# Patient Record
Sex: FEMALE | Race: AMERICAN INDIAN OR ALASKA NATIVE | HISPANIC OR LATINO | Employment: PART TIME | URBAN - METROPOLITAN AREA
[De-identification: names, ages, dates, MRNs, and addresses within clinical notes are randomized per-mention and may not be internally consistent; named-entity substitution may affect disease eponyms.]

---

## 2017-11-28 ENCOUNTER — HOSPITAL ENCOUNTER (EMERGENCY)
Facility: HOSPITAL | Age: 22
Discharge: HOME | End: 2017-11-28
Attending: EMERGENCY MEDICINE
Payer: COMMERCIAL

## 2017-11-28 VITALS
HEIGHT: 65 IN | SYSTOLIC BLOOD PRESSURE: 140 MMHG | DIASTOLIC BLOOD PRESSURE: 80 MMHG | TEMPERATURE: 98.1 F | WEIGHT: 237 LBS | OXYGEN SATURATION: 99 % | HEART RATE: 89 BPM | RESPIRATION RATE: 18 BRPM | BODY MASS INDEX: 39.49 KG/M2

## 2017-11-28 DIAGNOSIS — B37.3 VAGINAL CANDIDIASIS: Primary | ICD-10-CM

## 2017-11-28 LAB
CLARITY, POC: NORMAL
COLOR, POC: YELLOW
EXT BILIRUBIN, UA: NEGATIVE
EXT BLOOD URINE: NEGATIVE
EXT GLUCOSE, UA: NEGATIVE
EXT KETONES: NORMAL
EXT NITRITE, UA: NEGATIVE
EXT PH, UA: 5
EXT PREG TEST URINE: NEGATIVE
EXT PROTEIN, UA: NORMAL
EXT SPECIFIC GRAVITY, UA: 1.03
EXT UROBILINOGEN: NEGATIVE
WBC # BLD EST: NEGATIVE 10*3/UL

## 2017-11-28 PROCEDURE — 87591 N.GONORRHOEAE DNA AMP PROB: CPT | Performed by: EMERGENCY MEDICINE

## 2017-11-28 PROCEDURE — 81025 URINE PREGNANCY TEST: CPT | Performed by: EMERGENCY MEDICINE

## 2017-11-28 PROCEDURE — 87491 CHLMYD TRACH DNA AMP PROBE: CPT | Performed by: EMERGENCY MEDICINE

## 2017-11-28 PROCEDURE — 99283 EMERGENCY DEPT VISIT LOW MDM: CPT

## 2017-11-28 PROCEDURE — 81002 URINALYSIS NONAUTO W/O SCOPE: CPT | Performed by: EMERGENCY MEDICINE

## 2017-11-28 RX ORDER — FLUCONAZOLE 100 MG/1
200 TABLET ORAL ONCE
Status: COMPLETED | OUTPATIENT
Start: 2017-11-28 | End: 2017-11-28

## 2017-11-28 RX ADMIN — FLUCONAZOLE 200 MG: 100 TABLET ORAL at 19:37

## 2017-11-28 NOTE — ED PROVIDER NOTES
History  Chief Complaint   Patient presents with    Vaginal Itching     Patient says the symptoms have been going on for about 1 week  Has itching, odorous urine and discharge  History provided by:  Patient   used: No    Vaginal Itching   Location:  Vagina  Quality:  Itching, white discharge  Severity:  Moderate  Onset quality:  Gradual  Duration:  1 week  Timing:  Constant  Progression:  Worsening  Chronicity:  New  Context:  No antibiotics  Relieved by:  Nothing  Worsened by:  Nothing  Ineffective treatments:  None tried  Associated symptoms: no abdominal pain, no chest pain, no congestion, no cough, no diarrhea, no fatigue, no fever, no headaches, no nausea, no rash, no shortness of breath, no sore throat and no vomiting        None       History reviewed  No pertinent past medical history  History reviewed  No pertinent surgical history  History reviewed  No pertinent family history  I have reviewed and agree with the history as documented  Social History   Substance Use Topics    Smoking status: Current Every Day Smoker     Packs/day: 0 25    Smokeless tobacco: Never Used    Alcohol use Yes      Comment: socially        Review of Systems   Constitutional: Negative for activity change, appetite change, fatigue, fever and unexpected weight change  HENT: Negative for congestion, sore throat and voice change  Eyes: Negative for pain and visual disturbance  Respiratory: Negative for cough, chest tightness and shortness of breath  Cardiovascular: Negative for chest pain  Gastrointestinal: Negative for abdominal pain, diarrhea, nausea and vomiting  Endocrine: Negative for polyphagia and polyuria  Genitourinary: Negative for difficulty urinating, dysuria, flank pain, frequency and urgency  Musculoskeletal: Negative for back pain, gait problem, neck pain and neck stiffness  Skin: Negative for color change and rash     Allergic/Immunologic: Negative for immunocompromised state  Neurological: Negative for dizziness, syncope, speech difficulty, light-headedness, numbness and headaches  Hematological: Does not bruise/bleed easily  Psychiatric/Behavioral: Negative for confusion and decreased concentration  Physical Exam  ED Triage Vitals [11/28/17 1642]   Temperature Pulse Respirations Blood Pressure SpO2   98 1 °F (36 7 °C) 90 18 141/66 98 %      Temp Source Heart Rate Source Patient Position - Orthostatic VS BP Location FiO2 (%)   Oral Monitor Sitting Left arm --      Pain Score       No Pain           Orthostatic Vital Signs  Vitals:    11/28/17 1642   BP: 141/66   Pulse: 90   Patient Position - Orthostatic VS: Sitting       Physical Exam   Constitutional: She is oriented to person, place, and time  She appears well-developed and well-nourished  HENT:   Head: Normocephalic and atraumatic  Eyes: Conjunctivae and EOM are normal  Pupils are equal, round, and reactive to light  No scleral icterus  Neck: Normal range of motion  Neck supple  No tracheal deviation present  Cardiovascular: Normal rate and regular rhythm  Pulmonary/Chest: Effort normal and breath sounds normal  No respiratory distress  Abdominal: Soft  She exhibits no distension  There is no tenderness  There is no rebound and no guarding  Genitourinary:   Genitourinary Comments: Normal external female genitalia  There is scant amount of thick white discharge in the vaginal vault  Musculoskeletal: Normal range of motion  She exhibits no tenderness or deformity  Lymphadenopathy:     She has no cervical adenopathy  Neurological: She is alert and oriented to person, place, and time  No gross focal sensory or motor deficits   Skin: Skin is warm and dry  No rash noted  She is not diaphoretic  Psychiatric: She has a normal mood and affect  Vitals reviewed        ED Medications  Medications   fluconazole (DIFLUCAN) tablet 200 mg (200 mg Oral Given 11/28/17 1937) Diagnostic Studies  Results Reviewed     Procedure Component Value Units Date/Time    POCT pregnancy, urine [[id number]]  (Normal) Resulted:  11/28/17 1818    Lab Status:  Final result Updated:  11/28/17 1818     EXT PREG TEST UR (Ref: Negative) NEGATIVE    POCT urinalysis dipstick [[id number]]  (Normal) Resulted:  11/28/17 1816    Lab Status:  Final result Specimen:  Urine Updated:  11/28/17 1818     Color, UA YELLOW     Clarity, UA CLARITY     EXT Glucose, UA NEGATIVE     EXT Bilirubin, UA (Ref: Negative) NEGATIVE     EXT Ketones, UA (Ref: Negative) TRACE     EXT Spec Grav, UA 1 030     EXT Blood, UA (Ref: Negative) NEGATIVE     EXT pH, UA 5 0     EXT Protein, UA (Ref: Negative) TRACE     EXT Urobilinogen, UA (Ref: 0 2- 1 0) NEGATIVE     EXT Leukocytes, UA (Ref: Negative) NEGATIVE     EXT Nitrite, UA (Ref: Negative) NEGATIVE    Chlamydia/GC amplified DNA by PCR [[id number]] Collected:  11/28/17 1804    Lab Status: In process Specimen:  Urine, Other Updated:  11/28/17 1814                 No orders to display              Procedures  Procedures       Phone Contacts  ED Phone Contact    ED Course  ED Course                                MDM  Number of Diagnoses or Management Options  Vaginal candidiasis: new and requires workup  Diagnosis management comments: 53-year-old female complains of thick white discharge per vagina for the past four days  States that symptoms are similar to previous episodes of yeast infection  Urine HCG today is negative for pregnancy  No sign of infection on urine dip  Pelvic exam is suggestive of vaginal candidiasis  Will treat with Diflucan  Discharged to follow up with OBGYN as necessary    Discussed return precautions       Amount and/or Complexity of Data Reviewed  Clinical lab tests: reviewed and ordered  Review and summarize past medical records: yes      CritCare Time    Disposition  Final diagnoses:   Vaginal candidiasis     Time reflects when diagnosis was documented in both MDM as applicable and the Disposition within this note     Time User Action Codes Description Comment    11/28/2017  7:43 PM Irasema Leiva Add [B37 3] Vaginal candidiasis       ED Disposition     ED Disposition Condition Comment    Discharge  Henry Hernandez discharge to home/self care  Condition at discharge: Good        Follow-up Information     Follow up With Specialties Details Why Contact Info    OB/GYN   Please arrange for follow-up with your OBGYN  If you have new or worsening symptoms please call your doctor or return to the emergency department         Patient's Medications    No medications on file     No discharge procedures on file      ED Provider  Electronically Signed by           Courtney Goodrich MD  11/28/17 2555

## 2017-11-29 LAB
CHLAMYDIA DNA CVX QL NAA+PROBE: NORMAL
N GONORRHOEA DNA GENITAL QL NAA+PROBE: NORMAL

## 2017-11-29 NOTE — DISCHARGE INSTRUCTIONS
Vulvovaginal Candidiasis   WHAT YOU NEED TO KNOW:   Vulvovaginal candidiasis, or yeast infection, is a common vaginal infection  Vulvovaginal candidiasis is caused by a fungus, or yeast-like germ  Fungi are normally found in your vagina  When there are too many fungi, it can cause an infection  DISCHARGE INSTRUCTIONS:   Return to the emergency department if:   · You have fever and chills  · You are bleeding from your vagina and it is not your monthly period  · You develop abdominal or pelvic pain  Contact your healthcare provider if:   · Your signs and symptoms get worse, even after treatment  · You have questions or concerns about your condition or care  Medicines:   · Medicines  help treat the fungal infection and decrease inflammation  The medicine may be a pill, topical cream, or vaginal suppository  · Take your medicine as directed  Contact your healthcare provider if you think your medicine is not helping or if you have side effects  Tell him of her if you are allergic to any medicine  Keep a list of the medicines, vitamins, and herbs you take  Include the amounts, and when and why you take them  Bring the list or the pill bottles to follow-up visits  Carry your medicine list with you in case of an emergency  Manage your symptoms:   · Wear cotton underwear  · Keep the vaginal area clean and dry  · Do not have sex until your symptoms are gone  · Do not douche  · Do not use feminine hygiene sprays, powders, or bubble bath  Prevent another infection:   · Take showers instead of baths  · Eat yogurt  · Limit the amount of alcohol you drink'    · Control your blood sugar if you are diabetic  · Limit your time in hot tubs  Follow up with your healthcare provider as directed:  Write down your questions so you remember to ask them during your visits     © 2017 [employer] Information is for End User's use only and may not be sold, redistributed or otherwise used for commercial purposes  All illustrations and images included in CareNotes® are the copyrighted property of A D [employer] M [employer]  or [employer]  The above information is an  only  It is not intended as medical advice for individual conditions or treatments  Talk to your doctor, nurse or pharmacist before following any medical regimen to see if it is safe and effective for you

## 2018-03-13 ENCOUNTER — APPOINTMENT (OUTPATIENT)
Dept: LAB | Facility: HOSPITAL | Age: 23
End: 2018-03-13

## 2018-03-13 ENCOUNTER — TRANSCRIBE ORDERS (OUTPATIENT)
Dept: OCCUPATIONAL MEDICINE | Facility: CLINIC | Age: 23
End: 2018-03-13

## 2018-03-13 DIAGNOSIS — Z02.1 PRE-EMPLOYMENT HEALTH SCREENING EXAMINATION: ICD-10-CM

## 2018-03-13 DIAGNOSIS — Z02.1 PRE-EMPLOYMENT HEALTH SCREENING EXAMINATION: Primary | ICD-10-CM

## 2018-03-13 LAB — RUBV IGG SERPL IA-ACNC: >175 IU/ML

## 2018-03-13 PROCEDURE — 86762 RUBELLA ANTIBODY: CPT

## 2018-03-13 PROCEDURE — 86787 VARICELLA-ZOSTER ANTIBODY: CPT

## 2018-03-13 PROCEDURE — 86480 TB TEST CELL IMMUN MEASURE: CPT

## 2018-03-13 PROCEDURE — 86765 RUBEOLA ANTIBODY: CPT

## 2018-03-13 PROCEDURE — 36415 COLL VENOUS BLD VENIPUNCTURE: CPT

## 2018-03-13 PROCEDURE — 86735 MUMPS ANTIBODY: CPT

## 2018-03-15 LAB
ANNOTATION COMMENT IMP: NORMAL
GAMMA INTERFERON BACKGROUND BLD IA-ACNC: 0.04 IU/ML
M TB IFN-G BLD-IMP: NEGATIVE
M TB IFN-G CD4+ BCKGRND COR BLD-ACNC: 0.01 IU/ML
M TB IFN-G CD4+ T-CELLS BLD-ACNC: 0.05 IU/ML
MEV IGG SER QL: NORMAL
MEV IGM SER-ACNC: <0.8 AU (ref 0–0.79)
MITOGEN IGNF BLD-ACNC: 6.52 IU/ML
MUV IGG SER QL: NORMAL
QUANTIFERON-TB GOLD IN TUBE: NORMAL
SERVICE CMNT-IMP: NORMAL
VZV IGG SER IA-ACNC: NORMAL

## 2018-04-12 ENCOUNTER — HOSPITAL ENCOUNTER (EMERGENCY)
Facility: HOSPITAL | Age: 23
Discharge: LEFT BEFORE DISCHARGE | End: 2018-04-12
Attending: EMERGENCY MEDICINE
Payer: COMMERCIAL

## 2018-04-12 VITALS
HEART RATE: 98 BPM | HEIGHT: 66 IN | OXYGEN SATURATION: 98 % | SYSTOLIC BLOOD PRESSURE: 118 MMHG | WEIGHT: 260.2 LBS | RESPIRATION RATE: 17 BRPM | DIASTOLIC BLOOD PRESSURE: 80 MMHG | BODY MASS INDEX: 41.82 KG/M2 | TEMPERATURE: 99 F

## 2018-04-12 LAB
BILIRUB UR QL STRIP: NEGATIVE
CLARITY UR: CLEAR
COLOR UR: YELLOW
EXT PREG TEST URINE: POSITIVE
GLUCOSE UR STRIP-MCNC: NEGATIVE MG/DL
HGB UR QL STRIP.AUTO: NEGATIVE
KETONES UR STRIP-MCNC: NEGATIVE MG/DL
LEUKOCYTE ESTERASE UR QL STRIP: NEGATIVE
NITRITE UR QL STRIP: NEGATIVE
PH UR STRIP.AUTO: 6 [PH] (ref 4.5–8)
PROT UR STRIP-MCNC: NEGATIVE MG/DL
SP GR UR STRIP.AUTO: 1.02 (ref 1–1.03)
UROBILINOGEN UR QL STRIP.AUTO: 0.2 E.U./DL

## 2018-04-12 PROCEDURE — 81003 URINALYSIS AUTO W/O SCOPE: CPT | Performed by: EMERGENCY MEDICINE

## 2018-04-12 PROCEDURE — 99284 EMERGENCY DEPT VISIT MOD MDM: CPT

## 2018-04-12 PROCEDURE — 81025 URINE PREGNANCY TEST: CPT | Performed by: EMERGENCY MEDICINE

## 2018-04-13 NOTE — ED NOTES
Pt states that she is still in pain but her ride is here so she wants to leave        Reggie Flores RN  04/12/18 1277

## 2019-05-10 ENCOUNTER — OFFICE VISIT (OUTPATIENT)
Dept: INTERNAL MEDICINE CLINIC | Facility: CLINIC | Age: 24
End: 2019-05-10
Payer: COMMERCIAL

## 2019-05-10 VITALS
RESPIRATION RATE: 18 BRPM | OXYGEN SATURATION: 98 % | DIASTOLIC BLOOD PRESSURE: 72 MMHG | HEIGHT: 65 IN | HEART RATE: 78 BPM | BODY MASS INDEX: 44.65 KG/M2 | WEIGHT: 268 LBS | SYSTOLIC BLOOD PRESSURE: 132 MMHG

## 2019-05-10 DIAGNOSIS — M54.2 NECK PAIN: ICD-10-CM

## 2019-05-10 DIAGNOSIS — M54.9 UPPER BACK PAIN: ICD-10-CM

## 2019-05-10 DIAGNOSIS — L70.9 ACNE, UNSPECIFIED ACNE TYPE: ICD-10-CM

## 2019-05-10 DIAGNOSIS — E66.01 CLASS 3 SEVERE OBESITY DUE TO EXCESS CALORIES WITHOUT SERIOUS COMORBIDITY WITH BODY MASS INDEX (BMI) OF 40.0 TO 44.9 IN ADULT (HCC): Primary | ICD-10-CM

## 2019-05-10 DIAGNOSIS — N62 LARGE BREASTS: ICD-10-CM

## 2019-05-10 PROBLEM — E66.813 CLASS 3 SEVERE OBESITY DUE TO EXCESS CALORIES WITHOUT SERIOUS COMORBIDITY WITH BODY MASS INDEX (BMI) OF 40.0 TO 44.9 IN ADULT (HCC): Status: ACTIVE | Noted: 2019-05-10

## 2019-05-10 PROCEDURE — 3008F BODY MASS INDEX DOCD: CPT | Performed by: PHYSICIAN ASSISTANT

## 2019-05-10 PROCEDURE — 99204 OFFICE O/P NEW MOD 45 MIN: CPT | Performed by: PHYSICIAN ASSISTANT

## 2019-05-10 RX ORDER — METHOCARBAMOL 500 MG/1
TABLET, FILM COATED ORAL
COMMUNITY
Start: 2019-02-09 | End: 2019-07-10

## 2019-05-10 RX ORDER — MELOXICAM 15 MG/1
TABLET ORAL
COMMUNITY
Start: 2019-02-09 | End: 2019-07-10

## 2019-05-13 RX ORDER — TRETINOIN 0.5 MG/G
CREAM TOPICAL
COMMUNITY
Start: 2019-02-22 | End: 2019-07-12

## 2019-05-13 RX ORDER — CLINDAMYCIN HYDROCHLORIDE 300 MG/1
CAPSULE ORAL
COMMUNITY
Start: 2019-02-26 | End: 2019-07-10

## 2019-05-31 ENCOUNTER — EVALUATION (OUTPATIENT)
Dept: PHYSICAL THERAPY | Facility: CLINIC | Age: 24
End: 2019-05-31
Payer: COMMERCIAL

## 2019-05-31 DIAGNOSIS — M54.2 NECK PAIN: ICD-10-CM

## 2019-05-31 DIAGNOSIS — M54.9 UPPER BACK PAIN: ICD-10-CM

## 2019-05-31 PROCEDURE — 97112 NEUROMUSCULAR REEDUCATION: CPT | Performed by: PHYSICAL THERAPIST

## 2019-05-31 PROCEDURE — 97140 MANUAL THERAPY 1/> REGIONS: CPT | Performed by: PHYSICAL THERAPIST

## 2019-05-31 PROCEDURE — 97110 THERAPEUTIC EXERCISES: CPT | Performed by: PHYSICAL THERAPIST

## 2019-05-31 PROCEDURE — 97161 PT EVAL LOW COMPLEX 20 MIN: CPT | Performed by: PHYSICAL THERAPIST

## 2019-06-03 ENCOUNTER — OFFICE VISIT (OUTPATIENT)
Dept: PHYSICAL THERAPY | Facility: CLINIC | Age: 24
End: 2019-06-03
Payer: COMMERCIAL

## 2019-06-03 DIAGNOSIS — M54.2 NECK PAIN: Primary | ICD-10-CM

## 2019-06-03 DIAGNOSIS — M54.9 UPPER BACK PAIN: ICD-10-CM

## 2019-06-03 PROCEDURE — 97110 THERAPEUTIC EXERCISES: CPT | Performed by: PHYSICAL THERAPIST

## 2019-06-03 PROCEDURE — 97140 MANUAL THERAPY 1/> REGIONS: CPT | Performed by: PHYSICAL THERAPIST

## 2019-06-03 PROCEDURE — 97112 NEUROMUSCULAR REEDUCATION: CPT | Performed by: PHYSICAL THERAPIST

## 2019-06-06 ENCOUNTER — APPOINTMENT (OUTPATIENT)
Dept: PHYSICAL THERAPY | Facility: CLINIC | Age: 24
End: 2019-06-06
Payer: COMMERCIAL

## 2019-06-14 ENCOUNTER — APPOINTMENT (OUTPATIENT)
Dept: PHYSICAL THERAPY | Facility: CLINIC | Age: 24
End: 2019-06-14
Payer: COMMERCIAL

## 2019-06-21 ENCOUNTER — TELEPHONE (OUTPATIENT)
Dept: PHYSICAL THERAPY | Facility: CLINIC | Age: 24
End: 2019-06-21

## 2019-07-10 ENCOUNTER — HOSPITAL ENCOUNTER (EMERGENCY)
Facility: HOSPITAL | Age: 24
Discharge: HOME | End: 2019-07-10
Attending: EMERGENCY MEDICINE | Admitting: EMERGENCY MEDICINE
Payer: COMMERCIAL

## 2019-07-10 ENCOUNTER — TELEPHONE (OUTPATIENT)
Dept: INTERNAL MEDICINE CLINIC | Facility: CLINIC | Age: 24
End: 2019-07-10

## 2019-07-10 VITALS
BODY MASS INDEX: 44.71 KG/M2 | OXYGEN SATURATION: 98 % | DIASTOLIC BLOOD PRESSURE: 78 MMHG | WEIGHT: 278.22 LBS | SYSTOLIC BLOOD PRESSURE: 141 MMHG | HEIGHT: 66 IN | TEMPERATURE: 98.1 F | RESPIRATION RATE: 18 BRPM | HEART RATE: 72 BPM

## 2019-07-10 DIAGNOSIS — N89.8 VAGINAL DISCHARGE: Primary | ICD-10-CM

## 2019-07-10 LAB
BILIRUB UR QL STRIP: NEGATIVE
CLARITY UR: CLEAR
COLOR UR: YELLOW
EXT PREG TEST URINE: NEGATIVE
EXT. CONTROL ED NAV: NORMAL
GLUCOSE UR STRIP-MCNC: NEGATIVE MG/DL
HGB UR QL STRIP.AUTO: NEGATIVE
KETONES UR STRIP-MCNC: NEGATIVE MG/DL
LEUKOCYTE ESTERASE UR QL STRIP: NEGATIVE
NITRITE UR QL STRIP: NEGATIVE
PH UR STRIP.AUTO: 5.5 [PH]
PROT UR STRIP-MCNC: NEGATIVE MG/DL
SP GR UR STRIP.AUTO: 1.02 (ref 1–1.03)
UROBILINOGEN UR QL STRIP.AUTO: 0.2 E.U./DL

## 2019-07-10 PROCEDURE — 81003 URINALYSIS AUTO W/O SCOPE: CPT | Performed by: EMERGENCY MEDICINE

## 2019-07-10 PROCEDURE — 99283 EMERGENCY DEPT VISIT LOW MDM: CPT | Performed by: EMERGENCY MEDICINE

## 2019-07-10 PROCEDURE — 81025 URINE PREGNANCY TEST: CPT | Performed by: EMERGENCY MEDICINE

## 2019-07-10 PROCEDURE — 99283 EMERGENCY DEPT VISIT LOW MDM: CPT

## 2019-07-10 PROCEDURE — 87591 N.GONORRHOEAE DNA AMP PROB: CPT | Performed by: EMERGENCY MEDICINE

## 2019-07-10 PROCEDURE — 87491 CHLMYD TRACH DNA AMP PROBE: CPT | Performed by: EMERGENCY MEDICINE

## 2019-07-10 RX ORDER — FLUCONAZOLE 150 MG/1
150 TABLET ORAL ONCE
Qty: 1 TABLET | Refills: 0 | Status: SHIPPED | OUTPATIENT
Start: 2019-07-10 | End: 2019-07-10

## 2019-07-10 RX ORDER — FLUCONAZOLE 150 MG/1
150 TABLET ORAL ONCE
Status: COMPLETED | OUTPATIENT
Start: 2019-07-10 | End: 2019-07-10

## 2019-07-10 RX ADMIN — FLUCONAZOLE 150 MG: 150 TABLET ORAL at 16:38

## 2019-07-10 NOTE — TELEPHONE ENCOUNTER
Will make sure her PT is done before her next appt 08/16  She also would like to know if you would like any routine blood work done before this appt? Call back #[phone]   leave message

## 2019-07-10 NOTE — ED PROVIDER NOTES
History  Chief Complaint   Patient presents with    Vaginal Discharge     Patient presents with vaginal discharge and itchiness for the last few days  States she would also like to be tested for STIs       History provided by:  Patient  Vaginal Discharge   Quality:  White  Severity:  Moderate  Onset quality:  Gradual  Duration:  1 week  Timing:  Constant  Progression:  Worsening  Chronicity:  New  Context: spontaneously    Context: not recent antibiotic use    Relieved by:  Nothing  Worsened by:  Nothing  Ineffective treatments:  None tried  Associated symptoms: vaginal itching    Associated symptoms: no abdominal pain, no dyspareunia, no dysuria, no fever and no vomiting    Risk factors: no new sexual partner, no STI (but wants to be checked) and no STI exposure        Prior to Admission Medications   Prescriptions Last Dose Informant Patient Reported? Taking? clindamycin (CLEOCIN) 300 MG capsule   Yes No   meloxicam (MOBIC) 15 mg tablet   Yes No   methocarbamol (ROBAXIN) 500 mg tablet   Yes No   mupirocin (BACTROBAN) 2 % ointment   Yes No   tretinoin (REFISSA) 0 05 % cream   Yes No   tretinoin (RETIN-A) 0 05 % cream   Yes No   Sig:   Facility-Administered Medications: None       Past Medical History:   Diagnosis Date    High cholesterol     Morbid obesity with BMI of 45 0-49 9, adult ([facility] [address] )     Pre-operative laboratory examination        History reviewed  No pertinent surgical history  Family History   Problem Relation Age of Onset    Coronary artery disease Mother     Diabetes Sister     Coronary artery disease Maternal Grandmother     Hyperlipidemia Maternal Grandmother     Hyperlipidemia Maternal Grandfather     Diabetes Maternal Grandfather     Alcohol abuse Neg Hx     Substance Abuse Neg Hx     Mental illness Neg Hx      I have reviewed and agree with the history as documented      Social History     Tobacco Use    Smoking status: Former Smoker     Packs/day: 0 25     Years: 6 00 Pack years: 1 50    Smokeless tobacco: Never Used    Tobacco comment: stopped   Substance Use Topics    Alcohol use: Yes     Comment: socially-holidays    Drug use: No        Review of Systems   Constitutional: Negative for fever  Gastrointestinal: Negative for abdominal pain and vomiting  Genitourinary: Positive for vaginal discharge  Negative for dyspareunia and dysuria  All other systems reviewed and are negative  Physical Exam  Physical Exam   Constitutional: She appears well-developed and well-nourished  HENT:   Head: Normocephalic and atraumatic  Eyes: Pupils are equal, round, and reactive to light  EOM are normal    Cardiovascular: Normal rate and regular rhythm  Pulmonary/Chest: Effort normal and breath sounds normal    Abdominal: Soft  Genitourinary: There is no rash, tenderness or lesion on the right labia  There is no rash, tenderness or lesion on the left labia  Cervix exhibits discharge (mild yellow white discharge)  Cervix exhibits no motion tenderness  Musculoskeletal: She exhibits no edema  Neurological: She is alert  No cranial nerve deficit  She exhibits normal muscle tone  Skin: Skin is warm  Psychiatric: She has a normal mood and affect  Vitals reviewed        Vital Signs  ED Triage Vitals [07/10/19 1615]   Temp Pulse Respirations Blood Pressure SpO2   -- 72 18 141/78 98 %      Temp src Heart Rate Source Patient Position - Orthostatic VS BP Location FiO2 (%)   -- Monitor Sitting Right arm --      Pain Score       --           Vitals:    07/10/19 1615   BP: 141/78   Pulse: 72   Patient Position - Orthostatic VS: Sitting         Visual Acuity      ED Medications  Medications - No data to display    Diagnostic Studies  Results Reviewed     None                 No orders to display              Procedures  Procedures       ED Course                               MDM  Number of Diagnoses or Management Options  Vaginal discharge: new and requires workup  Diagnosis management comments: Pt declining current STD treatment and will await phone call if positive  Has appointment with OB/GYN in next few weeks  Amount and/or Complexity of Data Reviewed  Clinical lab tests: ordered and reviewed        Disposition  Final diagnoses:   None     ED Disposition     None      Follow-up Information    None         Patient's Medications   Discharge Prescriptions    No medications on file     No discharge procedures on file      ED Provider  Electronically Signed by           Mahesh Valencia MD  07/10/19 9716

## 2019-07-11 LAB
C TRACH DNA SPEC QL NAA+PROBE: NEGATIVE
N GONORRHOEA DNA SPEC QL NAA+PROBE: NEGATIVE

## 2019-07-12 ENCOUNTER — OFFICE VISIT (OUTPATIENT)
Dept: DERMATOLOGY | Facility: CLINIC | Age: 24
End: 2019-07-12
Payer: COMMERCIAL

## 2019-07-12 DIAGNOSIS — L70.9 ACNE, UNSPECIFIED ACNE TYPE: Primary | ICD-10-CM

## 2019-07-12 DIAGNOSIS — L91.0 KELOID OF SKIN: ICD-10-CM

## 2019-07-12 PROCEDURE — 99203 OFFICE O/P NEW LOW 30 MIN: CPT | Performed by: DERMATOLOGY

## 2019-07-12 PROCEDURE — 11900 INJECT SKIN LESIONS </W 7: CPT | Performed by: DERMATOLOGY

## 2019-07-12 RX ORDER — TRIAMCINOLONE ACETONIDE 40 MG/ML
40 INJECTION, SUSPENSION INTRA-ARTICULAR; INTRAMUSCULAR ONCE
Status: COMPLETED | OUTPATIENT
Start: 2019-07-12 | End: 2019-07-12

## 2019-07-12 RX ADMIN — TRIAMCINOLONE ACETONIDE 40 MG: 40 INJECTION, SUSPENSION INTRA-ARTICULAR; INTRAMUSCULAR at 13:06

## 2019-07-12 NOTE — PROGRESS NOTES
[facility] DERMATOLOGY  [place] [address] [place] [id number]  [phone]  [phone]     MRN: [id number] : 1995  Encounter: [id number]  Patient Information: Delilah Waller  Chief complaint: keloids    History of present illness:  26year old female with history of acne and history of keloids presents for overall check and concerned regarding keloids patient reports that she has had this treated previously with some response  Past Medical History:   Diagnosis Date    High cholesterol     Morbid obesity with BMI of 45 0-49 9, adult ([facility] [address] )     Pre-operative laboratory examination      History reviewed  No pertinent surgical history  Social History   Social History     Substance and Sexual Activity   Alcohol Use Yes    Comment: socially-holidays     Social History     Substance and Sexual Activity   Drug Use No     Social History     Tobacco Use   Smoking Status Former Smoker    Packs/day: 0 25    Years: 6 00    Pack years: 1 50   Smokeless Tobacco Never Used   Tobacco Comment    stopped     Family History   Problem Relation Age of Onset    Coronary artery disease Mother     Diabetes Sister     Coronary artery disease Maternal Grandmother     Hyperlipidemia Maternal Grandmother     Hyperlipidemia Maternal Grandfather     Diabetes Maternal Grandfather     Alcohol abuse Neg Hx     Substance Abuse Neg Hx     Mental illness Neg Hx      Meds/Allergies   No Known Allergies    Meds:  Prior to Admission medications    Medication Sig Start Date End Date Taking?  Authorizing Provider   mupirocin (BACTROBAN) 2 % ointment  19   Historical Provider, MD   tretinoin (REFISSA) 0 05 % cream  19   Historical Provider, MD   tretinoin (RETIN-A) 0 05 % cream   19   Historical Provider, MD       Subjective:     Review of Systems:    General: negative for - chills, fatigue, fever,  weight gain or weight loss  Psychological: negative for - anxiety, behavioral disorder, concentration difficulties, decreased libido, depression, irritability, memory difficulties, mood swings, sleep disturbances or suicidal ideation  ENT: negative for - hearing difficulties , nasal congestion, nasal discharge, oral lesions, sinus pain, sneezing, sore throat  Allergy and Immunology: negative for - hives, insect bite sensitivity,  Hematological and Lymphatic: negative for - bleeding problems, blood clots,bruising, swollen lymph nodes  Endocrine: negative for - hair pattern changes, hot flashes, malaise/lethargy, mood swings, palpitations, polydipsia/polyuria, skin changes, temperature intolerance or unexpected weight change  Respiratory: negative for - cough, hemoptysis, orthopnea, shortness of breath, or wheezing  Cardiovascular: negative for - chest pain, dyspnea on exertion, edema,  Gastrointestinal: negative for - abdominal pain, nausea/vomiting  Genito-Urinary: negative for - dysuria, incontinence, irregular/heavy menses or urinary frequency/urgency  Musculoskeletal: negative for - gait disturbance, joint pain, joint stiffness, joint swelling, muscle pain, muscular weakness  Dermatological:  As in HPI  Neurological: negative for confusion, dizziness, headaches, impaired coordination/balance, memory loss, numbness/tingling, seizures, speech problems, tremors or weakness       Objective: There were no vitals taken for this visit      Physical Exam:    General Appearance:    Alert, cooperative, no distress   Head:    Normocephalic, without obvious abnormality, atraumatic           Skin:   A full skin exam was performed including scalp, head scalp, eyes, ears, nose, lips, neck, chest, axilla, abdomen, back, buttocks, bilateral upper extremities, bilateral lower extremities, hands, feet, fingers, toes, fingernails, and toenails some comedones papules noted on the face no acne noted on the torso numerous hypertrophic to keloid scars noted between the on the posterior shoulders 1 on the chest nothing else of concern noted  Intralesional Injection Procedure Note  Diagnosis:  KeloidLocation:  Left upper backInformed consent:  Discussed risks (infection, pain, bleeding, bruising, thinning of the skin, loss of skin pigment, lack of resolution, and recurrence of lesion) and benefits of the procedure, as well as the alternatives   Informed consent was obtained  Preparation: The area was prepared a standard fashion  Anesthesia: not required  Procedure Details: An intralesional injection was performed with                                         Kenalog 40                         1 cc in total were injected  Total number of lesions injected:  12       Assessment:     1  Acne, unspecified acne type  tretinoin (RETIN-A) 0 025 % cream   2  Keloid of skin           Plan:   Acne we discussed the pathophysiology of acne the role of hormones cleansers at present encourage patient to control her acne with the lower dose of tretinoin see if this will prevent further activity as this is going to lead to further keloids  Keloid we discussed this process will go ahead and see if we get this under control with intralesional steroid injection  Estiven Rodriguez MD  7/12/2019,11:17 AM    Portions of the record may have been created with voice recognition software   Occasional wrong word or "sound a like" substitutions may have occurred due to the inherent limitations of voice recognition software   Read the chart carefully and recognize, using context, where substitutions have occurred

## 2019-07-12 NOTE — PATIENT INSTRUCTIONS
Acne we discussed the pathophysiology of acne the role of hormones cleansers at present encourage patient to control her acne with the lower dose of tretinoin see if this will prevent further activity as this is going to lead to further keloids    Keloid we discussed this process will go ahead and see if we get this under control with intralesional steroid injection

## 2019-07-22 ENCOUNTER — EVALUATION (OUTPATIENT)
Dept: PHYSICAL THERAPY | Facility: CLINIC | Age: 24
End: 2019-07-22
Payer: COMMERCIAL

## 2019-07-22 DIAGNOSIS — M54.2 NECK PAIN: Primary | ICD-10-CM

## 2019-07-22 DIAGNOSIS — M54.9 UPPER BACK PAIN: ICD-10-CM

## 2019-07-22 PROCEDURE — 97140 MANUAL THERAPY 1/> REGIONS: CPT | Performed by: PHYSICAL THERAPIST

## 2019-07-22 PROCEDURE — 97110 THERAPEUTIC EXERCISES: CPT | Performed by: PHYSICAL THERAPIST

## 2019-07-22 PROCEDURE — 97164 PT RE-EVAL EST PLAN CARE: CPT | Performed by: PHYSICAL THERAPIST

## 2019-07-22 RX ORDER — IBUPROFEN 200 MG
TABLET ORAL EVERY 6 HOURS PRN
COMMUNITY
End: 2021-10-04 | Stop reason: ALTCHOICE

## 2019-07-22 NOTE — PROGRESS NOTES
PT Re-Evaluation     Today's date: 2019  Patient name: Judith Rojas  : 1995  MRN: [id number]  Referring provider: Albertina France PA-C  Dx:   Encounter Diagnosis     ICD-10-CM    1  Neck pain M54 2    2  Upper back pain M54 9                   Assessment  Assessment details: Patient is a 27 y/o female with chief complaints of cervical/thoracic pain that began 4-5 years ago and worsened after the birth of her son 9 months ago  She reports large breasts contribute to her slouched/forward shoulder posture  Patient continues with decreased functional mobility due to increased pain, decreased cervical/postural strength, decreased cervical and thoracic ROM associated with postural dysfunction  She is not independent in exercises, self stretches, and has not made postural/ergonomic changes at her work desk  Patient will benefit from skilled physical therapy to address impairment and improve functional mobility  PT needed to allow for return to maximal function and improve quality of life  Impairments: abnormal or restricted ROM, activity intolerance, impaired physical strength, lacks appropriate home exercise program, pain with function, poor posture  and poor body mechanics  Understanding of Dx/Px/POC: good   Prognosis: good    Goals  STG within 4 weeks:   1  Patient to be independent in HEP  2  Reduce pain by 50% to improve quality of life  3  Improve cervical ROM by 10 degrees in all planes to improve ADLs  (partially met)  4  Improve cervical strength to 4+ in all planes to improve ADLs  LTG within 8 weeks:   1  Patient to be independent in ADLs/IADLs without difficulty  2  Patient to be able to sit at work desk for 20-30 minutes without pain  3  Patient to be able to look over her shoulder without pain       Plan  Patient would benefit from: skilled physical therapy and PT eval  Planned modality interventions: cryotherapy, hydrotherapy, unattended electrical stimulation, H-Wave, traction and TENS  Planned therapy interventions: therapeutic training, therapeutic exercise, therapeutic activities, stretching, strengthening, postural training, patient education, neuromuscular re-education, manual therapy, joint mobilization, IADL retraining, activity modification, ADL retraining, ADL training, body mechanics training, flexibility, functional ROM exercises, gait training, graded activity, graded exercise, graded motor, home exercise program, Morales taping and abdominal trunk stabilization  Frequency: 2x week  Duration in weeks: 8  Plan of Care beginning date: 2019  Plan of Care expiration date: 2019  Treatment plan discussed with: patient        Subjective Evaluation    History of Present Illness  Onset date: ongoing for the past 4-5 years; script from 5/10/19  Mechanism of injury: Patient is a 25 y/o female with chief complaints of cervical and thoracic pain that began 4-5 years ago with insidious onset  She states she has large breasts and is more comfortable slouching, which has increased her pain over the years  She reports increased pain following the birth of her son 9 months ago  She was seen by PCP and referred for physical therapy  Upon re-evaluation on 19, patient has only attended two therapy sessions due to her work schedule and states she is only performing exercises on occasion  She wants to return to therapy prior to her upcoming dr Dupont Many     Pain  Current pain ratin  At best pain ratin  At worst pain ratin  Quality: cramping and burning  Relieving factors: heat and medications  Aggravating factors: lifting  Progression: worsening      Diagnostic Tests  X-ray: normal (patient reports cerviothoracic xray was normal; unable to see formal imaging report from outside facility )  Treatments  Previous treatment: medication  Patient Goals  Patient goals for therapy: decreased pain  Patient goal: improve her posture         Objective     Concurrent Complaints  Negative for dizziness, headaches, nausea/motion sickness and visual change    Additional Special Questions  No numbness/tingling into UE     Postural Observations  Seated posture: fair  Standing posture: fair  Correction of posture: makes symptoms better    Additional Postural Observation Details  Forward head/forward shoulder     Palpation     Additional Palpation Details  Tender to palpation of cervical and thoracic SPs; thoracic paraspinal tenderness     Neurological Testing     Sensation   Cervical/Thoracic   Left   Intact: light touch    Right   Intact: light touch    Active Range of Motion   Cervical/Thoracic Spine       Cervical    Flexion: 30 degrees  with pain  Extension: 40 degrees      Left lateral flexion: 30 degrees     with pain  Right lateral flexion: 25 degrees     with pain  Left rotation: 60 degrees with pain  Right rotation: 72 degrees    with pain    Thoracic    Left lateral flexion:  with pain Restriction level: moderate  Right lateral flexion:  with pain Restriction level: moderate    Additional Active Range of Motion Details  Pain with thoracic rotation overpressure    Joint Play     Comments: Cervical segmental mobility: WNL   Thoracic segmental mobility: hypomobile     Strength/Myotome Testing   Cervical Spine   Neck extension: 3  Neck flexion: 3    Left   Interossei strength (t1): 5  Neck lateral flexion (C3): 3    Right   Interossei strength (t1): 5  Neck lateral flexion (C3): 3    Left Shoulder     Planes of Motion   Flexion: 5   Abduction: 5   External rotation at 0°: 5   Internal rotation at 0°: 5     Right Shoulder     Planes of Motion   Flexion: 5   Abduction: 5   External rotation at 0°: 5   Internal rotation at 0°: 5     Left Elbow   Flexion: 5  Extension: 5    Right Elbow   Flexion: 5  Extension: 5    Left Wrist/Hand   Wrist extension: 5  Wrist flexion: 5  Thumb extension: 5    Right Wrist/Hand   Wrist extension: 5  Wrist flexion: 5  Thumb extension: 5    Additional Strength Details  Cervical strength: 3/5 formally assessed (able to hold 4- through available range)    Lumbar screen: 90 flexion, 40 extension, 30 side bending bilaterally     LE MMT: 5/5     General Comments:    Upper quarter screen   Shoulder: unremarkable  Elbow: unremarkable  Hand/wrist: unremarkable  Neuro Exam:     Headaches   Patient reports headaches: No        Flowsheet Rows      Most Recent Value   PT/OT G-Codes   Current Score  41   Projected Score  56          Precautions: none     Daily Treatment Diary     Manual  5/31 6/3 7/22          Light TSP /UVPs 8' 8'           STR Paraspinals CT Junction  6'           IASTM Thoracic paraspinals   8'                                      Increased time spent on patient education with diagnosis, prognosis, goals of therapy, progression of therapy, and plan of care  All questions answered  Patient instructed to call clinic with questions or concerns  Patient education on work desk set up to reduce cervicothoracic stresses  Exercise Diary  5/31 6/3 7/22          RetroUBE  X 3'           NMR: Postural Edu KS   KS           Cervical Retraction 3"x15            Scapular Retraction 3"x20            Doorway Stretch 5x20" 5x20"           Standing open books x15 ea SL x15 ea           Seated 1/2 foam roll TSP extension 3"x20 3"x30           RetroUBE NV 3'           TB Row NV Red 3 x 10            TB Ext  NV Red 3 x 10            Prone Scapular Retraction- 4 steps   2 x 10           PPT  3"x20 3" x20          Adductor set w TA  3" x20 3" x20          TA engagement in hooklying    3" x20          Kneeling hip flexor/quad stretch   5 x 20" ea          LTR   2x10                                                                                            Modalities    7/22          Moist hot pack TSP   10'                                      Assessment: Less pain from start to finish of session   She is educated in today's exercises for HEP and instruction in seated posture and desk set up for work

## 2019-07-24 ENCOUNTER — OFFICE VISIT (OUTPATIENT)
Dept: PHYSICAL THERAPY | Facility: CLINIC | Age: 24
End: 2019-07-24
Payer: COMMERCIAL

## 2019-07-24 DIAGNOSIS — M54.2 NECK PAIN: Primary | ICD-10-CM

## 2019-07-24 DIAGNOSIS — M54.9 UPPER BACK PAIN: ICD-10-CM

## 2019-07-24 PROCEDURE — 97112 NEUROMUSCULAR REEDUCATION: CPT | Performed by: PHYSICAL THERAPIST

## 2019-07-24 PROCEDURE — 97110 THERAPEUTIC EXERCISES: CPT | Performed by: PHYSICAL THERAPIST

## 2019-07-24 PROCEDURE — 97140 MANUAL THERAPY 1/> REGIONS: CPT | Performed by: PHYSICAL THERAPIST

## 2019-07-24 NOTE — PROGRESS NOTES
Daily Note     Today's date: 2019  Patient name: Caitlyn العلي  : 1995  MRN: [id number]  Referring provider: Jazzy Timmons PA-C  Dx:   Encounter Diagnosis     ICD-10-CM    1  Neck pain M54 2    2  Upper back pain M54 9                   Subjective: Patient states the kneeling hip flexor stretch has been beneficial in reducing pain  She is trying to pay attention to posture at her desk  Objective: See treatment diary below      Manual  5/31 6/3 7/22 7/24         Light TSP /UVPs 8' 8'           STR Paraspinals CT Junction  6'           IASTM Thoracic paraspinals   8' 8'         CTJ Mob    2'                          Exercise Diary  5/31 6/3 7/22 7/24         RetroUBE  X 3'           NMR: Postural Edu KS   KS           Cervical Retraction 3"x15            Scapular Retraction 3"x20            Doorway Stretch 5x20" 5x20"           Standing open books x15 ea SL x15 ea           Seated 1/2 foam roll TSP extension 3"x20 3"x30           RetroUBE NV 3'           TB Row NV Red 3 x 10            TB Ext  NV Red 3 x 10            Prone Scapular Retraction- 4 steps   2 x 10           PPT  3"x20 3" x20 3" x20         Adductor set w TA  3" x20 3" x20 3" x20         TA engagement in hooklying    3" x20 3" x20         Kneeling hip flexor/quad stretch   5 x 20" ea 5 x 20" ea         LTR   2x10 2x10         Forward flexion ball roll center, L, R      x5 ea                                                                              Modalities             Moist hot pack TSP   10' 10'                                         Assessment: Tolerated treatment well  Patient notes significant reduction in lumbar pain from start to finish of session  CTJ pain persists; partially relieved with CTJ mob  Patient education in not overemphasizing scapular retraction posture while at work, so as to not further stress cervicothoracic region  Patient would benefit from continued PT      Plan: Continue per plan of care

## 2019-07-29 ENCOUNTER — OFFICE VISIT (OUTPATIENT)
Dept: PHYSICAL THERAPY | Facility: CLINIC | Age: 24
End: 2019-07-29
Payer: COMMERCIAL

## 2019-07-29 DIAGNOSIS — M54.9 UPPER BACK PAIN: ICD-10-CM

## 2019-07-29 DIAGNOSIS — M54.2 NECK PAIN: Primary | ICD-10-CM

## 2019-07-29 PROCEDURE — 97110 THERAPEUTIC EXERCISES: CPT | Performed by: PHYSICAL THERAPIST

## 2019-07-29 PROCEDURE — 97112 NEUROMUSCULAR REEDUCATION: CPT | Performed by: PHYSICAL THERAPIST

## 2019-07-29 NOTE — PROGRESS NOTES
Daily Note     Today's date: 2019  Patient name: Elizabeth Winters  : 1995  MRN: [id number]  Referring provider: Nancy Luna PA-C  Dx:   Encounter Diagnosis     ICD-10-CM    1  Neck pain M54 2    2  Upper back pain M54 9                   Subjective: Patient states , "I've been having pain for so long  It's not going to go away " She does state she's been doing her kneeling hip flexor stretch, which has been helping a bit  Objective: See treatment diary below      Manual  5/31 6/3 7/22 7/24 7/29        Light TSP /UVPs 8' 8'           STR Paraspinals CT Junction  6'           IASTM Thoracic paraspinals   8' 8'         CTJ Mob    2'                          Exercise Diary  5/31 6/3 7/22 7/24 7/29        RetroUBE  X 3'   Bike x 8 min        NMR: Postural Edu KS   KS           Cervical Retraction 3"x15            Scapular Retraction 3"x20            Doorway Stretch 5x20" 5x20"           Standing open books x15 ea SL x15 ea           Seated 1/ foam roll TSP extension 3"x20 3"x30           RetroUBE NV 3'           TB Row NV Red 3 x 10    Red 3 x 10        TB Ext  NV Red 3 x 10    Red 3 x 10        Prone Scapular Retraction- 4 steps   2 x 10           PPT  3"x20 3" x20 3" x20 3"x20        Adductor set w TA  3" x20 3" x20 3" x20 W/ knee lift 3"x 20        TA engagement in hooklying    3" x20 3" x20 3"x20        Kneeling hip flexor/quad stretch   5 x 20" ea 5 x 20" ea 5 x 20"ea        LTR   2x10 2x10 x20 Hold        Forward flexion ball roll center, L, R      x5 ea Hold         Mary pose      4 x 15"        Manual BLE- HS and piri stretching     10'                                                   Modalities             Moist hot pack TSP   10' 10'                                         Assessment: Tolerated treatment fair  Slight reduction in pain from start to finish of session  Patient admits not performing all her home exercises; updated written HEP provided to patient   Encouraged to continue with daily exercise to promote optimal mobility    Patient would benefit from continued PT      Plan: Continue per plan of care

## 2019-07-31 ENCOUNTER — OFFICE VISIT (OUTPATIENT)
Dept: PHYSICAL THERAPY | Facility: CLINIC | Age: 24
End: 2019-07-31
Payer: COMMERCIAL

## 2019-07-31 DIAGNOSIS — M54.9 UPPER BACK PAIN: Primary | ICD-10-CM

## 2019-07-31 DIAGNOSIS — M54.2 NECK PAIN: ICD-10-CM

## 2019-07-31 PROCEDURE — 97112 NEUROMUSCULAR REEDUCATION: CPT | Performed by: PHYSICAL THERAPIST

## 2019-07-31 PROCEDURE — 97110 THERAPEUTIC EXERCISES: CPT | Performed by: PHYSICAL THERAPIST

## 2019-07-31 NOTE — PROGRESS NOTES
Daily Note     Today's date: 2019  Patient name: Destinee Townsend  : 1995  MRN: [id number]  Referring provider: Key Patino PA-C  Dx:   Encounter Diagnosis     ICD-10-CM    1  Upper back pain M54 9    2  Neck pain M54 2                   Subjective: Patient states , "I didn't have much pain today  I feel good "     Objective: See treatment diary below      Manual  5/31 6/3 7/22 7/24 7/29 7/31       Light TSP /UVPs 8' 8'           STR Paraspinals CT Junction  6'           IASTM Thoracic paraspinals   8' 8'         CTJ Mob    2'                          Exercise Diary  5/31 6/3 7/22 7/24 7/29 7/31       RetroUBE  X 3'   Bike x 8 min Bike x 8 min       NMR: Postural Edu KS   KS           Cervical Retraction 3"x15            Scapular Retraction 3"x20            Doorway Stretch 5x20" 5x20"           Standing open books x15 ea SL x15 ea           Seated  foam roll TSP extension 3"x20 3"x30           RetroUBE NV 3'           TB Row NV Red 3 x 10    Red 3 x 10 Red 3 x 10       TB Ext  NV Red 3 x 10    Red 3 x 10 Red 3 x 10       Prone Scapular Retraction- 4 steps   2 x 10           PPT  3"x20 3" x20 3" x20 3"x20 3"x20       Adductor set w TA  3" x20 3" x20 3" x20 W/ knee lift 3"x 20 Up up down x 20       TA engagement in hooklying    3" x20 3" x20 3"x20        Kneeling hip flexor/quad stretch   5 x 20" ea 5 x 20" ea 5 x 20"ea 5 x 20"ea       LTR   2x10 2x10 x20 Hold        Forward flexion ball roll center, L, R      x5 ea Hold         Mary pose      4 x 15" 5 x 20"       Manual BLE- HS and piri stretching     10' 10'       DLS in HL w PB      2"Q80       SLR      2x10 ea                        Modalities             Moist hot pack TSP   10' 10'                                         Assessment: Tolerated treatment well  Able to progress exercise program to include DLS in hooklying with PB and SLR  No increased pain with session     Patient would benefit from continued PT      Plan: Continue per plan of care

## 2019-08-05 ENCOUNTER — OFFICE VISIT (OUTPATIENT)
Dept: PHYSICAL THERAPY | Facility: CLINIC | Age: 24
End: 2019-08-05
Payer: COMMERCIAL

## 2019-08-05 DIAGNOSIS — M54.9 UPPER BACK PAIN: Primary | ICD-10-CM

## 2019-08-05 DIAGNOSIS — M54.2 NECK PAIN: ICD-10-CM

## 2019-08-05 PROCEDURE — 97110 THERAPEUTIC EXERCISES: CPT | Performed by: PHYSICAL THERAPIST

## 2019-08-05 PROCEDURE — 97112 NEUROMUSCULAR REEDUCATION: CPT | Performed by: PHYSICAL THERAPIST

## 2019-08-05 NOTE — PROGRESS NOTES
Daily Note     Today's date: 2019  Patient name: Delilah Waller  : 1995  MRN: [id number]  Referring provider: Ilan Sutherland PA-C  Dx:   Encounter Diagnosis     ICD-10-CM    1  Upper back pain M54 9    2  Neck pain M54 2                   Subjective: Patient states she again has no pain today  "Some days it's good and some days I have pain "     Objective: See treatment diary below      Manual  5/31 6/3 7/22 7/24 7/29 7/31 8/5      Light TSP /UVPs 8' 8'           STR Paraspinals CT Junction  6'           IASTM Thoracic paraspinals   8' 8'         CTJ Mob    2'                          Exercise Diary  5/31 6/3 7/22 7/24 7/29 7/31 8/5      RetroUBE  X 3'   Bike x 8 min Bike x 8 min Bike x 8 min      NMR: Postural Edu KS   KS           Cervical Retraction 3"x15            Scapular Retraction 3"x20            Doorway Stretch 5x20" 5x20"           Standing open books x15 ea SL x15 ea           Seated 1/ foam roll TSP extension 3"x20 3"x30           RetroUBE NV 3'           TB Row NV Red 3 x 10    Red 3 x 10 Red 3 x 10 Green 3 x 10      TB Ext  NV Red 3 x 10    Red 3 x 10 Red 3 x 10 Green 3 x 10      Prone Scapular Retraction- 4 steps   2 x 10           PPT  3"x20 3" x20 3" x20 3"x20 3"x20       Adductor set w TA  3" x20 3" x20 3" x20 W/ knee lift 3"x 20 Up up down x 20 Up up down x 20      TA engagement in hooklying    3" x20 3" x20 3"x20        Kneeling hip flexor/quad stretch   5 x 20" ea 5 x 20" ea 5 x 20"ea 5 x 20"ea 5 x 20" ea      LTR   2x10 2x10 x20 Hold        Forward flexion ball roll center, L, R      x5 ea Hold         Mary pose      4 x 15" 5 x 20"       Manual BLE- HS and piri stretching     10' 10' 10'      DLS in HL w PB      2"B41 5"x20      SLR      2x10 ea 3x10ea                       Modalities             Moist hot pack TSP   10' 10'                                         Assessment: Patient completes exercise program without any increase in pain    Patient education in importance of exercises at home several times a week in addition to PT sessions  Patient would benefit from continued PT      Plan: Continue per plan of care

## 2019-08-09 ENCOUNTER — APPOINTMENT (OUTPATIENT)
Dept: PHYSICAL THERAPY | Facility: CLINIC | Age: 24
End: 2019-08-09
Payer: COMMERCIAL

## 2019-08-16 ENCOUNTER — OFFICE VISIT (OUTPATIENT)
Dept: INTERNAL MEDICINE CLINIC | Facility: CLINIC | Age: 24
End: 2019-08-16
Payer: COMMERCIAL

## 2019-08-16 ENCOUNTER — TRANSCRIBE ORDERS (OUTPATIENT)
Dept: LAB | Facility: CLINIC | Age: 24
End: 2019-08-16

## 2019-08-16 ENCOUNTER — OFFICE VISIT (OUTPATIENT)
Dept: DERMATOLOGY | Facility: CLINIC | Age: 24
End: 2019-08-16
Payer: COMMERCIAL

## 2019-08-16 VITALS
RESPIRATION RATE: 18 BRPM | OXYGEN SATURATION: 97 % | SYSTOLIC BLOOD PRESSURE: 138 MMHG | HEART RATE: 86 BPM | TEMPERATURE: 97.8 F | BODY MASS INDEX: 43.87 KG/M2 | WEIGHT: 273 LBS | HEIGHT: 66 IN | DIASTOLIC BLOOD PRESSURE: 78 MMHG

## 2019-08-16 DIAGNOSIS — L91.0 KELOID OF SKIN: Primary | ICD-10-CM

## 2019-08-16 DIAGNOSIS — N62 LARGE BREASTS: ICD-10-CM

## 2019-08-16 DIAGNOSIS — M54.2 NECK PAIN: Primary | ICD-10-CM

## 2019-08-16 DIAGNOSIS — F32.A DEPRESSION, UNSPECIFIED DEPRESSION TYPE: ICD-10-CM

## 2019-08-16 DIAGNOSIS — E66.01 CLASS 3 SEVERE OBESITY DUE TO EXCESS CALORIES WITHOUT SERIOUS COMORBIDITY WITH BODY MASS INDEX (BMI) OF 40.0 TO 44.9 IN ADULT (HCC): ICD-10-CM

## 2019-08-16 DIAGNOSIS — M54.9 UPPER BACK PAIN: ICD-10-CM

## 2019-08-16 DIAGNOSIS — E78.5 HYPERLIPIDEMIA, UNSPECIFIED HYPERLIPIDEMIA TYPE: ICD-10-CM

## 2019-08-16 PROCEDURE — 99213 OFFICE O/P EST LOW 20 MIN: CPT | Performed by: PHYSICIAN ASSISTANT

## 2019-08-16 PROCEDURE — 3008F BODY MASS INDEX DOCD: CPT | Performed by: PHYSICIAN ASSISTANT

## 2019-08-16 PROCEDURE — 11900 INJECT SKIN LESIONS </W 7: CPT | Performed by: DERMATOLOGY

## 2019-08-16 PROCEDURE — 99212 OFFICE O/P EST SF 10 MIN: CPT | Performed by: DERMATOLOGY

## 2019-08-16 PROCEDURE — 1036F TOBACCO NON-USER: CPT | Performed by: PHYSICIAN ASSISTANT

## 2019-08-16 RX ORDER — TRIAMCINOLONE ACETONIDE 40 MG/ML
40 INJECTION, SUSPENSION INTRA-ARTICULAR; INTRAMUSCULAR ONCE
Status: COMPLETED | OUTPATIENT
Start: 2019-08-16 | End: 2019-08-16

## 2019-08-16 RX ADMIN — TRIAMCINOLONE ACETONIDE 40 MG: 40 INJECTION, SUSPENSION INTRA-ARTICULAR; INTRAMUSCULAR at 09:32

## 2019-08-16 NOTE — PROGRESS NOTES
[facility] DERMATOLOGY  [address] [place] [id number]  [phone]  [phone]     MRN: [id number] : 1995  Encounter: [id number]  Patient Information: Tess Hernadez    Subjective:     28-year-old female presents for follow-up for her keloids on her chest and shoulders  Patient notes improvement after last injection     Objective: There were no vitals taken for this visit  Physical Exam:    General Appearance:    Alert, cooperative, no distress   Skin:  Numerous hypertrophic scars noted on the shoulder and mid chest marked improvement from previous  Intralesional Injection Procedure Note  Diagnosis:  Keloid  Location:  Chest shoulders bilaterally  Informed consent:  Discussed risks (infection, pain, bleeding, bruising, thinning of the skin, loss of skin pigment, lack of resolution, and recurrence of lesion) and benefits of the procedure, as well as the alternatives   Informed consent was obtained  Preparation: The area was prepared a standard fashion  Anesthesia: not required  Procedure Details: An intralesional injection was performed with                                                                  Kenalog 40 1 cc in total were injected  Total number of lesions injected:  9       Assessment:     1  Keloid of skin           Plan:   Patient advised to continue treatment we will re-evaluate in a month marked improvement noted      Prior to Admission medications    Medication Sig Start Date End Date Taking?  Authorizing Provider   ibuprofen (MOTRIN) 200 mg tablet Take by mouth every 6 (six) hours as needed for mild pain   Yes Historical Provider, MD   mupirocin (BACTROBAN) 2 % ointment  19  Yes Historical Provider, MD   tretinoin (RETIN-A) 0 025 % cream Apply topically daily at bedtime 19  Yes Wm White MD     No Known Allergies    Wm White MD  2019,9:29 AM    Portions of the record may have been created with voice recognition software   Occasional wrong word or "sound a like" substitutions may have occurred due to the inherent limitations of voice recognition software   Read the chart carefully and recognize, using context, where substitutions have occurred

## 2019-08-16 NOTE — PATIENT INSTRUCTIONS
Asked patient to check her insurance company to see if they will [facility] for [facility]  She will get back to me let me know if they will do so  Otherwise as she realizes she needs to disapline herself to initiate lifestyle modifications with dietary modifications and exercise  Schedule follow-up in 4 months, to repeat cholesterol at that time  Patient also research possible chiropractors in her insurance plan, and get me names so that I can review with her  Will refer to Psychology

## 2019-08-16 NOTE — PROGRESS NOTES
Assessment/Plan:      Patient Instructions   Asked patient to check her insurance company to see if they will cover [address] [facility] for weight loss  She will get back to me let me know if they will do so  Otherwise as she realizes she needs to disapline herself to initiate lifestyle modifications with dietary modifications and exercise  Schedule follow-up in 4 months, to repeat cholesterol at that time  Patient also research possible chiropractors in her insurance plan, and get me names so that I can review with her  Will refer to Psychology  BMI Counseling: Body mass index is 44 06 kg/m²  Discussed the patient's BMI with her  The BMI is above average  BMI counseling and education was provided to the patient  Nutrition recommendations include reducing portion sizes and decreasing overall calorie intake  Exercise recommendations include exercising 3-5 times per week  Return in about 6 months (around 2/16/2020) for Next scheduled follow up  Diagnoses and all orders for this visit:    Neck pain    Upper back pain    Large breasts    Class 3 severe obesity due to excess calories without serious comorbidity with body mass index (BMI) of 40 0 to 44 9 in [facility])    Hyperlipidemia, unspecified hyperlipidemia type  -     Comprehensive metabolic panel; Future  -     Lipid panel; Future    Depression, unspecified depression type  -     Ambulatory referral to [facility]; Future          Subjective:      Patient ID: Vic Piedra is a 26 y o  female  Follow-up    Despite physical therapy patient continues with upper neck pain upper shoulder pain and upper back pain  Much of this is felt to be positional and due to exceptionally large breasts  Patient has tried muscle relaxants in the past which have not been helpful  She works in a sitting position often hunched over  NSAIDs due to provide temporary relief  She would like to try chiropractic treatment  No radicular symptoms    She has seen bariatric surgery in the past and does not want to consider this care  She has also seen a nutritionist in the past   She reiterates that she knows what to do, she needs to get motivated to do so  Depression:  Patient has found a counselor that will take her insurance, therefore we will give her referral     Hyperlipidemia: We will recheck lipids in 4 months  Hopefully she will continue to developed lifestyle modifications with better diet and weight loss, and we will see an improvement in her lipids  ALLERGIES:  No Known Allergies    CURRENT MEDICATIONS:    Current Outpatient Medications:     ibuprofen (MOTRIN) 200 mg tablet, Take by mouth every 6 (six) hours as needed for mild pain, Disp: , Rfl:     mupirocin (BACTROBAN) 2 % ointment, , Disp: , Rfl:     tretinoin (RETIN-A) 0 025 % cream, Apply topically daily at bedtime, Disp: 45 g, Rfl: 3  No current facility-administered medications for this visit  ACTIVE PROBLEM LIST:  Patient Active Problem List   Diagnosis    Neck pain    Upper back pain    Class 3 severe obesity due to excess calories without serious comorbidity with body mass index (BMI) of 40 0 to 44 9 in adult ([facility])    Acne    Large breasts    Hyperlipidemia    Depression       PAST MEDICAL HISTORY:  Past Medical History:   Diagnosis Date    High cholesterol     Morbid obesity with BMI of 45 0-49 9, adult ([facility] )     Pre-operative laboratory examination        PAST SURGICAL HISTORY:  History reviewed  No pertinent surgical history      FAMILY HISTORY:  Family History   Problem Relation Age of Onset    Coronary artery disease Mother     Diabetes Sister     Coronary artery disease Maternal Grandmother     Hyperlipidemia Maternal Grandmother     Hyperlipidemia Maternal Grandfather     Diabetes Maternal Grandfather     Alcohol abuse Neg Hx     Substance Abuse Neg Hx     Mental illness Neg Hx        SOCIAL HISTORY:  Social History     Socioeconomic History    Marital status: Single     Spouse name: Not on file    Number of children: Not on file    Years of education: Not on file    Highest education level: Not on file   Occupational History    Not on file   Social Needs    Financial resource strain: Not on file    Food insecurity:     Worry: Not on file     Inability: Not on file    Transportation needs:     Medical: Not on file     Non-medical: Not on file   Tobacco Use    Smoking status: Former Smoker     Packs/day: 0 25     Years: 6 00     Pack years: 1 50    Smokeless tobacco: Never Used    Tobacco comment: stopped   Substance and Sexual Activity    Alcohol use: Yes     Comment: socially-holidays    Drug use: No    Sexual activity: Yes     Partners: Male   Lifestyle    Physical activity:     Days per week: Not on file     Minutes per session: Not on file    Stress: Not on file   Relationships    Social connections:     Talks on phone: Not on file     Gets together: Not on file     Attends [employer] service: Not on file     Active member of club or organization: Not on file     Attends meetings of clubs or organizations: Not on file     Relationship status: Not on file    Intimate partner violence:     Fear of current or ex partner: Not on file     Emotionally abused: Not on file     Physically abused: Not on file     Forced sexual activity: Not on file   Other Topics Concern    Not on file   Social History Narrative    Significant other    Works for [employer] office    1 child    Hobbies-    Exercise-occ       Review of Systems   Constitutional: Negative for activity change, chills, fatigue and fever  HENT: Negative for congestion  Eyes: Negative for discharge  Respiratory: Negative for cough, chest tightness and shortness of breath  Cardiovascular: Negative for chest pain, palpitations and leg swelling  Gastrointestinal: Negative for abdominal pain  Genitourinary: Negative for difficulty urinating     Musculoskeletal: Positive for back pain, neck pain and neck stiffness  Negative for arthralgias and myalgias  Skin: Negative for rash  Allergic/Immunologic: Negative for immunocompromised state  Neurological: Negative for dizziness, syncope, weakness, light-headedness and headaches  Hematological: Negative for adenopathy  Does not bruise/bleed easily  Psychiatric/Behavioral: Negative for dysphoric mood, sleep disturbance and suicidal ideas  The patient is not nervous/anxious  Objective:  Vitals:    08/16/19 1102   BP: 138/78   BP Location: Left arm   Patient Position: Sitting   Pulse: 86   Resp: 18   Temp: 97 8 °F (36 6 °C)   SpO2: 97%   Weight: 124 kg (273 lb)   Height: 5' 6" (1 676 m)     Body mass index is 44 06 kg/m²  Physical Exam   Constitutional: She is oriented to person, place, and time  She appears well-developed and well-nourished  No distress  Obese   Neck: No JVD present  No thyromegaly present  Cardiovascular: Normal rate, regular rhythm and normal heart sounds  Pulmonary/Chest: Effort normal and breath sounds normal    Large breasted female   Musculoskeletal: She exhibits no edema  Palpable tenderness with spasm of the paracervicals, trapezii, and parathoracic muscles   Lymphadenopathy:     She has no cervical adenopathy  Neurological: She is alert and oriented to person, place, and time  Skin: Skin is warm and dry  No rash noted  Psychiatric: She has a normal mood and affect  Her behavior is normal    Nursing note and vitals reviewed  RESULTS:        This note was created with voice recognition software  Phonic, grammatical and spelling errors may be present within the note as a result

## 2019-09-17 DIAGNOSIS — M54.2 NECK PAIN: ICD-10-CM

## 2019-09-17 DIAGNOSIS — M54.9 UPPER BACK PAIN: Primary | ICD-10-CM

## 2019-09-20 ENCOUNTER — OFFICE VISIT (OUTPATIENT)
Dept: DERMATOLOGY | Facility: CLINIC | Age: 24
End: 2019-09-20
Payer: COMMERCIAL

## 2019-09-20 DIAGNOSIS — L91.0 KELOID OF SKIN: Primary | ICD-10-CM

## 2019-09-20 PROCEDURE — 99212 OFFICE O/P EST SF 10 MIN: CPT | Performed by: DERMATOLOGY

## 2019-09-20 PROCEDURE — 11900 INJECT SKIN LESIONS </W 7: CPT | Performed by: DERMATOLOGY

## 2019-09-20 RX ORDER — TRIAMCINOLONE ACETONIDE 40 MG/ML
40 INJECTION, SUSPENSION INTRA-ARTICULAR; INTRAMUSCULAR ONCE
Status: COMPLETED | OUTPATIENT
Start: 2019-09-20 | End: 2019-09-20

## 2019-09-20 RX ADMIN — TRIAMCINOLONE ACETONIDE 40 MG: 40 INJECTION, SUSPENSION INTRA-ARTICULAR; INTRAMUSCULAR at 09:21

## 2019-09-20 NOTE — PATIENT INSTRUCTIONS
At present I think this should be enough other treatment at this point would stop any more intralesional injections afraid to cause any cutaneous atrophy continue with tretinoin to prevent further acne lesions and will plan follow-up again in a year earlier if necessary

## 2019-09-20 NOTE — PROGRESS NOTES
[facility] DERMATOLOGY  [address]  [place] [facility] [address]  [phone]  [phone]     MRN: [id number] : 1995  Encounter: [id number]  Patient Information: Allison Srinivasan    Subjective:     30-year-old female presents for follow-up for her keloids on her chest and shoulders  Patient notes improvement after last injection     Objective: There were no vitals taken for this visit  Physical Exam:    General Appearance:    Alert, cooperative, no distress   Skin:  Yevgeniy softening of numerous hypertrophic scar still lesions present on the shoulders and mid chest     Intralesional Injection Procedure Note  Diagnosis:  Keloid  Location:  Chest shoulders bilaterally  Informed consent:  Discussed risks (infection, pain, bleeding, bruising, thinning of the skin, loss of skin pigment, lack of resolution, and recurrence of lesion) and benefits of the procedure, as well as the alternatives   Informed consent was obtained  Preparation: The area was prepared a standard fashion  Anesthesia: not required  Procedure Details: An intralesional injection was performed with                                                                  Kenalog 40 1 cc in total were injected  Total number of lesions injected:  10  Assessment:     1  Keloid of skin           Plan: At present I think this should be enough other treatment at this point would stop any more intralesional injections afraid to cause any cutaneous atrophy continue with tretinoin to prevent further acne lesions and will plan follow-up again in a year earlier if necessary      Prior to Admission medications    Medication Sig Start Date End Date Taking?  Authorizing Provider   ibuprofen (MOTRIN) 200 mg tablet Take by mouth every 6 (six) hours as needed for mild pain   Yes Historical Provider, MD   mupirocin (BACTROBAN) 2 % ointment  19  Yes Historical Provider, MD   tretinoin (RETIN-A) 0 025 % cream Apply topically daily at bedtime 7/12/19  Yes Herve Kaur MD     No Known Allergies    Herve Kaur MD  9/20/2019,9:10 AM    Portions of the record may have been created with voice recognition software   Occasional wrong word or "sound a like" substitutions may have occurred due to the inherent limitations of voice recognition software   Read the chart carefully and recognize, using context, where substitutions have occurred

## 2019-09-24 ENCOUNTER — OFFICE VISIT (OUTPATIENT)
Dept: INTERNAL MEDICINE CLINIC | Facility: CLINIC | Age: 24
End: 2019-09-24
Payer: COMMERCIAL

## 2019-09-24 VITALS
BODY MASS INDEX: 44.68 KG/M2 | HEIGHT: 66 IN | SYSTOLIC BLOOD PRESSURE: 130 MMHG | WEIGHT: 278 LBS | HEART RATE: 84 BPM | DIASTOLIC BLOOD PRESSURE: 82 MMHG | OXYGEN SATURATION: 97 % | RESPIRATION RATE: 18 BRPM

## 2019-09-24 DIAGNOSIS — R19.7 DIARRHEA, UNSPECIFIED TYPE: Primary | ICD-10-CM

## 2019-09-24 DIAGNOSIS — S83.411A SPRAIN OF MEDIAL COLLATERAL LIGAMENT OF RIGHT KNEE, INITIAL ENCOUNTER: ICD-10-CM

## 2019-09-24 PROCEDURE — 3008F BODY MASS INDEX DOCD: CPT | Performed by: PHYSICIAN ASSISTANT

## 2019-09-24 PROCEDURE — 99213 OFFICE O/P EST LOW 20 MIN: CPT | Performed by: PHYSICIAN ASSISTANT

## 2019-09-24 NOTE — LETTER
September 24, 2019     Patient: Ricky Elmore   YOB: 1995   Date of Visit: 9/24/2019       To Whom it May Concern:    Ricky Elmore is under my professional care  She was seen in my office on 9/24/2019  She may return to work on 9/25/19  If you have any questions or concerns, please don't hesitate to call           Sincerely,          Freedom Quiñones PA-C        CC: No Recipients

## 2019-09-24 NOTE — PATIENT INSTRUCTIONS
For right knee sprain, can use topical heat for comfort  If pain persists greater than 3 weeks, please contact me  For diarrhea today put herself on clear liquids through the rest of the day, tomorrow start brat diet  If persisting please let me know

## 2019-09-24 NOTE — PROGRESS NOTES
Assessment/Plan:   Patient Instructions   For right knee sprain, can use topical heat for comfort  If pain persists greater than 3 weeks, please contact me  For diarrhea today put herself on clear liquids through the rest of the day, tomorrow start brat diet  If persisting please let me know  BMI Counseling: Body mass index is 44 87 kg/m²  Discussed the patient's BMI with her  The BMI is above normal  Nutrition recommendations include reducing portion sizes, decreasing overall calorie intake and moderation in carbohydrate intake  Exercise recommendations include exercising 3-5 times per week  Return for Next scheduled follow up  Diagnoses and all orders for this visit:    Diarrhea, unspecified type    Sprain of medial collateral ligament of right knee, initial encounter          Subjective:      Patient ID: Joselyn Johnson is a 26 y o  female  Acute visit 2 problems    Diarrhea:  Patient states that she started with loose stools yesterday and has continued to times today  No complaint of crampy abdominal pain  No fever, chills, urinary symptoms, nausea or vomiting  No blood in stools noted  Right knee pain:  3 weeks ago slipped and twisted her right knee  She has been having some pain in the medial aspect of the knee  Had not noted any swelling or bruising  No instability  Initially she had some trouble walking but the pain and the symptoms have been steadily improving        ALLERGIES:  No Known Allergies    CURRENT MEDICATIONS:    Current Outpatient Medications:     ibuprofen (MOTRIN) 200 mg tablet, Take by mouth every 6 (six) hours as needed for mild pain, Disp: , Rfl:     mupirocin (BACTROBAN) 2 % ointment, , Disp: , Rfl:     tretinoin (RETIN-A) 0 025 % cream, Apply topically daily at bedtime, Disp: 45 g, Rfl: 3    ACTIVE PROBLEM LIST:  Patient Active Problem List   Diagnosis    Neck pain    Upper back pain    Class 3 severe obesity due to excess calories without serious comorbidity with body mass index (BMI) of 40 0 to 44 9 in adult ([facility])    Acne    Large breasts    Hyperlipidemia    Depression       PAST MEDICAL HISTORY:  Past Medical History:   Diagnosis Date    High cholesterol     Morbid obesity with BMI of 45 0-49 9, adult ([facility] )     Pre-operative laboratory examination        PAST SURGICAL HISTORY:  History reviewed  No pertinent surgical history      FAMILY HISTORY:  Family History   Problem Relation Age of Onset    Coronary artery disease Mother     Diabetes Sister     Coronary artery disease Maternal Grandmother     Hyperlipidemia Maternal Grandmother     Hyperlipidemia Maternal Grandfather     Diabetes Maternal Grandfather     Alcohol abuse Neg Hx     Substance Abuse Neg Hx     Mental illness Neg Hx        SOCIAL HISTORY:  Social History     Socioeconomic History    Marital status: Single     Spouse name: Not on file    Number of children: Not on file    Years of education: Not on file    Highest education level: Not on file   Occupational History    Not on file   Social Needs    Financial resource strain: Not on file    Food insecurity:     Worry: Not on file     Inability: Not on file    Transportation needs:     Medical: Not on file     Non-medical: Not on file   Tobacco Use    Smoking status: Former Smoker     Packs/day: 0 25     Years: 6 00     Pack years: 1 50    Smokeless tobacco: Never Used    Tobacco comment: stopped   Substance and Sexual Activity    Alcohol use: Yes     Comment: socially-holidays    Drug use: No    Sexual activity: Yes     Partners: Male   Lifestyle    Physical activity:     Days per week: Not on file     Minutes per session: Not on file    Stress: Not on file   Relationships    Social connections:     Talks on phone: Not on file     Gets together: Not on file     Attends Hoahaoism service: Not on file     Active member of club or organization: Not on file     Attends meetings of clubs or organizations: Not on file     Relationship status: Not on file    Intimate partner violence:     Fear of current or ex partner: Not on file     Emotionally abused: Not on file     Physically abused: Not on file     Forced sexual activity: Not on file   Other Topics Concern    Not on file   Social History Narrative    Significant other    Works for [employer] office    1 child    Hobbies-    Exercise-occ       Review of Systems   Constitutional: Negative for activity change, chills, fatigue and fever  HENT: Negative for congestion  Eyes: Negative for discharge  Respiratory: Negative for cough, chest tightness and shortness of breath  Cardiovascular: Negative for chest pain, palpitations and leg swelling  Gastrointestinal: Positive for diarrhea  Negative for abdominal pain, nausea and vomiting  Genitourinary: Negative for difficulty urinating  Musculoskeletal: Positive for arthralgias  Negative for myalgias  Skin: Negative for rash  Allergic/Immunologic: Negative for immunocompromised state  Neurological: Negative for dizziness, syncope, weakness, light-headedness and headaches  Hematological: Negative for adenopathy  Does not bruise/bleed easily  Psychiatric/Behavioral: Negative for dysphoric mood  The patient is not nervous/anxious  Objective:  Vitals:    09/24/19 1251   BP: 130/82   BP Location: Left arm   Patient Position: Sitting   Cuff Size: Large   Pulse: 84   Resp: 18   SpO2: 97%   Weight: 126 kg (278 lb)   Height: 5' 6" (1 676 m)     Body mass index is 44 87 kg/m²  Physical Exam   Constitutional: She is oriented to person, place, and time  She appears well-developed and well-nourished  She does not appear ill  Obese   Cardiovascular: Normal rate, regular rhythm and normal heart sounds  Pulmonary/Chest: Effort normal and breath sounds normal    Abdominal: Soft  Bowel sounds are normal  There is no hepatosplenomegaly  There is no tenderness     Musculoskeletal: She exhibits no edema  Right knee: She exhibits no swelling, no effusion, no ecchymosis, no deformity, normal meniscus and no MCL laxity  Tenderness found  Medial joint line and MCL tenderness noted  Neurological: She is alert and oriented to person, place, and time  Skin: Skin is warm and dry  Psychiatric: She has a normal mood and affect  Her behavior is normal    Nursing note and vitals reviewed  RESULTS:    No results found for this or any previous visit (from the past 1008 hour(s))  This note was created with voice recognition software  Phonic, grammatical and spelling errors may be present within the note as a result

## 2019-09-27 ENCOUNTER — SOCIAL WORK (OUTPATIENT)
Dept: BEHAVIORAL/MENTAL HEALTH CLINIC | Facility: CLINIC | Age: 24
End: 2019-09-27
Payer: COMMERCIAL

## 2019-09-27 DIAGNOSIS — F32.A DEPRESSION, UNSPECIFIED DEPRESSION TYPE: ICD-10-CM

## 2019-09-27 PROCEDURE — 90834 PSYTX W PT 45 MINUTES: CPT | Performed by: SOCIAL WORKER
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
## 2019-09-30 NOTE — PROGRESS NOTES
PT Discharge:     Patient has stopped attending physical therapy appointments and has failed to reschedule  During last phone call with patient, asked patient to call clinic with any questions or concerns  At this time, patient to be discharged from current treatment cycle

## 2019-10-29 ENCOUNTER — OFFICE VISIT (OUTPATIENT)
Dept: DERMATOLOGY | Facility: CLINIC | Age: 24
End: 2019-10-29
Payer: COMMERCIAL

## 2019-10-29 DIAGNOSIS — L70.0 ACNE VULGARIS: Primary | ICD-10-CM

## 2019-10-29 PROCEDURE — 99213 OFFICE O/P EST LOW 20 MIN: CPT | Performed by: DERMATOLOGY

## 2019-10-29 RX ORDER — ERYTHROMYCIN AND BENZOYL PEROXIDE 30; 50 MG/G; MG/G
GEL TOPICAL
Qty: 46 G | Refills: 3 | Status: SHIPPED | OUTPATIENT
Start: 2019-10-29 | End: 2020-09-30 | Stop reason: SDUPTHER

## 2019-10-29 NOTE — PATIENT INSTRUCTIONS
Acne flaring advised patient that this again is hormonal and really is something that she needs to keep using the medications to control this and we really do not have a pimple cream to use just when it flares up advised the importance of trying to prevent this pore then the idea of using something just when it is flaring up

## 2019-11-01 ENCOUNTER — SOCIAL WORK (OUTPATIENT)
Dept: BEHAVIORAL/MENTAL HEALTH CLINIC | Facility: CLINIC | Age: 24
End: 2019-11-01
Payer: COMMERCIAL

## 2019-11-01 DIAGNOSIS — F32.A DEPRESSION, UNSPECIFIED DEPRESSION TYPE: Primary | ICD-10-CM

## 2019-11-01 PROCEDURE — 90834 PSYTX W PT 45 MINUTES: CPT | Performed by: SOCIAL WORKER
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
## 2020-02-01 ENCOUNTER — OFFICE VISIT (OUTPATIENT)
Dept: FAMILY MEDICINE CLINIC | Facility: CLINIC | Age: 25
End: 2020-02-01
Payer: COMMERCIAL

## 2020-02-01 VITALS
BODY MASS INDEX: 46.61 KG/M2 | RESPIRATION RATE: 14 BRPM | TEMPERATURE: 98 F | WEIGHT: 290 LBS | HEIGHT: 66 IN | SYSTOLIC BLOOD PRESSURE: 135 MMHG | HEART RATE: 100 BPM | DIASTOLIC BLOOD PRESSURE: 85 MMHG

## 2020-02-01 DIAGNOSIS — G89.29 CHRONIC LOW BACK PAIN WITH SCIATICA, SCIATICA LATERALITY UNSPECIFIED, UNSPECIFIED BACK PAIN LATERALITY: ICD-10-CM

## 2020-02-01 DIAGNOSIS — E55.9 HYPOVITAMINOSIS D: ICD-10-CM

## 2020-02-01 DIAGNOSIS — M25.512 CHRONIC PAIN OF BOTH SHOULDERS: ICD-10-CM

## 2020-02-01 DIAGNOSIS — R63.5 WEIGHT GAIN: ICD-10-CM

## 2020-02-01 DIAGNOSIS — Z11.4 SCREENING FOR HIV WITHOUT PRESENCE OF RISK FACTORS: ICD-10-CM

## 2020-02-01 DIAGNOSIS — M54.40 CHRONIC LOW BACK PAIN WITH SCIATICA, SCIATICA LATERALITY UNSPECIFIED, UNSPECIFIED BACK PAIN LATERALITY: ICD-10-CM

## 2020-02-01 DIAGNOSIS — G89.29 CHRONIC PAIN OF BOTH SHOULDERS: ICD-10-CM

## 2020-02-01 DIAGNOSIS — M25.511 CHRONIC PAIN OF BOTH SHOULDERS: ICD-10-CM

## 2020-02-01 DIAGNOSIS — E78.01 FAMILIAL HYPERCHOLESTEROLEMIA: ICD-10-CM

## 2020-02-01 DIAGNOSIS — M25.50 ARTHRALGIA, UNSPECIFIED JOINT: ICD-10-CM

## 2020-02-01 DIAGNOSIS — R53.82 CHRONIC FATIGUE: ICD-10-CM

## 2020-02-01 DIAGNOSIS — N62 LARGE BREASTS: Primary | ICD-10-CM

## 2020-02-01 PROCEDURE — 99204 OFFICE O/P NEW MOD 45 MIN: CPT | Performed by: FAMILY MEDICINE

## 2020-02-01 PROCEDURE — 1036F TOBACCO NON-USER: CPT | Performed by: FAMILY MEDICINE

## 2020-02-01 PROCEDURE — 3008F BODY MASS INDEX DOCD: CPT | Performed by: FAMILY MEDICINE

## 2020-02-01 NOTE — PROGRESS NOTES
BMI Counseling: Body mass index is 46 81 kg/m²  The BMI is above normal  Nutrition recommendations include encouraging healthy choices of fruits and vegetables, consuming healthier snacks, moderation in carbohydrate intake, increasing intake of lean protein, reducing intake of saturated and trans fat and reducing intake of cholesterol  Exercise recommendations include vigorous physical activity 75 minutes/week  No pharmacotherapy was ordered  Standard Visit   Assessment/Plan:     Visit Diagnosis:  Diagnoses and all orders for this visit:    Large breasts  -     Ambulatory referral to Plastic Surgery; Future    Chronic pain of both shoulders    Chronic fatigue  -     TSH, 3rd generation with Free T4 reflex; Future  -     Comprehensive metabolic panel; Future  -     CBC and differential; Future    Familial hypercholesterolemia  -     Lipid panel; Future    Chronic low back pain with sciatica, sciatica laterality unspecified, unspecified back pain laterality  -     HLA-B27 antigen; Future    Screening for HIV without presence of risk factors  -     HIV 1/2 AG-AB combo; Future    Hypovitaminosis D  -     Vitamin D 25 hydroxy; Future    Arthralgia, unspecified joint  -     Lyme Antibody Profile with reflex to WB; Future    Weight gain  -     Phentermine HCl 30 MG TBDP; TAKE 1 PILL DAILY BEFORE NOON          Subjective:    Patient ID: Jaiden Thornton is a 27 y o  female  Patient is here with the following concerns:     To establish with our practice  Previously seen by Dr Amada Nelson  She takes meloxicam on an occasional basis for her chronic back pain     Denies any surgical history   Periods are regular     lives with her son Mark Oro     father alive age  40  Hyperlipidemia,    Mother alive age 37  Alive well   denies family history of breast cancer   denies family history of colon cancer   2 siblings   2 sisters  Both alive well     Portia Zamora works in our office  She has prior of [employer]        The following portions of the patient's history were reviewed and updated as appropriate: allergies, current medications, past family history, past medical history, past social history, past surgical history and problem list     Review of Systems   Constitutional: Positive for unexpected weight change  Eyes: Negative  Respiratory: Negative  Cardiovascular: Negative  Gastrointestinal: Positive for constipation  Musculoskeletal: Positive for back pain  Complains of back pain for several years  Back pain started when she developed breasts  Age 16   bra size   42 H   difficult to sleep on her back because of shortness of breath  Chest feels very tight when she sleeps on her back  Breasts are heavy  Neck pain  Shoulder pain  Back pain   deep  Grooves in the shoulders  From her bra   also suffers from skin rashes and irritation underneath the breast   Neurological: Positive for seizures          ONE SEIZURE 11TH GRADE AND NOTHING SINCE         /85 (BP Location: Left arm, Patient Position: Sitting, Cuff Size: Standard)   Pulse 100   Temp 98 °F (36 7 °C)   Resp 14   Ht 5' 6" (1 676 m)   Wt 132 kg (290 lb)   LMP 01/01/2020   BMI 46 81 kg/m²   Social History     Socioeconomic History    Marital status: Single     Spouse name: Not on file    Number of children: Not on file    Years of education: Not on file    Highest education level: Not on file   Occupational History    Not on file   Social Needs    Financial resource strain: Not on file    Food insecurity:     Worry: Not on file     Inability: Not on file    Transportation needs:     Medical: Not on file     Non-medical: Not on file   Tobacco Use    Smoking status: Former Smoker     Packs/day: 0 25     Years: 6 00     Pack years: 1 50    Smokeless tobacco: Never Used    Tobacco comment: stopped   Substance and Sexual Activity    Alcohol use: Yes     Comment: socially-holidays    Drug use: No    Sexual activity: Yes Partners: Male   Lifestyle    Physical activity:     Days per week: Not on file     Minutes per session: Not on file    Stress: Not on file   Relationships    Social connections:     Talks on phone: Not on file     Gets together: Not on file     Attends [employer] service: Not on file     Active member of club or organization: Not on file     Attends meetings of clubs or organizations: Not on file     Relationship status: Not on file    Intimate partner violence:     Fear of current or ex partner: Not on file     Emotionally abused: Not on file     Physically abused: Not on file     Forced sexual activity: Not on file   Other Topics Concern    Not on file   Social History Narrative    Significant other    Works for [employer] office    1 child    Hobbies-    Exercise-occ     Past Medical History:   Diagnosis Date    High cholesterol     Morbid [facility] with BMI of 45 0-49 9, [facility]     Pre-operative laboratory examination      Family History   Problem Relation Age of Onset    Coronary artery disease Mother     Diabetes Sister     Coronary artery disease Maternal Grandmother     Hyperlipidemia Maternal Grandmother     Hyperlipidemia Maternal Grandfather     Diabetes Maternal Grandfather     Alcohol abuse Neg Hx     Substance Abuse Neg Hx     Mental illness Neg Hx      History reviewed  No pertinent surgical history  Current Outpatient Medications:     benzoyl peroxide-erythromycin (BENZAMYCIN) gel, Apply topically daily at bedtime, Disp: 46 g, Rfl: 3    ibuprofen (MOTRIN) 200 mg tablet, Take by mouth every 6 (six) hours as needed for mild pain, Disp: , Rfl:     mupirocin (BACTROBAN) 2 % ointment, , Disp: , Rfl:     Phentermine HCl 30 MG TBDP, TAKE 1 PILL DAILY BEFORE NOON, Disp: 30 tablet, Rfl: 3    tretinoin (RETIN-A) 0 025 % cream, Apply topically daily at bedtime, Disp: 45 g, Rfl: 3    Lab Review   No visits with results within 6 Month(s) from this visit     Latest known visit with results is:   Admission on 07/10/2019, Discharged on 07/10/2019   Component Date Value    Color, UA 07/10/2019 Yellow     Clarity, UA 07/10/2019 Clear     Specific Gravity, UA 07/10/2019 1 025     pH, UA 07/10/2019 5 5     Leukocytes, UA 07/10/2019 Negative     Nitrite, UA 07/10/2019 Negative     Protein, UA 07/10/2019 Negative     Glucose, UA 07/10/2019 Negative     Ketones, UA 07/10/2019 Negative     Urobilinogen, UA 07/10/2019 0 2     Bilirubin, UA 07/10/2019 Negative     Blood, UA 07/10/2019 Negative     EXT PREG TEST UR (Ref: N* 07/10/2019 negative     Control 07/10/2019 v     N gonorrhoeae, DNA Probe 07/10/2019 Negative     Chlamydia trachomatis, D* 07/10/2019 Negative         Objective:     Physical Exam   Constitutional: She is oriented to person, place, and time  She appears well-developed and well-nourished  HENT:   Head: Normocephalic and atraumatic  Eyes: Pupils are equal, round, and reactive to light  Neck: Normal range of motion  Neck supple  Cardiovascular: Normal rate  Pulmonary/Chest: Effort normal and breath sounds normal    Abdominal: Soft  Musculoskeletal: Normal range of motion  Neurological: She is alert and oriented to person, place, and time  Skin: Skin is warm and dry  DEEP GROOVES EITHER SHOULDER FROM HER BRA     Psychiatric: She has a normal mood and affect  Nursing note and vitals reviewed  There are no Patient Instructions on file for this visit          MIREYA Duran

## 2020-02-03 ENCOUNTER — TELEPHONE (OUTPATIENT)
Dept: FAMILY MEDICINE CLINIC | Facility: CLINIC | Age: 25
End: 2020-02-03

## 2020-02-03 NOTE — TELEPHONE ENCOUNTER
Patient has [facility]-ER  Who would you recommend for breast reduction? Her insurance only covers:    Dr Ricky Hernandez MD  [facility], [address] [place] [address] and Reconstruction Surgery   [phone]  Please enter new referral for plastic surgeon   Also will need insurance referral

## 2020-02-04 ENCOUNTER — TELEPHONE (OUTPATIENT)
Dept: FAMILY MEDICINE CLINIC | Facility: CLINIC | Age: 25
End: 2020-02-04

## 2020-02-04 DIAGNOSIS — N62 LARGE BREASTS: Primary | ICD-10-CM

## 2020-02-04 NOTE — TELEPHONE ENCOUNTER
Patient called both surgeons  The only surgeon that takes her insurance is     MARLINE Luis     [address] [place], [place] [id number]  [phone]  Please enter referral in chart

## 2020-02-04 NOTE — TELEPHONE ENCOUNTER
Lets go with [facility] and reconstruction  I will put a in a the referral for plastic surgeon in  I do not know what we mean by insurance referral however

## 2020-02-04 NOTE — TELEPHONE ENCOUNTER
Patient is requesting insurance referral :    HARINI-[id number]  Doctor- MARLINE Whitaker- 04/07/2020  Diagnosis- N62

## 2020-02-05 DIAGNOSIS — N62 LARGE BREASTS: Primary | ICD-10-CM

## 2020-02-07 DIAGNOSIS — N62 LARGE BREASTS: Primary | ICD-10-CM

## 2020-02-24 NOTE — PROGRESS NOTES
[facility] DERMATOLOGY  [address] [place] [id number]  [phone]  [phone]     MRN: [id number] : 1995  Encounter: [id number]  Patient Information: Mary Beth Adams  Chief complaint:  Acne flare-up    History of present illness:  79-year-old female with known history of acne presents because of flare that occur to stop using the tretinoin cream we had given her previously  Past Medical History:   Diagnosis Date    High cholesterol     Morbid obesity with BMI of 45 0-49 9, adult ([facility] )     Pre-operative laboratory examination      No past surgical history on file  Social History   Social History     Substance and Sexual Activity   Alcohol Use Yes    Comment: socially-holidays     Social History     Substance and Sexual Activity   Drug Use No     Social History     Tobacco Use   Smoking Status Former Smoker    Packs/day: 0 25    Years: 6 00    Pack years: 1 50   Smokeless Tobacco Never Used   Tobacco Comment    stopped     Family History   Problem Relation Age of Onset    Coronary artery disease Mother     Diabetes Sister     Coronary artery disease Maternal Grandmother     Hyperlipidemia Maternal Grandmother     Hyperlipidemia Maternal Grandfather     Diabetes Maternal Grandfather     Alcohol abuse Neg Hx     Substance Abuse Neg Hx     Mental illness Neg Hx      Meds/Allergies   No Known Allergies    Meds:  Prior to Admission medications    Medication Sig Start Date End Date Taking?  Authorizing Provider   ibuprofen (MOTRIN) 200 mg tablet Take by mouth every 6 (six) hours as needed for mild pain   Yes Historical Provider, MD   mupirocin (BACTROBAN) 2 % ointment  19  Yes Historical Provider, MD   tretinoin (RETIN-A) 0 025 % cream Apply topically daily at bedtime 19  Yes Koko Dawson MD       Subjective:     Review of Systems:    General: negative for - chills, fatigue, fever,  weight gain or weight loss  Psychological: negative for Phone Number Called: [phone] (home)       Call outcome: Spoke to patient regarding message below.    Message: Spoke to patient and was able to relay the results of the labs bone density and the thyroid u/s. Patient was satisfied with this answer.   - anxiety, behavioral disorder, concentration difficulties, decreased libido, depression, irritability, memory difficulties, mood swings, sleep disturbances or suicidal ideation  ENT: negative for - hearing difficulties , nasal congestion, nasal discharge, oral lesions, sinus pain, sneezing, sore throat  Allergy and Immunology: negative for - hives, insect bite sensitivity,  Hematological and Lymphatic: negative for - bleeding problems, blood clots,bruising, swollen lymph nodes  Endocrine: negative for - hair pattern changes, hot flashes, malaise/lethargy, mood swings, palpitations, polydipsia/polyuria, skin changes, temperature intolerance or unexpected weight change  Respiratory: negative for - cough, hemoptysis, orthopnea, shortness of breath, or wheezing  Cardiovascular: negative for - chest pain, dyspnea on exertion, edema,  Gastrointestinal: negative for - abdominal pain, nausea/vomiting  Genito-Urinary: negative for - dysuria, incontinence, irregular/heavy menses or urinary frequency/urgency  Musculoskeletal: negative for - gait disturbance, joint pain, joint stiffness, joint swelling, muscle pain, muscular weakness  Dermatological:  As in HPI  Neurological: negative for confusion, dizziness, headaches, impaired coordination/balance, memory loss, numbness/tingling, seizures, speech problems, tremors or weakness       Objective: There were no vitals taken for this visit  Physical Exam:    General Appearance:    Alert, cooperative, no distress   Head:    Normocephalic, without obvious abnormality, atraumatic           Skin:   A full skin exam was performed including scalp, head scalp, eyes, ears, nose, lips, neck, chest, axilla, abdomen, back, buttocks, bilateral upper extremities, bilateral lower extremities, hands, feet, fingers, toes, fingernails, and toenails inflammatory papules pustules noted and comedones as well     Assessment:     1   Acne vulgaris  benzoyl peroxide-erythromycin (BENZAMYCIN) gel Plan:   Acne flaring advised patient that this again is hormonal and really is something that she needs to keep using the medications to control this and we really do not have a pimple cream to use just when it flares up advised the importance of trying to prevent this pore then the idea of using something just when it is flaring up    Janie Finney MD  10/29/2019,4:44 PM    Portions of the record may have been created with voice recognition software   Occasional wrong word or "sound a like" substitutions may have occurred due to the inherent limitations of voice recognition software   Read the chart carefully and recognize, using context, where substitutions have occurred

## 2020-03-18 ENCOUNTER — OFFICE VISIT (OUTPATIENT)
Dept: FAMILY MEDICINE CLINIC | Facility: CLINIC | Age: 25
End: 2020-03-18
Payer: COMMERCIAL

## 2020-03-18 VITALS
BODY MASS INDEX: 45.96 KG/M2 | WEIGHT: 286 LBS | DIASTOLIC BLOOD PRESSURE: 78 MMHG | SYSTOLIC BLOOD PRESSURE: 124 MMHG | HEART RATE: 80 BPM | TEMPERATURE: 97.9 F | HEIGHT: 66 IN | OXYGEN SATURATION: 98 %

## 2020-03-18 DIAGNOSIS — J02.0 STREP PHARYNGITIS: ICD-10-CM

## 2020-03-18 DIAGNOSIS — H66.003 NON-RECURRENT ACUTE SUPPURATIVE OTITIS MEDIA OF BOTH EARS WITHOUT SPONTANEOUS RUPTURE OF TYMPANIC MEMBRANES: ICD-10-CM

## 2020-03-18 DIAGNOSIS — G44.229 CHRONIC TENSION-TYPE HEADACHE, NOT INTRACTABLE: Primary | ICD-10-CM

## 2020-03-18 DIAGNOSIS — R05.9 COUGH: ICD-10-CM

## 2020-03-18 PROCEDURE — 3008F BODY MASS INDEX DOCD: CPT | Performed by: FAMILY MEDICINE

## 2020-03-18 PROCEDURE — 99214 OFFICE O/P EST MOD 30 MIN: CPT | Performed by: FAMILY MEDICINE

## 2020-03-18 PROCEDURE — 1036F TOBACCO NON-USER: CPT | Performed by: FAMILY MEDICINE

## 2020-03-18 PROCEDURE — 3008F BODY MASS INDEX DOCD: CPT | Performed by: NURSE PRACTITIONER

## 2020-03-18 RX ORDER — GUAIFENESIN AND CODEINE PHOSPHATE 100; 10 MG/5ML; MG/5ML
SOLUTION ORAL
Qty: 120 ML | Refills: 0 | Status: SHIPPED | OUTPATIENT
Start: 2020-03-18 | End: 2020-08-06 | Stop reason: ALTCHOICE

## 2020-03-18 RX ORDER — AMOXICILLIN 500 MG/1
TABLET, FILM COATED ORAL
Qty: 30 TABLET | Refills: 0 | Status: SHIPPED | OUTPATIENT
Start: 2020-03-18 | End: 2020-03-28

## 2020-03-18 RX ORDER — TOPIRAMATE 50 MG/1
TABLET, FILM COATED ORAL
Qty: 180 TABLET | Refills: 1 | Status: SHIPPED | OUTPATIENT
Start: 2020-03-18 | End: 2021-07-01

## 2020-03-18 RX ORDER — FLUTICASONE PROPIONATE 50 MCG
1 SPRAY, SUSPENSION (ML) NASAL DAILY
Qty: 1 BOTTLE | Refills: 1 | Status: SHIPPED | OUTPATIENT
Start: 2020-03-18 | End: 2021-07-01

## 2020-03-18 NOTE — PROGRESS NOTES
Standard Visit   Assessment/Plan:     Visit Diagnosis:  Diagnoses and all orders for this visit:    Chronic tension-type headache, not intractable  -     topiramate (TOPAMAX) 50 MG tablet; One pill twice a day    Non-recurrent acute suppurative otitis media of both ears without spontaneous rupture of tympanic membranes  -     amoxicillin (AMOXIL) 500 MG tablet; Take 1 pill 3 times a day times 10 days  -     fluticasone (FLONASE) 50 mcg/act nasal spray; 1 spray into each nostril daily    Cough  -     guaifenesin-codeine (GUAIFENESIN AC) 100-10 MG/5ML liquid; May take 1 tsp at bedtime p r n  coughing    Strep pharyngitis     point of care strep done  Positive for group a beta-hemolytic strep   complete all the antibiotic   Caution with the cough syrup  Warm salt water gargles  Get a new toothbrush   Start the Topamax when you are all done with your amoxicillin   Call if you fail to improve         Subjective:    Patient ID: Mg Tony is a 27 y o  female  Patient is here with the following concerns:     Unique Herr is here with complaints of ear pain and cough  She has been feeling poorly for several days  Is getting worse instead of better  She has taken NyQuil with no relief  No high fever  No no rash   No vomiting   She does have a headache   Sore throat        The following portions of the patient's history were reviewed and updated as appropriate: allergies, current medications, past family history, past medical history, past social history, past surgical history and problem list     Review of Systems   Constitutional: Positive for activity change and fatigue  Negative for fever  HENT: Positive for congestion            /78   Pulse 80   Temp 97 9 °F (36 6 °C) (Tympanic)   Ht 5' 6" (1 676 m)   Wt 130 kg (286 lb)   SpO2 98%   BMI 46 16 kg/m²   Social History     Socioeconomic History    Marital status: Single     Spouse name: Not on file    Number of children: Not on file    Years of education: Not on file    Highest education level: Not on file   Occupational History    Not on file   Social Needs    Financial resource strain: Not on file    Food insecurity:     Worry: Not on file     Inability: Not on file    Transportation needs:     Medical: Not on file     Non-medical: Not on file   Tobacco Use    Smoking status: Former Smoker     Packs/day: 0 25     Years: 6 00     Pack years: 1 50    Smokeless tobacco: Never Used    Tobacco comment: stopped   Substance and Sexual Activity    Alcohol use: Yes     Comment: socially-holidays    Drug use: No    Sexual activity: Yes     Partners: Male   Lifestyle    Physical activity:     Days per week: Not on file     Minutes per session: Not on file    Stress: Not on file   Relationships    Social connections:     Talks on phone: Not on file     Gets together: Not on file     Attends Christian service: Not on file     Active member of club or organization: Not on file     Attends meetings of clubs or organizations: Not on file     Relationship status: Not on file    Intimate partner violence:     Fear of current or ex partner: Not on file     Emotionally abused: Not on file     Physically abused: Not on file     Forced sexual activity: Not on file   Other Topics Concern    Not on file   Social History Narrative    Significant other    Works for [employer] office    1 child    Hobbies-    Exercise-occ     Past Medical History:   Diagnosis Date    High cholesterol     Morbid obesity with BMI of 45 0-49 9, adult ([facility]     Pre-operative laboratory examination      Family History   Problem Relation Age of Onset    Coronary artery disease Mother     Diabetes Sister     Coronary artery disease Maternal Grandmother     Hyperlipidemia Maternal Grandmother     Hyperlipidemia Maternal Grandfather     Diabetes Maternal Grandfather     Alcohol abuse Neg Hx     Substance Abuse Neg Hx     Mental illness Neg Hx      No past surgical history on file  Current Outpatient Medications:     benzoyl peroxide-erythromycin (BENZAMYCIN) gel, Apply topically daily at bedtime, Disp: 46 g, Rfl: 3    ibuprofen (MOTRIN) 200 mg tablet, Take by mouth every 6 (six) hours as needed for mild pain, Disp: , Rfl:     mupirocin (BACTROBAN) 2 % ointment, , Disp: , Rfl:     Phentermine HCl 30 MG TBDP, TAKE 1 PILL DAILY BEFORE NOON, Disp: 30 tablet, Rfl: 3    tretinoin (RETIN-A) 0 025 % cream, Apply topically daily at bedtime, Disp: 45 g, Rfl: 3    amoxicillin (AMOXIL) 500 MG tablet, Take 1 pill 3 times a day times 10 days, Disp: 30 tablet, Rfl: 0    fluticasone (FLONASE) 50 mcg/act nasal spray, 1 spray into each nostril daily, Disp: 1 Bottle, Rfl: 1    guaifenesin-codeine (GUAIFENESIN AC) 100-10 MG/5ML liquid, May take 1 tsp at bedtime p r n  coughing, Disp: 120 mL, Rfl: 0    topiramate (TOPAMAX) 50 MG tablet, One pill twice a day, Disp: 180 tablet, Rfl: 1    Lab Review   No visits with results within 6 Month(s) from this visit  Latest known visit with results is:   Admission on 07/10/2019, Discharged on 07/10/2019   Component Date Value    Color, UA 07/10/2019 Yellow     Clarity, UA 07/10/2019 Clear     Specific Gravity, UA 07/10/2019 1 025     pH, UA 07/10/2019 5 5     Leukocytes, UA 07/10/2019 Negative     Nitrite, UA 07/10/2019 Negative     Protein, UA 07/10/2019 Negative     Glucose, UA 07/10/2019 Negative     Ketones, UA 07/10/2019 Negative     Urobilinogen, UA 07/10/2019 0 2     Bilirubin, UA 07/10/2019 Negative     Blood, UA 07/10/2019 Negative     EXT PREG TEST UR (Ref: N* 07/10/2019 negative     Control 07/10/2019 v     N gonorrhoeae, DNA Probe 07/10/2019 Negative     Chlamydia trachomatis, D* 07/10/2019 Negative         Objective:     Physical Exam   Constitutional: She is oriented to person, place, and time  She appears well-developed and well-nourished  HENT:   Head: Normocephalic and atraumatic     Mouth/Throat: No oral lesions  Posterior oropharyngeal erythema present  No oropharyngeal exudate  Eyes: Pupils are equal, round, and reactive to light  Neck: Normal range of motion  Neck supple  Cardiovascular: Normal rate  Pulmonary/Chest: Effort normal and breath sounds normal    Abdominal: Soft  Musculoskeletal: Normal range of motion  Neurological: She is alert and oriented to person, place, and time  Skin: Skin is warm and dry  Psychiatric: She has a normal mood and affect  Nursing note and vitals reviewed  There are no Patient Instructions on file for this visit          MIREYA Ashford

## 2020-03-19 ENCOUNTER — APPOINTMENT (OUTPATIENT)
Dept: LAB | Facility: HOSPITAL | Age: 25
End: 2020-03-19
Payer: COMMERCIAL

## 2020-07-21 ENCOUNTER — TELEPHONE (OUTPATIENT)
Dept: PSYCHIATRY | Facility: CLINIC | Age: 25
End: 2020-07-21

## 2020-08-06 ENCOUNTER — OFFICE VISIT (OUTPATIENT)
Dept: FAMILY MEDICINE CLINIC | Facility: CLINIC | Age: 25
End: 2020-08-06
Payer: COMMERCIAL

## 2020-08-06 VITALS
HEIGHT: 66 IN | RESPIRATION RATE: 18 BRPM | DIASTOLIC BLOOD PRESSURE: 82 MMHG | TEMPERATURE: 97.7 F | HEART RATE: 84 BPM | SYSTOLIC BLOOD PRESSURE: 114 MMHG | BODY MASS INDEX: 46.16 KG/M2 | OXYGEN SATURATION: 96 %

## 2020-08-06 DIAGNOSIS — E55.9 VITAMIN D DEFICIENCY: ICD-10-CM

## 2020-08-06 DIAGNOSIS — E66.01 CLASS 3 SEVERE OBESITY DUE TO EXCESS CALORIES WITHOUT SERIOUS COMORBIDITY WITH BODY MASS INDEX (BMI) OF 40.0 TO 44.9 IN ADULT (HCC): ICD-10-CM

## 2020-08-06 DIAGNOSIS — E78.5 HYPERLIPIDEMIA, UNSPECIFIED HYPERLIPIDEMIA TYPE: ICD-10-CM

## 2020-08-06 DIAGNOSIS — H69.83 DYSFUNCTION OF BOTH EUSTACHIAN TUBES: ICD-10-CM

## 2020-08-06 DIAGNOSIS — J02.9 PHARYNGITIS, UNSPECIFIED ETIOLOGY: Primary | ICD-10-CM

## 2020-08-06 PROBLEM — H69.93 DYSFUNCTION OF BOTH EUSTACHIAN TUBES: Status: ACTIVE | Noted: 2020-08-06

## 2020-08-06 LAB — S PYO AG THROAT QL: NEGATIVE

## 2020-08-06 PROCEDURE — 87147 CULTURE TYPE IMMUNOLOGIC: CPT | Performed by: NURSE PRACTITIONER

## 2020-08-06 PROCEDURE — 87880 STREP A ASSAY W/OPTIC: CPT | Performed by: NURSE PRACTITIONER

## 2020-08-06 PROCEDURE — 99214 OFFICE O/P EST MOD 30 MIN: CPT | Performed by: NURSE PRACTITIONER

## 2020-08-06 PROCEDURE — 87070 CULTURE OTHR SPECIMN AEROBIC: CPT | Performed by: NURSE PRACTITIONER

## 2020-08-06 PROCEDURE — 1036F TOBACCO NON-USER: CPT | Performed by: NURSE PRACTITIONER

## 2020-08-06 RX ORDER — PHENTERMINE HYDROCHLORIDE 37.5 MG/1
37.5 TABLET ORAL
Qty: 30 TABLET | Refills: 0 | Status: SHIPPED | OUTPATIENT
Start: 2020-08-06 | End: 2020-10-01 | Stop reason: SDUPTHER

## 2020-08-06 RX ORDER — ERGOCALCIFEROL 1.25 MG/1
50000 CAPSULE ORAL WEEKLY
Qty: 8 CAPSULE | Refills: 0 | Status: SHIPPED | OUTPATIENT
Start: 2020-08-06 | End: 2021-05-20 | Stop reason: ALTCHOICE

## 2020-08-06 NOTE — ASSESSMENT & PLAN NOTE
Rapid strep negative, will send out throat culture and follow  Counseled on staying well hydrated beginning soft diet

## 2020-08-06 NOTE — PROGRESS NOTES
Assessment/Plan:    Pharyngitis  Rapid strep negative, will send out throat culture and follow  Counseled on staying well hydrated beginning soft diet  Hyperlipidemia  Cholesterol was mildly elevated in March of 2020  Patient did not change lifestyle or eating habits  Counseled extensively on the importance of heart healthy diet, increasing dietary fiber, daily physical activity, and avoidance of fried/processed food  Will repeat lipid panel in 3 months  Class 3 severe obesity due to excess calories without serious comorbidity with body mass index (BMI) of 40 0 to 44 9 in adult [facility])  Patient would like to resume phentermine  Again, counseled on the importance of healthy food choices and beginning daily physical activity  To follow-up in 1 month or sooner if needed  Vitamin D deficiency  To begin vitamin-D supplement weekly  Dysfunction of both eustachian tubes  To begin Flonase daily  Follow-up if no improvement in 1 week  Diagnoses and all orders for this visit:    Pharyngitis, unspecified etiology  -     Lipid Panel with Direct LDL reflex; Future  -     POCT rapid strepA  -     Throat culture    Class 3 severe obesity due to excess calories without serious comorbidity with body mass index (BMI) of 40 0 to 44 9 in adult ([facility])  -     phentermine (ADIPEX-P) 37 5 MG tablet; Take 1 tablet (37 5 mg total) by mouth daily in the early morning  -     Ambulatory referral to Nutrition Services; Future    Hyperlipidemia, unspecified hyperlipidemia type    Vitamin D deficiency  -     ergocalciferol (VITAMIN D2) 50,000 units; Take 1 capsule (50,000 Units total) by mouth once a week    Dysfunction of both eustachian tubes          Subjective:      Patient ID: Anthony Ontiveros is a 27 y o  female  Tiffany presents reporting a sore throat for 2 days  She is also experiencing ear fullness and headache associated with her symptoms  Denies fever, chills, sick contacts, or difficulty swallowing    She also reports concern regarding her weight  Nidia Gurrola has not been watching her diet or exercising  She reports frequently eating takeout and fast food  She was on phentermine before and would like to resume this  Tiffany also obtained labs in March and was advised to change her eating habits and lifestyle due to elevated cholesterol but did not  Her vitamin-D was also found to be low at this time  She was to begin a weekly vitamin-D supplement but did not and would like to at this time  The following portions of the patient's history were reviewed and updated as appropriate:   She   Patient Active Problem List    Diagnosis Date Noted    Pharyngitis 08/06/2020    Vitamin D deficiency 08/06/2020    Dysfunction of both eustachian tubes 08/06/2020    Hyperlipidemia 08/16/2019    Depression 08/16/2019    Neck pain 05/10/2019    Upper back pain 05/10/2019    Class 3 severe obesity due to excess calories without serious comorbidity with body mass index (BMI) of 40 0 to 44 9 in [facility]) 05/10/2019    Acne 05/10/2019    Large breasts 05/10/2019     Current Outpatient Medications   Medication Sig Dispense Refill    benzoyl peroxide-erythromycin (BENZAMYCIN) gel Apply topically daily at bedtime 46 g 3    fluticasone (FLONASE) 50 mcg/act nasal spray 1 spray into each nostril daily 1 Bottle 1    ibuprofen (MOTRIN) 200 mg tablet Take by mouth every 6 (six) hours as needed for mild pain      topiramate (TOPAMAX) 50 MG tablet One pill twice a day 180 tablet 1    tretinoin (RETIN-A) 0 025 % cream Apply topically daily at bedtime 45 g 3    ergocalciferol (VITAMIN D2) 50,000 units Take 1 capsule (50,000 Units total) by mouth once a week 8 capsule 0    phentermine (ADIPEX-P) 37 5 MG tablet Take 1 tablet (37 5 mg total) by mouth daily in the early morning 30 tablet 0     No current facility-administered medications for this visit  She has No Known Allergies       Review of Systems   Constitutional: Positive for appetite change (increase ) and fatigue  Negative for activity change  Weight gain   HENT: Positive for sore throat  Ear fullness   Respiratory: Negative  Cardiovascular: Negative  Gastrointestinal: Negative  Neurological: Negative  Psychiatric/Behavioral: Negative  /82   Pulse 84   Temp 97 7 °F (36 5 °C) (Tympanic)   Resp 18   Ht 5' 6" (1 676 m)   SpO2 96%   BMI 46 16 kg/m²     Objective:     Physical Exam   Constitutional: She is oriented to person, place, and time  She appears well-developed  HENT:   Head: Normocephalic and atraumatic  Mouth/Throat: Mucous membranes are moist  No oropharyngeal exudate  Eyes: Conjunctivae are normal    Neck: Neck supple  Cardiovascular: Normal rate, regular rhythm and normal heart sounds  No murmur heard  Pulmonary/Chest: Effort normal and breath sounds normal  No respiratory distress  She has no wheezes  She has no rales  She exhibits no tenderness  Lymphadenopathy:     She has no cervical adenopathy  Neurological: She is alert and oriented to person, place, and time  Psychiatric: Her behavior is normal  Judgment and thought content normal    Nursing note and vitals reviewed  BMI Counseling: Body mass index is 46 16 kg/m²  The BMI is above normal  Nutrition recommendations include decreasing overall calorie intake, 3-5 servings of fruits/vegetables daily, reducing fast food intake, consuming healthier snacks, decreasing soda and/or juice intake, moderation in carbohydrate intake, increasing intake of lean protein and reducing intake of saturated fat and trans fat  Exercise recommendations include moderate aerobic physical activity for 150 minutes/week       Results for orders placed or performed in visit on 08/06/20   POCT rapid strepA   Result Value Ref Range     RAPID STREP A Negative Negative

## 2020-08-06 NOTE — ASSESSMENT & PLAN NOTE
Patient would like to resume phentermine  Again, counseled on the importance of healthy food choices and beginning daily physical activity  To follow-up in 1 month or sooner if needed

## 2020-08-06 NOTE — ASSESSMENT & PLAN NOTE
Cholesterol was mildly elevated in March of 2020  Patient did not change lifestyle or eating habits  Counseled extensively on the importance of heart healthy diet, increasing dietary fiber, daily physical activity, and avoidance of fried/processed food  Will repeat lipid panel in 3 months

## 2020-08-06 NOTE — PATIENT INSTRUCTIONS
Obesity   AMBULATORY CARE:   Obesity  is when your body mass index (BMI) is greater than 30  Your healthcare provider will use your height and weight to measure your BMI  The risks of obesity include  many health problems, such as injuries or physical disability  You may need tests to check for the following:  · Diabetes     · High blood pressure or high cholesterol     · Heart disease     · Gallbladder or liver disease     · Cancer of the colon, breast, prostate, liver, or kidney     · Sleep apnea     · Arthritis or gout  Seek care immediately if:   · You have a severe headache, confusion, or difficulty speaking  · You have weakness on one side of your body  · You have chest pain, sweating, or shortness of breath  Contact your healthcare provider if:   · You have symptoms of gallbladder or liver disease, such as pain in your upper abdomen  · You have knee or hip pain and discomfort while walking  · You have symptoms of diabetes, such as intense hunger and thirst, and frequent urination  · You have symptoms of sleep apnea, such as snoring or daytime sleepiness  · You have questions or concerns about your condition or care  Treatment for obesity  focuses on helping you lose weight to improve your health  Even a small decrease in BMI can reduce the risk for many health problems  Your healthcare provider will help you set a weight-loss goal   · Lifestyle changes  are the first step in treating obesity  These include making healthy food choices and getting regular physical activity  Your healthcare provider may suggest a weight-loss program that involves coaching, education, and therapy  · Medicine  may help you lose weight when it is used with a healthy diet and physical activity  · Surgery  can help you lose weight if you are very obese and have other health problems  There are several types of weight-loss surgery  Ask your healthcare provider for more information    Be successful losing weight:   · Set small, realistic goals  An example of a small goal is to walk for 20 minutes 5 days a week  Anther goal is to lose 5% of your body weight  · Tell friends, family members, and coworkers about your goals  and ask for their support  Ask a friend to lose weight with you, or join a weight-loss support group  · Identify foods or triggers that may cause you to overeat , and find ways to avoid them  Remove tempting high-calorie foods from your home and workplace  Place a bowl of fresh fruit on your kitchen counter  If stress causes you to eat, then find other ways to cope with stress  · Keep a diary to track what you eat and drink  Also write down how many minutes of physical activity you do each day  Weigh yourself once a week and record it in your diary  Eating changes: You will need to eat 500 to 1,000 fewer calories each day than you currently eat to lose 1 to 2 pounds a week  The following changes will help you cut calories:  · Eat smaller portions  Use small plates, no larger than 9 inches in diameter  Fill your plate half full of fruits and vegetables  Measure your food using measuring cups until you know what a serving size looks like  · Eat 3 meals and 1 or 2 snacks each day  Plan your meals in advance  Claire Becky and eat at home most of the time  Eat slowly  · Eat fruits and vegetables at every meal   They are low in calories and high in fiber, which makes you feel full  Do not add butter, margarine, or cream sauce to vegetables  Use herbs to season steamed vegetables  · Eat less fat and fewer fried foods  Eat more baked or grilled chicken and fish  These protein sources are lower in calories and fat than red meat  Limit fast food  Dress your salads with olive oil and vinegar instead of bottled dressing  · Limit the amount of sugar you eat  Do not drink sugary beverages  Limit alcohol  Activity changes:  Physical activity is good for your body in many ways   It helps you burn calories and build strong muscles  It decreases stress and depression, and improves your mood  It can also help you sleep better  Talk to your healthcare provider before you begin an exercise program   · Exercise for at least 30 minutes 5 days a week  Start slowly  Set aside time each day for physical activity that you enjoy and that is convenient for you  It is best to do both weight training and an activity that increases your heart rate, such as walking, bicycling, or swimming  · Find ways to be more active  Do yard work and housecleaning  Walk up the stairs instead of using elevators  Spend your leisure time going to events that require walking, such as outdoor festivals or fairs  This extra physical activity can help you lose weight and keep it off  Follow up with your healthcare provider as directed: You may need to meet with a dietitian  Write down your questions so you remember to ask them during your visits  © 2017 [address] [employer] Information is for End User's use only and may not be sold, redistributed or otherwise used for commercial purposes  All illustrations and images included in CareNotes® are the copyrighted property of [employer]  or [employer]  The above information is an  only  It is not intended as medical advice for individual conditions or treatments  Talk to your doctor, nurse or pharmacist before following any medical regimen to see if it is safe and effective for you  Weight Management   AMBULATORY CARE:   Why it is important to manage your weight:  Being overweight increases your risk of health conditions such as heart disease, high blood pressure, type 2 diabetes, and certain types of cancer  It can also increase your risk for osteoarthritis, sleep apnea, and other respiratory problems  Aim for a slow, steady weight loss  Even a small amount of weight loss can lower your risk of health problems    How to lose weight safely:  A safe and healthy way to lose weight is to eat fewer calories and get regular exercise  You can lose up about 1 pound a week by decreasing the number of calories you eat by 500 calories each day  You can decrease calories by eating smaller portion sizes or by cutting out high-calorie foods  Read labels to find out how many calories are in the foods you eat  You can also burn calories with exercise such as walking, swimming, or biking  You will be more likely to keep weight off if you make these changes part of your lifestyle  Healthy meal plan for weight management:  A healthy meal plan includes a variety of foods, contains fewer calories, and helps you stay healthy  A healthy meal plan includes the following:  · Eat whole-grain foods more often  A healthy meal plan should contain fiber  Fiber is the part of grains, fruits, and vegetables that is not broken down by your body  Whole-grain foods are healthy and provide extra fiber in your diet  Some examples of whole-grain foods are whole-wheat breads and pastas, oatmeal, brown rice, and bulgur  · Eat a variety of vegetables every day  Include dark, leafy greens such as spinach, kale, paige greens, and mustard greens  Eat yellow and orange vegetables such as carrots, sweet potatoes, and winter squash  · Eat a variety of fruits every day  Choose fresh or canned fruit (canned in its own juice or light syrup) instead of juice  Fruit juice has very little or no fiber  · Eat low-fat dairy foods  Drink fat-free (skim) milk or 1% milk  Eat fat-free yogurt and low-fat cottage cheese  Try low-fat cheeses such as mozzarella and other reduced-fat cheeses  · Choose meat and other protein foods that are low in fat  Choose beans or other legumes such as split peas or lentils  Choose fish, skinless poultry (chicken or turkey), or lean cuts of red meat (beef or pork)  Before you cook meat or poultry, cut off any visible fat  · Use less fat and oil  Try baking foods instead of frying them  Add less fat, such as margarine, sour cream, regular salad dressing and mayonnaise to foods  Eat fewer high-fat foods  Some examples of high-fat foods include french fries, doughnuts, ice cream, and cakes  · Eat fewer sweets  Limit foods and drinks that are high in sugar  This includes candy, cookies, regular soda, and sweetened drinks  Ways to decrease calories:   · Eat smaller portions  ¨ Use a small plate with smaller servings  ¨ Do not eat second helpings  ¨ When you eat at a restaurant, ask for a box and place half of your meal in the box before you eat  ¨ Share an entrée with someone else  · Replace high-calorie snacks with healthy, low-calorie snacks  ¨ Choose fresh fruit, vegetables, fat-free rice cakes, or air-popped popcorn instead of potato chips, nuts, or chocolate  ¨ Choose water or calorie-free drinks instead of soda or sweetened drinks  · Eat regular meals  Skipping meals can lead to overeating later in the day  Eat a healthy snack in place of a meal if you do not have time to eat a regular meal      · Do not shop for groceries when you are hungry  You may be more likely to make unhealthy food choices  Take a grocery list of healthy foods and shop after you have eaten  Exercise:  Exercise at least 30 minutes per day on most days of the week  Some examples of exercise include walking, biking, dancing, and swimming  You can also fit in more physical activity by taking the stairs instead of the elevator or parking farther away from stores  Ask your healthcare provider about the best exercise plan for you  Other things to consider as you try to lose weight:   · Be aware of situations that may give you the urge to overeat, such as eating while watching television  Find ways to avoid these situations  For example, read a book, go for a walk, or do crafts      · Meet with a weight loss support group or friends who are also trying to lose weight  This may help you stay motivated to continue working on your weight loss goals  © 2017 [address] [employer] Information is for End User's use only and may not be sold, redistributed or otherwise used for commercial purposes  All illustrations and images included in CareNotes® are the copyrighted property of [employer]  or [employer]  The above information is an  only  It is not intended as medical advice for individual conditions or treatments  Talk to your doctor, nurse or pharmacist before following any medical regimen to see if it is safe and effective for you  Calorie Counting Diet   WHAT YOU NEED TO KNOW:   What is a calorie counting diet? It is a meal plan based on counting calories each day to reach a healthy body weight  You will need to eat fewer calories if you are trying to lose weight  Weight loss may decrease your risk for certain health problems or improve your health if you have health problems  Some of these health problems include heart disease, high blood pressure, and diabetes  What foods should I avoid? Your dietitian will tell you if you need to avoid certain foods based on your body weight and health condition  You may need to avoid high-fat foods if you are at risk for or have heart disease  You may need to eat fewer foods from the breads and starches food group if you have diabetes  How many calories are in foods? The following is a list of foods and drinks with the approximate number of calories in each  Check the food label to find the exact number of calories  A dietitian can tell you how many calories you should have from each food group each day    · Carbohydrate:      ¨ ½ of a 3-inch bagel, 1 slice of bread, or ½ of a hamburger bun or hot dog bun (80)    ¨ 1 (8-inch) flour tortilla or ½ cup of cooked rice (100)    ¨ 1 (6-inch) corn tortilla (80)    ¨ 1 (6-inch) pancake or 1 cup of bran flakes cereal (110)    ¨ ½ cup of cooked cereal (80)    ¨ ½ cup of cooked pasta (85)    ¨ 1 ounce of pretzels (100)    ¨ 3 cups of air-popped popcorn without butter or oil (80)    · Dairy:      ¨ 1 cup of skim or 1% milk (90)    ¨ 1 cup of 2% milk (120)    ¨ 1 cup of whole milk (160)    ¨ 1 cup of 2% chocolate milk (220)    ¨ 1 ounce of low-fat cheese with 3 grams of fat per ounce (70)    ¨ 1 ounce of cheddar cheese (114)    ¨ ½ cup of 1% fat cottage cheese (80)    ¨ 1 cup of plain or sugar-free, fat-free yogurt (90)    · Protein foods:      ¨ 3 ounces of fish (not breaded or fried) (95)    ¨ 3 ounces of breaded, fried fish (195)    ¨ ¾ cup of tuna canned in water (105)    ¨ 3 ounces of chicken breast without skin (105)    ¨ 1 fried chicken breast with skin (350)    ¨ ¼ cup of fat free egg substitute (40)    ¨ 1 large egg (75)    ¨ 3 ounces of lean beef or pork (165)    ¨ 3 ounces of fried pork chop or ham (185)    ¨ ½ cup of cooked dried beans, such as kidney, rocha, lentils, or navy (115)    ¨ 3 ounces of bologna or lunch meat (225)    ¨ 2 links of breakfast sausage (140)    · Vegetables:      ¨ ½ cup of sliced mushrooms (10)    ¨ 1 cup of salad greens, such as lettuce, spinach, or darian (15)    ¨ ½ cup of steamed asparagus (20)    ¨ ½ cup of cooked summer squash, zucchini squash, or green or wax beans (25)    ¨ 1 cup of broccoli or cauliflower florets, or 1 medium tomato (25)    ¨ 1 large raw carrot or ½ cup of cooked carrots (40)    ¨ ? of a medium cucumber or 1 stalk of celery (5)    ¨ 1 small baked potato (160)    ¨ 1 cup of breaded, fried vegetables (230)    · Fruit:      ¨ 1 (6-inch) banana (55)     ¨ ½ of a 4-inch grapefruit (55)    ¨ 15 grapes (60)    ¨ 1 medium orange or apple (70)    ¨ 1 large peach (65)    ¨ 1 cup of fresh pineapple chunks (75)    ¨ 1 cup of melon cubes (50)    ¨ 1¼ cups of whole strawberries (45)    ¨ ½ cup of fruit canned in juice (55)    ¨ ½ cup of fruit canned in heavy syrup (110)    ¨ ?  cup of raisins (130)    ¨ ½ cup of unsweetened fruit juice (60)    ¨ ½ cup of grape, cranberry, or prune juice (90)    · Fat:      ¨ 10 peanuts or 2 teaspoons of peanut butter (55)    ¨ 2 tablespoons of avocado or 1 tablespoon of regular salad dressing (45)    ¨ 2 slices of blancas (90)    ¨ 1 teaspoon of oil, such as safflower, canola, corn, or olive oil (45)    ¨ 2 teaspoons of low-fat margarine, or 1 tablespoon of low-fat mayonnaise (50)    ¨ 1 teaspoon of regular margarine (40)    ¨ 1 tablespoon of regular mayonnaise (135)    ¨ 1 tablespoon of cream cheese or 2 tablespoons of low-fat cream cheese (45)    ¨ 2 tablespoons of vegetable shortening (215)    · Dessert and sweets:      ¨ 8 animal crackers or 5 vanilla wafers (80)    ¨ 1 frozen fruit juice bar (80)    ¨ ½ cup of ice milk or low-fat frozen yogurt (90)    ¨ ½ cup of sherbet or sorbet (125)    ¨ ½ cup of sugar-free pudding or custard (60)    ¨ ½ cup of ice cream (140)    ¨ ½ cup of pudding or custard (175)    ¨ 1 (2-inch) square chocolate brownie (185)    · Combination foods:      ¨ Bean burrito made with an 8-inch tortilla, without cheese (275)    ¨ Chicken breast sandwich with lettuce and tomato (325)    ¨ 1 cup of chicken noodle soup (60)    ¨ 1 beef taco (175)    ¨ Regular hamburger with lettuce and tomato (310)    ¨ Regular cheeseburger with lettuce and tomato (410)     ¨ ¼ of a 12-inch cheese pizza (280)    ¨ Fried fish sandwich with lettuce and tomato (425)    ¨ Hot dog and bun (275)    ¨ 1½ cups of macaroni and cheese (310)    ¨ Taco salad with a fried tortilla shell (870)    · Low-calorie foods:      ¨ 1 tablespoon of ketchup or 1 tablespoon of fat free sour cream (15)    ¨ 1 teaspoon of mustard (5)    ¨ ¼ cup of salsa (20)    ¨ 1 large dill pickle (15)    ¨ 1 tablespoon of fat free salad dressing (10)    ¨ 2 teaspoons of low-sugar, light jam or jelly, or 1 tablespoon of sugar-free syrup (15)    ¨ 1 sugar-free popsicle (15)    ¨ 1 cup of club soda, seltzer water, or diet soda (0)  CARE AGREEMENT:   You have the right to help plan your care  Discuss treatment options with your caregivers to decide what care you want to receive  You always have the right to refuse treatment  The above information is an  only  It is not intended as medical advice for individual conditions or treatments  Talk to your doctor, nurse or pharmacist before following any medical regimen to see if it is safe and effective for you  © 2017 [address] Information is for End User's use only and may not be sold, redistributed or otherwise used for commercial purposes  All illustrations and images included in CareNotes® are the copyrighted property of [employer]  or [employer]

## 2020-08-08 LAB — BACTERIA THROAT CULT: ABNORMAL

## 2020-08-10 ENCOUNTER — TELEPHONE (OUTPATIENT)
Dept: FAMILY MEDICINE CLINIC | Facility: CLINIC | Age: 25
End: 2020-08-10

## 2020-08-10 DIAGNOSIS — J02.9 PHARYNGITIS, UNSPECIFIED ETIOLOGY: Primary | ICD-10-CM

## 2020-08-10 RX ORDER — AMOXICILLIN 500 MG/1
500 CAPSULE ORAL EVERY 12 HOURS SCHEDULED
Qty: 20 CAPSULE | Refills: 0 | Status: SHIPPED | OUTPATIENT
Start: 2020-08-10 | End: 2020-08-20

## 2020-08-10 NOTE — TELEPHONE ENCOUNTER
Patient is still having ear pain, sore throat and headache  She wants to know if she is able to be at work as well, please advise

## 2020-08-10 NOTE — TELEPHONE ENCOUNTER
Please call patient to make aware that I sent in amoxicillin to begin 1 tablet every 12 hours for 10 days  I would encourage her to begin a probiotic while she is taking the antibiotic, to be sure to separate this at least 3-4 hours  She is also to stay well hydrated, gargle with salt water, change her toothbrush 24 hours after the started the antibiotic, and begin bland diet  After 24 hours on the antibiotic, she is no longer contagious  I would encourage her to return to work after that time  She is to follow-up if no improvement in a week or sooner if symptoms worsen

## 2020-08-10 NOTE — TELEPHONE ENCOUNTER
----- Message from Tyree Velez sent at 8/10/2020  1:03 PM EDT -----  Regarding: Routing to team since Modesto Pacheco is off  Thank you    ----- Message -----  From: MIREYA Burgess  Sent: 8/10/2020   7:30 AM EDT  To: Dylon Wilhelm MA    Please call patient to see how her throat is feeling  Her throat culture was positive for strep infection group C  If her sore throat is still present, I will treat her  Let me know either way

## 2020-09-30 ENCOUNTER — OFFICE VISIT (OUTPATIENT)
Dept: DERMATOLOGY | Facility: CLINIC | Age: 25
End: 2020-09-30
Payer: COMMERCIAL

## 2020-09-30 VITALS — TEMPERATURE: 97.4 F

## 2020-09-30 DIAGNOSIS — L70.0 ACNE VULGARIS: Primary | ICD-10-CM

## 2020-09-30 DIAGNOSIS — L70.9 ACNE, UNSPECIFIED ACNE TYPE: ICD-10-CM

## 2020-09-30 DIAGNOSIS — L91.0 KELOID OF SKIN: ICD-10-CM

## 2020-09-30 PROCEDURE — 11900 INJECT SKIN LESIONS </W 7: CPT | Performed by: DERMATOLOGY

## 2020-09-30 PROCEDURE — 99213 OFFICE O/P EST LOW 20 MIN: CPT | Performed by: DERMATOLOGY

## 2020-09-30 RX ORDER — CLINDAMYCIN AND BENZOYL PEROXIDE 10; 50 MG/G; MG/G
GEL TOPICAL DAILY
Qty: 50 G | Refills: 4 | Status: SHIPPED | OUTPATIENT
Start: 2020-09-30 | End: 2021-01-21 | Stop reason: ALTCHOICE

## 2020-09-30 RX ORDER — TRETINOIN 0.1 MG/G
GEL TOPICAL
Qty: 45 G | Refills: 3 | Status: SHIPPED | OUTPATIENT
Start: 2020-09-30 | End: 2021-01-21 | Stop reason: ALTCHOICE

## 2020-09-30 RX ORDER — TRIAMCINOLONE ACETONIDE 40 MG/ML
40 INJECTION, SUSPENSION INTRA-ARTICULAR; INTRAMUSCULAR ONCE
Status: COMPLETED | OUTPATIENT
Start: 2020-09-30 | End: 2020-09-30

## 2020-09-30 RX ADMIN — TRIAMCINOLONE ACETONIDE 40 MG: 40 INJECTION, SUSPENSION INTRA-ARTICULAR; INTRAMUSCULAR at 16:39

## 2020-09-30 NOTE — PROGRESS NOTES
[facility] DERMATOLOGY  [address] [place] [address]  [facility] [address]  [phone]  [phone]     MRN: [id number] : 1995  Encounter: [id number]  Patient Information: Ike Conway  Chief complaint:  Follow-up for acne and keloid    History of present illness: 28-year-old female presents for follow-up secondary to her acne and keloids  Patient not using anything at this to control acne that is flaring somewhat if she request restart of treatment also complaining of numerous keloids that are causing some discomfort  Past Medical History:   Diagnosis Date    High cholesterol     Morbid [facility] with BMI of 45 0-49 9, [facility]     Pre-operative laboratory examination      History reviewed  No pertinent surgical history  Social History   Social History     Substance and Sexual Activity   Alcohol Use Yes    Comment: socially-holidays     Social History     Substance and Sexual Activity   Drug Use No     Social History     Tobacco Use   Smoking Status Former Smoker    Packs/day: 0 25    Years: 6 00    Pack years: 1 50   Smokeless Tobacco Never Used   Tobacco Comment    stopped     Family History   Problem Relation Age of Onset    Coronary artery disease Mother     Diabetes Sister     Coronary artery disease Maternal Grandmother     Hyperlipidemia Maternal Grandmother     Hyperlipidemia Maternal Grandfather     Diabetes Maternal Grandfather     Alcohol abuse Neg Hx     Substance Abuse Neg Hx     Mental illness Neg Hx      Meds/Allergies   No Known Allergies    Meds:  Prior to Admission medications    Medication Sig Start Date End Date Taking?  Authorizing Provider   ibuprofen (MOTRIN) 200 mg tablet Take by mouth every 6 (six) hours as needed for mild pain   Yes Historical Provider, MD   phentermine (ADIPEX-P) 37 5 MG tablet Take 1 tablet (37 5 mg total) by mouth daily in the early morning 20  Yes MIREYA Mccormick   topiramate (TOPAMAX) 50 MG tablet One pill twice a day 3/18/20  Yes MIREYA Bravo   benzoyl peroxide-erythromycin [facility]) gel Apply topically daily at bedtime  Patient not taking: Reported on 9/30/2020 10/29/19   Obi Smith MD   ergocalciferol (VITAMIN D2) 50,000 units Take 1 capsule (50,000 Units total) by mouth once a week  Patient not taking: Reported on 9/30/2020 8/6/20   MIREYA Burton   fluticasone [facility]) 50 mcg/act nasal spray 1 spray into each nostril daily  Patient not taking: Reported on 9/30/2020 3/18/20   MIREYA Portillo   tretinoin (RETIN-A) 0 025 % cream Apply topically daily at bedtime  Patient not taking: Reported on 9/30/2020 7/12/19   Obi Smith MD       Subjective:     Review of Systems:    General: negative for - chills, fatigue, fever,  weight gain or weight loss  Psychological: negative for - anxiety, behavioral disorder, concentration difficulties, decreased libido, depression, irritability, memory difficulties, mood swings, sleep disturbances or suicidal ideation  ENT: negative for - hearing difficulties , nasal congestion, nasal discharge, oral lesions, sinus pain, sneezing, sore throat  Allergy and Immunology: negative for - hives, insect bite sensitivity,  Hematological and Lymphatic: negative for - bleeding problems, blood clots,bruising, swollen lymph nodes  Endocrine: negative for - hair pattern changes, hot flashes, malaise/lethargy, mood swings, palpitations, polydipsia/polyuria, skin changes, temperature intolerance or unexpected weight change  Respiratory: negative for - cough, hemoptysis, orthopnea, shortness of breath, or wheezing  Cardiovascular: negative for - chest pain, dyspnea on exertion, edema,  Gastrointestinal: negative for - abdominal pain, nausea/vomiting  Genito-Urinary: negative for - dysuria, incontinence, irregular/heavy menses or urinary frequency/urgency  Musculoskeletal: negative for - gait disturbance, joint pain, joint stiffness, joint swelling, muscle pain, muscular weakness  Dermatological:  As in HPI  Neurological: negative for confusion, dizziness, headaches, impaired coordination/balance, memory loss, numbness/tingling, seizures, speech problems, tremors or weakness       Objective:   Temp (!) 97 4 °F (36 3 °C)     Physical Exam:    General Appearance:    Alert, cooperative, no distress   Head:    Normocephalic, without obvious abnormality, atraumatic           Skin:   A full skin exam was performed including scalp, head scalp, eyes, ears, nose, lips, neck, chest, axilla, abdomen, back, buttocks, bilateral upper extremities, bilateral lower extremities, hands, feet, fingers, toes, fingernails, and toenails *comedones papules noted on the face in addition hypertrophic scars that are numerous 1 on the chest and numerous at least 10 on the back some appear to be less indurated  Intralesional Injection Procedure Note  Diagnosis:  Keloid  Location:  Back chest and shoulders  Informed consent:  Discussed risks (infection, pain, bleeding, bruising, thinning of the skin, loss of skin pigment, lack of resolution, and recurrence of lesion) and benefits of the procedure, as well as the alternatives   Informed consent was obtained  Preparation: The area was prepared a standard fashion  Anesthesia: not required  Procedure Details: An intralesional injection was performed with                             1 cc of Kenalog 40 in total were injected  Total number of lesions injected:  9       Assessment:     1  Acne vulgaris  clindamycin-benzoyl peroxide (BENZACLIN) gel   2  Keloid of skin  triamcinolone acetonide (KENALOG-40) 40 mg/mL injection 40 mg   3   Acne, unspecified acne type  tretinoin (Retin-A) 0 01 % gel         Plan:   Acne will restart therapy hopefully get this process under control again   keloid again were injected hopefully get this under control again we will recheck in [address], MD  9/30/2020,4:35 PM    Portions of the record may have been created with voice recognition software   Occasional wrong word or "sound a like" substitutions may have occurred due to the inherent limitations of voice recognition software   Read the chart carefully and recognize, using context, where substitutions have occurred

## 2020-09-30 NOTE — PATIENT INSTRUCTIONS
Acne will restart therapy hopefully get this process under control again   keloid again were injected hopefully get this under control again we will recheck in 1month

## 2020-10-01 ENCOUNTER — OFFICE VISIT (OUTPATIENT)
Dept: FAMILY MEDICINE CLINIC | Facility: CLINIC | Age: 25
End: 2020-10-01
Payer: COMMERCIAL

## 2020-10-01 VITALS
HEART RATE: 102 BPM | SYSTOLIC BLOOD PRESSURE: 120 MMHG | HEIGHT: 66 IN | WEIGHT: 282.4 LBS | TEMPERATURE: 97.2 F | RESPIRATION RATE: 18 BRPM | DIASTOLIC BLOOD PRESSURE: 74 MMHG | BODY MASS INDEX: 45.38 KG/M2

## 2020-10-01 DIAGNOSIS — E66.01 CLASS 3 SEVERE OBESITY DUE TO EXCESS CALORIES WITHOUT SERIOUS COMORBIDITY WITH BODY MASS INDEX (BMI) OF 40.0 TO 44.9 IN ADULT (HCC): ICD-10-CM

## 2020-10-01 DIAGNOSIS — H69.83 DYSFUNCTION OF BOTH EUSTACHIAN TUBES: ICD-10-CM

## 2020-10-01 DIAGNOSIS — J02.9 PHARYNGITIS, UNSPECIFIED ETIOLOGY: Primary | ICD-10-CM

## 2020-10-01 DIAGNOSIS — E66.01 CLASS 3 SEVERE OBESITY DUE TO EXCESS CALORIES WITHOUT SERIOUS COMORBIDITY WITH BODY MASS INDEX (BMI) OF 45.0 TO 49.9 IN ADULT (HCC): ICD-10-CM

## 2020-10-01 LAB — S PYO AG THROAT QL: NEGATIVE

## 2020-10-01 PROCEDURE — 99214 OFFICE O/P EST MOD 30 MIN: CPT | Performed by: NURSE PRACTITIONER

## 2020-10-01 PROCEDURE — 87070 CULTURE OTHR SPECIMN AEROBIC: CPT | Performed by: NURSE PRACTITIONER

## 2020-10-01 PROCEDURE — 87147 CULTURE TYPE IMMUNOLOGIC: CPT | Performed by: NURSE PRACTITIONER

## 2020-10-01 PROCEDURE — 1036F TOBACCO NON-USER: CPT | Performed by: NURSE PRACTITIONER

## 2020-10-01 PROCEDURE — 87880 STREP A ASSAY W/OPTIC: CPT | Performed by: NURSE PRACTITIONER

## 2020-10-01 RX ORDER — LIDOCAINE HYDROCHLORIDE 20 MG/ML
15 SOLUTION OROPHARYNGEAL 4 TIMES DAILY PRN
Qty: 100 ML | Refills: 0 | Status: SHIPPED | OUTPATIENT
Start: 2020-10-01 | End: 2021-05-20 | Stop reason: ALTCHOICE

## 2020-10-01 RX ORDER — PHENTERMINE HYDROCHLORIDE 37.5 MG/1
37.5 TABLET ORAL
Qty: 30 TABLET | Refills: 0 | Status: SHIPPED | OUTPATIENT
Start: 2020-10-01 | End: 2021-04-08 | Stop reason: DRUGHIGH

## 2020-10-02 ENCOUNTER — TELEPHONE (OUTPATIENT)
Dept: DERMATOLOGY | Facility: CLINIC | Age: 25
End: 2020-10-02

## 2020-10-04 LAB — BACTERIA THROAT CULT: ABNORMAL

## 2020-10-05 ENCOUNTER — TELEPHONE (OUTPATIENT)
Dept: FAMILY MEDICINE CLINIC | Facility: CLINIC | Age: 25
End: 2020-10-05

## 2020-10-12 ENCOUNTER — TELEPHONE (OUTPATIENT)
Dept: FAMILY MEDICINE CLINIC | Facility: CLINIC | Age: 25
End: 2020-10-12

## 2020-10-12 DIAGNOSIS — J02.0 STREP PHARYNGITIS: Primary | ICD-10-CM

## 2020-10-12 RX ORDER — CEPHALEXIN 500 MG/1
500 CAPSULE ORAL EVERY 12 HOURS SCHEDULED
Qty: 20 CAPSULE | Refills: 0 | Status: SHIPPED | OUTPATIENT
Start: 2020-10-12 | End: 2020-10-22

## 2020-10-13 ENCOUNTER — TELEPHONE (OUTPATIENT)
Dept: BEHAVIORAL/MENTAL HEALTH CLINIC | Facility: CLINIC | Age: 25
End: 2020-10-13

## 2020-10-29 ENCOUNTER — OFFICE VISIT (OUTPATIENT)
Dept: DERMATOLOGY | Facility: CLINIC | Age: 25
End: 2020-10-29
Payer: COMMERCIAL

## 2020-10-29 VITALS — TEMPERATURE: 95.9 F

## 2020-10-29 DIAGNOSIS — L91.0 KELOID OF SKIN: ICD-10-CM

## 2020-10-29 DIAGNOSIS — L70.0 ACNE VULGARIS: Primary | ICD-10-CM

## 2020-10-29 PROCEDURE — 99213 OFFICE O/P EST LOW 20 MIN: CPT | Performed by: DERMATOLOGY

## 2020-10-29 PROCEDURE — 11900 INJECT SKIN LESIONS </W 7: CPT | Performed by: DERMATOLOGY

## 2020-10-29 RX ORDER — ADAPALENE 3 MG/G
1 GEL TOPICAL
Qty: 45 G | Refills: 3 | Status: SHIPPED | OUTPATIENT
Start: 2020-10-29 | End: 2021-01-21 | Stop reason: ALTCHOICE

## 2020-10-29 RX ORDER — TRIAMCINOLONE ACETONIDE 40 MG/ML
20 INJECTION, SUSPENSION INTRA-ARTICULAR; INTRAMUSCULAR ONCE
Status: COMPLETED | OUTPATIENT
Start: 2020-10-29 | End: 2020-10-29

## 2020-10-29 RX ADMIN — TRIAMCINOLONE ACETONIDE 20 MG: 40 INJECTION, SUSPENSION INTRA-ARTICULAR; INTRAMUSCULAR at 17:19

## 2020-11-03 ENCOUNTER — TELEPHONE (OUTPATIENT)
Dept: DERMATOLOGY | Facility: CLINIC | Age: 25
End: 2020-11-03

## 2020-11-11 ENCOUNTER — CLINICAL SUPPORT (OUTPATIENT)
Dept: FAMILY MEDICINE CLINIC | Facility: CLINIC | Age: 25
End: 2020-11-11
Payer: COMMERCIAL

## 2020-11-11 DIAGNOSIS — Z23 NEED FOR IMMUNIZATION AGAINST INFLUENZA: Primary | ICD-10-CM

## 2020-11-11 PROCEDURE — 90471 IMMUNIZATION ADMIN: CPT

## 2020-11-11 PROCEDURE — 90682 RIV4 VACC RECOMBINANT DNA IM: CPT

## 2021-01-21 ENCOUNTER — OFFICE VISIT (OUTPATIENT)
Dept: FAMILY MEDICINE CLINIC | Facility: CLINIC | Age: 26
End: 2021-01-21
Payer: COMMERCIAL

## 2021-01-21 VITALS
WEIGHT: 293 LBS | HEART RATE: 79 BPM | OXYGEN SATURATION: 98 % | HEIGHT: 66 IN | DIASTOLIC BLOOD PRESSURE: 72 MMHG | SYSTOLIC BLOOD PRESSURE: 118 MMHG | BODY MASS INDEX: 47.09 KG/M2

## 2021-01-21 DIAGNOSIS — L70.9 ACNE, UNSPECIFIED ACNE TYPE: ICD-10-CM

## 2021-01-21 DIAGNOSIS — E28.2 PCOS (POLYCYSTIC OVARIAN SYNDROME): ICD-10-CM

## 2021-01-21 DIAGNOSIS — N92.6 IRREGULAR MENSES: Primary | ICD-10-CM

## 2021-01-21 PROCEDURE — 3008F BODY MASS INDEX DOCD: CPT | Performed by: FAMILY MEDICINE

## 2021-01-21 PROCEDURE — 3725F SCREEN DEPRESSION PERFORMED: CPT | Performed by: FAMILY MEDICINE

## 2021-01-21 PROCEDURE — 1036F TOBACCO NON-USER: CPT | Performed by: FAMILY MEDICINE

## 2021-01-21 PROCEDURE — 99214 OFFICE O/P EST MOD 30 MIN: CPT | Performed by: FAMILY MEDICINE

## 2021-01-21 RX ORDER — AZITHROMYCIN 250 MG/1
TABLET, FILM COATED ORAL
Qty: 6 TABLET | Refills: 0 | Status: SHIPPED | OUTPATIENT
Start: 2021-01-21 | End: 2021-01-26

## 2021-01-21 RX ORDER — SPIRONOLACTONE 50 MG/1
TABLET, FILM COATED ORAL
Qty: 90 TABLET | Refills: 1 | Status: SHIPPED | OUTPATIENT
Start: 2021-01-21 | End: 2021-05-20 | Stop reason: ALTCHOICE

## 2021-01-21 RX ORDER — DOXYCYCLINE HYCLATE 50 MG/1
CAPSULE ORAL
Qty: 180 CAPSULE | Refills: 1 | Status: SHIPPED | OUTPATIENT
Start: 2021-01-21 | End: 2021-03-21

## 2021-01-21 NOTE — PROGRESS NOTES
Standard Visit   Assessment/Plan:     Visit Diagnosis:  Diagnoses and all orders for this visit:    Irregular menses  -     FSH and LH; Future    PCOS (polycystic ovarian syndrome)  -     Testosterone; Future  -     spironolactone (ALDACTONE) 50 mg tablet; Take 1 pill daily in the morning    Acne, unspecified acne type  -     azithromycin (Zithromax) 250 mg tablet; Take 2 tablets (500 mg total) by mouth daily for 1 day, THEN 1 tablet (250 mg total) daily for 4 days  -     doxycycline hyclate (VIBRAMYCIN) 50 mg capsule; Take 1 pill twice a day          Subjective:    Patient ID: Jacque Holley is a 23 y o  female       Patient is here with the following concerns:    HPI    The following portions of the patient's history were reviewed and updated as appropriate: allergies, current medications, past family history, past medical history, past social history, past surgical history and problem list     Review of Systems      /72   Pulse 79   Ht 5' 6" (1 676 m)   Wt 134 kg (296 lb)   LMP 01/08/2021 (Approximate)   SpO2 98%   BMI 47 78 kg/m²   Social History     Socioeconomic History    Marital status: Single     Spouse name: Not on file    Number of children: Not on file    Years of education: Not on file    Highest education level: Not on file   Occupational History    Not on file   Social Needs    Financial resource strain: Not on file    Food insecurity     Worry: Not on file     Inability: Not on file    Transportation needs     Medical: Not on file     Non-medical: Not on file   Tobacco Use    Smoking status: Former Smoker     Packs/day: 0 25     Years: 6 00     Pack years: 1 50    Smokeless tobacco: Never Used    Tobacco comment: stopped   Substance and Sexual Activity    Alcohol use: Yes     Comment: socially-holidays    Drug use: No    Sexual activity: Yes     Partners: Male   Lifestyle    Physical activity     Days per week: Not on file     Minutes per session: Not on file    Stress: Not on file   Relationships    Social connections     Talks on phone: Not on file     Gets together: Not on file     Attends Congregation service: Not on file     Active member of club or organization: Not on file     Attends meetings of clubs or organizations: Not on file     Relationship status: Not on file    Intimate partner violence     Fear of current or ex partner: Not on file     Emotionally abused: Not on file     Physically abused: Not on file     Forced sexual activity: Not on file   Other Topics Concern    Not on file   Social History Narrative    Significant other    Works for [employer] office    1 child    Hobbies-    Exercise-occ     Past Medical History:   Diagnosis Date    High cholesterol     Morbid obesity with BMI of 45 0-49 9, adult ([facility] )     Pre-operative laboratory examination      Family History   Problem Relation Age of Onset    Coronary artery disease Mother     Diabetes Sister     Coronary artery disease Maternal Grandmother     Hyperlipidemia Maternal Grandmother     Hyperlipidemia Maternal Grandfather     Diabetes Maternal Grandfather     Alcohol abuse Neg Hx     Substance Abuse Neg Hx     Mental illness Neg Hx      History reviewed  No pertinent surgical history  Current Outpatient Medications:     azithromycin (Zithromax) 250 mg tablet, Take 2 tablets (500 mg total) by mouth daily for 1 day, THEN 1 tablet (250 mg total) daily for 4 days  , Disp: 6 tablet, Rfl: 0    doxycycline hyclate (VIBRAMYCIN) 50 mg capsule, Take 1 pill twice a day, Disp: 180 capsule, Rfl: 1    ergocalciferol (VITAMIN D2) 50,000 units, Take 1 capsule (50,000 Units total) by mouth once a week, Disp: 8 capsule, Rfl: 0    fluticasone (FLONASE) 50 mcg/act nasal spray, 1 spray into each nostril daily, Disp: 1 Bottle, Rfl: 1    ibuprofen (MOTRIN) 200 mg tablet, Take by mouth every 6 (six) hours as needed for mild pain, Disp: , Rfl:     Lidocaine Viscous HCl (XYLOCAINE) 2 % mucosal solution, Swish and spit 15 mL 4 (four) times a day as needed for mouth pain or discomfort (Patient not taking: Reported on 10/29/2020), Disp: 100 mL, Rfl: 0    phentermine (ADIPEX-P) 37 5 MG tablet, Take 1 tablet (37 5 mg total) by mouth daily in the early morning, Disp: 30 tablet, Rfl: 0    spironolactone (ALDACTONE) 50 mg tablet, Take 1 pill daily in the morning, Disp: 90 tablet, Rfl: 1    topiramate (TOPAMAX) 50 MG tablet, One pill twice a day, Disp: 180 tablet, Rfl: 1    Lab Review   Office Visit on 10/01/2020   Component Date Value     RAPID STREP A 10/01/2020 Negative     Throat Culture 10/01/2020 1+ Growth of Beta Hemolytic Streptococcus Group C*   Office Visit on 08/06/2020   Component Date Value     RAPID STREP A 08/06/2020 Negative     Throat Culture 08/06/2020 1+ Growth of Beta Hemolytic Streptococcus Group C*        Objective:     Physical Exam      There are no Patient Instructions on file for this visit          MIREYA Cueto

## 2021-01-28 ENCOUNTER — TELEPHONE (OUTPATIENT)
Dept: PSYCHIATRY | Facility: CLINIC | Age: 26
End: 2021-01-28

## 2021-02-15 ENCOUNTER — TELEPHONE (OUTPATIENT)
Dept: FAMILY MEDICINE CLINIC | Facility: CLINIC | Age: 26
End: 2021-02-15

## 2021-02-15 DIAGNOSIS — E66.01 CLASS 3 SEVERE OBESITY DUE TO EXCESS CALORIES WITHOUT SERIOUS COMORBIDITY WITH BODY MASS INDEX (BMI) OF 45.0 TO 49.9 IN ADULT (HCC): Primary | ICD-10-CM

## 2021-02-15 NOTE — TELEPHONE ENCOUNTER
Patient requesting a referral to weight management as she is having difficulty losing weight despite dietary changes and working out  She was also go inquiring how to manage a callus on index finger  Counseled on gently exfoliating area while in the shower and applying an emollient daily  Verbalized agreement understanding plan of care, denies additional concerns

## 2021-03-04 ENCOUNTER — TELEMEDICINE (OUTPATIENT)
Dept: FAMILY MEDICINE CLINIC | Facility: CLINIC | Age: 26
End: 2021-03-04
Payer: COMMERCIAL

## 2021-03-04 DIAGNOSIS — J02.9 PHARYNGITIS, UNSPECIFIED ETIOLOGY: Primary | ICD-10-CM

## 2021-03-04 PROCEDURE — 99213 OFFICE O/P EST LOW 20 MIN: CPT | Performed by: NURSE PRACTITIONER

## 2021-03-04 NOTE — PROGRESS NOTES
Virtual Regular Visit      Assessment/Plan:    Problem List Items Addressed This Visit        Digestive    Pharyngitis - Primary      Reports having difficulty swallowing  Is concerned for strep throat  Also is concerned regarding possible call with exposure  Strep test ordered  Discussed COVID testing because of possible exposure  BMI Counseling: There is no height or weight on file to calculate BMI  The BMI is above normal  Nutrition recommendations include decreasing portion sizes, encouraging healthy choices of fruits and vegetables, decreasing fast food intake, consuming healthier snacks, limiting drinks that contain sugar, moderation in carbohydrate intake, increasing intake of lean protein, reducing intake of saturated and trans fat and reducing intake of cholesterol  Exercise recommendations include exercising 3-5 times per week and strength training exercises  No pharmacotherapy was ordered  Reason for visit is   Chief Complaint   Patient presents with    Virtual Regular Visit        Encounter provider MIREYA Marcelo    Provider located at [address] [place] [address]  [address] [facility] [address] [phone]      Recent Visits  Date Type Provider Dept   03/04/21 Telemedicine Loretta Ross [address] recent visits within past 7 days and meeting all other requirements     Future Appointments  No visits were found meeting these conditions  Showing future appointments within next 150 days and meeting all other requirements        The patient was identified by name and date of birth  Nikki Velasco was informed that this is a telemedicine visit and that the visit is being conducted through [employer] and patient was informed that this is not a secure, HIPAA-compliant platform  She agrees to proceed     My office door was closed  No one else was in the room    She acknowledged consent and understanding of privacy and security of the video platform  The patient has agreed to participate and understands they can discontinue the visit at any time  Patient is aware this is a billable service  Subjective  Marysol Au is a 23 y o  female        Patient is being seen for virtual visit with complaints of congestion and some ear pain  States she got up this morning had some symptoms  Her boyfriend is being checked for COVID  Is concerned that she may have COVID or strep throat  Onset of symptoms was last night  Past Medical History:   Diagnosis Date    High cholesterol     Morbid obesity with BMI of 45 0-49 9, adult ([facility] )     Pre-operative laboratory examination        No past surgical history on file  Current Outpatient Medications   Medication Sig Dispense Refill    doxycycline hyclate (VIBRAMYCIN) 50 mg capsule Take 1 pill twice a day 180 capsule 1    ergocalciferol (VITAMIN D2) 50,000 units Take 1 capsule (50,000 Units total) by mouth once a week 8 capsule 0    fluticasone (FLONASE) 50 mcg/act nasal spray 1 spray into each nostril daily 1 Bottle 1    ibuprofen (MOTRIN) 200 mg tablet Take by mouth every 6 (six) hours as needed for mild pain      Lidocaine Viscous HCl (XYLOCAINE) 2 % mucosal solution Swish and spit 15 mL 4 (four) times a day as needed for mouth pain or discomfort (Patient not taking: Reported on 10/29/2020) 100 mL 0    phentermine (ADIPEX-P) 37 5 MG tablet Take 1 tablet (37 5 mg total) by mouth daily in the early morning 30 tablet 0    spironolactone (ALDACTONE) 50 mg tablet Take 1 pill daily in the morning 90 tablet 1    topiramate (TOPAMAX) 50 MG tablet One pill twice a day 180 tablet 1     No current facility-administered medications for this visit  No Known Allergies    Review of Systems   Constitutional: Negative for chills, fatigue and fever  HENT: Positive for ear pain and sore throat  Eyes: Negative  Respiratory: Negative  Negative for cough and shortness of breath  Cardiovascular: Negative  Gastrointestinal: Negative  Endocrine: Negative  Genitourinary: Negative  Musculoskeletal: Negative  Skin: Negative  Allergic/Immunologic: Negative  Neurological: Negative  Negative for headaches  Psychiatric/Behavioral: Negative  Video Exam    There were no vitals filed for this visit  Physical Exam  Vitals signs and nursing note reviewed  Constitutional:       Appearance: She is well-developed  HENT:      Head: Normocephalic and atraumatic  Right Ear: External ear normal       Left Ear: External ear normal    Eyes:      Pupils: Pupils are equal, round, and reactive to light  Neck:      Musculoskeletal: Normal range of motion  Cardiovascular:      Rate and Rhythm: Normal rate and regular rhythm  Pulmonary:      Effort: Pulmonary effort is normal    Abdominal:      General: Bowel sounds are normal       Palpations: Abdomen is soft  Musculoskeletal: Normal range of motion  Skin:     General: Skin is warm and dry  Neurological:      Mental Status: She is alert and oriented to person, place, and time  I spent 15 minutes directly with the patient during this visit      VIRTUAL VISIT DISCLAIMER    Roxanne Khan acknowledges that she has consented to an online visit or consultation  She understands that the online visit is based solely on information provided by her, and that, in the absence of a face-to-face physical evaluation by the physician, the diagnosis she receives is both limited and provisional in terms of accuracy and completeness  This is not intended to replace a full medical face-to-face evaluation by the physician  Roxanne Khan understands and accepts these terms

## 2021-03-05 ENCOUNTER — SOCIAL WORK (OUTPATIENT)
Dept: BEHAVIORAL/MENTAL HEALTH CLINIC | Facility: CLINIC | Age: 26
End: 2021-03-05
Payer: COMMERCIAL

## 2021-03-05 DIAGNOSIS — F33.1 MODERATE RECURRENT MAJOR DEPRESSION (HCC): Primary | ICD-10-CM

## 2021-03-05 PROCEDURE — 90791 PSYCH DIAGNOSTIC EVALUATION: CPT | Performed by: SOCIAL WORKER

## 2021-03-05 NOTE — BH TREATMENT PLAN
Jaiden Thornton  1995       Date of Initial Treatment Plan: 3/5/21  Date of Current Treatment Plan: 03/05/21    Treatment Plan Number Initial (1)    Strengths/Personal Resources for Self Care: good mother, independent, determined/goal oriented    Diagnosis:   1  Moderate recurrent major depression ([facility] )         Area of Needs: Anger, depression, attitude      Long Term Goal 1: "I want to improve my delivery when I talk to people "    Target Date: 8/5/21  Completion Date: TBD by therapist and Tiffany         Short Term Objectives for Goal 1:        1  Tiffany will attend therapy and process thoughts and feelings       2  Tiffany will work with therapist on establishing a trusting therapy relationship       3  Tiffany will gain insight into her triggers for anger/irritability/depression       4  Therapist will teach Tiffany at least three skills for managing emotions       5  Therapist will work with Tiffany to replace thoughts that supporting her emotional reactivity       6  Tiffany will give consideration to pursuing medication management  Responsible:  Tiffany and Therapist    GOAL 1: Modality: individual/every two weeks/client centered, CBT, mindfulness, solution focused        [address]: Diagnosis and Treatment Plan explained to Deandra Juarez relates understanding diagnosis and is agreeable to Treatment Plan  YES      Client Comments : Please share your thoughts, feelings, need and/or experiences regarding your treatment plan: Nidia Leija gave verbal consent due to COVID restrictions

## 2021-03-06 NOTE — ASSESSMENT & PLAN NOTE
Reports having difficulty swallowing  Is concerned for strep throat  Also is concerned regarding possible call with exposure  Strep test ordered  Discussed COVID testing because of possible exposure

## 2021-03-22 ENCOUNTER — TELEPHONE (OUTPATIENT)
Dept: FAMILY MEDICINE CLINIC | Facility: CLINIC | Age: 26
End: 2021-03-22

## 2021-04-08 ENCOUNTER — OFFICE VISIT (OUTPATIENT)
Dept: FAMILY MEDICINE CLINIC | Facility: CLINIC | Age: 26
End: 2021-04-08
Payer: COMMERCIAL

## 2021-04-08 VITALS
HEIGHT: 66 IN | SYSTOLIC BLOOD PRESSURE: 122 MMHG | WEIGHT: 293 LBS | BODY MASS INDEX: 47.09 KG/M2 | HEART RATE: 77 BPM | OXYGEN SATURATION: 98 % | DIASTOLIC BLOOD PRESSURE: 76 MMHG

## 2021-04-08 DIAGNOSIS — E66.01 CLASS 3 SEVERE OBESITY DUE TO EXCESS CALORIES WITHOUT SERIOUS COMORBIDITY WITH BODY MASS INDEX (BMI) OF 40.0 TO 44.9 IN ADULT (HCC): Primary | ICD-10-CM

## 2021-04-08 DIAGNOSIS — E66.01 CLASS 3 SEVERE OBESITY DUE TO EXCESS CALORIES WITHOUT SERIOUS COMORBIDITY WITH BODY MASS INDEX (BMI) OF 45.0 TO 49.9 IN ADULT (HCC): ICD-10-CM

## 2021-04-08 PROBLEM — E66.813 CLASS 3 SEVERE OBESITY DUE TO EXCESS CALORIES WITHOUT SERIOUS COMORBIDITY WITH BODY MASS INDEX (BMI) OF 40.0 TO 44.9 IN ADULT (HCC): Status: ACTIVE | Noted: 2021-04-08

## 2021-04-08 PROCEDURE — 3008F BODY MASS INDEX DOCD: CPT | Performed by: NURSE PRACTITIONER

## 2021-04-08 PROCEDURE — 1036F TOBACCO NON-USER: CPT | Performed by: NURSE PRACTITIONER

## 2021-04-08 PROCEDURE — 99213 OFFICE O/P EST LOW 20 MIN: CPT | Performed by: NURSE PRACTITIONER

## 2021-04-08 RX ORDER — PHENTERMINE HYDROCHLORIDE 15 MG/1
15 CAPSULE ORAL EVERY MORNING
Qty: 30 CAPSULE | Refills: 0 | Status: SHIPPED | OUTPATIENT
Start: 2021-04-08 | End: 2021-05-20 | Stop reason: DRUGHIGH

## 2021-04-08 NOTE — ASSESSMENT & PLAN NOTE
Discussed various weight loss options  Will start phentermine at 15 mg  Maintain 1500 calorie diet  Increase exercise activity  Make wise diet choices

## 2021-04-08 NOTE — PATIENT INSTRUCTIONS
Start phentermine 15mg   Maintain 1500 farhad diet  Increase exercise activity  Heart Healthy Diet   WHAT YOU NEED TO KNOW:   A heart healthy diet is an eating plan low in unhealthy fats and sodium (salt)  The plan is high in healthy fats and fiber  A heart healthy diet helps improve your cholesterol levels and lowers your risk for heart disease and stroke  A dietitian will teach you how to read and understand food labels  DISCHARGE INSTRUCTIONS:   Heart healthy diet guidelines to follow:   · Choose foods that contain healthy fats  ? Unsaturated fats  include monounsaturated and polyunsaturated fats  Unsaturated fat is found in foods such as soybean, canola, olive, corn, and safflower oils  It is also found in soft tub margarine that is made with liquid vegetable oil  ? Omega-3 fat  is found in certain fish, such as salmon, tuna, and trout, and in walnuts and flaxseed  Eat fish high in omega-3 fats at least 2 times a week  · Get 20 to 30 grams of fiber each day  Fruits, vegetables, whole-grain foods, and legumes (cooked beans) are good sources of fiber  · Limit or do not have unhealthy fats  ? Cholesterol  is found in animal foods, such as eggs and lobster, and in dairy products made from whole milk  Limit cholesterol to less than 200 mg each day  ? Saturated fat  is found in meats, such as blancas and hamburger  It is also found in chicken or turkey skin, whole milk, and butter  Limit saturated fat to less than 7% of your total daily calories  ? Trans fat  is found in packaged foods, such as potato chips and cookies  It is also in hard margarine, some fried foods, and shortening  Do not eat foods that contain trans fats  · Limit sodium as directed  You may be told to limit sodium to 2,000 to 2,300 mg each day  Choose low-sodium or no-salt-added foods  Add little or no salt to food you prepare  Use herbs and spices in place of salt         Include the following in your heart healthy plan:  Ask your dietitian or healthcare provider how many servings to have from each of the following food groups:  · Grains:      ? Whole-wheat breads, cereals, and pastas, and brown rice    ? Low-fat, low-sodium crackers and chips    · Vegetables:      ? Broccoli, green beans, green peas, and spinach    ? Collards, kale, and lima beans    ? Carrots, sweet potatoes, tomatoes, and peppers    ? Canned vegetables with no salt added    · Fruits:      ? Bananas, peaches, pears, and pineapple    ? Grapes, raisins, and dates    ? Oranges, tangerines, grapefruit, orange juice, and grapefruit juice    ? Apricots, mangoes, melons, and papaya    ? Raspberries and strawberries    ? Canned fruit with no added sugar    · Low-fat dairy:      ? Nonfat (skim) milk, 1% milk, and low-fat almond, cashew, or soy milks fortified with calcium    ? Low-fat cheese, regular or frozen yogurt, and cottage cheese    · Meats and proteins:      ? Lean cuts of beef and pork (loin, leg, round), skinless chicken and turkey    ? Legumes, soy products, egg whites, or nuts    Limit or do not include the following in your heart healthy plan:   · Unhealthy fats and oils:      ? Whole or 2% milk, cream cheese, sour cream, or cheese    ? High-fat cuts of beef (T-bone steaks, ribs), chicken or turkey with skin, and organ meats such as liver    ? Butter, stick margarine, shortening, and cooking oils such as coconut or palm oil    · Foods and liquids high in sodium:      ? Packaged foods, such as frozen dinners, cookies, macaroni and cheese, and cereals with more than 300 mg of sodium per serving    ? Vegetables with added sodium, such as instant potatoes, vegetables with added sauces, or regular canned vegetables    ? Cured or smoked meats, such as hot dogs, blancas, and sausage    ?  High-sodium ketchup, barbecue sauce, salad dressing, pickles, olives, soy sauce, or miso    · Foods and liquids high in sugar:      ? Candy, cake, cookies, pies, or doughnuts    ? Soft drinks (soda), sports drinks, or sweetened tea    ? Canned or dry mixes for cakes, soups, sauces, or gravies    Other healthy heart guidelines:   · Do not smoke  Nicotine and other chemicals in cigarettes and cigars can cause lung and heart damage  Ask your healthcare provider for information if you currently smoke and need help to quit  E-cigarettes or smokeless tobacco still contain nicotine  Talk to your healthcare provider before you use these products  · Limit or do not drink alcohol as directed  Alcohol can damage your heart and raise your blood pressure  Your healthcare provider may give you specific daily and weekly limits  The general recommended limit is 1 drink a day for women 21 or older and for men 72 or older  Do not have more than 3 drinks in a day or 7 in a week  The recommended limit is 2 drinks a day for men 24to 59years of age  Do not have more than 4 drinks in a day or 14 in a week  A drink of alcohol is 12 ounces of beer, 5 ounces of wine, or 1½ ounces of liquor  · Exercise regularly  Exercise can help you maintain a healthy weight and improve your blood pressure and cholesterol levels  Regular exercise can also decrease your risk for heart problems  Ask your healthcare provider about the best exercise plan for you  Do not start an exercise program without asking your healthcare provider  Follow up with your doctor or cardiologist as directed:  Write down your questions so you remember to ask them during your visits  © Copyright [address] Information is for End User's use only and may not be sold, redistributed or otherwise used for commercial purposes  All illustrations and images included in CareNotes® are the copyrighted property of [employer]  or [address] [place], [place] [address]ter[address]   The above information is an  only  It is not intended as medical advice for individual conditions or treatments   Talk to your doctor, nurse or pharmacist before following any medical regimen to see if it is safe and effective for you

## 2021-04-08 NOTE — PROGRESS NOTES
Assessment/Plan:     Class 3 severe obesity due to excess calories without serious comorbidity with body mass index (BMI) of 45 0 to 49 9 in [facility])   Discussed various weight loss options  Will start phentermine at 15 mg  Maintain 1500 calorie diet  Increase exercise activity  Make wise diet choices  Problem List Items Addressed This Visit        Other    Class 3 severe obesity due to excess calories without serious comorbidity with body mass index (BMI) of 45 0 to 49 9 in [facility])      Discussed various weight loss options  Will start phentermine at 15 mg  Maintain 1500 calorie diet  Increase exercise activity  Make wise diet choices  Relevant Medications    phentermine 15 MG capsule    Class 3 severe obesity due to excess calories without serious comorbidity with body mass index (BMI) of 40 0 to 44 9 in [facility]) - Primary    Relevant Medications    phentermine 15 MG capsule            Subjective:      Patient ID: Quoc Jarrett is a 23 y o  female  Is here for follow-up on weight loss efforts  Reports that she has gained more than 16 lb in the past few months  Did try phentermine before but was not consistent  Would like to  Restart  Needs to get blood work done  Patient also reports she does have a   The following portions of the patient's history were reviewed and updated as appropriate: allergies, current medications, past family history, past medical history, past social history, past surgical history and problem list     Review of Systems   Constitutional: Negative  Negative for chills and fever  HENT: Negative  Eyes: Negative  Respiratory: Negative  Cardiovascular: Negative  Gastrointestinal: Negative  Endocrine: Negative  Genitourinary: Negative  Musculoskeletal: Negative  Skin: Negative  Allergic/Immunologic: Negative  Neurological: Negative  Psychiatric/Behavioral: Negative    Negative for decreased concentration, dysphoric mood and sleep disturbance  Objective:      /76   Pulse 77   Ht 5' 6" (1 676 m)   Wt 134 kg (296 lb)   SpO2 98%   BMI 47 78 kg/m²          Physical Exam  Vitals signs and nursing note reviewed  Constitutional:       Appearance: She is well-developed  HENT:      Head: Normocephalic and atraumatic  Right Ear: External ear normal       Left Ear: External ear normal    Eyes:      Pupils: Pupils are equal, round, and reactive to light  Neck:      Musculoskeletal: Normal range of motion  Cardiovascular:      Rate and Rhythm: Normal rate and regular rhythm  Pulmonary:      Effort: Pulmonary effort is normal    Musculoskeletal: Normal range of motion  Skin:     General: Skin is warm and dry  Neurological:      General: No focal deficit present  Mental Status: She is alert and oriented to person, place, and time  Psychiatric:         Mood and Affect: Mood normal          Behavior: Behavior normal          Thought Content:  Thought content normal          Judgment: Judgment normal            Labs:    Lab Results   Component Value Date    WBC 6 42 03/19/2020    HGB 13 4 03/19/2020    HCT 41 6 03/19/2020    MCV 88 03/19/2020     03/19/2020     Lab Results   Component Value Date    K 4 0 03/19/2020     03/19/2020    CO2 28 03/19/2020    BUN 11 03/19/2020    CREATININE 0 63 03/19/2020    GLUF 94 03/19/2020    CALCIUM 9 1 03/19/2020    AST 14 03/19/2020    ALT 21 03/19/2020    ALKPHOS 80 03/19/2020    EGFR 147 03/19/2020     Lab Results   Component Value Date    CALCIUM 9 1 03/19/2020    K 4 0 03/19/2020    CO2 28 03/19/2020     03/19/2020    BUN 11 03/19/2020    CREATININE 0 63 03/19/2020

## 2021-04-30 ENCOUNTER — SOCIAL WORK (OUTPATIENT)
Dept: BEHAVIORAL/MENTAL HEALTH CLINIC | Facility: CLINIC | Age: 26
End: 2021-04-30
Payer: COMMERCIAL

## 2021-04-30 DIAGNOSIS — F32.A DEPRESSION, UNSPECIFIED DEPRESSION TYPE: Primary | ICD-10-CM

## 2021-04-30 PROCEDURE — 90834 PSYTX W PT 45 MINUTES: CPT | Performed by: SOCIAL WORKER
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
## 2021-05-12 ENCOUNTER — APPOINTMENT (OUTPATIENT)
Dept: LAB | Facility: HOSPITAL | Age: 26
End: 2021-05-12
Payer: COMMERCIAL

## 2021-05-12 DIAGNOSIS — J02.9 PHARYNGITIS, UNSPECIFIED ETIOLOGY: ICD-10-CM

## 2021-05-12 DIAGNOSIS — N92.6 IRREGULAR MENSES: Primary | ICD-10-CM

## 2021-05-12 DIAGNOSIS — E28.2 PCOS (POLYCYSTIC OVARIAN SYNDROME): ICD-10-CM

## 2021-05-12 LAB
ALBUMIN SERPL BCP-MCNC: 3.5 G/DL (ref 3.5–5)
ALP SERPL-CCNC: 89 U/L (ref 46–116)
ALT SERPL W P-5'-P-CCNC: 22 U/L (ref 12–78)
ANION GAP SERPL CALCULATED.3IONS-SCNC: 6 MMOL/L (ref 4–13)
AST SERPL W P-5'-P-CCNC: 21 U/L (ref 5–45)
BASOPHILS # BLD AUTO: 0.04 THOUSANDS/ΜL (ref 0–0.1)
BASOPHILS NFR BLD AUTO: 1 % (ref 0–1)
BILIRUB SERPL-MCNC: 0.32 MG/DL (ref 0.2–1)
BUN SERPL-MCNC: 14 MG/DL (ref 5–25)
CALCIUM SERPL-MCNC: 8.7 MG/DL (ref 8.3–10.1)
CHLORIDE SERPL-SCNC: 104 MMOL/L (ref 100–108)
CHOLEST SERPL-MCNC: 206 MG/DL (ref 50–200)
CO2 SERPL-SCNC: 28 MMOL/L (ref 21–32)
CREAT SERPL-MCNC: 0.73 MG/DL (ref 0.6–1.3)
EOSINOPHIL # BLD AUTO: 0.18 THOUSAND/ΜL (ref 0–0.61)
EOSINOPHIL NFR BLD AUTO: 4 % (ref 0–6)
ERYTHROCYTE [DISTWIDTH] IN BLOOD BY AUTOMATED COUNT: 13.1 % (ref 11.6–15.1)
FSH SERPL-ACNC: 6.4 MIU/ML
GFR SERPL CREATININE-BSD FRML MDRD: 134 ML/MIN/1.73SQ M
GLUCOSE P FAST SERPL-MCNC: 95 MG/DL (ref 65–99)
HCT VFR BLD AUTO: 39.4 % (ref 34.8–46.1)
HDLC SERPL-MCNC: 51 MG/DL
HGB BLD-MCNC: 13.1 G/DL (ref 11.5–15.4)
IMM GRANULOCYTES # BLD AUTO: 0.01 THOUSAND/UL (ref 0–0.2)
IMM GRANULOCYTES NFR BLD AUTO: 0 % (ref 0–2)
LDLC SERPL CALC-MCNC: 140 MG/DL (ref 0–100)
LH SERPL-ACNC: 5.4 MIU/ML
LYMPHOCYTES # BLD AUTO: 1.55 THOUSANDS/ΜL (ref 0.6–4.47)
LYMPHOCYTES NFR BLD AUTO: 34 % (ref 14–44)
MCH RBC QN AUTO: 29 PG (ref 26.8–34.3)
MCHC RBC AUTO-ENTMCNC: 33.2 G/DL (ref 31.4–37.4)
MCV RBC AUTO: 87 FL (ref 82–98)
MONOCYTES # BLD AUTO: 0.3 THOUSAND/ΜL (ref 0.17–1.22)
MONOCYTES NFR BLD AUTO: 7 % (ref 4–12)
NEUTROPHILS # BLD AUTO: 2.53 THOUSANDS/ΜL (ref 1.85–7.62)
NEUTS SEG NFR BLD AUTO: 54 % (ref 43–75)
NRBC BLD AUTO-RTO: 0 /100 WBCS
PLATELET # BLD AUTO: 149 THOUSANDS/UL (ref 149–390)
PMV BLD AUTO: 12.3 FL (ref 8.9–12.7)
POTASSIUM SERPL-SCNC: 3.7 MMOL/L (ref 3.5–5.3)
PROT SERPL-MCNC: 7.2 G/DL (ref 6.4–8.2)
RBC # BLD AUTO: 4.52 MILLION/UL (ref 3.81–5.12)
SODIUM SERPL-SCNC: 138 MMOL/L (ref 136–145)
TESTOST SERPL-MCNC: 21 NG/DL
TRIGL SERPL-MCNC: 74 MG/DL
TSH SERPL DL<=0.05 MIU/L-ACNC: 2.65 UIU/ML (ref 0.36–3.74)
WBC # BLD AUTO: 4.61 THOUSAND/UL (ref 4.31–10.16)

## 2021-05-12 PROCEDURE — 85025 COMPLETE CBC W/AUTO DIFF WBC: CPT

## 2021-05-12 PROCEDURE — 84403 ASSAY OF TOTAL TESTOSTERONE: CPT

## 2021-05-12 PROCEDURE — 83002 ASSAY OF GONADOTROPIN (LH): CPT

## 2021-05-12 PROCEDURE — 83001 ASSAY OF GONADOTROPIN (FSH): CPT

## 2021-05-12 PROCEDURE — 80061 LIPID PANEL: CPT

## 2021-05-12 PROCEDURE — 80053 COMPREHEN METABOLIC PANEL: CPT

## 2021-05-12 PROCEDURE — 84443 ASSAY THYROID STIM HORMONE: CPT

## 2021-05-12 PROCEDURE — 36415 COLL VENOUS BLD VENIPUNCTURE: CPT

## 2021-05-17 ENCOUNTER — TELEPHONE (OUTPATIENT)
Dept: FAMILY MEDICINE CLINIC | Facility: CLINIC | Age: 26
End: 2021-05-17

## 2021-05-17 NOTE — TELEPHONE ENCOUNTER
Called patient to review labs but no answer, left message asking her to give office a call  Awaiting response

## 2021-05-17 NOTE — TELEPHONE ENCOUNTER
----- Message from [id number] sent at 5/13/2021  8:05 AM EDT -----  Please call patient to make aware lab work looks good other than her cholesterol is mildly elevated  Please stress the importance of beginning a heart healthy diet, increasing dietary fiber, working on weight loss, and avoidance of fried / processed food  Please also counseled the importance of daily physical activity  We will continue to monitor her lipids

## 2021-05-20 ENCOUNTER — OFFICE VISIT (OUTPATIENT)
Dept: FAMILY MEDICINE CLINIC | Facility: CLINIC | Age: 26
End: 2021-05-20
Payer: COMMERCIAL

## 2021-05-20 VITALS
DIASTOLIC BLOOD PRESSURE: 84 MMHG | SYSTOLIC BLOOD PRESSURE: 121 MMHG | BODY MASS INDEX: 47.09 KG/M2 | WEIGHT: 293 LBS | TEMPERATURE: 97.1 F | HEIGHT: 66 IN | HEART RATE: 76 BPM | OXYGEN SATURATION: 98 % | RESPIRATION RATE: 19 BRPM

## 2021-05-20 DIAGNOSIS — E66.01 CLASS 3 SEVERE OBESITY DUE TO EXCESS CALORIES WITHOUT SERIOUS COMORBIDITY WITH BODY MASS INDEX (BMI) OF 45.0 TO 49.9 IN ADULT (HCC): Primary | ICD-10-CM

## 2021-05-20 PROBLEM — H69.93 DYSFUNCTION OF BOTH EUSTACHIAN TUBES: Status: RESOLVED | Noted: 2020-08-06 | Resolved: 2021-05-20

## 2021-05-20 PROBLEM — J02.9 PHARYNGITIS: Status: RESOLVED | Noted: 2020-08-06 | Resolved: 2021-05-20

## 2021-05-20 PROBLEM — H69.83 DYSFUNCTION OF BOTH EUSTACHIAN TUBES: Status: RESOLVED | Noted: 2020-08-06 | Resolved: 2021-05-20

## 2021-05-20 PROCEDURE — 1036F TOBACCO NON-USER: CPT | Performed by: NURSE PRACTITIONER

## 2021-05-20 PROCEDURE — 99213 OFFICE O/P EST LOW 20 MIN: CPT | Performed by: NURSE PRACTITIONER

## 2021-05-20 PROCEDURE — 3008F BODY MASS INDEX DOCD: CPT | Performed by: NURSE PRACTITIONER

## 2021-05-20 RX ORDER — PHENTERMINE HYDROCHLORIDE 37.5 MG/1
37.5 TABLET ORAL
Qty: 30 TABLET | Refills: 0 | Status: SHIPPED | OUTPATIENT
Start: 2021-05-20 | End: 2021-06-24 | Stop reason: SDUPTHER

## 2021-05-20 NOTE — PROGRESS NOTES
Assessment/Plan:    Class 3 severe obesity due to excess calories without serious comorbidity with body mass index (BMI) of 45 0 to 49 9 in adult [facility])  To increase phentermine to 37 5 mg daily  Counseled the importance of low carb diet and daily physical activity  Follow-up in 3 months or sooner if needed  Diagnoses and all orders for this visit:    Class 3 severe obesity due to excess calories without serious comorbidity with body mass index (BMI) of 45 0 to 49 9 in adult ([facility])  -     phentermine (ADIPEX-P) 37 5 MG tablet; Take 1 tablet (37 5 mg total) by mouth daily in the early morning          Subjective:      Patient ID: Yemi Montemayor is a 23 y o  female  Tiffany presents for a follow-up  She was started on phentermine 15 mg in early April  Her starting weight was 298 lb and she is now down to 294lb  She is tolerating the medication well  Denies headache, palpitation, or chest pain  She is trying to eat healthy and exercise daily          The following portions of the patient's history were reviewed and updated as appropriate:   She   Patient Active Problem List    Diagnosis Date Noted    Class 3 severe obesity due to excess calories without serious comorbidity with body mass index (BMI) of 40 0 to 44 9 in adult ([address] ) 04/08/2021    Vitamin D deficiency 08/06/2020    Hyperlipidemia 08/16/2019    Depression 08/16/2019    Neck pain 05/10/2019    Upper back pain 05/10/2019    Class 3 severe obesity due to excess calories without serious comorbidity with body mass index (BMI) of 45 0 to 49 9 in adult [facility]) 05/10/2019    Acne 05/10/2019    Large breasts 05/10/2019     Current Outpatient Medications   Medication Sig Dispense Refill    fluticasone (FLONASE) 50 mcg/act nasal spray 1 spray into each nostril daily 1 Bottle 1    ibuprofen (MOTRIN) 200 mg tablet Take by mouth every 6 (six) hours as needed for mild pain      topiramate (TOPAMAX) 50 MG tablet One pill twice a day 180 tablet 1    phentermine (ADIPEX-P) 37 5 MG tablet Take 1 tablet (37 5 mg total) by mouth daily in the early morning 30 tablet 0     No current facility-administered medications for this visit  She has No Known Allergies       Review of Systems   Constitutional: Negative  Respiratory: Negative  Cardiovascular: Negative  Negative for chest pain and palpitations  Gastrointestinal: Negative  Neurological: Negative  Negative for headaches  Psychiatric/Behavioral: Negative  /84   Pulse 76   Temp (!) 97 1 °F (36 2 °C)   Resp 19   Ht 5' 6" (1 676 m)   Wt 133 kg (293 lb)   SpO2 98%   BMI 47 29 kg/m²     Objective:     Physical Exam  Vitals signs and nursing note reviewed  Constitutional:       General: She is not in acute distress  Appearance: Normal appearance  She is well-developed  She is obese  She is not ill-appearing, toxic-appearing or diaphoretic  HENT:      Head: Normocephalic and atraumatic  Eyes:      Conjunctiva/sclera: Conjunctivae normal    Neck:      Musculoskeletal: Neck supple  Cardiovascular:      Rate and Rhythm: Normal rate and regular rhythm  Heart sounds: Normal heart sounds  No murmur  Pulmonary:      Effort: Pulmonary effort is normal  No respiratory distress  Breath sounds: Normal breath sounds  No wheezing or rales  Chest:      Chest wall: No tenderness  Neurological:      General: No focal deficit present  Mental Status: She is alert and oriented to person, place, and time  Psychiatric:         Mood and Affect: Mood normal          Behavior: Behavior normal          Thought Content:  Thought content normal          Judgment: Judgment normal

## 2021-05-20 NOTE — ASSESSMENT & PLAN NOTE
To increase phentermine to 37 5 mg daily  Counseled the importance of low carb diet and daily physical activity  Follow-up in 3 months or sooner if needed

## 2021-06-03 ENCOUNTER — TELEPHONE (OUTPATIENT)
Dept: FAMILY MEDICINE CLINIC | Facility: CLINIC | Age: 26
End: 2021-06-03

## 2021-06-03 DIAGNOSIS — Z11.59 SCREENING FOR VIRAL DISEASE: Primary | ICD-10-CM

## 2021-06-03 NOTE — TELEPHONE ENCOUNTER
Patient called requesting a covid test for travel to get done on 6/7/2021   Could you put an order in or would you prefer to do a virtual?

## 2021-06-07 PROCEDURE — U0005 INFEC AGEN DETEC AMPLI PROBE: HCPCS | Performed by: NURSE PRACTITIONER

## 2021-06-07 PROCEDURE — U0003 INFECTIOUS AGENT DETECTION BY NUCLEIC ACID (DNA OR RNA); SEVERE ACUTE RESPIRATORY SYNDROME CORONAVIRUS 2 (SARS-COV-2) (CORONAVIRUS DISEASE [COVID-19]), AMPLIFIED PROBE TECHNIQUE, MAKING USE OF HIGH THROUGHPUT TECHNOLOGIES AS DESCRIBED BY CMS-2020-01-R: HCPCS | Performed by: NURSE PRACTITIONER

## 2021-06-08 LAB — SARS-COV-2 RNA RESP QL NAA+PROBE: NEGATIVE

## 2021-06-08 NOTE — RESULT ENCOUNTER NOTE
Please call Tiffany and let her know that her COVID-19 swab was negative  advise her to continue social distancing procedures

## 2021-06-17 ENCOUNTER — TELEPHONE (OUTPATIENT)
Dept: PSYCHIATRY | Facility: CLINIC | Age: 26
End: 2021-06-17

## 2021-06-17 NOTE — TELEPHONE ENCOUNTER
Spoke with patient she is still interested in doing therapy, however she stated she has been working a lot  The patient will call back to schedule once she get her work schedule

## 2021-06-21 ENCOUNTER — TELEPHONE (OUTPATIENT)
Dept: FAMILY MEDICINE CLINIC | Facility: CLINIC | Age: 26
End: 2021-06-21

## 2021-06-21 DIAGNOSIS — S89.92XA LEFT KNEE INJURY, INITIAL ENCOUNTER: Primary | ICD-10-CM

## 2021-06-21 NOTE — TELEPHONE ENCOUNTER
[facility]  Referral #[id number] has been successfully submitted

## 2021-06-22 ENCOUNTER — APPOINTMENT (OUTPATIENT)
Dept: RADIOLOGY | Facility: CLINIC | Age: 26
End: 2021-06-22
Payer: COMMERCIAL

## 2021-06-22 ENCOUNTER — OFFICE VISIT (OUTPATIENT)
Dept: OBGYN CLINIC | Facility: CLINIC | Age: 26
End: 2021-06-22
Payer: COMMERCIAL

## 2021-06-22 VITALS
HEART RATE: 89 BPM | BODY MASS INDEX: 47.09 KG/M2 | WEIGHT: 293 LBS | DIASTOLIC BLOOD PRESSURE: 75 MMHG | SYSTOLIC BLOOD PRESSURE: 112 MMHG | HEIGHT: 66 IN

## 2021-06-22 DIAGNOSIS — S83.412A TEAR OF MCL (MEDIAL COLLATERAL LIGAMENT) OF KNEE, LEFT, INITIAL ENCOUNTER: Primary | ICD-10-CM

## 2021-06-22 DIAGNOSIS — S89.92XA LEFT KNEE INJURY, INITIAL ENCOUNTER: ICD-10-CM

## 2021-06-22 PROCEDURE — 99203 OFFICE O/P NEW LOW 30 MIN: CPT | Performed by: ORTHOPAEDIC SURGERY

## 2021-06-22 PROCEDURE — 73562 X-RAY EXAM OF KNEE 3: CPT

## 2021-06-22 NOTE — PROGRESS NOTES
Orthopaedics Office Visit - 1st Patient Visit    ASSESSMENT/PLAN:    Assessment:   Left knee MCL sprain/ tear, possible meniscus tear DOI 6/12/21      Plan:   - MRI of the left knee ordered to evaluate for internal derangement  - Over the counter analgesics as needed / directed   - Ice / heat as directed   - brace offered to patient and accepted  - Begin physical therapy as directed   - Discussed conservative surgical intervention with patient pending MRI results  - Follow up after MRI       To Do Next Visit:  Evaluate MRI results     _____________________________________________________  CHIEF COMPLAINT:  Chief Complaint   Patient presents with    Left Knee - Pain         SUBJECTIVE:  Cheryl Nails is a 23 y o  female who presents to the office for a initial evaluation of her left knee  Patient states that she was sliding down a water slide states that when she arrived at the balm of the water slide the force of the impact made her knee stretch hour  Patient states that she felt pain medially on the medial aspect of the knee  Patient states that she is able to weight bear after initial injury but states that a few hours after the injury her pain increased  Patient states that her pain is slightly decreased during today's office visit  Patient states that her pain is mostly in the medial aspect of the knee becomes worse with standing from a seated position, walking, direct contact  Patient denies any prior history of knee pain or injuries  The patient states she has been taking Tylenol as needed with minimally for symptoms  Patient offers no other complaints at this time  PAST MEDICAL HISTORY:  Past Medical History:   Diagnosis Date    High cholesterol     Morbid obesity with BMI of 45 0-49 9, adult ([facility] [address] )     Pre-operative laboratory examination        PAST SURGICAL HISTORY:  History reviewed  No pertinent surgical history      FAMILY HISTORY:  Family History   Problem Relation Age of Onset    Coronary artery disease Mother     Diabetes Sister     Coronary artery disease Maternal Grandmother     Hyperlipidemia Maternal Grandmother     Hyperlipidemia Maternal Grandfather     Diabetes Maternal Grandfather     Alcohol abuse Neg Hx     Substance Abuse Neg Hx     Mental illness Neg Hx        SOCIAL HISTORY:  Social History     Tobacco Use    Smoking status: Former Smoker     Packs/day: 0 25     Years: 6 00     Pack years: 1 50    Smokeless tobacco: Never Used    Tobacco comment: stopped   Vaping Use    Vaping Use: Never used   Substance Use Topics    Alcohol use: Yes     Comment: socially-holidays    Drug use: No       MEDICATIONS:    Current Outpatient Medications:     ibuprofen (MOTRIN) 200 mg tablet, Take by mouth every 6 (six) hours as needed for mild pain, Disp: , Rfl:     phentermine (ADIPEX-P) 37 5 MG tablet, Take 1 tablet (37 5 mg total) by mouth daily in the early morning, Disp: 30 tablet, Rfl: 0    fluticasone (FLONASE) 50 mcg/act nasal spray, 1 spray into each nostril daily, Disp: 1 Bottle, Rfl: 1    topiramate (TOPAMAX) 50 MG tablet, One pill twice a day, Disp: 180 tablet, Rfl: 1    ALLERGIES:  No Known Allergies    REVIEW OF SYSTEMS:  MSK: left knee pain  Neuro: WNL   Pertinent items are otherwise noted in HPI  A comprehensive review of systems was otherwise negative      LABS:  HgA1c:   Lab Results   Component Value Date    HGBA1C 5 4 03/22/2019     BMP:   Lab Results   Component Value Date    CALCIUM 8 7 05/12/2021    K 3 7 05/12/2021    CO2 28 05/12/2021     05/12/2021    BUN 14 05/12/2021    CREATININE 0 73 05/12/2021     CBC: No components found for: CBC    _____________________________________________________  PHYSICAL EXAMINATION:  Vital signs: /75   Pulse 89   Ht 5' 6" (1 676 m)   Wt (!) 136 kg (300 lb 3 2 oz)   BMI 48 45 kg/m²   General: No acute distress, awake and alert  Psychiatric: Mood and affect appear appropriate  HEENT: Trachea Midline, No torticollis, no apparent facial trauma  Cardiovascular: No audible murmurs; Extremities appear perfused  Pulmonary: No audible wheezing or stridor  Skin: No open lesions; see further details (if any) below      MUSCULOSKELETAL EXAMINATION:  Left knee examination:  - Patient sitting comfortably in the office in no acute distress   - small effusion present in the left knee  No other acute visible abnormalities present in left knee  - tenderness palpation noted over the medial joint line  No other bony or soft tissue tenderness palpation noted at this time  - 0-80 degrees range of motion limited by pain  - Special Tests       - pain associated with valgus stress test   Negative anterior drawer  Equivocal Nikhil's examination medially  - NV intact    _____________________________________________________  STUDIES REVIEWED:  I personally reviewed the images and interpretation is as follows:  Left knee XR 3 views:  No acute osseous abnormalities present  PROCEDURES PERFORMED:  No procedures were performed at this time           Koby Goldstein PA-C - assisting    Vivian Brumfield MD

## 2021-06-24 DIAGNOSIS — E66.01 CLASS 3 SEVERE OBESITY DUE TO EXCESS CALORIES WITHOUT SERIOUS COMORBIDITY WITH BODY MASS INDEX (BMI) OF 45.0 TO 49.9 IN ADULT (HCC): ICD-10-CM

## 2021-06-24 RX ORDER — PHENTERMINE HYDROCHLORIDE 37.5 MG/1
37.5 TABLET ORAL
Qty: 30 TABLET | Refills: 0 | Status: SHIPPED | OUTPATIENT
Start: 2021-06-24 | End: 2021-07-01 | Stop reason: ALTCHOICE

## 2021-07-01 ENCOUNTER — OFFICE VISIT (OUTPATIENT)
Dept: FAMILY MEDICINE CLINIC | Facility: CLINIC | Age: 26
End: 2021-07-01
Payer: COMMERCIAL

## 2021-07-01 ENCOUNTER — TELEPHONE (OUTPATIENT)
Dept: OBGYN CLINIC | Facility: HOSPITAL | Age: 26
End: 2021-07-01

## 2021-07-01 VITALS
SYSTOLIC BLOOD PRESSURE: 128 MMHG | HEART RATE: 68 BPM | HEIGHT: 66 IN | TEMPERATURE: 98.2 F | BODY MASS INDEX: 48.45 KG/M2 | OXYGEN SATURATION: 98 % | DIASTOLIC BLOOD PRESSURE: 76 MMHG

## 2021-07-01 DIAGNOSIS — K59.00 CONSTIPATION, UNSPECIFIED CONSTIPATION TYPE: ICD-10-CM

## 2021-07-01 DIAGNOSIS — E66.01 CLASS 3 SEVERE OBESITY DUE TO EXCESS CALORIES WITHOUT SERIOUS COMORBIDITY WITH BODY MASS INDEX (BMI) OF 45.0 TO 49.9 IN ADULT (HCC): Primary | ICD-10-CM

## 2021-07-01 PROCEDURE — 99214 OFFICE O/P EST MOD 30 MIN: CPT | Performed by: NURSE PRACTITIONER

## 2021-07-01 RX ORDER — POLYETHYLENE GLYCOL 3350 17 G/17G
17 POWDER, FOR SOLUTION ORAL DAILY PRN
Qty: 7 EACH | Refills: 1 | Status: SHIPPED | OUTPATIENT
Start: 2021-07-01 | End: 2021-10-04 | Stop reason: ALTCHOICE

## 2021-07-01 NOTE — TELEPHONE ENCOUNTER
Patient advised she was given a knee brace  She advised it's not fitting well and she needs a smaller size  When could she come in for a fitting? Patient can be reached at [phone]

## 2021-07-01 NOTE — ASSESSMENT & PLAN NOTE
Patient reports she has not seen results with phentermine  She does admit to limited exercise due to a recent knee injury and staying on her diet intermittently       To begin Bam  Stressed the importance of daily physical activity, low carb diet, and the avoidance of fried / process foods  Also discussed limiting caloric intake  Follow-up in 1 month to recheck weight

## 2021-07-01 NOTE — PROGRESS NOTES
Assessment/Plan:    Class 3 severe obesity due to excess calories without serious comorbidity with body mass index (BMI) of 45 0 to 49 9 in adult [facility])    Patient reports she has not seen results with phentermine  She does admit to limited exercise due to a recent knee injury and staying on her diet intermittently       To begin Saxenda  Stressed the importance of daily physical activity, low carb diet, and the avoidance of fried / process foods  Also discussed limiting caloric intake  Follow-up in 1 month to recheck weight  Constipation    To begin MiraLax as needed  Counseled patient extensively on the importance of increasing dietary fiber, daily physical activity, and staying well hydrated  Advised patient to avoid foods that may worsen constipation such as dairy and rice  Follow-up if no improvement in 2 weeks or sooner if symptoms worsen  Diagnoses and all orders for this visit:    Class 3 severe obesity due to excess calories without serious comorbidity with body mass index (BMI) of 45 0 to 49 9 in adult (HCC)  -     liraglutide (SAXENDA) injection; Inject 0 1 mL (0 6 mg total) under the skin daily    Constipation, unspecified constipation type  -     polyethylene glycol (MIRALAX) 17 g packet; Take 17 g by mouth daily as needed (constipation)          Subjective:      Patient ID: Yaniv Burrell is a 23 y o  female  Tiffany presents reporting continued difficulty with losing weight  She has been on phentermine intermittently has not seen results  She does report to limited physical activity due to recent knee injury as well as only intermittently following her diet  She has gained about 20 lb in the last 2 months  Since this time, Tiffany has also noted intermittent constipation  She does admit to drinking a lot a water but also consumes lot of cheese and takeout  Denies abdominal pain, decrease in appetite, or blood in stools        The following portions of the patient's history were reviewed and updated as appropriate:   She   Patient Active Problem List    Diagnosis Date Noted    Constipation 07/01/2021    Tear of MCL (medial collateral ligament) of knee, left, initial encounter 06/22/2021    Class 3 severe obesity due to excess calories without serious comorbidity with body mass index (BMI) of 40 0 to 44 9 in adult ([facility] [address] ) 04/08/2021    Vitamin D deficiency 08/06/2020    Hyperlipidemia 08/16/2019    Depression 08/16/2019    Neck pain 05/10/2019    Upper back pain 05/10/2019    Class 3 severe obesity due to excess calories without serious comorbidity with body mass index (BMI) of 45 0 to 49 9 in adult [facility]) 05/10/2019    Acne 05/10/2019    Large breasts 05/10/2019     Current Outpatient Medications   Medication Sig Dispense Refill    ibuprofen (MOTRIN) 200 mg tablet Take by mouth every 6 (six) hours as needed for mild pain (Patient not taking: Reported on 7/1/2021)      liraglutide (SAXENDA) injection Inject 0 1 mL (0 6 mg total) under the skin daily 3 mL 1    polyethylene glycol (MIRALAX) 17 g packet Take 17 g by mouth daily as needed (constipation) 7 each 1     No current facility-administered medications for this visit  She has No Known Allergies       Review of Systems   Constitutional: Positive for unexpected weight change (  Weight gain)  Respiratory: Negative  Cardiovascular: Negative  Gastrointestinal: Positive for constipation  Genitourinary: Negative  Neurological: Negative  Psychiatric/Behavioral: Negative  /76   Pulse 68   Temp 98 2 °F (36 8 °C)   Ht 5' 6" (1 676 m)   SpO2 98%   BMI 48 45 kg/m²     Objective:     Physical Exam  Vitals and nursing note reviewed  Constitutional:       General: She is not in acute distress  Appearance: She is well-developed  She is obese  She is not ill-appearing, toxic-appearing or diaphoretic  HENT:      Head: Normocephalic and atraumatic     Eyes:      Conjunctiva/sclera: Conjunctivae normal    Cardiovascular:      Rate and Rhythm: Normal rate and regular rhythm  Heart sounds: Normal heart sounds  No murmur heard  Pulmonary:      Effort: Pulmonary effort is normal  No respiratory distress  Breath sounds: Normal breath sounds  No wheezing or rales  Chest:      Chest wall: No tenderness  Abdominal:      General: Bowel sounds are normal  There is no distension  Palpations: Abdomen is soft  There is no mass  Tenderness: There is no abdominal tenderness  There is no guarding  Musculoskeletal:      Cervical back: Neck supple  Neurological:      General: No focal deficit present  Mental Status: She is alert and oriented to person, place, and time  Psychiatric:         Mood and Affect: Mood normal          Behavior: Behavior normal          Thought Content:  Thought content normal          Judgment: Judgment normal

## 2021-07-01 NOTE — ASSESSMENT & PLAN NOTE
To begin MiraLax as needed  Counseled patient extensively on the importance of increasing dietary fiber, daily physical activity, and staying well hydrated  Advised patient to avoid foods that may worsen constipation such as dairy and rice  Follow-up if no improvement in 2 weeks or sooner if symptoms worsen

## 2021-07-10 ENCOUNTER — HOSPITAL ENCOUNTER (OUTPATIENT)
Dept: MRI IMAGING | Facility: HOSPITAL | Age: 26
Discharge: HOME | End: 2021-07-10
Payer: COMMERCIAL

## 2021-07-10 DIAGNOSIS — S83.412A TEAR OF MCL (MEDIAL COLLATERAL LIGAMENT) OF KNEE, LEFT, INITIAL ENCOUNTER: ICD-10-CM

## 2021-07-10 PROCEDURE — 73721 MRI JNT OF LWR EXTRE W/O DYE: CPT

## 2021-07-10 PROCEDURE — G1004 CDSM NDSC: HCPCS

## 2021-07-13 ENCOUNTER — TELEMEDICINE (OUTPATIENT)
Dept: FAMILY MEDICINE CLINIC | Facility: CLINIC | Age: 26
End: 2021-07-13
Payer: COMMERCIAL

## 2021-07-13 DIAGNOSIS — R11.2 NON-INTRACTABLE VOMITING WITH NAUSEA, UNSPECIFIED VOMITING TYPE: Primary | ICD-10-CM

## 2021-07-13 PROCEDURE — 99213 OFFICE O/P EST LOW 20 MIN: CPT | Performed by: NURSE PRACTITIONER

## 2021-07-13 PROCEDURE — 1036F TOBACCO NON-USER: CPT | Performed by: NURSE PRACTITIONER

## 2021-07-13 NOTE — PROGRESS NOTES
Virtual Regular Visit    Verification of patient location:    Patient is currently located in the state [facility]  Patient is currently located in a state in which I am licensed    Assessment/Plan:    Problem List Items Addressed This Visit     None      Visit Diagnoses     Non-intractable vomiting with nausea, unspecified vomiting type    -  Primary    Relevant Orders    Pregnancy Test (HCG Qualitative)    hCG, quantitative        Patient declining antiemetics at this time  Will call office back if symptoms return or worsen  Advised on adequate hydration and bland diet  Reason for visit is   Chief Complaint   Patient presents with    Virtual Awv    Virtual Regular Visit        Encounter provider [address]    Provider located at [address] [place] [address]  [address] [facility] [address] [phone]      Recent Visits  No visits were found meeting these conditions  Showing recent visits within past 7 days and meeting all other requirements  Today's Visits  Date Type Provider Dept   07/13/21 [address] [facility], [address] [facility] [address]   Showing today's visits and meeting all other requirements  Future Appointments  No visits were found meeting these conditions  Showing future appointments within next 150 days and meeting all other requirements       The patient was identified by name and date of birth  Calista Canada was informed that this is a telemedicine visit and that the visit is being conducted through [address] [place] [address] Now and patient was informed that this is a secure, HIPAA-compliant platform  She agrees to proceed     My office door was closed  No one else was in the room  She acknowledged consent and understanding of privacy and security of the video platform  The patient has agreed to participate and understands they can discontinue the visit at any time      Patient is aware this is a billable service  Subjective  Pastora Barfield is a 23 y o  female    [facility] and vomiting x's 1 that began today  Patient denies abdominal pain, fever, chills, urinary symptoms  Patient believes her LNMP was at the end of June  Is inquiring about pregnancy  Notes after she vomited, took a nap and symptoms are subsiding  Denies recent travel or sick contacts  Nausea  This is a new problem  The current episode started today  The problem occurs intermittently  The problem has been gradually improving  Associated symptoms include nausea and vomiting  Pertinent negatives include no abdominal pain, anorexia, arthralgias, change in bowel habit, chest pain, chills, coughing, diaphoresis, fatigue, fever, headaches, rash, sore throat, swollen glands, urinary symptoms, vertigo or visual change  Nothing aggravates the symptoms  She has tried nothing for the symptoms  Past Medical History:   Diagnosis Date    High cholesterol     Morbid obesity with BMI of 45 0-49 9, adult ([facility] [address] )     Pre-operative laboratory examination        History reviewed  No pertinent surgical history  Current Outpatient Medications   Medication Sig Dispense Refill    ibuprofen (MOTRIN) 200 mg tablet Take by mouth every 6 (six) hours as needed for mild pain (Patient not taking: Reported on 7/1/2021)      liraglutide (SAXENDA) injection Inject 0 1 mL (0 6 mg total) under the skin daily 3 mL 1    polyethylene glycol (MIRALAX) 17 g packet Take 17 g by mouth daily as needed (constipation) 7 each 1     No current facility-administered medications for this visit  No Known Allergies    Review of Systems   Constitutional: Negative for chills, diaphoresis, fatigue and fever  HENT: Negative for sore throat  Respiratory: Negative for cough and shortness of breath  Cardiovascular: Negative for chest pain  Gastrointestinal: Positive for nausea and vomiting   Negative for abdominal pain, anorexia and change in bowel habit  Genitourinary: Negative for dysuria, flank pain, frequency, hematuria and urgency  Musculoskeletal: Negative for arthralgias and back pain  Skin: Negative for rash  Neurological: Negative for dizziness, vertigo, light-headedness and headaches  Video Exam    There were no vitals filed for this visit  Physical Exam  Constitutional:       General: She is not in acute distress  Appearance: Normal appearance  She is not ill-appearing  HENT:      Head: Normocephalic  Right Ear: External ear normal       Left Ear: External ear normal       Nose: Nose normal       Mouth/Throat:      Mouth: Mucous membranes are moist    Pulmonary:      Effort: Pulmonary effort is normal  No respiratory distress  Musculoskeletal:      Cervical back: Normal range of motion  Skin:     Coloration: Skin is not pale  Neurological:      Mental Status: She is alert and oriented to person, place, and time  Psychiatric:         Mood and Affect: Mood normal          Behavior: Behavior normal           I spent 10 minutes directly with the patient during this visit    VIRTUAL VISIT DISCLAIMER      Bibi Jonathan verbally agrees to participate in [employer]  Pt is aware that [employer] could be limited without vital signs or the ability to perform a full hands-on physical exam  Tiffany Vera understands she or the provider may request at any time to terminate the video visit and request the patient to seek care or treatment in person

## 2021-07-13 NOTE — PATIENT INSTRUCTIONS
Acute Nausea and Vomiting   AMBULATORY CARE:   Acute nausea and vomiting  starts suddenly, gets worse quickly, and lasts a short time  Common causes include pregnancy, alcohol, infection, and medicines  A head injury, heart attack, or inner ear imbalance can also cause acute nausea and vomiting  Seek care immediately if:   · You see blood in your vomit or your bowel movements  · You have sudden, severe pain in your chest and upper abdomen after hard vomiting or retching  · You have swelling in your neck and chest      · You are dizzy, cold, and thirsty and your eyes and mouth are dry  · You are urinating very little or not at all  · You have muscle weakness, leg cramps, and trouble breathing  · Your heart is beating much faster than normal      · You continue to vomit for more than 48 hours  Contact your healthcare provider if:   · You have frequent dry heaves (vomiting but nothing comes out)  · Your nausea and vomiting does not get better or go away after you use medicine  · You have questions or concerns about your condition or treatment  Treatment for acute nausea and vomiting  may include medicines to calm your stomach and stop the vomiting  You may need IV fluids if you are dehydrated  Prevent or manage acute nausea and vomiting:   · Do not drink alcohol  Alcohol may upset or irritate your stomach  Too much alcohol can also cause acute nausea and vomiting  · Control stress  Headaches due to stress may cause nausea and vomiting  Find ways to relax and manage your stress  Get more rest and sleep  · Drink more liquids as directed  Vomiting can lead to dehydration  It is important to drink more liquids to help replace lost body fluids  Ask your healthcare provider how much liquid to drink each day and which liquids are best for you  Your provider may recommend that you drink an oral rehydration solution (ORS)   ORS contains water, salts, and sugar that are needed to replace the lost body fluids  Ask what kind of ORS to use, how much to drink, and where to get it  · Eat smaller meals, more often  Eat small amounts of food every 2 to 3 hours, even if you are not hungry  Food in your stomach may decrease your nausea  · Talk to your healthcare provider before you take over-the-counter (OTC) medicines  These medicines can cause serious problems if you use certain other medicines, or you have a medical condition  You may have problems if you use too much or use them for longer than the label says  Follow directions on the label carefully  Follow up with your healthcare provider as directed:  Write down your questions so you remember to ask them during your visits  © Copyright [address] Information is for End User's use only and may not be sold, redistributed or otherwise used for commercial purposes  All illustrations and images included in CareNotes® are the copyrighted property of A D A M , Inc  or [facility] [employer]   The above information is an  only  It is not intended as medical advice for individual conditions or treatments  Talk to your doctor, nurse or pharmacist before following any medical regimen to see if it is safe and effective for you

## 2021-07-16 ENCOUNTER — OFFICE VISIT (OUTPATIENT)
Dept: OBGYN CLINIC | Facility: CLINIC | Age: 26
End: 2021-07-16
Payer: COMMERCIAL

## 2021-07-16 VITALS
BODY MASS INDEX: 47.09 KG/M2 | HEART RATE: 89 BPM | SYSTOLIC BLOOD PRESSURE: 136 MMHG | HEIGHT: 66 IN | DIASTOLIC BLOOD PRESSURE: 84 MMHG | WEIGHT: 293 LBS

## 2021-07-16 DIAGNOSIS — S83.412A TEAR OF MCL (MEDIAL COLLATERAL LIGAMENT) OF KNEE, LEFT, INITIAL ENCOUNTER: Primary | ICD-10-CM

## 2021-07-16 PROCEDURE — 3008F BODY MASS INDEX DOCD: CPT | Performed by: ORTHOPAEDIC SURGERY

## 2021-07-16 PROCEDURE — 1036F TOBACCO NON-USER: CPT | Performed by: ORTHOPAEDIC SURGERY

## 2021-07-16 PROCEDURE — 99213 OFFICE O/P EST LOW 20 MIN: CPT | Performed by: ORTHOPAEDIC SURGERY

## 2021-07-16 NOTE — PATIENT INSTRUCTIONS
- Over the counter analgesics as needed / directed   - Ice / heat as directed   - continue maintain brace as needed   - Begin physical therapy as directed   - Follow up PRN

## 2021-07-16 NOTE — PROGRESS NOTES
Orthopaedics Office Visit - Follow up Patient Visit    ASSESSMENT/PLAN:    Assessment:   Left knee MCL sprain, lateral tibial plateau contusion   Improving with conservative treatment  Plan:   - Over the counter analgesics as needed / directed   - Ice / heat as directed   - continue maintain brace as needed   - Begin physical therapy as directed   - Follow up PRN        To Do Next Visit:  PRN     _____________________________________________________  CHIEF COMPLAINT:  Chief Complaint   Patient presents with    Left Knee - Follow-up       SUBJECTIVE:  Elizabeth Winters is a 23 y o  female who presents to the office for follow up evaluation of her left knee and for a discussion of a recent MRI   Patient states that she was sliding down a water slide states that when she arrived at the balm of the water slide the force of the impact made her knee stretch hour  Patient states that she felt pain medially on the medial aspect of the knee  Patient states that she is able to weight bear after initial injury but states that a few hours after the injury her pain increased  Patient states that her pain is slightly decreased during today's office visit  Patient states that her pain is mostly in the medial aspect of the knee becomes worse with standing from a seated position, walking, direct contact  Patient denies any prior history of knee pain or injuries  The patient states she has been taking Tylenol as needed with minimally for symptoms  Patient offers no other complaints at this time  PAST MEDICAL HISTORY:  Past Medical History:   Diagnosis Date    High cholesterol     Morbid obesity with BMI of 45 0-49 9, adult ([address] )     Pre-operative laboratory examination        PAST SURGICAL HISTORY:  History reviewed  No pertinent surgical history      FAMILY HISTORY:  Family History   Problem Relation Age of Onset    Coronary artery disease Mother     Diabetes Sister     Coronary artery disease Maternal Grandmother  Hyperlipidemia Maternal Grandmother     Hyperlipidemia Maternal Grandfather     Diabetes Maternal Grandfather     Alcohol abuse Neg Hx     Substance Abuse Neg Hx     Mental illness Neg Hx        SOCIAL HISTORY:  Social History     Tobacco Use    Smoking status: Former Smoker     Packs/day: 0 25     Years: 6 00     Pack years: 1 50    Smokeless tobacco: Never Used    Tobacco comment: stopped   Vaping Use    Vaping Use: Never used   Substance Use Topics    Alcohol use: Yes     Comment: socially-holidays    Drug use: No       MEDICATIONS:    Current Outpatient Medications:     ibuprofen (MOTRIN) 200 mg tablet, Take by mouth every 6 (six) hours as needed for mild pain (Patient not taking: Reported on 7/1/2021), Disp: , Rfl:     liraglutide (SAXENDA) injection, Inject 0 1 mL (0 6 mg total) under the skin daily (Patient not taking: Reported on 7/16/2021), Disp: 3 mL, Rfl: 1    polyethylene glycol (MIRALAX) 17 g packet, Take 17 g by mouth daily as needed (constipation) (Patient not taking: Reported on 7/16/2021), Disp: 7 each, Rfl: 1    ALLERGIES:  No Known Allergies    REVIEW OF SYSTEMS:  MSK: left knee pain   Neuro: WNL   Pertinent items are otherwise noted in HPI  A comprehensive review of systems was otherwise negative  LABS:  HgA1c:   Lab Results   Component Value Date    HGBA1C 5 4 03/22/2019     BMP:   Lab Results   Component Value Date    CALCIUM 8 7 05/12/2021    K 3 7 05/12/2021    CO2 28 05/12/2021     05/12/2021    BUN 14 05/12/2021    CREATININE 0 73 05/12/2021     CBC: No components found for: CBC    _____________________________________________________  PHYSICAL EXAMINATION:  Vital signs: Ht 5' 6" (1 676 m)   Wt 136 kg (299 lb)   BMI 48 26 kg/m²   General: No acute distress, awake and alert  Psychiatric: Mood and affect appear appropriate  HEENT: Trachea Midline, No torticollis, no apparent facial trauma  Cardiovascular: No audible murmurs;  Extremities appear perfused  Pulmonary: No audible wheezing or stridor  Skin: No open lesions; see further details (if any) below      MUSCULOSKELETAL EXAMINATION:  Left knee examination:  - Patient sitting comfortably in the office in no acute distress   - small effusion present in the left knee  No other acute visible abnormalities present in left knee  - tenderness palpation noted over the medial joint line  No other bony or soft tissue tenderness palpation noted at this time  - 0-80 degrees range of motion limited by pain  - Special Tests       - pain associated with valgus stress test   Negative anterior drawer  Equivocal Nikhil's examination medially  - NV intact    _____________________________________________________  STUDIES REVIEWED:  I personally reviewed the images and interpretation is as follows:  MRI knee left  wo contrast  Partial tear involving the proximal fibers of the MCL, mild bone contusion involving the posterior aspect of lateral tibial plateau      PROCEDURES PERFORMED:  No procedures were performed at this time         Clarisa Johnston PA-C - assisting    Tam Rendon MD

## 2021-08-03 ENCOUNTER — DOCUMENTATION (OUTPATIENT)
Dept: BEHAVIORAL/MENTAL HEALTH CLINIC | Facility: CLINIC | Age: 26
End: 2021-08-03

## 2021-08-03 DIAGNOSIS — F32.A DEPRESSION, UNSPECIFIED DEPRESSION TYPE: Primary | ICD-10-CM

## 2021-08-03 NOTE — PROGRESS NOTES
Therapist has not seen Pablo Lemon since April  Today therapist left voicemail for Tiffany asking her to call the office to schedule appointments  Therapist indicated that if the office did not hear from Tiffany by August 13, she would move to discharge her from therapy

## 2021-08-04 ENCOUNTER — EVALUATION (OUTPATIENT)
Dept: PHYSICAL THERAPY | Facility: CLINIC | Age: 26
End: 2021-08-04
Payer: COMMERCIAL

## 2021-08-04 DIAGNOSIS — S83.412D SPRAIN OF MEDIAL COLLATERAL LIGAMENT OF LEFT KNEE, SUBSEQUENT ENCOUNTER: Primary | ICD-10-CM

## 2021-08-04 PROCEDURE — 97110 THERAPEUTIC EXERCISES: CPT | Performed by: PHYSICAL THERAPIST

## 2021-08-04 PROCEDURE — 97161 PT EVAL LOW COMPLEX 20 MIN: CPT | Performed by: PHYSICAL THERAPIST

## 2021-08-04 NOTE — PROGRESS NOTES
PT Evaluation     Today's date: 2021  Patient name: Delmi Oconnor  : 1995  MRN: [id number]  Referring provider: SHIVA Rothman*  Dx:   Encounter Diagnosis     ICD-10-CM    1  Sprain of medial collateral ligament of left knee, subsequent encounter  [id number]                   Assessment  Assessment details: Pt is a 22 y/o female presenting to physical therapy with chief complaint of L knee pain following an MCL sprain and bone contusion about 6 weeks ago  Pt presents with TTP over the medial joint line, decreased L knee extension, pain with quad set, fair quality quad set, and minimal swelling at the L knee joint  Pt would benefit from physical therapy in order to improve strength, quad set quality, pain, and swelling in order to return to PLOF  Impairments: abnormal gait, abnormal or restricted ROM, activity intolerance, impaired balance, impaired physical strength, lacks appropriate home exercise program, pain with function and weight-bearing intolerance  Functional limitations: prolonged walking, standing  Symptom irritability: lowUnderstanding of Dx/Px/POC: good   Prognosis: good    Goals  STG: 3 weeks  1  Pt will demonstrate independence with HEP  2  Pt will improve pain to no more than 5/10  3  Pt will improve LLE strength by at least 1/2 grade  4  Pt will be able to walk for at least 5 minutes without an increase in L knee pain  5  Pt will improve L knee AROM by at least 10%    LT weeks  1  Pt will report pain less than 2/10 in the L knee  2  Pt will improve BLE strength to at least 4+/5  3  Pt will be able to ascend/descend stairs with step over step gait pattern without increased knee pain    4  Pt will be able to walk for 60 min without L knee pain      Plan  Plan details: Pt would like to come 1x/week due to work schedule  Patient would benefit from: skilled physical therapy  Planned modality interventions: cryotherapy and thermotherapy: hydrocollator packs  Planned therapy interventions: therapeutic exercise, therapeutic activities, stretching, strengthening, patient education, neuromuscular re-education, massage, manual therapy, balance, gait training and home exercise program  Frequency: 2x week  Duration in weeks: 6  Treatment plan discussed with: patient        Subjective Evaluation    History of Present Illness  Mechanism of injury: Pt reports in the middle of  she hurt her leg while going down a water slide  She felt a pop and her knee shifted  She went to the hospital, who told her to take OTC pain meds, which did not help  She had an MRI that showed a torn MCL and a bone bruise  She reports the pain is getting better, mostly the intensity is improving but it is still as frequent  Pt walks for 1 hour about 2-3x/week  Pt reports she gets pain and stiffness with sitting for a long period of time  She gets pain with lifting her leg (to put pants on), squatting, and descending stairs  Not a recurrent problem   Quality of life: fair    Pain  At best pain ratin  At worst pain ratin  Quality: dull ache and discomfort  Relieving factors: medications  Aggravating factors: walking, stair climbing and lifting  Progression: improved      Diagnostic Tests  MRI studies: abnormal  Patient Goals  Patient goals for therapy: increased strength, independence with ADLs/IADLs, increased motion, decreased pain and return to sport/leisure activities          Objective     Tenderness   Left Knee   Tenderness in the medial joint line       Active Range of Motion   Left Knee   Flexion: 111 degrees   Extension: 2 degrees     Right Knee   Flexion: WFL  Extension: 0 degrees     Mobility   Patellar Mobility:     Right Knee   WFL: medial and lateral  Hypomobile: superior and inferior     Additional Mobility Details  Pain with inf mobs    Strength/Myotome Testing     Left Knee   Flexion: 4  Extension: 4  Quadriceps contraction: fair    Right Knee   Flexion: 4+  Extension: 4+  Quadriceps contraction: good    Swelling     Left Knee Girth Measurement (cm)   Joint line: 53 5 cm    Right Knee Girth Measurement (cm)   Joint line: 52 5 cm             Precautions: N/A      Manuals 8/4            MCL IASTM STM 5'                                                   Neuro Re-Ed             [facility] set HEP            Heel digs HEP            SLR flex                                                                 Ther Ex             Bike             SAQ/LAQ                                                                                           Ther Activity                                       Gait Training                                       Modalities

## 2021-08-09 ENCOUNTER — OFFICE VISIT (OUTPATIENT)
Dept: FAMILY MEDICINE CLINIC | Facility: CLINIC | Age: 26
End: 2021-08-09
Payer: COMMERCIAL

## 2021-08-09 VITALS
SYSTOLIC BLOOD PRESSURE: 128 MMHG | OXYGEN SATURATION: 96 % | DIASTOLIC BLOOD PRESSURE: 88 MMHG | TEMPERATURE: 96.5 F | WEIGHT: 293 LBS | BODY MASS INDEX: 47.09 KG/M2 | HEART RATE: 82 BPM | HEIGHT: 66 IN

## 2021-08-09 DIAGNOSIS — J01.00 ACUTE NON-RECURRENT MAXILLARY SINUSITIS: Primary | ICD-10-CM

## 2021-08-09 PROCEDURE — 99213 OFFICE O/P EST LOW 20 MIN: CPT | Performed by: NURSE PRACTITIONER

## 2021-08-09 PROCEDURE — 3008F BODY MASS INDEX DOCD: CPT | Performed by: ORTHOPAEDIC SURGERY

## 2021-08-09 RX ORDER — FLUTICASONE PROPIONATE 50 MCG
1 SPRAY, SUSPENSION (ML) NASAL DAILY
Qty: 16 G | Refills: 1 | Status: SHIPPED | OUTPATIENT
Start: 2021-08-09 | End: 2021-11-12 | Stop reason: SDUPTHER

## 2021-08-09 RX ORDER — AMOXICILLIN AND CLAVULANATE POTASSIUM 875; 125 MG/1; MG/1
1 TABLET, FILM COATED ORAL EVERY 12 HOURS SCHEDULED
Qty: 14 TABLET | Refills: 0 | Status: SHIPPED | OUTPATIENT
Start: 2021-08-09 | End: 2021-08-16

## 2021-08-09 NOTE — PROGRESS NOTES
Assessment/Plan:     Chronic Problems:  No problem-specific Assessment & Plan notes found for this encounter  Visit Diagnosis:  Diagnoses and all orders for this visit:    Acute non-recurrent maxillary sinusitis  Comments: Will treat with Augmentin and Flonase  Advised to call if not improving  Orders:  -     amoxicillin-clavulanate (Augmentin) 875-125 mg per tablet; Take 1 tablet by mouth every 12 (twelve) hours for 7 days  -     fluticasone (FLONASE) 50 mcg/act nasal spray; 1 spray into each nostril daily          Subjective:    Patient ID: Cora Wharton is a 23 y o  female  Patient presents for sick visit  The patient states that she started with symptoms about 1 week ago  She states she has pain and pressure in her left ear and slight pain in her right ear  Patient states she is congested, postnasal drip, rhinorrhea, sinus pain and pressure and sore throat  The following portions of the patient's history were reviewed and updated as appropriate: allergies, current medications, past family history, past medical history, past social history, past surgical history and problem list     Review of Systems   Constitutional: Negative for chills, fatigue and fever  HENT: Positive for congestion, ear pain, postnasal drip, rhinorrhea, sinus pressure, sinus pain and sore throat  Eyes: Negative for pain and visual disturbance  Respiratory: Positive for cough  Negative for shortness of breath  Cardiovascular: Negative for chest pain and palpitations  Gastrointestinal: Negative for abdominal pain and vomiting  Genitourinary: Negative for dysuria and hematuria  Musculoskeletal: Negative for arthralgias and back pain  Skin: Negative for color change and rash  Neurological: Negative for seizures and syncope  All other systems reviewed and are negative          /88 (BP Location: Left arm, Patient Position: Sitting)   Pulse 82   Temp (!) 96 5 °F (35 8 °C) (Tympanic)   Ht 5' 6" (1 676 m)   Wt (!) 137 kg (302 lb 3 2 oz)   SpO2 96%   BMI 48 78 kg/m²   Social History     Socioeconomic History    Marital status: Single     Spouse name: Not on file    Number of children: Not on file    Years of education: Not on file    Highest education level: Not on file   Occupational History    Not on file   Tobacco Use    Smoking status: Former Smoker     Packs/day: 0 25     Years: 6 00     Pack years: 1 50    Smokeless tobacco: Never Used    Tobacco comment: stopped   Vaping Use    Vaping Use: Never used   Substance and Sexual Activity    Alcohol use: Yes     Comment: socially-holidays    Drug use: No    Sexual activity: Yes     Partners: Male   Other Topics Concern    Not on file   Social History Narrative    Significant other    Works for [employer] office    1 child    Hobbies-    Exercise-occ     Social Determinants of Health     Financial Resource Strain:     Difficulty of Paying Living Expenses:    Food Insecurity:     Worried About [address] in the Last Year:     [address] [employer] in the Last Year:    Transportation Needs:     Lack of Transportation (Medical):      Lack of Transportation (Non-Medical):    Physical Activity:     Days of Exercise per Week:     Minutes of Exercise per Session:    Stress:     Feeling of Stress :    Social Connections:     Frequency of Communication with Friends and Family:     Frequency of Social Gatherings with Friends and Family:     Attends Restorationist Services:     Active Member of Clubs or Organizations:     Attends Club or Organization Meetings:     Marital Status:    Intimate Partner Violence:     Fear of Current or Ex-Partner:     Emotionally Abused:     Physically Abused:     Sexually Abused:      Past Medical History:   Diagnosis Date    High cholesterol     Morbid obesity with BMI of 45 0-49 9, [facility]     Pre-operative laboratory examination      Family History   Problem Relation Age of Onset    Coronary artery disease Mother     Diabetes Sister     Coronary artery disease Maternal Grandmother     Hyperlipidemia Maternal Grandmother     Hyperlipidemia Maternal Grandfather     Diabetes Maternal Grandfather     Alcohol abuse Neg Hx     Substance Abuse Neg Hx     Mental illness Neg Hx      History reviewed  No pertinent surgical history  Current Outpatient Medications:     amoxicillin-clavulanate (Augmentin) 875-125 mg per tablet, Take 1 tablet by mouth every 12 (twelve) hours for 7 days, Disp: 14 tablet, Rfl: 0    fluticasone (FLONASE) 50 mcg/act nasal spray, 1 spray into each nostril daily, Disp: 16 g, Rfl: 1    ibuprofen (MOTRIN) 200 mg tablet, Take by mouth every 6 (six) hours as needed for mild pain (Patient not taking: Reported on 7/1/2021), Disp: , Rfl:     liraglutide (SAXENDA) injection, Inject 0 1 mL (0 6 mg total) under the skin daily, Disp: 3 mL, Rfl: 1    phentermine (ADIPEX-P) 37 5 MG tablet, take 1 tablet by mouth once daily IN THE EARLY MORNING (Patient not taking: Reported on 8/9/2021), Disp: , Rfl:     polyethylene glycol (MIRALAX) 17 g packet, Take 17 g by mouth daily as needed (constipation) (Patient not taking: Reported on 7/16/2021), Disp: 7 each, Rfl: 1    No Known Allergies       Lab Review   No visits with results within 2 Month(s) from this visit  Latest known visit with results is:   Orders Only on 06/03/2021   Component Date Value    SARS-CoV-2 06/07/2021 Negative         Imaging: No results found  Objective:     Physical Exam  Constitutional:       Appearance: She is well-developed  HENT:      Right Ear: Tympanic membrane is bulging  Left Ear: Tympanic membrane is injected and bulging  Nose:      Right Sinus: Maxillary sinus tenderness and frontal sinus tenderness present  Left Sinus: Maxillary sinus tenderness and frontal sinus tenderness present  Cardiovascular:      Rate and Rhythm: Normal rate and regular rhythm        Heart sounds: Normal heart sounds  No murmur heard  Pulmonary:      Effort: Pulmonary effort is normal  No respiratory distress  Breath sounds: Normal breath sounds  Skin:     General: Skin is warm and dry  Neurological:      Mental Status: She is alert and oriented to person, place, and time  There are no Patient Instructions on file for this visit  MIREYA Hatch    Portions of the record may have been created with voice recognition software  Occasional wrong word or "sound a like" substitutions may have occurred due to the inherent limitations of voice recognition software  Read the chart carefully and recognize, using context, where substitutions have occurred

## 2021-08-10 ENCOUNTER — TELEPHONE (OUTPATIENT)
Dept: FAMILY MEDICINE CLINIC | Facility: CLINIC | Age: 26
End: 2021-08-10

## 2021-08-13 ENCOUNTER — OFFICE VISIT (OUTPATIENT)
Dept: PHYSICAL THERAPY | Facility: CLINIC | Age: 26
End: 2021-08-13
Payer: COMMERCIAL

## 2021-08-13 DIAGNOSIS — S83.412D SPRAIN OF MEDIAL COLLATERAL LIGAMENT OF LEFT KNEE, SUBSEQUENT ENCOUNTER: Primary | ICD-10-CM

## 2021-08-13 PROCEDURE — 97112 NEUROMUSCULAR REEDUCATION: CPT

## 2021-08-13 PROCEDURE — 97110 THERAPEUTIC EXERCISES: CPT

## 2021-08-13 PROCEDURE — 97140 MANUAL THERAPY 1/> REGIONS: CPT

## 2021-08-13 NOTE — PROGRESS NOTES
Daily Note     Today's date: 2021  Patient name: Allison Srinivasan  : 1995  MRN: [id number]  Referring provider: SHIVA Winston*  Dx:   Encounter Diagnosis     ICD-10-CM    1  Sprain of medial collateral ligament of left knee, subsequent encounter  S83 412D                   Subjective:  Pt states she has difficulty squatting, navigating stairs, crossing legs, and walking for prolonged period of time  Objective: See treatment diary below      Assessment: Good quad control was present today, as pt was able to perform SLR with no lag  TTP at [facility] insertion  Increased L lateral lean with SLS  Difficulty avoiding pelvic rotation during sidelying hip ABD  Pt would benefit from continued PT in order to improve LLE strength for improved function during functional activities  Plan: Continue per plan of care        Precautions: N/A      Manuals            MCL IASTM STM 5' STM 8'                                                  Neuro Re-Ed             Quad set HEP 3"x20           Heel digs HEP 3"x20           SLR flex  2x10           SLS  15"x5                                                  Ther Ex             Bike  5'           SAQ/LAQ  x20 ea           clamshell  10"x10           SL hip abd  2x10           TG squat  L10 2x10           Hip 3 way  YTB x20                                     Ther Activity                                       Gait Training                                       Modalities

## 2021-08-16 ENCOUNTER — TELEPHONE (OUTPATIENT)
Dept: FAMILY MEDICINE CLINIC | Facility: CLINIC | Age: 26
End: 2021-08-16

## 2021-08-16 NOTE — TELEPHONE ENCOUNTER
Spoke with Olamide [facility]) in regards to coverage for saxenda  I was informed by her insurance that her plan does not offer coverage for weight loss medication

## 2021-08-17 ENCOUNTER — DOCUMENTATION (OUTPATIENT)
Dept: BEHAVIORAL/MENTAL HEALTH CLINIC | Facility: CLINIC | Age: 26
End: 2021-08-17

## 2021-08-17 DIAGNOSIS — F32.A DEPRESSION, UNSPECIFIED DEPRESSION TYPE: Primary | ICD-10-CM

## 2021-08-17 NOTE — PROGRESS NOTES
Assessment/Plan:      Diagnoses and all orders for this visit:    Depression, unspecified depression type          Subjective:     Patient ID: Rip Calhoun is a 23 y o  female  Outpatient Discharge Summary:   Admission Date: 3/5/21  Tiffany was referred by Chente GOMES  Discharge Date: 8/17/21    Discharge Diagnosis:    1  Depression, unspecified depression type         Treating Physician: Chente GOMES  Treatment Complications: None noted  Presenting Problem: Tiffany reports that she feels depressed but not all the time (sad, tearfulness, difficulty sleeping, overeating)  She states that her depression symptoms are triggered by "just life "  She adds that she has a child and does not get along with son's father or her parents  Tiffany also reports anxiety is she is feeling overwhelmed  She denies panic attacks as well as hypomanic/manic behavior  She characterizes herself as "a ticking time bomb "  She tends to keep a lot of her thoughts and feelings to herself  Tiffany scored a 12 on the PHQ 9 today - moderate degree of depression  She states that she has been struggling with these symptoms since she was a teenager  Course of treatment includes:    Intake session and one therapy session  Treatment Progress: fair  Criteria for Discharge: Zoya Browning did not schedule follow up appointments  Therapist reached out to her by phone but Tiffany did not contact the office within 10 days as instructed    Aftercare recommendations:  Attend psychotherapy   Discharge Medications include:  Current Outpatient Medications:     fluticasone (FLONASE) 50 mcg/act nasal spray, 1 spray into each nostril daily, Disp: 16 g, Rfl: 1    ibuprofen (MOTRIN) 200 mg tablet, Take by mouth every 6 (six) hours as needed for mild pain (Patient not taking: Reported on 7/1/2021), Disp: , Rfl:     liraglutide (SAXENDA) injection, Inject 0 1 mL (0 6 mg total) under the skin daily, Disp: 3 mL, Rfl: 1    phentermine (ADIPEX-P) 37 5 MG tablet, take 1 tablet by mouth once daily IN THE EARLY MORNING (Patient not taking: Reported on 8/9/2021), Disp: , Rfl:     polyethylene glycol (MIRALAX) 17 g packet, Take 17 g by mouth daily as needed (constipation) (Patient not taking: Reported on 7/16/2021), Disp: 7 each, Rfl: 1    Prognosis: fair

## 2021-08-18 ENCOUNTER — TELEPHONE (OUTPATIENT)
Dept: FAMILY MEDICINE CLINIC | Facility: CLINIC | Age: 26
End: 2021-08-18

## 2021-08-18 NOTE — TELEPHONE ENCOUNTER
Pt called and is requesting blood work to check all her levels because there is mold in her apartment and her sons LEAD came back abnormal  So she will also like to get her LEAD checked

## 2021-08-19 NOTE — TELEPHONE ENCOUNTER
Patients son currently has pink eye and he has been around her a lot  She is starting to have tingly feelings in her eyes and wants to know if you can send something in for [facility] so she doesn't get pink eye?  She also would like a lead test

## 2021-08-19 NOTE — TELEPHONE ENCOUNTER
Please call patient to make her aware that I sent in an antibiotic eyedrop  Please ensure she is washing her hands well and changes her pillowcase 24 hours after she starts the antibiotic drop  If her symptoms have not improved by early next week, she is to let us know  Also I placed a lab order to obtain a lead level  We will call with the results

## 2021-08-27 ENCOUNTER — APPOINTMENT (OUTPATIENT)
Dept: PHYSICAL THERAPY | Facility: CLINIC | Age: 26
End: 2021-08-27
Payer: COMMERCIAL

## 2021-08-30 NOTE — PROGRESS NOTES
PT Discharge    Today's date: 2021  Patient name: Vic Piedra  : 1995  MRN: [id number]  Referring provider: SHIVA Rubio*  Dx:   Encounter Diagnosis     ICD-10-CM    1  Sprain of medial collateral ligament of left knee, subsequent encounter  S83 412D      Assessment  Assessment details: 21: Pt has not attended physical therapy since 21 and has cancelled all appointments  Subjective and objective information and goals unable to be updated at this time  Pt DC from skilled therapy  Impairments: abnormal gait, abnormal or restricted ROM, activity intolerance, impaired balance, impaired physical strength, lacks appropriate home exercise program, pain with function and weight-bearing intolerance  Functional limitations: prolonged walking, standing  Symptom irritability: lowUnderstanding of Dx/Px/POC: good   Prognosis: good    Goals  STG: 3 weeks - unable to assess  1  Pt will demonstrate independence with HEP  2  Pt will improve pain to no more than 5/10  3  Pt will improve LLE strength by at least 1/2 grade  4  Pt will be able to walk for at least 5 minutes without an increase in L knee pain  5  Pt will improve L knee AROM by at least 10%    LT weeks - unable to assess  1  Pt will report pain less than 2/10 in the L knee  2  Pt will improve BLE strength to at least 4+/5  3  Pt will be able to ascend/descend stairs with step over step gait pattern without increased knee pain    4  Pt will be able to walk for 60 min without L knee pain      Plan  Patient would benefit from: skilled physical therapy  Planned modality interventions: cryotherapy and thermotherapy: hydrocollator packs  Planned therapy interventions: therapeutic exercise, therapeutic activities, stretching, strengthening, patient education, neuromuscular re-education, massage, manual therapy, balance, gait training and home exercise program  Treatment plan discussed with: patient        Subjective Evaluation    History of Present Illness  Mechanism of injury: Pt reports in the middle of  she hurt her leg while going down a water slide  She felt a pop and her knee shifted  She went to the hospital, who told her to take OTC pain meds, which did not help  She had an MRI that showed a torn MCL and a bone bruise  She reports the pain is getting better, mostly the intensity is improving but it is still as frequent  Pt walks for 1 hour about 2-3x/week  Pt reports she gets pain and stiffness with sitting for a long period of time  She gets pain with lifting her leg (to put pants on), squatting, and descending stairs  Not a recurrent problem   Quality of life: fair    Pain  At best pain ratin  At worst pain ratin  Quality: dull ache and discomfort  Relieving factors: medications  Aggravating factors: walking, stair climbing and lifting  Progression: improved      Diagnostic Tests  MRI studies: abnormal  Patient Goals  Patient goals for therapy: increased strength, independence with ADLs/IADLs, increased motion, decreased pain and return to sport/leisure activities          Objective     Tenderness   Left Knee   Tenderness in the medial joint line       Active Range of Motion   Left Knee   Flexion: 111 degrees   Extension: 2 degrees     Right Knee   Flexion: WFL  Extension: 0 degrees     Mobility   Patellar Mobility:     Right Knee   WFL: medial and lateral  Hypomobile: superior and inferior     Additional Mobility Details  Pain with inf mobs    Strength/Myotome Testing     Left Knee   Flexion: 4  Extension: 4  Quadriceps contraction: fair    Right Knee   Flexion: 4+  Extension: 4+  Quadriceps contraction: good    Swelling     Left Knee Girth Measurement (cm)   Joint line: 53 5 cm    Right Knee Girth Measurement (cm)   Joint line: 52 5 cm

## 2021-09-08 ENCOUNTER — OFFICE VISIT (OUTPATIENT)
Dept: FAMILY MEDICINE CLINIC | Facility: CLINIC | Age: 26
End: 2021-09-08
Payer: COMMERCIAL

## 2021-09-08 ENCOUNTER — TELEPHONE (OUTPATIENT)
Dept: FAMILY MEDICINE CLINIC | Facility: CLINIC | Age: 26
End: 2021-09-08

## 2021-09-08 VITALS
DIASTOLIC BLOOD PRESSURE: 80 MMHG | SYSTOLIC BLOOD PRESSURE: 120 MMHG | HEIGHT: 66 IN | BODY MASS INDEX: 47.09 KG/M2 | RESPIRATION RATE: 14 BRPM | TEMPERATURE: 97 F | WEIGHT: 293 LBS

## 2021-09-08 DIAGNOSIS — R09.81 NASAL CONGESTION: Primary | ICD-10-CM

## 2021-09-08 DIAGNOSIS — L70.0 ACNE VULGARIS: ICD-10-CM

## 2021-09-08 PROCEDURE — 99213 OFFICE O/P EST LOW 20 MIN: CPT | Performed by: NURSE PRACTITIONER

## 2021-09-08 PROCEDURE — 1036F TOBACCO NON-USER: CPT | Performed by: NURSE PRACTITIONER

## 2021-09-08 PROCEDURE — 3008F BODY MASS INDEX DOCD: CPT | Performed by: NURSE PRACTITIONER

## 2021-09-08 RX ORDER — ADAPALENE AND BENZOYL PEROXIDE .1; 2.5 G/100G; G/100G
1 GEL TOPICAL
Qty: 45 G | Refills: 0 | Status: SHIPPED | OUTPATIENT
Start: 2021-09-08 | End: 2021-10-04 | Stop reason: SDUPTHER

## 2021-09-08 NOTE — LETTER
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

## 2021-09-08 NOTE — PROGRESS NOTES
Assessment/Plan:     Chronic Problems:  No problem-specific Assessment & Plan notes found for this encounter  Visit Diagnosis:  Diagnoses and all orders for this visit:    Nasal congestion  Comments:  recommend daily allergy medication  Acne vulgaris  -     Adapalene-Benzoyl Peroxide 0 1-2 5 % gel; Apply 1 application topically daily at bedtime          Subjective:    Patient ID: Ricky Elmore is a 23 y o  female  Patient presents with concerns of sore throat, ear pressure, cough and congestion  This has been occurring for two days  She does sleep right next to the [facility] and feels this may be the issue  Since she was 14yo, dealing with keloids  First on her back, but now on her abdomen  Wants to know what is causing this  Went to [facility] for Radha Crane and Dr Anselmo Campbell  She does have follow up in October  Concerned with continued acne  The following portions of the patient's history were reviewed and updated as appropriate: allergies, current medications, past family history, past medical history, past social history, past surgical history and problem list     Review of Systems   Constitutional: Negative for chills and fever  HENT: Positive for congestion, ear pain and sore throat  Eyes: Negative for pain and visual disturbance  Respiratory: Positive for cough  Negative for shortness of breath  Cardiovascular: Negative for chest pain and palpitations  Gastrointestinal: Negative for abdominal pain and vomiting  Genitourinary: Negative for dysuria and hematuria  Musculoskeletal: Negative for arthralgias and back pain  Skin: Negative for color change and rash  Acne, keloids   Neurological: Positive for headaches  Negative for seizures and syncope  All other systems reviewed and are negative          /80   Temp (!) 97 °F (36 1 °C)   Resp 14   Ht 5' 6" (1 676 m)   Wt (!) 138 kg (304 lb 3 2 oz)   BMI 49 10 kg/m²   Social History     Socioeconomic History    Marital status: Single     Spouse name: Not on file    Number of children: Not on file    Years of education: Not on file    Highest education level: Not on file   Occupational History    Not on file   Tobacco Use    Smoking status: Former Smoker     Packs/day: 0 25     Years: 6 00     Pack years: 1 50    Smokeless tobacco: Never Used    Tobacco comment: stopped   Vaping Use    Vaping Use: Never used   Substance and Sexual Activity    Alcohol use: Yes     Comment: socially-holidays    Drug use: No    Sexual activity: Yes     Partners: Male   Other Topics Concern    Not on file   Social History Narrative    Significant other    Works for [employer] office    1 child    Hobbies-    Exercise-occ     Social Determinants of Health     Financial Resource Strain:     Difficulty of Paying Living Expenses:    Food Insecurity:     Worried About Running Out of Food in the Last Year:     [address] in the Last Year:    Transportation Needs:     Lack of Transportation (Medical):      Lack of Transportation (Non-Medical):    Physical Activity:     Days of Exercise per Week:     Minutes of Exercise per Session:    Stress:     Feeling of Stress :    Social Connections:     Frequency of Communication with Friends and Family:     Frequency of Social Gatherings with Friends and Family:     Attends Druze Services:     Active Member of Clubs or Organizations:     Attends Club or Organization Meetings:     Marital Status:    Intimate Partner Violence:     Fear of Current or Ex-Partner:     Emotionally Abused:     Physically Abused:     Sexually Abused:      Past Medical History:   Diagnosis Date    High cholesterol     Morbid obesity with BMI of 45 0-49 9, adult ([facility]     Pre-operative laboratory examination      Family History   Problem Relation Age of Onset    Coronary artery disease Mother     Diabetes Sister     Coronary artery disease Maternal Grandmother     Hyperlipidemia Maternal Grandmother     Hyperlipidemia Maternal Grandfather     Diabetes Maternal Grandfather     Alcohol abuse Neg Hx     Substance Abuse Neg Hx     Mental illness Neg Hx      No past surgical history on file  Current Outpatient Medications:     fluticasone (FLONASE) 50 mcg/act nasal spray, 1 spray into each nostril daily, Disp: 16 g, Rfl: 1    Adapalene-Benzoyl Peroxide 0 1-2 5 % gel, Apply 1 application topically daily at bedtime, Disp: 45 g, Rfl: 0    ibuprofen (MOTRIN) 200 mg tablet, Take by mouth every 6 (six) hours as needed for mild pain (Patient not taking: Reported on 7/1/2021), Disp: , Rfl:     liraglutide (SAXENDA) injection, Inject 0 1 mL (0 6 mg total) under the skin daily (Patient not taking: Reported on 9/8/2021), Disp: 3 mL, Rfl: 1    phentermine (ADIPEX-P) 37 5 MG tablet, take 1 tablet by mouth once daily IN THE EARLY MORNING (Patient not taking: Reported on 8/9/2021), Disp: , Rfl:     polyethylene glycol (MIRALAX) 17 g packet, Take 17 g by mouth daily as needed (constipation) (Patient not taking: Reported on 7/16/2021), Disp: 7 each, Rfl: 1    No Known Allergies       Lab Review   No visits with results within 2 Month(s) from this visit  Latest known visit with results is:   Orders Only on 06/03/2021   Component Date Value    SARS-CoV-2 06/07/2021 Negative         Imaging: No results found  Objective:     Physical Exam  Constitutional:       Appearance: She is well-developed  HENT:      Right Ear: Tympanic membrane normal       Left Ear: Tympanic membrane normal       Nose: Congestion present  Mouth/Throat:      Mouth: Mucous membranes are moist       Pharynx: Oropharynx is clear  Cardiovascular:      Rate and Rhythm: Normal rate and regular rhythm  Heart sounds: Normal heart sounds  No murmur heard  Pulmonary:      Effort: Pulmonary effort is normal  No respiratory distress  Breath sounds: Normal breath sounds  Skin:     General: Skin is warm and dry  Neurological:      Mental Status: She is alert and oriented to person, place, and time  There are no Patient Instructions on file for this visit  MIREYA Hatch    Portions of the record may have been created with voice recognition software  Occasional wrong word or "sound a like" substitutions may have occurred due to the inherent limitations of voice recognition software  Read the chart carefully and recognize, using context, where substitutions have occurred

## 2021-10-04 ENCOUNTER — TELEMEDICINE (OUTPATIENT)
Dept: FAMILY MEDICINE CLINIC | Facility: CLINIC | Age: 26
End: 2021-10-04
Payer: COMMERCIAL

## 2021-10-04 ENCOUNTER — OFFICE VISIT (OUTPATIENT)
Dept: DERMATOLOGY | Facility: CLINIC | Age: 26
End: 2021-10-04
Payer: COMMERCIAL

## 2021-10-04 VITALS — BODY MASS INDEX: 49.55 KG/M2 | WEIGHT: 293 LBS

## 2021-10-04 DIAGNOSIS — L70.0 ACNE VULGARIS: Primary | ICD-10-CM

## 2021-10-04 DIAGNOSIS — E66.01 CLASS 3 SEVERE OBESITY DUE TO EXCESS CALORIES WITHOUT SERIOUS COMORBIDITY WITH BODY MASS INDEX (BMI) OF 45.0 TO 49.9 IN ADULT (HCC): Primary | ICD-10-CM

## 2021-10-04 DIAGNOSIS — L91.0 KELOID OF SKIN: ICD-10-CM

## 2021-10-04 PROCEDURE — 99213 OFFICE O/P EST LOW 20 MIN: CPT | Performed by: DERMATOLOGY

## 2021-10-04 PROCEDURE — 1036F TOBACCO NON-USER: CPT | Performed by: DERMATOLOGY

## 2021-10-04 PROCEDURE — 99213 OFFICE O/P EST LOW 20 MIN: CPT | Performed by: NURSE PRACTITIONER

## 2021-10-04 RX ORDER — ADAPALENE AND BENZOYL PEROXIDE .1; 2.5 G/100G; G/100G
1 GEL TOPICAL
Qty: 45 G | Refills: 2 | Status: SHIPPED | OUTPATIENT
Start: 2021-10-04 | End: 2021-12-02 | Stop reason: SDUPTHER

## 2021-10-04 RX ORDER — PHENTERMINE HYDROCHLORIDE 37.5 MG/1
37.5 TABLET ORAL
Qty: 30 TABLET | Refills: 0 | Status: SHIPPED | OUTPATIENT
Start: 2021-10-04 | End: 2021-12-21

## 2021-11-04 ENCOUNTER — OFFICE VISIT (OUTPATIENT)
Dept: BARIATRICS | Facility: CLINIC | Age: 26
End: 2021-11-04
Payer: COMMERCIAL

## 2021-11-04 VITALS
TEMPERATURE: 96.1 F | BODY MASS INDEX: 45.99 KG/M2 | RESPIRATION RATE: 12 BRPM | HEIGHT: 67 IN | WEIGHT: 293 LBS | DIASTOLIC BLOOD PRESSURE: 60 MMHG | SYSTOLIC BLOOD PRESSURE: 100 MMHG | HEART RATE: 86 BPM

## 2021-11-04 DIAGNOSIS — E78.5 HYPERLIPIDEMIA, UNSPECIFIED HYPERLIPIDEMIA TYPE: ICD-10-CM

## 2021-11-04 DIAGNOSIS — E66.01 CLASS 3 SEVERE OBESITY DUE TO EXCESS CALORIES WITHOUT SERIOUS COMORBIDITY WITH BODY MASS INDEX (BMI) OF 45.0 TO 49.9 IN ADULT (HCC): Primary | ICD-10-CM

## 2021-11-04 DIAGNOSIS — F32.A DEPRESSION, UNSPECIFIED DEPRESSION TYPE: ICD-10-CM

## 2021-11-04 PROCEDURE — 99204 OFFICE O/P NEW MOD 45 MIN: CPT | Performed by: SURGERY

## 2021-11-08 ENCOUNTER — CLINICAL SUPPORT (OUTPATIENT)
Dept: BARIATRICS | Facility: CLINIC | Age: 26
End: 2021-11-08

## 2021-11-08 VITALS
TEMPERATURE: 98.5 F | RESPIRATION RATE: 12 BRPM | WEIGHT: 293 LBS | BODY MASS INDEX: 45.99 KG/M2 | HEIGHT: 67 IN | HEART RATE: 85 BPM | DIASTOLIC BLOOD PRESSURE: 80 MMHG | SYSTOLIC BLOOD PRESSURE: 128 MMHG

## 2021-11-08 DIAGNOSIS — Z98.84 BARIATRIC SURGERY STATUS: Primary | ICD-10-CM

## 2021-11-08 DIAGNOSIS — Z71.89 ENCOUNTER FOR PRE-BARIATRIC SURGERY COUNSELING AND EDUCATION: ICD-10-CM

## 2021-11-08 DIAGNOSIS — E66.01 MORBID (SEVERE) OBESITY DUE TO EXCESS CALORIES (HCC): Primary | ICD-10-CM

## 2021-11-08 DIAGNOSIS — Z01.818 PREOP TESTING: ICD-10-CM

## 2021-11-08 PROCEDURE — RECHECK

## 2021-11-09 ENCOUNTER — TELEMEDICINE (OUTPATIENT)
Dept: FAMILY MEDICINE CLINIC | Facility: CLINIC | Age: 26
End: 2021-11-09
Payer: COMMERCIAL

## 2021-11-09 DIAGNOSIS — B34.9 VIRAL INFECTION, UNSPECIFIED: Primary | ICD-10-CM

## 2021-11-09 LAB — S PYO AG THROAT QL: NEGATIVE

## 2021-11-09 PROCEDURE — 99213 OFFICE O/P EST LOW 20 MIN: CPT | Performed by: NURSE PRACTITIONER

## 2021-11-09 PROCEDURE — 0241U HB NFCT DS VIR RESP RNA 4 TRGT: CPT | Performed by: NURSE PRACTITIONER

## 2021-11-09 PROCEDURE — 87880 STREP A ASSAY W/OPTIC: CPT | Performed by: NURSE PRACTITIONER

## 2021-11-10 LAB
FLUAV RNA RESP QL NAA+PROBE: NEGATIVE
FLUBV RNA RESP QL NAA+PROBE: NEGATIVE
RSV RNA RESP QL NAA+PROBE: NEGATIVE
SARS-COV-2 RNA RESP QL NAA+PROBE: POSITIVE

## 2021-11-12 ENCOUNTER — TELEMEDICINE (OUTPATIENT)
Dept: FAMILY MEDICINE CLINIC | Facility: CLINIC | Age: 26
End: 2021-11-12
Payer: COMMERCIAL

## 2021-11-12 DIAGNOSIS — U07.1 COVID-19: Primary | ICD-10-CM

## 2021-11-12 DIAGNOSIS — J01.00 ACUTE NON-RECURRENT MAXILLARY SINUSITIS: ICD-10-CM

## 2021-11-12 PROCEDURE — 99213 OFFICE O/P EST LOW 20 MIN: CPT | Performed by: NURSE PRACTITIONER

## 2021-11-12 RX ORDER — FLUTICASONE PROPIONATE 50 MCG
1 SPRAY, SUSPENSION (ML) NASAL DAILY
Qty: 16 G | Refills: 1 | Status: SHIPPED | OUTPATIENT
Start: 2021-11-12 | End: 2022-03-04 | Stop reason: ALTCHOICE

## 2021-11-18 ENCOUNTER — OFFICE VISIT (OUTPATIENT)
Dept: FAMILY MEDICINE CLINIC | Facility: CLINIC | Age: 26
End: 2021-11-18
Payer: COMMERCIAL

## 2021-11-18 VITALS
WEIGHT: 293 LBS | OXYGEN SATURATION: 97 % | RESPIRATION RATE: 18 BRPM | HEART RATE: 80 BPM | DIASTOLIC BLOOD PRESSURE: 68 MMHG | SYSTOLIC BLOOD PRESSURE: 120 MMHG | TEMPERATURE: 97.5 F | HEIGHT: 67 IN | BODY MASS INDEX: 45.99 KG/M2

## 2021-11-18 DIAGNOSIS — H69.80 DYSFUNCTION OF EUSTACHIAN TUBE, UNSPECIFIED LATERALITY: ICD-10-CM

## 2021-11-18 DIAGNOSIS — Z00.00 ANNUAL PHYSICAL EXAM: Primary | ICD-10-CM

## 2021-11-18 DIAGNOSIS — E66.01 CLASS 3 SEVERE OBESITY DUE TO EXCESS CALORIES WITHOUT SERIOUS COMORBIDITY WITH BODY MASS INDEX (BMI) OF 45.0 TO 49.9 IN ADULT (HCC): ICD-10-CM

## 2021-11-18 DIAGNOSIS — K29.00 ACUTE GASTRITIS WITHOUT HEMORRHAGE, UNSPECIFIED GASTRITIS TYPE: ICD-10-CM

## 2021-11-18 DIAGNOSIS — K59.00 CONSTIPATION, UNSPECIFIED CONSTIPATION TYPE: ICD-10-CM

## 2021-11-18 PROBLEM — M54.9 UPPER BACK PAIN: Status: RESOLVED | Noted: 2019-05-10 | Resolved: 2021-11-18

## 2021-11-18 PROBLEM — H69.90 EUSTACHIAN TUBE DYSFUNCTION: Status: ACTIVE | Noted: 2021-11-18

## 2021-11-18 PROCEDURE — 99395 PREV VISIT EST AGE 18-39: CPT | Performed by: NURSE PRACTITIONER

## 2021-11-18 PROCEDURE — 3008F BODY MASS INDEX DOCD: CPT | Performed by: NURSE PRACTITIONER

## 2021-11-18 PROCEDURE — 1036F TOBACCO NON-USER: CPT | Performed by: NURSE PRACTITIONER

## 2021-11-18 PROCEDURE — 3725F SCREEN DEPRESSION PERFORMED: CPT | Performed by: NURSE PRACTITIONER

## 2021-11-18 RX ORDER — ZINC OXIDE 13 %
1 CREAM (GRAM) TOPICAL DAILY
Qty: 90 CAPSULE | Refills: 1 | Status: SHIPPED | OUTPATIENT
Start: 2021-11-18 | End: 2021-12-21

## 2021-11-18 RX ORDER — FAMOTIDINE 20 MG/1
20 TABLET, FILM COATED ORAL 2 TIMES DAILY
Qty: 60 TABLET | Refills: 0 | Status: SHIPPED | OUTPATIENT
Start: 2021-11-18 | End: 2022-06-01

## 2021-12-02 DIAGNOSIS — L70.0 ACNE VULGARIS: ICD-10-CM

## 2021-12-03 RX ORDER — ADAPALENE AND BENZOYL PEROXIDE .1; 2.5 G/100G; G/100G
1 GEL TOPICAL
Qty: 45 G | Refills: 0 | Status: SHIPPED | OUTPATIENT
Start: 2021-12-03 | End: 2022-01-23 | Stop reason: SDUPTHER

## 2021-12-06 ENCOUNTER — OFFICE VISIT (OUTPATIENT)
Dept: FAMILY MEDICINE CLINIC | Facility: CLINIC | Age: 26
End: 2021-12-06
Payer: COMMERCIAL

## 2021-12-06 ENCOUNTER — HOSPITAL ENCOUNTER (OUTPATIENT)
Dept: CT IMAGING | Facility: HOSPITAL | Age: 26
Discharge: HOME | End: 2021-12-06
Payer: COMMERCIAL

## 2021-12-06 VITALS
BODY MASS INDEX: 45.99 KG/M2 | DIASTOLIC BLOOD PRESSURE: 70 MMHG | WEIGHT: 293 LBS | HEART RATE: 87 BPM | OXYGEN SATURATION: 97 % | RESPIRATION RATE: 16 BRPM | TEMPERATURE: 97.7 F | SYSTOLIC BLOOD PRESSURE: 118 MMHG | HEIGHT: 67 IN

## 2021-12-06 DIAGNOSIS — R10.12 LEFT UPPER QUADRANT ABDOMINAL PAIN: ICD-10-CM

## 2021-12-06 DIAGNOSIS — R07.81 RIB PAIN: Primary | ICD-10-CM

## 2021-12-06 DIAGNOSIS — R07.81 RIB PAIN: ICD-10-CM

## 2021-12-06 PROCEDURE — 71250 CT THORAX DX C-: CPT

## 2021-12-06 PROCEDURE — 74176 CT ABD & PELVIS W/O CONTRAST: CPT

## 2021-12-06 PROCEDURE — 99214 OFFICE O/P EST MOD 30 MIN: CPT | Performed by: NURSE PRACTITIONER

## 2021-12-06 PROCEDURE — G1004 CDSM NDSC: HCPCS

## 2021-12-09 ENCOUNTER — OFFICE VISIT (OUTPATIENT)
Dept: BARIATRICS | Facility: CLINIC | Age: 26
End: 2021-12-09

## 2021-12-09 VITALS — BODY MASS INDEX: 49.96 KG/M2 | WEIGHT: 293 LBS

## 2021-12-09 DIAGNOSIS — E66.01 MORBID (SEVERE) OBESITY DUE TO EXCESS CALORIES (HCC): Primary | ICD-10-CM

## 2021-12-09 PROCEDURE — RECHECK

## 2021-12-17 ENCOUNTER — TELEPHONE (OUTPATIENT)
Dept: FAMILY MEDICINE CLINIC | Facility: CLINIC | Age: 26
End: 2021-12-17

## 2021-12-21 ENCOUNTER — OFFICE VISIT (OUTPATIENT)
Dept: GASTROENTEROLOGY | Facility: CLINIC | Age: 26
End: 2021-12-21
Payer: COMMERCIAL

## 2021-12-21 VITALS
SYSTOLIC BLOOD PRESSURE: 130 MMHG | BODY MASS INDEX: 45.99 KG/M2 | WEIGHT: 293 LBS | DIASTOLIC BLOOD PRESSURE: 74 MMHG | HEIGHT: 67 IN

## 2021-12-21 DIAGNOSIS — K59.00 CONSTIPATION, UNSPECIFIED CONSTIPATION TYPE: Primary | ICD-10-CM

## 2021-12-21 DIAGNOSIS — R10.12 LEFT UPPER QUADRANT ABDOMINAL PAIN: ICD-10-CM

## 2021-12-21 PROCEDURE — 99203 OFFICE O/P NEW LOW 30 MIN: CPT | Performed by: PHYSICIAN ASSISTANT

## 2021-12-21 RX ORDER — POLYETHYLENE GLYCOL 3350 17 G/17G
17 POWDER, FOR SOLUTION ORAL DAILY
Qty: 507 G | Refills: 2 | Status: SHIPPED | OUTPATIENT
Start: 2021-12-21 | End: 2022-06-01

## 2021-12-21 RX ORDER — FLUCONAZOLE 150 MG/1
TABLET ORAL
COMMUNITY
Start: 2021-12-16 | End: 2022-03-04

## 2021-12-21 RX ORDER — DICYCLOMINE HCL 20 MG
20 TABLET ORAL EVERY 6 HOURS PRN
Qty: 60 TABLET | Refills: 3 | Status: SHIPPED | OUTPATIENT
Start: 2021-12-21 | End: 2022-06-01

## 2021-12-30 ENCOUNTER — CONSULT (OUTPATIENT)
Dept: CARDIOLOGY CLINIC | Facility: CLINIC | Age: 26
End: 2021-12-30
Payer: COMMERCIAL

## 2021-12-30 VITALS
OXYGEN SATURATION: 98 % | HEART RATE: 97 BPM | WEIGHT: 293 LBS | DIASTOLIC BLOOD PRESSURE: 70 MMHG | RESPIRATION RATE: 16 BRPM | SYSTOLIC BLOOD PRESSURE: 120 MMHG | HEIGHT: 67 IN | BODY MASS INDEX: 45.99 KG/M2

## 2021-12-30 DIAGNOSIS — Z98.84 BARIATRIC SURGERY STATUS: ICD-10-CM

## 2021-12-30 PROCEDURE — 99244 OFF/OP CNSLTJ NEW/EST MOD 40: CPT | Performed by: INTERNAL MEDICINE

## 2021-12-30 PROCEDURE — 1036F TOBACCO NON-USER: CPT | Performed by: INTERNAL MEDICINE

## 2021-12-30 PROCEDURE — 93000 ELECTROCARDIOGRAM COMPLETE: CPT | Performed by: INTERNAL MEDICINE

## 2021-12-30 PROCEDURE — 3008F BODY MASS INDEX DOCD: CPT | Performed by: INTERNAL MEDICINE

## 2022-01-11 NOTE — PROGRESS NOTES
3/6 weight check  Saw Cardiology 12/30 and was cleared  Patient stopped her membership at the [facility]  Plans on dancing at home for activity, did it once this week so far  Has not been able to find a therapist that accepts her insurance that is close by, so waiting until she gets a car to pursue this  Still not consistently having breakfast  Discussed planning ahead for meals  Patient drinks 16-24 oz of coffee and about 6 bottles of water daily  Struggles with sleep- will stay up on social media  Goals discussed: 1  Labs 2  Support group 3  EGD-patient reports this was scheduled for 2/11, but not in the system will check with [place] [facility]  Routine for meals and sleep 5   Increase activity HC LCSW

## 2022-01-13 ENCOUNTER — OFFICE VISIT (OUTPATIENT)
Dept: BARIATRICS | Facility: CLINIC | Age: 27
End: 2022-01-13

## 2022-01-13 ENCOUNTER — PREP FOR PROCEDURE (OUTPATIENT)
Dept: BARIATRICS | Facility: CLINIC | Age: 27
End: 2022-01-13

## 2022-01-13 VITALS — BODY MASS INDEX: 45.99 KG/M2 | WEIGHT: 293 LBS | HEIGHT: 67 IN

## 2022-01-13 DIAGNOSIS — E66.01 MORBID OBESITY (HCC): Primary | ICD-10-CM

## 2022-01-13 DIAGNOSIS — Z71.89 ENCOUNTER FOR PRE-BARIATRIC SURGERY COUNSELING AND EDUCATION: Primary | ICD-10-CM

## 2022-01-13 PROCEDURE — 3008F BODY MASS INDEX DOCD: CPT | Performed by: INTERNAL MEDICINE

## 2022-01-13 PROCEDURE — RECHECK

## 2022-01-27 DIAGNOSIS — L70.0 ACNE VULGARIS: ICD-10-CM

## 2022-01-27 NOTE — TELEPHONE ENCOUNTER
Patient called stating she usually gets her Adapalene-Benzoyl Peroxide from Dr Marcos Cristina, but he is on vacation and the last script he sent in says "failed to transmit to pharmacy" she wants to know if there is any way you can send this in since he is on vacation?

## 2022-01-28 RX ORDER — ADAPALENE AND BENZOYL PEROXIDE .1; 2.5 G/100G; G/100G
1 GEL TOPICAL
Qty: 45 G | Refills: 0 | Status: SHIPPED | OUTPATIENT
Start: 2022-01-28 | End: 2022-06-06 | Stop reason: SDUPTHER

## 2022-01-28 RX ORDER — ADAPALENE AND BENZOYL PEROXIDE .1; 2.5 G/100G; G/100G
1 GEL TOPICAL
Qty: 45 G | Refills: 0 | Status: SHIPPED | OUTPATIENT
Start: 2022-01-28 | End: 2022-03-04 | Stop reason: SDUPTHER

## 2022-02-10 ENCOUNTER — TELEPHONE (OUTPATIENT)
Dept: GASTROENTEROLOGY | Facility: HOSPITAL | Age: 27
End: 2022-02-10

## 2022-02-11 ENCOUNTER — HOSPITAL ENCOUNTER (OUTPATIENT)
Dept: GASTROENTEROLOGY | Facility: HOSPITAL | Age: 27
Setting detail: OUTPATIENT SURGERY
Discharge: HOME | End: 2022-02-11
Attending: SURGERY | Admitting: SURGERY
Payer: COMMERCIAL

## 2022-02-11 ENCOUNTER — OFFICE VISIT (OUTPATIENT)
Dept: BARIATRICS | Facility: CLINIC | Age: 27
End: 2022-02-11

## 2022-02-11 ENCOUNTER — ANESTHESIA (OUTPATIENT)
Dept: GASTROENTEROLOGY | Facility: HOSPITAL | Age: 27
End: 2022-02-11

## 2022-02-11 ENCOUNTER — ANESTHESIA EVENT (OUTPATIENT)
Dept: GASTROENTEROLOGY | Facility: HOSPITAL | Age: 27
End: 2022-02-11

## 2022-02-11 VITALS
TEMPERATURE: 97.8 F | HEART RATE: 68 BPM | BODY MASS INDEX: 47.09 KG/M2 | RESPIRATION RATE: 16 BRPM | DIASTOLIC BLOOD PRESSURE: 62 MMHG | WEIGHT: 293 LBS | OXYGEN SATURATION: 99 % | SYSTOLIC BLOOD PRESSURE: 124 MMHG | HEIGHT: 66 IN

## 2022-02-11 VITALS — WEIGHT: 293 LBS | BODY MASS INDEX: 49.39 KG/M2

## 2022-02-11 DIAGNOSIS — E66.01 MORBID OBESITY (HCC): ICD-10-CM

## 2022-02-11 DIAGNOSIS — E66.01 OBESITY, CLASS III, BMI 40-49.9 (MORBID OBESITY) (HCC): Primary | ICD-10-CM

## 2022-02-11 LAB
EXT PREGNANCY TEST URINE: NEGATIVE
EXT. CONTROL: NORMAL

## 2022-02-11 PROCEDURE — 88305 TISSUE EXAM BY PATHOLOGIST: CPT | Performed by: PATHOLOGY

## 2022-02-11 PROCEDURE — 81025 URINE PREGNANCY TEST: CPT | Performed by: SURGERY

## 2022-02-11 PROCEDURE — RECHECK

## 2022-02-11 PROCEDURE — 43239 EGD BIOPSY SINGLE/MULTIPLE: CPT | Performed by: SURGERY

## 2022-02-11 RX ORDER — PROPOFOL 10 MG/ML
INJECTION, EMULSION INTRAVENOUS AS NEEDED
Status: DISCONTINUED | OUTPATIENT
Start: 2022-02-11 | End: 2022-02-11

## 2022-02-11 RX ORDER — SODIUM CHLORIDE, SODIUM LACTATE, POTASSIUM CHLORIDE, CALCIUM CHLORIDE 600; 310; 30; 20 MG/100ML; MG/100ML; MG/100ML; MG/100ML
INJECTION, SOLUTION INTRAVENOUS CONTINUOUS PRN
Status: DISCONTINUED | OUTPATIENT
Start: 2022-02-11 | End: 2022-02-11

## 2022-02-11 RX ORDER — SODIUM CHLORIDE, SODIUM LACTATE, POTASSIUM CHLORIDE, CALCIUM CHLORIDE 600; 310; 30; 20 MG/100ML; MG/100ML; MG/100ML; MG/100ML
50 INJECTION, SOLUTION INTRAVENOUS CONTINUOUS
Status: DISCONTINUED | OUTPATIENT
Start: 2022-02-11 | End: 2022-02-15 | Stop reason: HOSPADM

## 2022-02-11 RX ORDER — LIDOCAINE HYDROCHLORIDE 10 MG/ML
0.5 INJECTION, SOLUTION EPIDURAL; INFILTRATION; INTRACAUDAL; PERINEURAL ONCE AS NEEDED
Status: DISCONTINUED | OUTPATIENT
Start: 2022-02-11 | End: 2022-02-15 | Stop reason: HOSPADM

## 2022-02-11 RX ADMIN — SODIUM CHLORIDE, SODIUM LACTATE, POTASSIUM CHLORIDE, AND CALCIUM CHLORIDE: .6; .31; .03; .02 INJECTION, SOLUTION INTRAVENOUS at 09:59

## 2022-02-11 RX ADMIN — PROPOFOL 150 MG: 10 INJECTION, EMULSION INTRAVENOUS at 10:11

## 2022-02-11 RX ADMIN — PROPOFOL 50 MG: 10 INJECTION, EMULSION INTRAVENOUS at 10:15

## 2022-02-11 NOTE — H&P
This is a 27 y o  female with a history of morbid obesity and Body mass index is 49 35 kg/m²  Here for an EGD to evaluate the anatomy of the GI tract and to rule out the presence of H  pylori  Physical Exam    /71   Pulse 75   Resp 16   Ht 5' 6" (1 676 m)   Wt (!) 139 kg (305 lb 12 5 oz)   LMP 02/10/2022   SpO2 97%   BMI 49 35 kg/m²    AAOx3  RRR  CTA B  Abdomen obese  Benign  A/P:    This is a 27 y o  female with a history of morbid obesity and Body mass index is 49 35 kg/m²       Will proceed with the EGD and biopsies        Rigoberto Penn MD  2/11/2022  9:39 AM

## 2022-02-11 NOTE — ANESTHESIA PREPROCEDURE EVALUATION
Procedure:  EGD    Relevant Problems   CARDIO   (+) Hyperlipidemia      NEURO/PSYCH   (+) Depression      Other   (+) Acute gastritis without hemorrhage   (+) Class 3 severe obesity due to excess calories without serious comorbidity with body mass index (BMI) of 45 0 to 49 9 in adult (HCC)   (+) Constipation   (+) Neck pain   (+) Tear of MCL (medial collateral ligament) of knee, left, initial encounter   (+) Vitamin D deficiency        Physical Exam    Airway    Mallampati score: III  TM Distance: >3 FB  Neck ROM: full     Dental   No notable dental hx     Cardiovascular  Cardiovascular exam normal    Pulmonary  Pulmonary exam normal Breath sounds clear to auscultation,     Other Findings        Anesthesia Plan  ASA Score- 2     Anesthesia Type- IV sedation with anesthesia with ASA Monitors  Additional Monitors:   Airway Plan:           Plan Factors-    Chart reviewed  EKG reviewed  Imaging results reviewed  Existing labs reviewed  Patient summary reviewed  Patient is not a current smoker  Patient instructed to abstain from smoking on day of procedure  Patient did not smoke on day of surgery  Obstructive sleep apnea risk education given perioperatively  Induction- intravenous  Postoperative Plan-     Informed Consent- Anesthetic plan and risks discussed with patient  I personally reviewed this patient with the CRNA  Discussed and agreed on the Anesthesia Plan with the CRNA             Lab Results   Component Value Date    WBC 4 61 05/12/2021    HGB 13 1 05/12/2021    HCT 39 4 05/12/2021    MCV 87 05/12/2021     05/12/2021     Lab Results   Component Value Date    CALCIUM 8 7 05/12/2021    K 3 7 05/12/2021    CO2 28 05/12/2021     05/12/2021    BUN 14 05/12/2021    CREATININE 0 73 05/12/2021         Discussed with pt the benefits/alternatives and risks or General Anesthesia including breathing tube remaining in place if not strong enough, PONV, damage to lips and teeth, sore throat, eye injury or blindness    I, Dr Payton Plaza, the attending physician, have personally seen and evaluated the patient prior to anesthetic care  I have reviewed the pre-anesthetic record, and other medical records if appropriate to the anesthetic care  If a CRNA is involved in the case, I have reviewed the CRNA assessment, if present, and agree  The patient is in a suitable condition to proceed with my formulated anesthetic plan

## 2022-02-11 NOTE — PROGRESS NOTES
Bariatric Nutrition Assessment Note -   Insurance: 6 required monthly weight checks Today is 4/6   Type of surgery    Interested in Vertical sleeve gastrectomy  Surgery Date: TBD  Surgeon: Dr Essence Nguyễn on 11/4/2021  Nutrition Assessment   Jeff Lucas  27 y o   female   Height: 5'7"  Weight: 306#  BMI: 47 9  Eval Weight: 312 2#   BMI: 48 9  Wt with BMI of 25: 159 5#  Pre-Op Excess Wt: 152 7#  BMI to Qualify at 40 = 255 5#  PMH includes: HLD; quit smoking  Pt advised not to gain weight during preop process  Pt encouraged to lose weight (5% /15#) via healthy eating and exercise  Pt may follow Liver Shrinking diet 2 weeks prior to DOS depending on BMI at time  This diet will promote weight loss  Last menstrual period 02/10/2022, unknown if currently breastfeeding  Weight History  Reason for WLS: can't sustain weight loss  Onset of Obesity: Childhood but more since pregnancy  Family history of obesity: Yes  Wt Loss Attempts: Counseling with  MD  FAD Diets (Cabbage soup, Grapefruit, Cleanse, etc ) Detox Tea, Apple Cider Vinegar  Fasting  Meal Replacements (Medifast, Slim Fast, etc )  Self Created Diets (Portion Control, Healthy Food Choices, etc ) Exercise -   Maximum Wt Lost: 20#    Review of History and Medications   OTC: Vitamin D B 12  Past Medical History:   Diagnosis Date    High cholesterol     Morbid obesity with BMI of 45 0-49 9, [facility]     Pre-operative laboratory examination      No past surgical history on file    Social History     Socioeconomic History    Marital status: Single     Spouse name: Not on file    Number of children: Not on file    Years of education: Not on file    Highest education level: Not on file   Occupational History    Not on file   Tobacco Use    Smoking status: Former Smoker     Packs/day: 0 25     Years: 6 00     Pack years: 1 50    Smokeless tobacco: Never Used    Tobacco comment: stopped   Vaping Use    Vaping Use: Never used Substance and Sexual Activity    Alcohol use: Yes     Comment: occasionally    Drug use: No    Sexual activity: Yes     Partners: Male   Other Topics Concern    Not on file   Social History Narrative    Significant other    Works for [employer] office    1 child    Hobbies-    Exercise-occ     Social Determinants of Health     Financial Resource Strain: Not on file   Food Insecurity: Not on file   Transportation Needs: Not on file   Physical Activity: Not on file   Stress: Not on file   Social Connections: Not on file   Intimate Partner Violence: Not on file   Housing Stability: Not on file       Current Outpatient Medications:     Adapalene-Benzoyl Peroxide 0 1-2 5 % gel, Apply 1 application topically daily at bedtime, Disp: 45 g, Rfl: 0    Adapalene-Benzoyl Peroxide 0 1-2 5 % gel, Apply 1 application topically daily at bedtime, Disp: 45 g, Rfl: 0    dicyclomine (BENTYL) 20 mg tablet, Take 1 tablet (20 mg total) by mouth every 6 (six) hours as needed (abdominal pain), Disp: 60 tablet, Rfl: 3    famotidine (PEPCID) 20 mg tablet, Take 1 tablet (20 mg total) by mouth 2 (two) times a day, Disp: 60 tablet, Rfl: 0    fluconazole (DIFLUCAN) 150 mg tablet, take 1 tablet by mouth today and repeat in 3 days, Disp: , Rfl:     fluticasone (FLONASE) 50 mcg/act nasal spray, 1 spray into each nostril daily, Disp: 16 g, Rfl: 1    polyethylene glycol (GLYCOLAX) 17 GM/SCOOP powder, Take 17 g by mouth daily, Disp: 507 g, Rfl: 2  No current facility-administered medications for this visit      Facility-Administered Medications Ordered in Other Visits:     lactated ringers infusion, 50 mL/hr, Intravenous, Continuous, Glo Nunez MD    lidocaine (PF) (XYLOCAINE-MPF) 1 % injection 0 5 mL, 0 5 mL, Infiltration, Once PRN, Glo Nunez MD  Food Intake and Lifestyle Assessment   Food Intake Assessment completed via usual diet recall  Breakfast: Water (Bottle), Coffee ( always drinking - tired)- Sometimes Omlette w/cheeese  Lunch: Rice, Beans Chicken  Dinner- snack : 8-9 pm  Bingeing - 2 bowls cereal, sushi, chips, cheese (lots) etc  Beverage intake: water (gallon) and coffee ( 20 oz  [place] or DD- 2 cups Keurig)  Protein supplement: None  Portion Sizes: 6-9 oz Pro   1+ cup  Starch  No Vegetable    Estimated protein intake per day: at least 75-85 oz  Estimated fluid intake per day: Adequate 1 gallon water  Meals eaten away from home: Stopped past month  Typical meal pattern: 1-2  meals per day and binges at night  Eating Behaviors: Consumption of high calorie/ high fat foods, Large portion sizes, Frequent snacking/ grazing, Binge eating, Mindless eating, Emotional eating and Craves salty foods  Food allergies or intolerances: No Known Allergies or intolerances  Cultural or Orthodox considerations: No Pork    Physical Assessment  Physical Activity  Types of exercise: Started back to gym - goes occasionally - eliptical or treadmill and machines - abdominal  Current physical limitations: None    Psychosocial Assessment  Has 2 yo son  Support systems: relative- cousin who had surgery (RNY) - mother, sisters   Socioeconomic factors: None  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[employer])  - 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  Starting new job at [employer] next week - didn't start  Nutrition Diagnosis  Diagnosis: Overweight / Obesity (NC-3 3)  Related to: Physical inactivity and Excessive energy intake  As Evidenced by: BMI >25     Nutrition Prescription: Recommend the following diet  Low fat, Low sugar, High protein and Regular    Interventions and Teaching   Discussed pre-op and post-op nutrition guidelines  Patient educated and handouts provided    Surgical changes to stomach / GI  Capacity of post-surgery stomach  Diet progression  Adequate hydration  Sugar and fat restriction to decrease "dumping syndrome"  Fat restriction to decrease steatorrhea  Expected weight loss  Weight loss plateaus/ possibility of weight regain  Exercise  Suggestions for pre-op diet  Nutrition considerations after surgery  Protein supplements  Meal planning and preparation  Appropriate carbohydrate, protein, and fat intake, and food/fluid choices to maximize safe weight loss, nutrient intake, and tolerance   Dietary and lifestyle changes  Possible problems with poor eating habits  Intuitive eating  Techniques for self monitoring and keeping daily food journal  Potential for food intolerance after surgery, and ways to deal with them including: lactose intolerance, nausea, reflux, vomiting, diarrhea, food intolerance, appetite changes, gas  Vitamin / Mineral supplementation of Multivitamin with minerals, Calcium, Vitamin B12, Iron and Vitamin D    Education provided to: patient    Barriers to learning: No barriers identified  Readiness to change: preparation    Prior research on procedure: internet, discussed with provider and friends or family    Comprehension: verbalizes understanding     Expected Compliance: good  Recommendations  Pt is an appropriate candidate for surgery  Yes  Evaluation / Monitoring  Dietitian to Monitor: Eating pattern as discussed Tolerance of nutrition prescription Body weight Lab values Physical activity Bowel pattern  2/6 Weight Check  Pt struggling with weight gain  Notes eating a lot of chocolate as "craves" it  Pt knows she needs to get better control of her eating habits and choices  Pt follows several support groups to learn more about the surgery and expectations  Concerned about hair loss and excess skin  Also concerned about back to gym after surgery as read would have to wait a year  Discussed the surgery more and answered her concerns/questions  Educated more on guidelines and ways to deal with her cravings  Pt receptive to discussion  Workflow reviewed    4/6 Weight Check  Pt making changes  Going to gym 3X week - 30 minutes cardio - eliptical  Better with cravings and grazing  Trying guidelines - not consistent but working on them (30/60  Eating slow, etc) Started using Equate as meal replacement for lunch  B- eggs blancas or turkey or Green juice with banana  D- Rice and chicken  Includes cheese at breakfast, dinner and at night - string cheese (cracker barrell)  4-5 water bottles     Workflow:   Bloodwork: Needs to complete     PCP Letter: Completed   Support Group:  To complete podcast and may attend Support Group    Weight Loss: Not required but needed to lose weight gained during process - has already lost the weight plus    6 Month Pre-Operative Program: 4/6    EGD  Scheduled for  2/11/2022   Cardiac Risk Assessment: Scheduled for 12/30/2021 Completed  Goals  Food journal, Exercise 30 minutes 5 times per week, Complete lession plans 1-6, Eat 3 meals per day and Eliminate mindless snacking   Follow Pre-Surgery guidelines  > Trial Baritastic for food logging  > Establish regular meal pattern of 3 meals - include fruits, vegetables and whole grains  > No skipping meals -   > Focus on protein - include lean protein at each meal and snack - Can use protein drink as meal replacement  > Decrease portions - florina starch at meals  > Limit processed foods and continue to avoid fast foods and dining away from home  > Include meal prepping  > Limit snacks - healthier choices and portion; avoid grazing  > Slow pace of eating and drinking - practice 30/60 minute rule  > Reduce caffeine/ eliminate before pre-op diet (2 cup or 20 oz presently)  > Continue to eliminate carbonation in diet  > Maintain water intake - 64 oz as goal ( drinks 128 oz presently)  > Increase physical activity/establish exercise regimen   > Start multi vitamin and additional Vitamin D 2000IU  Work on skills to cope with emotional eating/mindful eating and binge eating  Advised 5% weight loss (15#) by DOS (not required) -   F/U next month with bariatric provider  Time Spent:   30 minutes

## 2022-03-04 ENCOUNTER — OFFICE VISIT (OUTPATIENT)
Dept: FAMILY MEDICINE CLINIC | Facility: CLINIC | Age: 27
End: 2022-03-04
Payer: COMMERCIAL

## 2022-03-04 VITALS
WEIGHT: 293 LBS | HEIGHT: 66 IN | OXYGEN SATURATION: 99 % | SYSTOLIC BLOOD PRESSURE: 124 MMHG | BODY MASS INDEX: 47.09 KG/M2 | RESPIRATION RATE: 18 BRPM | HEART RATE: 83 BPM | TEMPERATURE: 97.3 F | DIASTOLIC BLOOD PRESSURE: 74 MMHG

## 2022-03-04 DIAGNOSIS — J02.9 SORE THROAT: ICD-10-CM

## 2022-03-04 DIAGNOSIS — H65.92 LEFT OTITIS MEDIA WITH EFFUSION: Primary | ICD-10-CM

## 2022-03-04 LAB — S PYO AG THROAT QL: NEGATIVE

## 2022-03-04 PROCEDURE — 1036F TOBACCO NON-USER: CPT | Performed by: NURSE PRACTITIONER

## 2022-03-04 PROCEDURE — 87147 CULTURE TYPE IMMUNOLOGIC: CPT | Performed by: NURSE PRACTITIONER

## 2022-03-04 PROCEDURE — 87880 STREP A ASSAY W/OPTIC: CPT | Performed by: NURSE PRACTITIONER

## 2022-03-04 PROCEDURE — 99213 OFFICE O/P EST LOW 20 MIN: CPT | Performed by: NURSE PRACTITIONER

## 2022-03-04 PROCEDURE — 87070 CULTURE OTHR SPECIMN AEROBIC: CPT | Performed by: NURSE PRACTITIONER

## 2022-03-04 RX ORDER — AMOXICILLIN 500 MG/1
500 CAPSULE ORAL EVERY 12 HOURS SCHEDULED
Qty: 14 CAPSULE | Refills: 0 | Status: SHIPPED | OUTPATIENT
Start: 2022-03-04 | End: 2022-03-11

## 2022-03-04 RX ORDER — METRONIDAZOLE 500 MG/1
TABLET ORAL
COMMUNITY
Start: 2022-02-18 | End: 2022-03-04 | Stop reason: ALTCHOICE

## 2022-03-04 RX ORDER — FLUTICASONE PROPIONATE 50 MCG
1 SPRAY, SUSPENSION (ML) NASAL DAILY
Qty: 16 G | Refills: 1 | Status: SHIPPED | OUTPATIENT
Start: 2022-03-04

## 2022-03-04 NOTE — PATIENT INSTRUCTIONS
Otitis Media, Ambulatory Care   GENERAL INFORMATION:   Otitis media  is an ear infection  Common symptoms include the following:   · Fever or a headache    · Ear pain    · Trouble hearing    · Ear feels plugged or full or you have ringing or buzzing in your ear    · Dizziness or you lose your balance    · Nausea or vomiting  Seek immediate care for the following symptoms:   · Seizure    · Fever and a stiff neck  Treatment for otitis media  may include any of the following:  · NSAIDs  help decrease swelling and pain or fever  This medicine is available with or without a doctor's order  NSAIDs can cause stomach bleeding or kidney problems in certain people  If you take blood thinner medicine, always ask your healthcare provider if NSAIDs are safe for you  Always read the medicine label and follow directions  · Ear drops  to help treat your ear pain  · Antibiotics  to help kill the germs that caused your ear infection  Care for otitis media:   · Use heat  Place a warm, moist washcloth on your ear to decrease pain  Apply for 15 to 20 minutes, 3 to 4 times a day    · Use ice  Ice helps decrease swelling and pain  Use an ice pack or put crushed ice in a plastic bag  Cover the ice pack with a towel and place it on your ear for 15 to 20 minutes, 3 to 4 times a day for 2 days  Prevent otitis media:   · Wash your hands often  This will help prevent the spread of germs  Encourage everyone in your house to wash their hands with soap and water after they use the bathroom  Everyone should also wash their hands after they change a child's diaper and before they prepare or eat food  · Stay away from people who are ill  Germs are easily and quickly spread through contact  Follow up with your healthcare provider as directed:  Write down your questions so you remember to ask them during your visits  CARE AGREEMENT:   You have the right to help plan your care   Learn about your health condition and how it may be treated  Discuss treatment options with your caregivers to decide what care you want to receive  You always have the right to refuse treatment  The above information is an  only  It is not intended as medical advice for individual conditions or treatments  Talk to your doctor, nurse or pharmacist before following any medical regimen to see if it is safe and effective for you  © 2014 [address] is for End User's use only and may not be sold, redistributed or otherwise used for commercial purposes  All illustrations and images included in CareNotes® are the copyrighted property of A D A M , Inc  or Regan [employer]

## 2022-03-04 NOTE — PROGRESS NOTES
Assessment/Plan:    No problem-specific Assessment & Plan notes found for this encounter  Diagnoses and all orders for this visit:    Left otitis media with effusion  Comments:  Advised to avoid irritants, increase hydration  Amoxicillin/Flonase as prescribed  Return if symptoms do not improve after a week  Orders:  -     fluticasone (FLONASE) 50 mcg/act nasal spray; 1 spray into each nostril daily  -     amoxicillin (AMOXIL) 500 mg capsule; Take 1 capsule (500 mg total) by mouth every 12 (twelve) hours for 7 days    Sore throat  Comments:  POCT strep negative in office, will send culture  Advised salt water gargles, cepacol lozenges, avoid irritants, increase hydration  Orders:  -     POCT rapid strepA  -     Throat culture; Future    Other orders  -     Discontinue: metroNIDAZOLE (FLAGYL) 500 mg tablet; take 1 tablet by mouth twice a day for 7 days          Subjective:      Patient ID: Adeola Vogel is a 27 y o  female  Patient presents to office for left ear pain and sore throat x's 2-3 days  Recent exposure to strep (son, nephew, sister)  Notes pain with swallowing, denies difficulty swallowing  Left ear pain is worsening  Denies drainage, notes "muffled" sensation in left ear  Denies fever, chills, n/v, cough, SOB  Has not tried any OTC treatments for symptoms  The following portions of the patient's history were reviewed and updated as appropriate: allergies, current medications, past family history, past medical history, past social history, past surgical history and problem list     Review of Systems   Constitutional: Negative for chills and fever  HENT: Positive for ear pain (left ear), sneezing and sore throat  Negative for congestion, postnasal drip, rhinorrhea, sinus pressure, sinus pain, trouble swallowing and voice change  Eyes: Negative for pain and itching  Respiratory: Negative for cough and shortness of breath      Cardiovascular: Negative for chest pain and palpitations  Gastrointestinal: Negative for abdominal pain, nausea and vomiting  Musculoskeletal: Negative for arthralgias, myalgias and neck pain  Skin: Negative for color change and rash  Neurological: Negative for dizziness, light-headedness and headaches  Psychiatric/Behavioral: Negative for confusion  Objective:      /74   Pulse 83   Temp (!) 97 3 °F (36 3 °C) (Tympanic)   Resp 18   Ht 5' 6" (1 676 m)   Wt (!) 139 kg (307 lb)   LMP 02/10/2022   SpO2 99%   BMI 49 55 kg/m²          Physical Exam  Vitals reviewed  Constitutional:       Appearance: She is well-developed  HENT:      Head: Normocephalic and atraumatic  Right Ear: Tympanic membrane and ear canal normal       Left Ear: Tenderness present  A middle ear effusion is present  Tympanic membrane is erythematous  Nose: No congestion or rhinorrhea  Mouth/Throat:      Mouth: Mucous membranes are moist  No oral lesions  Pharynx: Oropharynx is clear  Uvula midline  Posterior oropharyngeal erythema present  No pharyngeal swelling, oropharyngeal exudate or uvula swelling  Tonsils: No tonsillar exudate or tonsillar abscesses  1+ on the right  1+ on the left  Eyes:      Extraocular Movements:      Right eye: Normal extraocular motion  Left eye: Normal extraocular motion  Conjunctiva/sclera: Conjunctivae normal       Pupils: Pupils are equal, round, and reactive to light  Cardiovascular:      Rate and Rhythm: Normal rate and regular rhythm  Heart sounds: Normal heart sounds  No murmur heard  Pulmonary:      Effort: Pulmonary effort is normal       Breath sounds: Normal breath sounds  No wheezing  Abdominal:      General: Bowel sounds are normal       Palpations: Abdomen is soft  Tenderness: There is no abdominal tenderness  Musculoskeletal:      Cervical back: Normal range of motion and neck supple  Lymphadenopathy:      Cervical: No cervical adenopathy     Skin: General: Skin is warm and dry  Findings: No rash  Neurological:      General: No focal deficit present  Mental Status: She is alert and oriented to person, place, and time     Psychiatric:         Mood and Affect: Mood normal          Behavior: Behavior normal

## 2022-03-06 LAB — BACTERIA THROAT CULT: ABNORMAL

## 2022-03-10 NOTE — PROGRESS NOTES
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

## 2022-03-11 ENCOUNTER — OFFICE VISIT (OUTPATIENT)
Dept: BARIATRICS | Facility: CLINIC | Age: 27
End: 2022-03-11

## 2022-03-11 VITALS — BODY MASS INDEX: 45.99 KG/M2 | HEIGHT: 67 IN | WEIGHT: 293 LBS

## 2022-03-11 DIAGNOSIS — Z71.89 ENCOUNTER FOR PRE-BARIATRIC SURGERY COUNSELING AND EDUCATION: Primary | ICD-10-CM

## 2022-03-11 PROCEDURE — 3008F BODY MASS INDEX DOCD: CPT | Performed by: NURSE PRACTITIONER

## 2022-03-11 PROCEDURE — RECHECK

## 2022-03-21 ENCOUNTER — APPOINTMENT (OUTPATIENT)
Dept: LAB | Facility: HOSPITAL | Age: 27
End: 2022-03-21
Payer: COMMERCIAL

## 2022-04-04 ENCOUNTER — TELEPHONE (OUTPATIENT)
Dept: FAMILY MEDICINE CLINIC | Facility: CLINIC | Age: 27
End: 2022-04-04

## 2022-04-04 NOTE — TELEPHONE ENCOUNTER
Patient is requesting a letter to be out of jury duty due to not feeling well with a cough, sneezing, low grade fecer, ear pain and sore throat

## 2022-04-04 NOTE — TELEPHONE ENCOUNTER
Patient is calling again about this  Her jury duty is scheduled for tomorrow  Please let me know if this is ok to write  You will also need to sign the letter if so

## 2022-04-12 NOTE — PROGRESS NOTES
Bariatric Nutrition Assessment Note -   Insurance: 6 required monthly weight checks Today is 6/6   Type of surgery    Interested in Vertical sleeve gastrectomy  Surgery Date: TBD  Surgeon: Dr Eduardo Rodriguez on 11/4/2021  Nutrition Assessment   Prince Vigil  27 y o   female   Height: 5'7"  Weight: 304 8  BMI: 47 7  Eval Weight: 312 2#   BMI: 48 9  Wt with BMI of 25: 159 5#  Pre-Op Excess Wt: 152 7#  BMI to Qualify at 40 = 255 5#  PMH includes: HLD; quit smoking  Pt advised not to gain weight during preop process  Pt encouraged to lose weight (5% /15#) via healthy eating and exercise  Pt may follow Liver Shrinking diet 2 weeks prior to DOS depending on BMI at time  This diet will promote weight loss  unknown if currently breastfeeding  Weight History  Reason for WLS: can't sustain weight loss  Onset of Obesity: Childhood but more since pregnancy  Family history of obesity: Yes  Wt Loss Attempts: Counseling with  MD  FAD Diets (Cabbage soup, Grapefruit, Cleanse, etc ) Detox Tea, Apple Cider Vinegar  Fasting  Meal Replacements (Medifast, Slim Fast, etc )  Self Created Diets (Portion Control, Healthy Food Choices, etc ) Exercise -   Maximum Wt Lost: 20#    Review of History and Medications   OTC: Vitamin D B 12  Past Medical History:   Diagnosis Date    High cholesterol     Morbid obesity with BMI of 45 0-49 9, adult ([facility] [address] )     Pre-operative laboratory examination      No past surgical history on file    Social History     Socioeconomic History    Marital status: Single     Spouse name: Not on file    Number of children: Not on file    Years of education: Not on file    Highest education level: Not on file   Occupational History    Not on file   Tobacco Use    Smoking status: Former Smoker     Packs/day: 0 25     Years: 6 00     Pack years: 1 50    Smokeless tobacco: Never Used    Tobacco comment: stopped   Vaping Use    Vaping Use: Never used   Substance and Sexual Activity    Alcohol use: Yes     Comment: occasionally    Drug use: No    Sexual activity: Yes     Partners: Male   Other Topics Concern    Not on file   Social History Narrative    Significant other    Works for [employer] office    1 child    Hobbies-    Exercise-occ     Social Determinants of Health     Financial Resource Strain: Not on file   Food Insecurity: Not on file   Transportation Needs: Not on file   Physical Activity: Not on file   Stress: Not on file   Social Connections: Not on file   Intimate Partner Violence: Not on file   Housing Stability: Not on file       Current Outpatient Medications:     Adapalene-Benzoyl Peroxide 0 1-2 5 % gel, Apply 1 application topically daily at bedtime, Disp: 45 g, Rfl: 0    dicyclomine (BENTYL) 20 mg tablet, Take 1 tablet (20 mg total) by mouth every 6 (six) hours as needed (abdominal pain), Disp: 60 tablet, Rfl: 3    famotidine (PEPCID) 20 mg tablet, Take 1 tablet (20 mg total) by mouth 2 (two) times a day, Disp: 60 tablet, Rfl: 0    fluticasone (FLONASE) 50 mcg/act nasal spray, 1 spray into each nostril daily, Disp: 16 g, Rfl: 1    polyethylene glycol (GLYCOLAX) 17 GM/SCOOP powder, Take 17 g by mouth daily (Patient taking differently: Take 17 g by mouth as needed  ), Disp: 507 g, Rfl: 2  Food Intake and Lifestyle Assessment   Food Intake Assessment completed via usual diet recall  Breakfast: Water (Bottle), Coffee ( always drinking - tired)- Sometimes Omlette w/cheeese  Lunch: Rice, Beans Chicken  Dinner- snack : 8-9 pm  Bingeing - 2 bowls cereal, sushi, chips, cheese (lots) etc  Beverage intake: water (gallon) and coffee ( 20 oz  [place] or DD- 2 cups Keurig)  Protein supplement: None  Portion Sizes: 6-9 oz Pro   1+ cup  Starch  No Vegetable    Estimated protein intake per day: at least 75-85 oz  Estimated fluid intake per day: Adequate 1 gallon water  Meals eaten away from home: Stopped past month  Typical meal pattern: 1-2  meals per day and binges at night  Eating Behaviors: Consumption of high calorie/ high fat foods, Large portion sizes, Frequent snacking/ grazing, Binge eating, Mindless eating, Emotional eating and Craves salty foods  Food allergies or intolerances: No Known Allergies or intolerances  Cultural or Congregation considerations: No Pork    Physical Assessment  Physical Activity  Types of exercise: Started back to gym - goes occasionally - eliptical or treadmill and machines - abdominal  Current physical limitations: None    Psychosocial Assessment  Has 2 yo son  Support systems: relative- cousin who had surgery ([facility]) - mother, sisters   Socioeconomic factors: None  Former [facility] [employer]  - 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  Starting new job at [employer] next week - didn't start  Nutrition Diagnosis  Diagnosis: Overweight / Obesity (NC-3 3)  Related to: Physical inactivity and Excessive energy intake  As Evidenced by: BMI >25     Nutrition Prescription: Recommend the following diet  Low fat, Low sugar, High protein and Regular    Interventions and Teaching   Discussed pre-op and post-op nutrition guidelines  Patient educated and handouts provided    Surgical changes to stomach / GI  Capacity of post-surgery stomach  Diet progression  Adequate hydration  Sugar and fat restriction to decrease "dumping syndrome"  Fat restriction to decrease steatorrhea  Expected weight loss  Weight loss plateaus/ possibility of weight regain  Exercise  Suggestions for pre-op diet  Nutrition considerations after surgery  Protein supplements  Meal planning and preparation  Appropriate carbohydrate, protein, and fat intake, and food/fluid choices to maximize safe weight loss, nutrient intake, and tolerance   Dietary and lifestyle changes  Possible problems with poor eating habits  Intuitive eating  Techniques for self monitoring and keeping daily food journal  Potential for food intolerance after surgery, and ways to deal with them including: lactose intolerance, nausea, reflux, vomiting, diarrhea, food intolerance, appetite changes, gas  Vitamin / Mineral supplementation of Multivitamin with minerals, Calcium, Vitamin B12, Iron and Vitamin D    Education provided to: patient    Barriers to learning: No barriers identified  Readiness to change: preparation    Prior research on procedure: internet, discussed with provider and friends or family    Comprehension: verbalizes understanding     Expected Compliance: good  Recommendations  Pt is an appropriate candidate for surgery  Yes  Evaluation / Monitoring  Dietitian to Monitor: Eating pattern as discussed Tolerance of nutrition prescription Body weight Lab values Physical activity Bowel pattern  2/6 Weight Check  Pt struggling with weight gain  Notes eating a lot of chocolate as "craves" it  Pt knows she needs to get better control of her eating habits and choices  Pt follows several support groups to learn more about the surgery and expectations  Concerned about hair loss and excess skin  Also concerned about back to gym after surgery as read would have to wait a year  Discussed the surgery more and answered her concerns/questions  Educated more on guidelines and ways to deal with her cravings  Pt receptive to discussion  Workflow reviewed    4/6 Weight Check  Pt making changes  Going to gym 3X week - 30 minutes cardio - eliptical  Better with cravings and grazing  Trying guidelines - not consistent but working on them (30/60  Eating slow, etc) Started using Equate as meal replacement for lunch  B- eggs blancas or turkey or Green juice with banana  D- Rice and chicken  Includes cheese at breakfast, dinner and at night - string cheese (cracker barrell)  4-5 water bottles     6/6 Weight Check Summary  Maintains changes made to diet and exercise  Going to gym less but more walking with warmer weather  Continues to work on guidelines  Discussed bloodwork  Advised on diet per elevated LDL  Continues to lose weight  (15# PAST 4 MONTHS) Familiar with protein shakes - uses Equate as meal replacement as needed    Workflow reviewed - completed  Discussed next steps in the preop process  Questions answered        Workflow: COMPLETED   Bloodwork: Completed   PCP Letter: Completed   Support Group: Completed podcast    Weight Loss: Not required but needed to lose weight gained during process - has already lost the weight plus -    6 Month Pre-Operative Program: 6/6 today   EGD   2/11/2022   Cardiac Risk Assessment: 12/30/2021 Completed  Goals  Food journal, Exercise 30 minutes 5 times per week, Complete lession plans 1-6, Eat 3 meals per day and Eliminate mindless snacking   Follow Pre-Surgery guidelines  > Trial Baritastic for food logging  > Establish regular meal pattern of 3 meals - include fruits, vegetables and whole grains  > No skipping meals -   > Focus on protein - include lean protein at each meal and snack - Can use protein drink as meal replacement  > Decrease portions - florina starch at meals  > Limit processed foods and continue to avoid fast foods and dining away from home  > Include meal prepping  > Limit snacks - healthier choices and portion; avoid grazing  > Slow pace of eating and drinking - practice 30/60 minute rule  > Reduce caffeine/ eliminate before pre-op diet (2 cup or 20 oz presently)  > Continue to eliminate carbonation in diet  > Maintain water intake - 64 oz as goal ( drinks 128 oz presently)  > Increase physical activity/establish exercise regimen   > Start multi vitamin and additional Vitamin D 2000IU  Work on skills to cope with emotional eating/mindful eating and binge eating  Advised 5% weight loss (15#) by DOS (not required) -   F/U next month with bariatric provider for monthly preop visit  Time Spent:   30 minutes

## 2022-04-13 ENCOUNTER — OFFICE VISIT (OUTPATIENT)
Dept: BARIATRICS | Facility: CLINIC | Age: 27
End: 2022-04-13

## 2022-04-13 ENCOUNTER — OFFICE VISIT (OUTPATIENT)
Dept: FAMILY MEDICINE CLINIC | Facility: CLINIC | Age: 27
End: 2022-04-13
Payer: COMMERCIAL

## 2022-04-13 VITALS
OXYGEN SATURATION: 100 % | SYSTOLIC BLOOD PRESSURE: 110 MMHG | DIASTOLIC BLOOD PRESSURE: 80 MMHG | HEIGHT: 67 IN | BODY MASS INDEX: 45.99 KG/M2 | TEMPERATURE: 98.8 F | HEART RATE: 84 BPM | WEIGHT: 293 LBS

## 2022-04-13 VITALS — WEIGHT: 293 LBS | BODY MASS INDEX: 47.74 KG/M2

## 2022-04-13 DIAGNOSIS — J02.0 STREP PHARYNGITIS: Primary | ICD-10-CM

## 2022-04-13 DIAGNOSIS — J02.9 SORE THROAT: ICD-10-CM

## 2022-04-13 DIAGNOSIS — E66.01 OBESITY, CLASS III, BMI 40-49.9 (MORBID OBESITY) (HCC): Primary | ICD-10-CM

## 2022-04-13 LAB — S PYO AG THROAT QL: POSITIVE

## 2022-04-13 PROCEDURE — 1036F TOBACCO NON-USER: CPT | Performed by: NURSE PRACTITIONER

## 2022-04-13 PROCEDURE — 3008F BODY MASS INDEX DOCD: CPT | Performed by: NURSE PRACTITIONER

## 2022-04-13 PROCEDURE — RECHECK

## 2022-04-13 PROCEDURE — 99213 OFFICE O/P EST LOW 20 MIN: CPT | Performed by: NURSE PRACTITIONER

## 2022-04-13 PROCEDURE — 87070 CULTURE OTHR SPECIMN AEROBIC: CPT | Performed by: NURSE PRACTITIONER

## 2022-04-13 PROCEDURE — 87880 STREP A ASSAY W/OPTIC: CPT | Performed by: NURSE PRACTITIONER

## 2022-04-13 RX ORDER — METRONIDAZOLE 500 MG/1
500 TABLET ORAL 2 TIMES DAILY
COMMUNITY
Start: 2022-04-07 | End: 2022-04-14

## 2022-04-13 RX ORDER — AZITHROMYCIN 250 MG/1
TABLET, FILM COATED ORAL
Qty: 6 TABLET | Refills: 0 | Status: SHIPPED | OUTPATIENT
Start: 2022-04-13 | End: 2022-04-17

## 2022-04-13 NOTE — PATIENT INSTRUCTIONS
Take Z-pack as prescribed (2 pills on day 1, then 1 pill on days 2-5)  After you finish zithromax, return to office in 2 weeks to get throat swab  Strep Throat   AMBULATORY CARE:   Strep throat  is a throat infection caused by bacteria  It is easily spread from person to person  Common symptoms include the following:   · Sore, red, and swollen throat    · Fever and headache     · Upset stomach, abdominal pain, or vomiting    · White or yellow patches or blisters in the back of your throat    · Tender, swollen lumps on the sides of your neck or jaw    · Throat pain when you swallow    Call 911 for any of the following:   · You have trouble breathing  Seek care immediately if:   · You have new symptoms like a bad headache, stiff neck, chest pain, or vomiting  · You are drooling because you cannot swallow your spit  Contact your healthcare provider if:   · You have a fever  · You have a rash or ear pain  · You have green, yellow-brown, or bloody mucus when you cough or blow your nose  · You are unable to drink anything  · You have questions or concerns about your condition or care  Treatment for strep throat  may include antibiotic medicine to treat your strep throat  You should feel better within 2 to 3 days after you start antibiotics  You may return to work or school 24 hours after you start antibiotics  Manage strep throat:   · Use lozenges, ice, soft foods, or popsicles  to soothe your throat  · Drink juice, milk shakes, or soup  if your throat is too sore to eat solid food  Drinking liquids can also help prevent dehydration  · Gargle with salt water  Mix ¼ teaspoon salt in a glass of warm water and gargle  This may help reduce swelling in your throat  · Do not smoke  Nicotine and other chemicals in cigarettes and cigars can cause lung damage and make your symptoms worse  Ask your healthcare provider for information if you currently smoke and need help to quit  E-cigarettes or smokeless tobacco still contain nicotine  Talk to your healthcare provider before you use these products  Prevent the spread of strep throat:   · Wash your hands often  Use soap and water  Wash your hands after you use the bathroom, change a child's diapers, or sneeze  Wash your hands before you prepare or eat food  · Do not share food or drinks  Replace your toothbrush after you have taken antibiotics for 24 hours  Follow up with your doctor as directed:  Write down your questions so you remember to ask them during your visits  © Copyright [employer] 2022 Information is for End User's use only and may not be sold, redistributed or otherwise used for commercial purposes  All illustrations and images included in CareNotes® are the copyrighted property of A [facility] [employer]  or Cherelle Multani  The above information is an  only  It is not intended as medical advice for individual conditions or treatments  Talk to your doctor, nurse or pharmacist before following any medical regimen to see if it is safe and effective for you

## 2022-04-13 NOTE — PROGRESS NOTES
Assessment/Plan:    No problem-specific Assessment & Plan notes found for this encounter  Diagnoses and all orders for this visit:    Strep pharyngitis  Comments:  POCT strep positive, will treate with Zithromax  Advised increased hydration, discard toothbrush, soft foods, voice rest  To return in 2 weeks for swab  Orders:  -     azithromycin (ZITHROMAX) 250 mg tablet; Take 2 tablets today then 1 tablet daily x 4 days    Sore throat  -     POCT rapid strepA  -     Throat culture; Future    Other orders  -     metroNIDAZOLE (FLAGYL) 500 mg tablet; Take 500 mg by mouth 2 (two) times a day          Subjective:      Patient ID: Himanshu Roy is a 27 y o  female  Sore Throat   This is a new problem  The current episode started in the past 7 days  The problem has been gradually worsening  Neither side of throat is experiencing more pain than the other  There has been no fever  The pain is moderate  Associated symptoms include ear pain and a plugged ear sensation  Pertinent negatives include no abdominal pain, congestion, coughing, diarrhea, drooling, ear discharge, headaches, hoarse voice, neck pain, shortness of breath, stridor, swollen glands, trouble swallowing or vomiting  She has had no exposure to strep or mono  She has tried nothing for the symptoms  Earache   There is pain in the left ear  This is a new problem  The current episode started in the past 7 days  The problem occurs constantly  The problem has been gradually worsening  There has been no fever  Associated symptoms include a sore throat  Pertinent negatives include no abdominal pain, coughing, diarrhea, ear discharge, headaches, hearing loss, neck pain, rash, rhinorrhea or vomiting  She has tried nothing for the symptoms  There is no history of a chronic ear infection, hearing loss or a tympanostomy tube         The following portions of the patient's history were reviewed and updated as appropriate: allergies, current medications, past family history, past medical history, past social history, past surgical history and problem list     Review of Systems   Constitutional: Negative for chills and fever  HENT: Positive for ear pain and sore throat  Negative for congestion, drooling, ear discharge, hearing loss, hoarse voice, rhinorrhea and trouble swallowing  Eyes: Negative for pain and itching  Respiratory: Negative for cough, shortness of breath and stridor  Cardiovascular: Negative for chest pain and palpitations  Gastrointestinal: Negative for abdominal pain, diarrhea and vomiting  Musculoskeletal: Negative for neck pain  Skin: Negative for rash  Neurological: Negative for dizziness, light-headedness and headaches  Psychiatric/Behavioral: Negative for confusion  Objective:      /80 (BP Location: Left arm, Patient Position: Sitting)   Pulse 84   Temp 98 8 °F (37 1 °C) (Tympanic)   Ht 5' 7" (1 702 m)   Wt (!) 138 kg (304 lb 9 6 oz)   SpO2 100%   BMI 47 71 kg/m²          Physical Exam  Vitals reviewed  Constitutional:       General: She is not in acute distress  Appearance: She is not ill-appearing  HENT:      Head: Normocephalic  Right Ear: Tympanic membrane and ear canal normal       Left Ear: Tympanic membrane and ear canal normal       Nose: No congestion or rhinorrhea  Mouth/Throat:      Mouth: Mucous membranes are moist  No oral lesions  Pharynx: Posterior oropharyngeal erythema present  No pharyngeal swelling or oropharyngeal exudate  Tonsils: No tonsillar exudate or tonsillar abscesses  1+ on the right  1+ on the left  Eyes:      Extraocular Movements:      Right eye: Normal extraocular motion  Left eye: Normal extraocular motion  Conjunctiva/sclera: Conjunctivae normal       Pupils: Pupils are equal, round, and reactive to light  Cardiovascular:      Rate and Rhythm: Normal rate and regular rhythm  Heart sounds: Normal heart sounds   No murmur heard       Pulmonary:      Effort: Pulmonary effort is normal  No respiratory distress  Breath sounds: Normal breath sounds  No wheezing  Abdominal:      General: Bowel sounds are normal       Palpations: Abdomen is soft  Tenderness: There is no abdominal tenderness  Musculoskeletal:      Cervical back: Normal range of motion and neck supple  Lymphadenopathy:      Cervical: No cervical adenopathy  Skin:     General: Skin is warm and dry  Capillary Refill: Capillary refill takes less than 2 seconds  Neurological:      General: No focal deficit present  Mental Status: She is alert and oriented to person, place, and time     Psychiatric:         Mood and Affect: Mood normal          Behavior: Behavior normal

## 2022-04-16 LAB — BACTERIA THROAT CULT: NORMAL

## 2022-04-21 ENCOUNTER — PREP FOR PROCEDURE (OUTPATIENT)
Dept: BARIATRICS | Facility: CLINIC | Age: 27
End: 2022-04-21

## 2022-04-21 DIAGNOSIS — E66.01 OBESITY, CLASS III, BMI 40-49.9 (MORBID OBESITY) (HCC): Primary | ICD-10-CM

## 2022-04-21 DIAGNOSIS — Z98.84 BARIATRIC SURGERY STATUS: Primary | ICD-10-CM

## 2022-04-22 ENCOUNTER — CLINICAL SUPPORT (OUTPATIENT)
Dept: BARIATRICS | Facility: CLINIC | Age: 27
End: 2022-04-22

## 2022-04-22 DIAGNOSIS — E66.01 MORBID (SEVERE) OBESITY DUE TO EXCESS CALORIES (HCC): Primary | ICD-10-CM

## 2022-04-22 PROCEDURE — RECHECK

## 2022-05-12 ENCOUNTER — TELEPHONE (OUTPATIENT)
Dept: BARIATRICS | Facility: CLINIC | Age: 27
End: 2022-05-12

## 2022-05-12 NOTE — TELEPHONE ENCOUNTER
Pt called to ask when her pre-op diet is supposed to start, her surgery date is 5/24  Informed patient she was supposed to start her diet 2 weeks before her surgery which meant she was supposed to start on Tuesday 5/10  Pt stated that she has not started the liquid diet and then told me she did not have her pre-op diet packet from the class and that she left it in her old car which she no longer has  Pt then asked if she could have smoothies on her pre-op diet and did not know what she was allowed to have  She asked if she could come  the packet after work so I made sure there was one at the   5/12 8:00am: Pt never came to  her pre-op diet packet yesterday  Called Jing to let her know the situation

## 2022-05-12 NOTE — TELEPHONE ENCOUNTER
Pt returned my call  I explained our concerns about her not starting the pre op diet and not understanding how to follow it  She said that she did start it on 5/10, one day late but she did not do it correctly  She told me that her documents were at work when I asked about them  While on the phone with me, she opened her email and she brought up the pre- op diet sheets  We reviewed them together and I reiterated this is exactly how to follow the diet  She agreed she will do this every day until surgery  If she has any further questions, she will call about it

## 2022-05-13 RX ORDER — MELATONIN
1000 DAILY
COMMUNITY
End: 2022-06-01 | Stop reason: ALTCHOICE

## 2022-05-13 NOTE — PRE-PROCEDURE INSTRUCTIONS
Pre-Surgery Instructions:   Medication Instructions    Adapalene-Benzoyl Peroxide 0 1-2 5 % gel Hold day of surgery   cholecalciferol (VITAMIN D3) 1,000 units tablet Stop taking 7 days prior to surgery   dicyclomine (BENTYL) 20 mg tablet Uses PRN- OK to take day of surgery    famotidine (PEPCID) 20 mg tablet Uses PRN- OK to take day of surgery    fluticasone (FLONASE) 50 mcg/act nasal spray Uses PRN- OK to take day of surgery    polyethylene glycol (GLYCOLAX) 17 GM/SCOOP powder Uses PRN- DO NOT take day of surgery   Pre op and bathing instructions reviewed  Pt has hibiclens  Pt  Verbalized understanding of current visitor restrictions  Pt  Verbalized an understanding of all instructions reviewed and offers no concerns at this time  Instructed to avoid all ASA/NSAIDs and OTC Vit/Supp from now until after surgery per anesthesia guidelines  Tylenol ok prn  Reviewed Carb Drink Instructions per Surgeon/Anes  Guidelines

## 2022-05-17 ENCOUNTER — TELEPHONE (OUTPATIENT)
Dept: BARIATRICS | Facility: CLINIC | Age: 27
End: 2022-05-17

## 2022-05-17 NOTE — TELEPHONE ENCOUNTER
Pre-op call was made to patient to follow up on how they are doing and to remind them to continue with all medical and dietary directions that were given at pre-op class regarding liver shrinking diet and hydration  They were encouraged to purchase all vitamins and protein shakes for post op use as well as to begin Miralax three days prior to surgery as directed in Section 6 of their manual   They were reminded of the Ensure Pre-surgery drinks protocol and to bring their completed yellow form with them to surgery  Lastly, they were informed that they would be weighed the morning of surgery and to give the office a call if they had any further questions or concerns

## 2022-05-19 ENCOUNTER — OFFICE VISIT (OUTPATIENT)
Dept: BARIATRICS | Facility: CLINIC | Age: 27
End: 2022-05-19
Payer: COMMERCIAL

## 2022-05-19 VITALS
RESPIRATION RATE: 14 BRPM | BODY MASS INDEX: 45.99 KG/M2 | SYSTOLIC BLOOD PRESSURE: 138 MMHG | HEART RATE: 96 BPM | DIASTOLIC BLOOD PRESSURE: 78 MMHG | HEIGHT: 67 IN | TEMPERATURE: 97 F | WEIGHT: 293 LBS

## 2022-05-19 DIAGNOSIS — Z98.84 STATUS POST BARIATRIC SURGERY: Primary | ICD-10-CM

## 2022-05-19 DIAGNOSIS — Z01.818 ENCOUNTER FOR OTHER PREPROCEDURAL EXAMINATION: Primary | ICD-10-CM

## 2022-05-19 DIAGNOSIS — E66.01 OBESITY, CLASS III, BMI 40-49.9 (MORBID OBESITY) (HCC): ICD-10-CM

## 2022-05-19 DIAGNOSIS — E66.01 MORBID (SEVERE) OBESITY DUE TO EXCESS CALORIES (HCC): ICD-10-CM

## 2022-05-19 DIAGNOSIS — E78.5 HYPERLIPIDEMIA, UNSPECIFIED HYPERLIPIDEMIA TYPE: ICD-10-CM

## 2022-05-19 PROBLEM — E66.813 OBESITY, CLASS III, BMI 40-49.9 (MORBID OBESITY): Status: ACTIVE | Noted: 2022-05-19

## 2022-05-19 PROCEDURE — 1036F TOBACCO NON-USER: CPT | Performed by: SURGERY

## 2022-05-19 PROCEDURE — 99213 OFFICE O/P EST LOW 20 MIN: CPT | Performed by: SURGERY

## 2022-05-19 PROCEDURE — 3008F BODY MASS INDEX DOCD: CPT | Performed by: SURGERY

## 2022-05-19 RX ORDER — CELECOXIB 200 MG/1
200 CAPSULE ORAL ONCE
Status: CANCELLED | OUTPATIENT
Start: 2022-05-19 | End: 2022-05-19

## 2022-05-19 RX ORDER — GABAPENTIN 100 MG/1
300 CAPSULE ORAL ONCE
Status: CANCELLED | OUTPATIENT
Start: 2022-05-19 | End: 2022-05-19

## 2022-05-19 RX ORDER — ENOXAPARIN SODIUM 300 MG/3ML
40 INJECTION INTRAVENOUS; SUBCUTANEOUS
Status: CANCELLED | OUTPATIENT
Start: 2022-05-19 | End: 2022-05-20

## 2022-05-19 RX ORDER — ENOXAPARIN SODIUM 100 MG/ML
0.4 INJECTION SUBCUTANEOUS DAILY
Qty: 5.2 ML | Refills: 0 | Status: SHIPPED | OUTPATIENT
Start: 2022-05-19 | End: 2022-06-01

## 2022-05-19 RX ORDER — SCOLOPAMINE TRANSDERMAL SYSTEM 1 MG/1
1 PATCH, EXTENDED RELEASE TRANSDERMAL ONCE
Status: CANCELLED | OUTPATIENT
Start: 2022-05-19 | End: 2022-05-19

## 2022-05-19 RX ORDER — PANTOPRAZOLE SODIUM 40 MG/1
40 TABLET, DELAYED RELEASE ORAL DAILY
Qty: 90 TABLET | Refills: 2 | Status: SHIPPED | OUTPATIENT
Start: 2022-05-19 | End: 2022-06-01

## 2022-05-19 RX ORDER — OXYCODONE HYDROCHLORIDE 5 MG/1
5 TABLET ORAL EVERY 4 HOURS PRN
Qty: 10 TABLET | Refills: 0 | Status: SHIPPED | OUTPATIENT
Start: 2022-05-19 | End: 2022-06-01

## 2022-05-19 RX ORDER — ACETAMINOPHEN 325 MG/1
975 TABLET ORAL ONCE
Status: CANCELLED | OUTPATIENT
Start: 2022-05-19 | End: 2022-05-19

## 2022-05-19 NOTE — PROGRESS NOTES
H&P Exam - Bariatric Surgery   Arlette Chappell 27 y o  female MRN: [id number]  Unit/Bed#:  Encounter: [id number]      HPI:    27 y o  yo morbidly obese female found to be a good candidate to undergo a weight loss operation upon being enrolled here at the [facility]   She has been pre certified to undergo a Laparoscopic sleeve gastrectomy  Here today to review her pre op test results  Has been medically cleared for the procedure  ++Suffers from HLD, class 3 obesity    I have discussed with her at length the risks and benefits of the operation and reiterated the components of our multidisciplinary program and the importance of compliance and follow up in the post operative period  Although there is a great statistical chance of improvement or even resolution of most of her associated comorbidities, the results vary from patient to patient and they largely depend on his commitment  The patient was also instructed with regards to the importance of behavior modification, nutritional counseling, support meeting attendance and lifestyle changes that are important to ensure success  She was given the opportunity to ask questions and I have answered all of them  I have addressed with the patient the level of CODE STATUS for this hospital stay and after explaining the different options currently she wishes to be a Level I  She understands and wishes to proceed  She has lost all the weight required prior to surgery  Review of Systems   Constitutional: Positive for fatigue  Psychiatric/Behavioral: Positive for dysphoric mood  All other systems reviewed and are negative        Historical Information   Past Medical History:   Diagnosis Date    Anxiety     COVID 01/2021    High cholesterol     Morbid obesity with BMI of 45 0-49 9, adult ([facility] [address] [facility]     Pre-operative laboratory examination     Seizures ([facility] [address] ) 03/31/2015     Past Surgical History:   Procedure Laterality Date    UPPER GASTROINTESTINAL ENDOSCOPY       Social History   Social History     Substance and Sexual Activity   Alcohol Use Yes    Comment: occasionally     Social History     Substance and Sexual Activity   Drug Use No     Social History     Tobacco Use   Smoking Status Former Smoker    Packs/day: 0 25    Years: 6 00    Pack years: 1 50    Quit date: 2021    Years since quittin 0   Smokeless Tobacco Never Used   Tobacco Comment    stopped     Family History: non-contributory    Meds/Allergies   all medications and allergies reviewed  No Known Allergies    Objective     Current Vitals:   Blood Pressure: 138/78 (22)  Pulse: 96 (22)  Temperature: (!) 97 °F (36 1 °C) (22)  Temp Source: Tympanic (22)  Respirations: 14 (22)  Height: 5' 7" (170 2 cm) (22)  Weight - Scale: 133 kg (293 lb) (22)      Invasive Devices  Report    None                 Physical Exam  Constitutional:       Appearance: Normal appearance  HENT:      Head: Atraumatic  Nose: No rhinorrhea  Eyes:      Extraocular Movements: Extraocular movements intact  Cardiovascular:      Rate and Rhythm: Normal rate  Pulmonary:      Effort: Pulmonary effort is normal  No respiratory distress  Breath sounds: Normal breath sounds  Abdominal:      General: Abdomen is flat  There is no distension  Palpations: Abdomen is soft  Tenderness: There is no abdominal tenderness  Musculoskeletal:         General: Normal range of motion  Cervical back: Normal range of motion  Skin:     General: Skin is warm and dry  Neurological:      General: No focal deficit present  Mental Status: She is alert and oriented to person, place, and time  Psychiatric:         Mood and Affect: Mood normal          Behavior: Behavior normal          Lab Results: I have personally reviewed pertinent lab results  Imaging: I have personally reviewed pertinent reports  EKG, Pathology, and Other Studies: I have personally reviewed pertinent reports  Code Status: Level 1    Assessment:  27 y o  yo morbidly obese female found to be a good candidate to undergo a weight loss operation upon being enrolled here at the [facility]  She has completed all of the preoperative process for bariatric surgery      Plan:  - Plan for laparoscopic possible open sleeve gastrectomy with intraoperative EGD  - consent signed  - preop orders placed

## 2022-05-21 ENCOUNTER — APPOINTMENT (OUTPATIENT)
Dept: URGENT CARE | Facility: CLINIC | Age: 27
End: 2022-05-21
Payer: COMMERCIAL

## 2022-05-21 DIAGNOSIS — Z98.84 BARIATRIC SURGERY STATUS: ICD-10-CM

## 2022-05-21 PROCEDURE — U0003 INFECTIOUS AGENT DETECTION BY NUCLEIC ACID (DNA OR RNA); SEVERE ACUTE RESPIRATORY SYNDROME CORONAVIRUS 2 (SARS-COV-2) (CORONAVIRUS DISEASE [COVID-19]), AMPLIFIED PROBE TECHNIQUE, MAKING USE OF HIGH THROUGHPUT TECHNOLOGIES AS DESCRIBED BY CMS-2020-01-R: HCPCS | Performed by: NURSE PRACTITIONER

## 2022-05-21 PROCEDURE — U0005 INFEC AGEN DETEC AMPLI PROBE: HCPCS | Performed by: NURSE PRACTITIONER

## 2022-05-22 LAB — SARS-COV-2 RNA RESP QL NAA+PROBE: NEGATIVE

## 2022-05-24 ENCOUNTER — HOSPITAL ENCOUNTER (OUTPATIENT)
Facility: HOSPITAL | Age: 27
Setting detail: OUTPATIENT SURGERY
Discharge: HOME | End: 2022-05-24
Attending: SURGERY | Admitting: SURGERY
Payer: COMMERCIAL

## 2022-05-24 VITALS
SYSTOLIC BLOOD PRESSURE: 120 MMHG | OXYGEN SATURATION: 98 % | WEIGHT: 293 LBS | DIASTOLIC BLOOD PRESSURE: 72 MMHG | HEIGHT: 67 IN | TEMPERATURE: 98.1 F | RESPIRATION RATE: 18 BRPM | BODY MASS INDEX: 45.99 KG/M2 | HEART RATE: 84 BPM

## 2022-05-24 LAB
EXT PREGNANCY TEST URINE: POSITIVE
EXT. CONTROL: ABNORMAL

## 2022-05-24 PROCEDURE — 81025 URINE PREGNANCY TEST: CPT | Performed by: ANESTHESIOLOGY

## 2022-05-24 RX ORDER — CEFAZOLIN SODIUM 2 G/50ML
2000 SOLUTION INTRAVENOUS ONCE
Status: DISCONTINUED | OUTPATIENT
Start: 2022-05-24 | End: 2022-05-24 | Stop reason: HOSPADM

## 2022-05-24 RX ORDER — GABAPENTIN 300 MG/1
300 CAPSULE ORAL ONCE
Status: COMPLETED | OUTPATIENT
Start: 2022-05-24 | End: 2022-05-24

## 2022-05-24 RX ORDER — ENOXAPARIN SODIUM 100 MG/ML
40 INJECTION SUBCUTANEOUS
Status: DISCONTINUED | OUTPATIENT
Start: 2022-05-24 | End: 2022-05-24 | Stop reason: HOSPADM

## 2022-05-24 RX ORDER — CELECOXIB 200 MG/1
200 CAPSULE ORAL ONCE
Status: COMPLETED | OUTPATIENT
Start: 2022-05-24 | End: 2022-05-24

## 2022-05-24 RX ORDER — ACETAMINOPHEN 325 MG/1
975 TABLET ORAL ONCE
Status: COMPLETED | OUTPATIENT
Start: 2022-05-24 | End: 2022-05-24

## 2022-05-24 RX ORDER — SCOLOPAMINE TRANSDERMAL SYSTEM 1 MG/1
1 PATCH, EXTENDED RELEASE TRANSDERMAL ONCE
Status: DISCONTINUED | OUTPATIENT
Start: 2022-05-24 | End: 2022-05-24 | Stop reason: HOSPADM

## 2022-05-24 RX ADMIN — ACETAMINOPHEN 975 MG: 325 TABLET, FILM COATED ORAL at 07:46

## 2022-05-24 RX ADMIN — CELECOXIB 200 MG: 200 CAPSULE ORAL at 07:46

## 2022-05-24 RX ADMIN — GABAPENTIN 300 MG: 300 CAPSULE ORAL at 07:46

## 2022-05-31 ENCOUNTER — TELEPHONE (OUTPATIENT)
Dept: BARIATRICS | Facility: CLINIC | Age: 27
End: 2022-05-31

## 2022-06-01 ENCOUNTER — OFFICE VISIT (OUTPATIENT)
Dept: FAMILY MEDICINE CLINIC | Facility: CLINIC | Age: 27
End: 2022-06-01
Payer: COMMERCIAL

## 2022-06-01 VITALS
HEART RATE: 89 BPM | TEMPERATURE: 98.2 F | OXYGEN SATURATION: 97 % | DIASTOLIC BLOOD PRESSURE: 78 MMHG | BODY MASS INDEX: 45.99 KG/M2 | HEIGHT: 67 IN | SYSTOLIC BLOOD PRESSURE: 122 MMHG | WEIGHT: 293 LBS

## 2022-06-01 DIAGNOSIS — J02.9 SORE THROAT: ICD-10-CM

## 2022-06-01 DIAGNOSIS — R09.81 NASAL CONGESTION: ICD-10-CM

## 2022-06-01 DIAGNOSIS — H66.90 ACUTE OTITIS MEDIA, UNSPECIFIED OTITIS MEDIA TYPE: Primary | ICD-10-CM

## 2022-06-01 LAB — S PYO AG THROAT QL: NEGATIVE

## 2022-06-01 PROCEDURE — 99213 OFFICE O/P EST LOW 20 MIN: CPT | Performed by: NURSE PRACTITIONER

## 2022-06-01 PROCEDURE — 87147 CULTURE TYPE IMMUNOLOGIC: CPT | Performed by: NURSE PRACTITIONER

## 2022-06-01 PROCEDURE — 1036F TOBACCO NON-USER: CPT | Performed by: NURSE PRACTITIONER

## 2022-06-01 PROCEDURE — 87070 CULTURE OTHR SPECIMN AEROBIC: CPT | Performed by: NURSE PRACTITIONER

## 2022-06-01 PROCEDURE — 87880 STREP A ASSAY W/OPTIC: CPT | Performed by: NURSE PRACTITIONER

## 2022-06-01 PROCEDURE — 3008F BODY MASS INDEX DOCD: CPT | Performed by: NURSE PRACTITIONER

## 2022-06-01 RX ORDER — AMOXICILLIN 500 MG/1
1000 CAPSULE ORAL EVERY 8 HOURS SCHEDULED
Qty: 60 CAPSULE | Refills: 0 | Status: SHIPPED | OUTPATIENT
Start: 2022-06-01 | End: 2022-06-11

## 2022-06-01 RX ORDER — FAMOTIDINE 20 MG/1
20 TABLET, FILM COATED ORAL AS NEEDED
COMMUNITY

## 2022-06-01 RX ORDER — NITROFURANTOIN 25; 75 MG/1; MG/1
CAPSULE ORAL
COMMUNITY
Start: 2022-05-16 | End: 2022-06-01 | Stop reason: ALTCHOICE

## 2022-06-01 NOTE — PATIENT INSTRUCTIONS
Ear Infection   AMBULATORY CARE:   An ear infection  is also called otitis media  Blocked or swollen eustachian tubes can cause an infection  Eustachian tubes connect the middle ear to the back of the nose and throat  They drain fluid from the middle ear  You may have a buildup of fluid in your ear  Germs build up in the fluid and infection develops  Common signs and symptoms:   Ear pain    Fever or a headache    Trouble hearing    Ringing or buzzing in your ear    Plugged ear or an ear that feels full    Dizziness    Nausea or vomiting    Seek care immediately if:   You have clear fluid coming from your ear  You have a stiff neck, headache, and a fever  Call your doctor if:   You see blood or pus draining from your ear  Your ear pain gets worse or does not go away, even after treatment  The outside of your ear is red or swollen  You are vomiting or have diarrhea  You have questions or concerns about your condition or care  Medicines: You may  need any of the following:  Acetaminophen  decreases pain and fever  It is available without a doctor's order  Ask how much to take and how often to take it  Follow directions  Read the labels of all other medicines you are using to see if they also contain acetaminophen, or ask your doctor or pharmacist  Acetaminophen can cause liver damage if not taken correctly  Do not use more than 4 grams (4,000 milligrams) total of acetaminophen in one day  NSAIDs , such as ibuprofen, help decrease swelling, pain, and fever  This medicine is available with or without a doctor's order  NSAIDs can cause stomach bleeding or kidney problems in certain people  If you take blood thinner medicine, always ask your healthcare provider if NSAIDs are safe for you  Always read the medicine label and follow directions  Ear drops  may contain medicine to decrease pain and inflammation  Antibiotics  help treat a bacterial infection      Self-care:   Apply heat  on your ear for 15 to 20 minutes, 3 to 4 times a day or as directed  You can apply heat with an electric heating pad, hot water bottle, or warm compress  Always put a cloth between your skin and the heat pack to prevent burns  Heat helps decrease pain  Apply ice  on your ear for 15 to 20 minutes, 3 to 4 times a day for 2 days or as directed  Use an ice pack, or put crushed ice in a plastic bag  Cover it with a towel before you apply it to your ear  Ice decreases swelling and pain  Prevent an ear infection:   Wash your hands often  to help prevent the spread of germs  Ask everyone in your house to wash their hands with soap and water  Ask them to wash after they use the bathroom or change a diaper  Remind them to wash before they prepare or eat food  Stay away from people who are ill  Some germs spread easily and quickly through contact  Follow up with your doctor as directed:  Write down your questions so you remember to ask them during your visits  © Copyright [employer] 2022 Information is for End User's use only and may not be sold, redistributed or otherwise used for commercial purposes  All illustrations and images included in CareNotes® are the copyrighted property of [employer]  or [facility] Khari Perez   The above information is an  only  It is not intended as medical advice for individual conditions or treatments  Talk to your doctor, nurse or pharmacist before following any medical regimen to see if it is safe and effective for you

## 2022-06-01 NOTE — PROGRESS NOTES
Assessment/Plan:    No problem-specific Assessment & Plan notes found for this encounter  Diagnoses and all orders for this visit:    Acute otitis media, unspecified otitis media type  Comments: Will start on Amoxicillin  Advised increased hydration, avoid irritants, drain ears after showering, avoid aggressive use of Q-tips  To return if symptoms worse  Orders:  -     amoxicillin (AMOXIL) 500 mg capsule; Take 2 capsules (1,000 mg total) by mouth every 8 (eight) hours for 10 days    Sore throat  Comments:  Rapid strep negative, will send culture  Discussed re-starting Flonase to aid with post-nasal drip  Tylenol for pain, soft foods, voice rest  Orders:  -     POCT rapid strepA  -     Throat culture; Future  -     Throat culture    Nasal congestion  Comments:  Advised to re-start Flonase, use steam, humidifier in room at night  Can start OTC allergy medication such as Claritin  Other orders  -     Discontinue: nitrofurantoin (MACROBID) 100 mg capsule; take 1 capsule by mouth twice a day for 7 days  -     famotidine (PEPCID) 20 mg tablet; Take 20 mg by mouth as needed for heartburn          Subjective:      Patient ID: Marysol Au is a 27 y o  female  Patient presents to office for evaluation of congestion, coughing, sneezing, ear pain, and sore throat x's 5 days  Denies recent sick contacts  Denies fevers, chills, SOB, wheezing, malaise, fatigue  Denies difficulty swallowing  Denies seasonal allergies, but notes sneezing  Earache   There is pain in the right ear  This is a new problem  The current episode started in the past 7 days  The problem occurs constantly  The problem has been unchanged  There has been no fever  The pain is mild  Associated symptoms include rhinorrhea and a sore throat  Pertinent negatives include no abdominal pain, coughing, diarrhea, ear discharge, headaches, hearing loss, neck pain, rash or vomiting  She has tried nothing for the symptoms   There is no history of a chronic ear infection, hearing loss or a tympanostomy tube  The following portions of the patient's history were reviewed and updated as appropriate: allergies, current medications, past family history, past medical history, past social history, past surgical history and problem list     Review of Systems   Constitutional: Negative for chills and fever  HENT: Positive for congestion, ear pain (right ear), postnasal drip, rhinorrhea, sneezing and sore throat  Negative for ear discharge, hearing loss, sinus pressure, sinus pain, trouble swallowing and voice change  Eyes: Negative for pain, discharge, redness and itching  Respiratory: Negative for cough and shortness of breath  Cardiovascular: Negative for chest pain and palpitations  Gastrointestinal: Negative for abdominal pain, diarrhea, nausea and vomiting  Genitourinary: Negative for decreased urine volume  Musculoskeletal: Negative for neck pain and neck stiffness  Skin: Negative for color change and rash  Neurological: Negative for dizziness and headaches  Psychiatric/Behavioral: Negative for confusion  Objective:      /78   Pulse 89   Temp 98 2 °F (36 8 °C)   Ht 5' 7" (1 702 m)   Wt 135 kg (297 lb)   LMP 02/10/2022   SpO2 97%   BMI 46 52 kg/m²          Physical Exam  Vitals reviewed  Constitutional:       General: She is not in acute distress  Appearance: She is obese  She is not ill-appearing  HENT:      Head: Normocephalic and atraumatic  Right Ear: Hearing and external ear normal  Tympanic membrane is injected and erythematous  Left Ear: Hearing, tympanic membrane, ear canal and external ear normal       Nose: Congestion present  Right Turbinates: Enlarged  Right Sinus: No maxillary sinus tenderness or frontal sinus tenderness  Left Sinus: No maxillary sinus tenderness or frontal sinus tenderness        Mouth/Throat:      Mouth: Mucous membranes are moist       Pharynx: Oropharynx is clear  Eyes:      Conjunctiva/sclera: Conjunctivae normal       Pupils: Pupils are equal, round, and reactive to light  Cardiovascular:      Rate and Rhythm: Normal rate and regular rhythm  Pulses: Normal pulses  Heart sounds: Normal heart sounds  No murmur heard  Pulmonary:      Effort: Pulmonary effort is normal       Breath sounds: Normal breath sounds  No stridor  No wheezing, rhonchi or rales  Abdominal:      General: Bowel sounds are normal       Palpations: Abdomen is soft  Tenderness: There is no abdominal tenderness  Musculoskeletal:      Cervical back: Normal range of motion and neck supple  Lymphadenopathy:      Cervical: No cervical adenopathy  Skin:     General: Skin is warm and dry  Capillary Refill: Capillary refill takes less than 2 seconds  Neurological:      General: No focal deficit present  Mental Status: She is alert and oriented to person, place, and time     Psychiatric:         Mood and Affect: Mood normal          Behavior: Behavior normal

## 2022-06-03 LAB
EXTERNAL HIV CONFIRMATION: NORMAL
EXTERNAL HIV SCREEN: NORMAL
HCV AB SER-ACNC: NEGATIVE

## 2022-06-04 DIAGNOSIS — L70.0 ACNE VULGARIS: ICD-10-CM

## 2022-06-04 LAB — BACTERIA THROAT CULT: ABNORMAL

## 2022-06-06 ENCOUNTER — TELEPHONE (OUTPATIENT)
Dept: BARIATRICS | Facility: CLINIC | Age: 27
End: 2022-06-06

## 2022-06-06 DIAGNOSIS — L70.0 ACNE VULGARIS: ICD-10-CM

## 2022-06-06 RX ORDER — ADAPALENE AND BENZOYL PEROXIDE .1; 2.5 G/100G; G/100G
1 GEL TOPICAL
Qty: 45 G | Refills: 0 | Status: SHIPPED | OUTPATIENT
Start: 2022-06-06 | End: 2022-07-18 | Stop reason: SDUPTHER

## 2022-06-06 NOTE — TELEPHONE ENCOUNTER
Patient was returning your call regarding rescheduling her surgery  She terminated her pregnancy on 6/3 & has to see her dr again in 2 wks, so once she sees him and gets the all clear, she will be able to do the reschedule  She has been getting your messages but has the same hours as us, so it's difficult to get back to us  Patient indicated that the tel@ is her private cell# so it's ok to leave details on her vm

## 2022-06-06 NOTE — TELEPHONE ENCOUNTER
Spoke with pt and she will be seeing her [facility]in Meals on 06/17/2022 and I told her we will need a note stating that she is okay to proceed with the bariatric surgery

## 2022-06-13 RX ORDER — ADAPALENE AND BENZOYL PEROXIDE .1; 2.5 G/100G; G/100G
GEL TOPICAL
Qty: 45 G | Refills: 0 | Status: SHIPPED | OUTPATIENT
Start: 2022-06-13

## 2022-06-21 ENCOUNTER — PREP FOR PROCEDURE (OUTPATIENT)
Dept: BARIATRICS | Facility: CLINIC | Age: 27
End: 2022-06-21

## 2022-06-21 DIAGNOSIS — E66.01 MORBID OBESITY (HCC): Primary | ICD-10-CM

## 2022-06-21 DIAGNOSIS — Z98.84 BARIATRIC SURGERY STATUS: Primary | ICD-10-CM

## 2022-06-21 DIAGNOSIS — E78.5 HYPERLIPEMIA: ICD-10-CM

## 2022-07-08 ENCOUNTER — TELEPHONE (OUTPATIENT)
Dept: BARIATRICS | Facility: CLINIC | Age: 27
End: 2022-07-08

## 2022-07-15 ENCOUNTER — TELEPHONE (OUTPATIENT)
Dept: BARIATRICS | Facility: CLINIC | Age: 27
End: 2022-07-15

## 2022-07-15 NOTE — TELEPHONE ENCOUNTER
lmom for pt to return call so that we know if she is still interested in sx that is scheduled for 07/25/2022

## 2022-07-18 DIAGNOSIS — L70.0 ACNE VULGARIS: ICD-10-CM

## 2022-07-19 RX ORDER — ADAPALENE AND BENZOYL PEROXIDE .1; 2.5 G/100G; G/100G
1 GEL TOPICAL
Qty: 45 G | Refills: 0 | Status: SHIPPED | OUTPATIENT
Start: 2022-07-19 | End: 2022-09-06 | Stop reason: SDUPTHER

## 2022-07-21 ENCOUNTER — TELEPHONE (OUTPATIENT)
Dept: BARIATRICS | Facility: CLINIC | Age: 27
End: 2022-07-21

## 2022-07-21 NOTE — TELEPHONE ENCOUNTER
Called patient and left voicemail requesting call back  Following practice multidisc decision was made that patient must be seen in office

## 2022-07-22 ENCOUNTER — TELEPHONE (OUTPATIENT)
Dept: BARIATRICS | Facility: CLINIC | Age: 27
End: 2022-07-22

## 2022-07-22 NOTE — TELEPHONE ENCOUNTER
Called Patient at 11:32AM left voicemail for Patient to call me back at [facility] office to discuss scheduling with FRANK and RD

## 2022-09-06 DIAGNOSIS — L70.0 ACNE VULGARIS: ICD-10-CM

## 2022-09-07 RX ORDER — ADAPALENE AND BENZOYL PEROXIDE .1; 2.5 G/100G; G/100G
1 GEL TOPICAL
Qty: 45 G | Refills: 0 | Status: SHIPPED | OUTPATIENT
Start: 2022-09-07 | End: 2022-10-25 | Stop reason: SDUPTHER

## 2022-09-08 DIAGNOSIS — L70.0 ACNE VULGARIS: ICD-10-CM

## 2022-09-08 RX ORDER — ADAPALENE AND BENZOYL PEROXIDE .1; 2.5 G/100G; G/100G
GEL TOPICAL
Qty: 45 G | Refills: 0 | Status: SHIPPED | OUTPATIENT
Start: 2022-09-08 | End: 2022-10-25

## 2022-09-09 NOTE — PROGRESS NOTES
Bariatric Nutrition Assessment Note - Initial Visit for Re-Evaluation  Pt had completed process earlier this year though surgery was cancelled d/t + pregnancy test   Insurance: 6 required monthly weight checks  Type of surgery    Interested in Vertical sleeve gastrectomy  Surgery Date: TBD  Surgeon: Dr Simi Orozco on 11/4/2021    Nutrition Assessment   Tiffany Banegas  27 y o   female   Height: 5'7"  Re-Eval Weight: 306 2#   BMI: 48 4  Wt with BMI of 25: 158#  Pre-Op Excess Wt: 148 2#  BMI to Qualify at 40 = 253#  PMH includes: HLD; quit smoking  Pt advised not to gain weight during preop process  Pt encouraged to lose weight (5% /15#) via healthy eating and exercise  Pt may follow Liver Shrinking diet 2 weeks prior to DOS depending on BMI at time  This diet will promote weight loss  Last menstrual period 02/10/2022, not currently breastfeeding      Weight History  Reason for WLS: can't sustain weight loss  Onset of Obesity: Childhood but more since pregnancy  Family history of obesity: Yes  Wt Loss Attempts: Counseling with  MD  FAD Diets (Cabbage soup, Grapefruit, Cleanse, etc ) Detox Tea, Apple Cider Vinegar  Fasting  Meal Replacements (Medifast, Slim Fast, etc )  Self Created Diets (Portion Control, Healthy Food Choices, etc ) Exercise -   Maximum Wt Lost: 20#    Review of History and Medications   OTC: Vitamin C and  Green Leaf Vitamin  Past Medical History:   Diagnosis Date    Anxiety     COVID 01/2021    High cholesterol     Morbid obesity with BMI of 45 0-49 9, adult ([facility] [address] [facility]     Pre-operative laboratory examination     Seizures ([facility] ) 03/31/2015     Past Surgical History:   Procedure Laterality Date    UPPER GASTROINTESTINAL ENDOSCOPY       Social History     Socioeconomic History    Marital status: Single     Spouse name: Not on file    Number of children: Not on file    Years of education: Not on file    Highest education level: Not on file   Occupational History    Not on file   Tobacco Use    Smoking status: Former Smoker     Packs/day: 0 25     Years: 6 00     Pack years: 1 50     Quit date: 2021     Years since quittin 3    Smokeless tobacco: Never Used    Tobacco comment: stopped   Vaping Use    Vaping Use: Never used   Substance and Sexual Activity    Alcohol use: Yes     Comment: occasionally    Drug use: No    Sexual activity: Yes     Partners: Male   Other Topics Concern    Not on file   Social History Narrative    Significant other    Works for [employer] office    1 child    Hobbies-    Exercise-occ     Social Determinants of Health     Financial Resource Strain: Not on file   Food Insecurity: Not on file   Transportation Needs: Not on file   Physical Activity: Not on file   Stress: Not on file   Social Connections: Not on file   Intimate Partner Violence: Not on file   Housing Stability: Not on file       Current Outpatient Medications:     Adapalene-Benzoyl Peroxide 0 1-2 5 % gel, Apply 1 application topically daily at bedtime, Disp: 45 g, Rfl: 0    Adapalene-Benzoyl Peroxide 0 1-2 5 % gel, Apply daily, Disp: 45 g, Rfl: 0    famotidine (PEPCID) 20 mg tablet, Take 20 mg by mouth as needed for heartburn, Disp: , Rfl:     fluticasone (FLONASE) 50 mcg/act nasal spray, 1 spray into each nostril daily (Patient taking differently: 1 spray into each nostril as needed), Disp: 16 g, Rfl: 1  Food Intake and Lifestyle Assessment   Food Intake Assessment completed via usual diet recall - irregular eating pattern - tries to meal prep  Breakfast: Smoothie (Fruit)   Lunch: Cauliflower, Meatballs Chicken or Pizza  Dinner- Rice & Chicken or spaghetti  (large meal)  Snacks - not much - cut back on cheese  Beverage intake: water (gallon) and coffee ( 16-24 oz  [place] or DD- 2 cups Keurig)  Protein supplement: None  Portion Sizes: 6-8oz Pro   1+ cup  Starch  rare Vegetable  (cauliflower rice)  Estimated protein intake per day: at least 75-85 oz  Estimated fluid intake per day: Adequate 1 gallon water  Meals eaten away from home: now at work if orders out - decreased fast food past month   Typical meal pattern: 2-3  meals per day - "heavy" meal at night  Eating Behaviors: Consumption of high calorie/ high fat foods, Large portion sizes, Frequent snacking/ grazing, Binge eating(stopped), Mindless eating, Emotional eating and Craves salty foods  Food allergies or intolerances: No Known Allergies or intolerances  Cultural or Adventist considerations: No Pork    Physical Assessment  Physical Activity  Types of exercise: Goes to gym  occasionally - eliptical or treadmill and machines - abdominal  Current physical limitations: None    Psychosocial Assessment  Has 2 yo son  Support systems: relative- cousin who had surgery ([facility]) - Sister Chel who is also having WLS  Socioeconomic factors: None  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)  Nutrition Diagnosis  Diagnosis: Overweight / Obesity (NC-3 3)  Related to: Physical inactivity and Excessive energy intake  As Evidenced by: BMI >25     Nutrition Prescription: Recommend the following diet  Low fat, Low sugar, High protein and Regular    Interventions and Teaching   Discussed pre-op and post-op nutrition guidelines  Patient educated and handouts provided    Surgical changes to stomach / GI  Capacity of post-surgery stomach  Diet progression  Adequate hydration  Sugar and fat restriction to decrease "dumping syndrome"  Fat restriction to decrease steatorrhea  Expected weight loss  Weight loss plateaus/ possibility of weight regain  Exercise  Suggestions for pre-op diet  Nutrition considerations after surgery  Protein supplements  Meal planning and preparation  Appropriate carbohydrate, protein, and fat intake, and food/fluid choices to maximize safe weight loss, nutrient intake, and tolerance   Dietary and lifestyle changes  Possible problems with poor eating habits  Intuitive eating  Techniques for self monitoring and keeping daily food journal  Potential for food intolerance after surgery, and ways to deal with them including: lactose intolerance, nausea, reflux, vomiting, diarrhea, food intolerance, appetite changes, gas  Vitamin / Mineral supplementation of Multivitamin with minerals, Calcium, Vitamin B12, Iron and Vitamin D    Education provided to: patient    Barriers to learning: No barriers identified  Readiness to change: preparation    Prior research on procedure: internet, discussed with provider and friends or family    Comprehension: verbalizes understanding     Expected Compliance: Fair Pt states "stuck in my bad habits"  Recommendations  Pt is an appropriate candidate for surgery   Yes  Evaluation / Monitoring  Dietitian to Monitor: Eating pattern as discussed Tolerance of nutrition prescription Body weight Lab values Physical activity Bowel pattern  Goals  Food journal, Exercise 30 minutes 5 times per week, Complete lession plans 1-6, Eat 3 meals per day and Eliminate mindless snacking   Follow Pre-Surgery guidelines  >  Trial [employer] for food logging - Follows [employer]  - Was using [email or web address] - not consistent as not measuring  > Establish regular meal pattern of 3 meals - include fruits, vegetables and whole grains  > No skipping meals -   > Focus on protein - include lean protein at each meal and snack - Can use protein drink as meal replacement -  (needs to add protein to smoothies)  > Decrease portions - florina starch at meals  > Limit processed foods and continue to avoid fast foods and dining away from home  > Consistent with meal prepping  > Limit snacks - healthier choices and portion; avoid grazing  > Slow pace of eating and drinking - practice 30/60 minute rule  > Reduce caffeine/ eliminate before pre-op diet (2 cup or 20 oz presently)  > Continue to eliminate carbonation in diet  > Maintain water intake - 64 oz as goal ( drinks 128 oz presently)  > Increase physical activity/establish exercise regimen (goes to gym occ)  > Start multi vitamin and additional Vitamin D 2000IU  Work on skills to cope with emotional eating/mindfull eating and binge eating  Advised 5% weight loss (15#) by DOS (not required)  F/U next month with bariatric provider  Time Spent:   45 minutes

## 2022-09-12 ENCOUNTER — CLINICAL SUPPORT (OUTPATIENT)
Dept: BARIATRICS | Facility: CLINIC | Age: 27
End: 2022-09-12

## 2022-09-12 VITALS
RESPIRATION RATE: 16 BRPM | WEIGHT: 293 LBS | DIASTOLIC BLOOD PRESSURE: 84 MMHG | TEMPERATURE: 97.5 F | HEIGHT: 67 IN | SYSTOLIC BLOOD PRESSURE: 122 MMHG | BODY MASS INDEX: 45.99 KG/M2 | HEART RATE: 82 BPM

## 2022-09-12 DIAGNOSIS — Z01.818 PREOP TESTING: ICD-10-CM

## 2022-09-12 DIAGNOSIS — Z98.84 BARIATRIC SURGERY STATUS: Primary | ICD-10-CM

## 2022-09-12 DIAGNOSIS — E66.01 OBESITY, CLASS III, BMI 40-49.9 (MORBID OBESITY) (HCC): Primary | ICD-10-CM

## 2022-09-12 PROCEDURE — RECHECK
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
## 2022-09-23 ENCOUNTER — CONSULT (OUTPATIENT)
Dept: BARIATRICS | Facility: CLINIC | Age: 27
End: 2022-09-23
Payer: COMMERCIAL

## 2022-09-23 VITALS
WEIGHT: 293 LBS | RESPIRATION RATE: 16 BRPM | BODY MASS INDEX: 45.99 KG/M2 | HEART RATE: 60 BPM | HEIGHT: 67 IN | TEMPERATURE: 97.4 F | SYSTOLIC BLOOD PRESSURE: 130 MMHG | DIASTOLIC BLOOD PRESSURE: 80 MMHG

## 2022-09-23 DIAGNOSIS — E78.5 HYPERLIPIDEMIA, UNSPECIFIED HYPERLIPIDEMIA TYPE: ICD-10-CM

## 2022-09-23 DIAGNOSIS — E66.01 MORBID OBESITY (HCC): Primary | ICD-10-CM

## 2022-09-23 DIAGNOSIS — F32.A DEPRESSION, UNSPECIFIED DEPRESSION TYPE: ICD-10-CM

## 2022-09-23 DIAGNOSIS — K21.9 GERD (GASTROESOPHAGEAL REFLUX DISEASE): ICD-10-CM

## 2022-09-23 PROCEDURE — 99214 OFFICE O/P EST MOD 30 MIN: CPT | Performed by: SURGERY

## 2022-09-23 PROCEDURE — 99204 OFFICE O/P NEW MOD 45 MIN: CPT | Performed by: SURGERY

## 2022-09-23 NOTE — PROGRESS NOTES
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  Body mass index is 48 77 kg/m²  ++Suffers from HLD, class 3 obesity    Visit type: consultation     Symptoms: excess weight, weight increase, inability to loss weight and fatigue    Associated Symptoms: depressed mood    Associated Conditions: hyperlipidemia and abdominal obesity  Disease Complications: none  Weight Loss Interest: high  Previous Diet Trials: low carb    Exercise Frequency:infrequency  Types of Exercise: walking      Review of Systems   Constitutional: Negative  Respiratory: Negative  Cardiovascular: Negative  Gastrointestinal: Negative  Musculoskeletal: Negative  Neurological: Negative  All other systems reviewed and are negative        Historical Information   Past Medical History:   Diagnosis Date    Anxiety     COVID 2021    High cholesterol     Morbid obesity with BMI of 45 0-49 9, adult ([facility] )     Pre-operative laboratory examination     Seizures ([facility] ) 2015     Past Surgical History:   Procedure Laterality Date    UPPER GASTROINTESTINAL ENDOSCOPY       Social History   Social History     Substance and Sexual Activity   Alcohol Use Yes    Comment: occasionally     Social History     Substance and Sexual Activity   Drug Use No     Social History     Tobacco Use   Smoking Status Former Smoker    Packs/day: 0 25    Years: 6 00    Pack years: 1 50    Quit date: 2021    Years since quittin 4   Smokeless Tobacco Never Used   Tobacco Comment    stopped     Family History:   Family History   Problem Relation Age of Onset    Coronary artery disease Mother    Ronn Brooks Diabetes Sister     Coronary artery disease Maternal Grandmother     Hyperlipidemia Maternal Grandmother     Hyperlipidemia Maternal Grandfather     Diabetes Maternal Grandfather     Alcohol abuse Neg Hx     Substance Abuse Neg Hx     Mental illness Neg Hx        Meds/Allergies   all medications and allergies reviewed  No Known Allergies    Objective       Current Vitals:   /80 (BP Location: Left arm, Patient Position: Sitting, Cuff Size: Large)   Pulse 60   Temp (!) 97 4 °F (36 3 °C) (Tympanic)   Resp 16   Ht 5' 6 69" (1 694 m)   Wt (!) 140 kg (308 lb 8 oz)   LMP 02/10/2022   BMI 48 77 kg/m²       Physical Exam  Vitals reviewed  Constitutional:       Appearance: She is well-developed  HENT:      Head: Normocephalic  Eyes:      Extraocular Movements: Extraocular movements intact  Cardiovascular:      Rate and Rhythm: Normal rate  Pulmonary:      Effort: Pulmonary effort is normal    Abdominal:      General: There is no distension  Musculoskeletal:         General: Normal range of motion  Cervical back: Normal range of motion  Neurological:      Mental Status: She is alert and oriented to person, place, and time  Psychiatric:         Mood and Affect: Mood normal          Behavior: Behavior normal          Thought Content: Thought content normal          Judgment: Judgment normal          Lab Results: I have personally reviewed pertinent lab results  Imaging: I have personally reviewed pertinent reports  EKG, Pathology, and Other Studies: I have personally reviewed pertinent reports  Assessment/PLAN:    27 y o  yo female with a long standing h/o of obesity and inability to sustain any meaningful weight loss on her own despite several attempts  She is interested in the Laparoscopic sleeve gastrectomy      I have discussed with the patient that a percentage of patient's may experience new or worsened GERD and 18% risk of Mehta's esophagitis requiring surveillance EGDs after a sleeve gastrectomy  The patient understands this fully and accepts the risks to undergo a sleeve gastrectomy  ++Suffers from HLD, class 3 obesity    I have explained our Enhanced Recovery After Bariatric Surgery (ERABS) protocol and benefits including preoperative, intraoperative and postoperative elements  As a part of his pre op process, she will undergo additional appointments with out dietician, licensed care , and   After the evaluation, she may be referred to a cardiologist and for a sleep evaluation and consult  She does not need another EGD  I have spent over 45 minutes with her face to face in the office today discussing her options and details of the surgery  We have seen an animation of the surgery on the computer that illustrates how the operation is done and how the anatomy will be altered with the procedure  Over 50% of this was coordinating care  She was given the opportunity to ask questions and I have answered all of them  I have discussed and educated the patient with regards to the components of our multidisciplinary program and the importance of compliance and follow up in the post operative period  The patient was also instructed with regards to the importance of behavior modification, nutritional counseling, support meeting attendance and lifestyle changes that are important to ensure success  Although there is a great statistical chance of improvement or even resolution of most of her associated comorbidities, the results vary from patient to patient and they largely depend on her commitment and compliance         Bret Lucia MD, FACS, [facility]  9/23/2022  3:27 PM

## 2022-09-23 NOTE — LETTER
September 23, 2022     Kaitlin Walters, [address] [facility] [id number] [place] [address]  [address]    Patient: Ike Conway   YOB: 1995   Date of Visit: 9/23/2022       Dear Felicity Fam: Thank you for referring Ike Conway to me for evaluation for bariatric surgery  Below are my notes for this consultation  If you have questions, please do not hesitate to call me on my cell phone at [phone] or via [facility] Text  I look forward to following your patient along with you  Sincerely,    Karla Pedraza MD, Jamey Davis, 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  Body mass index is 48 77 kg/m²  ++Suffers from HLD, class 3 obesity    Visit type: consultation     Symptoms: excess weight, weight increase, inability to loss weight and fatigue    Associated Symptoms: depressed mood    Associated Conditions: hyperlipidemia and abdominal obesity  Disease Complications: none  Weight Loss Interest: high  Previous Diet Trials: low carb    Exercise Frequency:infrequency  Types of Exercise: walking      Review of Systems   Constitutional: Negative  Respiratory: Negative  Cardiovascular: Negative  Gastrointestinal: Negative  Musculoskeletal: Negative  Neurological: Negative  All other systems reviewed and are negative        Historical Information   Past Medical History:   Diagnosis Date    Anxiety     COVID 01/2021    High cholesterol     Morbid obesity with BMI of 45 0-49 9, adult ([facility] )     Pre-operative laboratory examination     Seizures ([facility] ) 2015     Past Surgical History:   Procedure Laterality Date    UPPER GASTROINTESTINAL ENDOSCOPY       Social History   Social History     Substance and Sexual Activity   Alcohol Use Yes    Comment: occasionally     Social History     Substance and Sexual Activity   Drug Use No     Social History     Tobacco Use   Smoking Status Former Smoker    Packs/day: 0 25    Years: 6 00    Pack years: 1 50    Quit date: 2021    Years since quittin 4   Smokeless Tobacco Never Used   Tobacco Comment    stopped     Family History:   Family History   Problem Relation Age of Onset    Coronary artery disease Mother     Diabetes Sister     Coronary artery disease Maternal Grandmother     Hyperlipidemia Maternal Grandmother     Hyperlipidemia Maternal Grandfather     Diabetes Maternal Grandfather     Alcohol abuse Neg Hx     Substance Abuse Neg Hx     Mental illness Neg Hx        Meds/Allergies   all medications and allergies reviewed  No Known Allergies    Objective       Current Vitals:   /80 (BP Location: Left arm, Patient Position: Sitting, Cuff Size: Large)   Pulse 60   Temp (!) 97 4 °F (36 3 °C) (Tympanic)   Resp 16   Ht 5' 6 69" (1 694 m)   Wt (!) 140 kg (308 lb 8 oz)   LMP 02/10/2022   BMI 48 77 kg/m²       Physical Exam  Vitals reviewed  Constitutional:       Appearance: She is well-developed  HENT:      Head: Normocephalic  Eyes:      Extraocular Movements: Extraocular movements intact  Cardiovascular:      Rate and Rhythm: Normal rate  Pulmonary:      Effort: Pulmonary effort is normal    Abdominal:      General: There is no distension  Musculoskeletal:         General: Normal range of motion  Cervical back: Normal range of motion  Neurological:      Mental Status: She is alert and oriented to person, place, and time     Psychiatric:         Mood and Affect: Mood normal          Behavior: Behavior normal          Thought Content: Thought content normal          Judgment: Judgment normal          Lab Results: I have personally reviewed pertinent lab results  Imaging: I have personally reviewed pertinent reports  EKG, Pathology, and Other Studies: I have personally reviewed pertinent reports  Assessment/PLAN:    27 y o  yo female with a long standing h/o of obesity and inability to sustain any meaningful weight loss on her own despite several attempts  She is interested in the Laparoscopic sleeve gastrectomy  I have discussed with the patient that a percentage of patient's may experience new or worsened GERD and 18% risk of Mehta's esophagitis requiring surveillance EGDs after a sleeve gastrectomy  The patient understands this fully and accepts the risks to undergo a sleeve gastrectomy  ++Suffers from HLD, class 3 obesity    I have explained our Enhanced Recovery After Bariatric Surgery (ERABS) protocol and benefits including preoperative, intraoperative and postoperative elements  As a part of his pre op process, she will undergo additional appointments with out dietician, licensed care , and   After the evaluation, she may be referred to a cardiologist and for a sleep evaluation and consult  She does not need another EGD  I have spent over 45 minutes with her face to face in the office today discussing her options and details of the surgery  We have seen an animation of the surgery on the computer that illustrates how the operation is done and how the anatomy will be altered with the procedure  Over 50% of this was coordinating care  She was given the opportunity to ask questions and I have answered all of them    I have discussed and educated the patient with regards to the components of our multidisciplinary program and the importance of compliance and follow up in the post operative period  The patient was also instructed with regards to the importance of behavior modification, nutritional counseling, support meeting attendance and lifestyle changes that are important to ensure success  Although there is a great statistical chance of improvement or even resolution of most of her associated comorbidities, the results vary from patient to patient and they largely depend on her commitment and compliance         Jaguar Bailey MD, FACS, [facility]  9/23/2022  3:27 PM

## 2022-10-04 NOTE — PROGRESS NOTES
Bariatric Nutrition Assessment Note  -   Pt had completed process earlier this year though surgery was cancelled d/t + pregnancy test   Insurance: 6 required monthly weight checks  2/6 today  Type of surgery    Interested in Vertical sleeve gastrectomy  Surgery Date: TBD  Surgeon: Dr Brittany Moreno on 11/4/2021 and reconsulted on 9/23/2022    Nutrition Assessment   Esthela Wihte  27 y o   female   Height: 5'7"  Weight: 307#   Re-Eval Weight: 306 2#   BMI: 48 4  Wt with BMI of 25: 158#  Pre-Op Excess Wt: 148 2#  BMI to Qualify at 40 = 253#  PMH includes: HLD; quit smoking  Pt advised not to gain weight during preop process  Pt encouraged to lose weight (5% /15#) via healthy eating and exercise  Pt may follow Liver Shrinking diet 2 weeks prior to DOS depending on BMI at time  This diet will promote weight loss  Weight (!) 139 kg (307 lb), last menstrual period 02/10/2022, not currently breastfeeding      Weight History  Reason for WLS: can't sustain weight loss  Onset of Obesity: Childhood but more since pregnancy  Family history of obesity: Yes  Wt Loss Attempts: Counseling with  MD  FAD Diets (Cabbage soup, Grapefruit, Cleanse, etc ) Detox Tea, Apple Cider Vinegar  Fasting  Meal Replacements (Medifast, Slim Fast, etc )  Self Created Diets (Portion Control, Healthy Food Choices, etc ) Exercise -   Maximum Wt Lost: 20#    Review of History and Medications   OTC: Vitamin C and  Green Leaf Vitamin  Past Medical History:   Diagnosis Date    Anxiety     COVID 01/2021    High cholesterol     Morbid obesity with BMI of 45 0-49 9, adult ([facility] )     Pre-operative laboratory examination     [facility]ures ([facility] ) 03/31/2015     Past Surgical History:   Procedure Laterality Date    UPPER GASTROINTESTINAL ENDOSCOPY       Social History     Socioeconomic History    Marital status: Single     Spouse name: Not on file    Number of children: Not on file    Years of education: Not on file    Highest education level: Not on file   Occupational History    Not on file   Tobacco Use    Smoking status: Former Smoker     Packs/day: 0 25     Years: 6 00     Pack years: 1 50     Quit date: 2021     Years since quittin 4    Smokeless tobacco: Never Used    Tobacco comment: stopped   Vaping Use    Vaping Use: Never used   Substance and Sexual Activity    Alcohol use: Yes     Comment: occasionally    Drug use: No    Sexual activity: Yes     Partners: Male   Other Topics Concern    Not on file   Social History Narrative    Significant other    Works for [employer] office    1 child    Hobbies-    Exercise-occ     Social Determinants of Health     Financial Resource Strain: Not on file   Food Insecurity: Not on file   Transportation Needs: Not on file   Physical Activity: Not on file   Stress: Not on file   Social Connections: Not on file   Intimate Partner Violence: Not on file   Housing Stability: Not on file       Current Outpatient Medications:     Adapalene-Benzoyl Peroxide 0 1-2 5 % gel, Apply 1 application topically daily at bedtime, Disp: 45 g, Rfl: 0    Adapalene-Benzoyl Peroxide 0 1-2 5 % gel, Apply daily (Patient not taking: No sig reported), Disp: 45 g, Rfl: 0    famotidine (PEPCID) 20 mg tablet, Take 20 mg by mouth as needed for heartburn, Disp: , Rfl:     fluticasone (FLONASE) 50 mcg/act nasal spray, 1 spray into each nostril daily (Patient taking differently: 1 spray into each nostril as needed), Disp: 16 g, Rfl: 1  Food Intake and Lifestyle Assessment   Food Intake Assessment completed via usual diet recall - irregular eating pattern - tries to meal prep  Breakfast: Smoothie (Fruit)   Lunch: Cauliflower, Meatballs Chicken or Pizza  Dinner- Rice & Chicken or spaghetti  (large meal)  Snacks - not much - cut back on cheese  Beverage intake: water (gallon) and coffee ( 16-24 oz  [place] or DD- 2 cups Keurig)  Protein supplement: None  Portion Sizes: 6-8oz Pro   1+ cup  Starch rare Vegetable  (cauliflower rice)  Estimated protein intake per day: at least 75-85 oz  Estimated fluid intake per day: Adequate 1 gallon water  Meals eaten away from home: now at work if orders out - decreased fast food past month   Typical meal pattern: 2-3  meals per day - "heavy" meal at night  Eating Behaviors: Consumption of high calorie/ high fat foods, Large portion sizes, Frequent snacking/ grazing, Binge eating(stopped), Mindless eating, Emotional eating and Craves salty foods  Food allergies or intolerances: No Known Allergies or intolerances  Cultural or Sikh considerations: No Pork    Physical Assessment  Physical Activity  Types of exercise: Goes to gym  occasionally - eliptical or treadmill and machines - abdominal  Current physical limitations: None    Psychosocial Assessment  Has 2 yo son  Support systems: relative- cousin who had surgery ([facility]NY) - Sister Chel who is also having WLS  Socioeconomic factors: None  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)  Nutrition Diagnosis  Diagnosis: Overweight / Obesity (NC-3 3)  Related to: Physical inactivity and Excessive energy intake  As Evidenced by: BMI >25     Nutrition Prescription: Recommend the following diet  Low fat, Low sugar, High protein and Regular    Interventions and Teaching   Discussed pre-op and post-op nutrition guidelines  Patient educated and handouts provided    Surgical changes to stomach / GI  Capacity of post-surgery stomach  Diet progression  Adequate hydration  Sugar and fat restriction to decrease "dumping syndrome"  Fat restriction to decrease steatorrhea  Expected weight loss  Weight loss plateaus/ possibility of weight regain  Exercise  Suggestions for pre-op diet  Nutrition considerations after surgery  Protein supplements  Meal planning and preparation  Appropriate carbohydrate, protein, and fat intake, and food/fluid choices to maximize safe weight loss, nutrient intake, and tolerance   Dietary and lifestyle changes  Possible problems with poor eating habits  Intuitive eating  Techniques for self monitoring and keeping daily food journal  Potential for food intolerance after surgery, and ways to deal with them including: lactose intolerance, nausea, reflux, vomiting, diarrhea, food intolerance, appetite changes, gas  Vitamin / Mineral supplementation of Multivitamin with minerals, Calcium, Vitamin B12, Iron and Vitamin D    Education provided to: patient    Barriers to learning: No barriers identified  Readiness to change: preparation    Prior research on procedure: internet, discussed with provider and friends or family    Comprehension: verbalizes understanding     Expected Compliance: Fair Pt states "stuck in my bad habits"  Recommendations  Pt is an appropriate candidate for surgery  Yes  Evaluation / Monitoring  Dietitian to Monitor: Eating pattern as discussed Tolerance of nutrition prescription Body weight Lab values Physical activity Bowel pattern  2/6 Weight Check Visit Summary  Re-started process  Doing better with making changes  Admitted she got depressed after surgery being cancelled and made poor food choices  Now going gym more- 2-3 X week - mainly treadmill and some weight lifting  Reports better with food choices but still going to DD for Iced Coffee & egg SW but started using Equate Protein Drink as meal replacement for lunch and making dinner usually rice beans and chicken vs Fast food consumption    Rarely snacks as identifies her issue is meal size and choice so working ti improve  Fluids good - drinks adequate amounts of water  Not consistent with practicing guidelines but familiar and plans to incorporate more as process progresses  Questions answered during discussion  Advised ways to modify breakfast choice at DD and try plain coffee with protein shake over ice as alternative to high sugar/fat in DD iced beverage  Pt receptive   Workflow reviewed  Workflow:   Psych and/or D+A Clearance: N/A   PCP Letter: Done   Support Group: Needs to complete   Surgeon Appt  Completed   EGD Completed    Cardiac Risk Assessment  To schedule   Sleep Studies N/A   Blood work To complete   Nicotine test Non smoker   6 Month Pre-Operative Program: 2/6 today   Weight Loss  Not required - advised not to gain      Goals  Food journal, Exercise 30 minutes 5 times per week, Complete lession plans 1-6, Eat 3 meals per day and Eliminate mindless snacking   Follow Pre-Surgery guidelines  >  Trial Baritastic for food logging - Follows Facebook Page  - Was using PossePal - not consistent as not measuring  > Establish regular meal pattern of 3 meals - include fruits, vegetables and whole grains  > No skipping meals -   > Focus on protein - include lean protein at each meal and snack - Can use protein drink as meal replacement -  Started Using Equate vs fruit smoothies  > Decrease portions - florina starch at meals  > Limit processed foods and continue to avoid fast foods and dining away from home  > Consistent with meal prepping  > Continue to limit snacks - healthier choices and portion; avoid grazing  > Slow pace of eating and drinking - practice 30/60 minute rule  > Reduce caffeine/ eliminate before pre-op diet (2 cup or 20 oz presently)  > Continue to eliminate carbonation in diet  > Maintain water intake - 64 oz as goal ( drinks 128 oz presently)  > Increase physical activity/establish exercise regimen (goes to gym occ)  > Start multi vitamin and additional Vitamin D 2000IU  Work on skills to cope with emotional eating/mindfull eating and binge eating  Advised 5% weight loss (15#) by DOS (not required)  F/U next month with bariatric provider - FRANK for 3/6 visit  Time Spent:   45 minutes

## 2022-10-05 ENCOUNTER — OFFICE VISIT (OUTPATIENT)
Dept: BARIATRICS | Facility: CLINIC | Age: 27
End: 2022-10-05

## 2022-10-05 VITALS — BODY MASS INDEX: 48.53 KG/M2 | WEIGHT: 293 LBS

## 2022-10-05 DIAGNOSIS — E66.01 OBESITY, CLASS III, BMI 40-49.9 (MORBID OBESITY) (HCC): Primary | ICD-10-CM

## 2022-10-05 PROCEDURE — RECHECK

## 2022-10-10 ENCOUNTER — TELEPHONE (OUTPATIENT)
Dept: FAMILY MEDICINE CLINIC | Facility: CLINIC | Age: 27
End: 2022-10-10

## 2022-10-25 ENCOUNTER — TELEPHONE (OUTPATIENT)
Dept: ADMINISTRATIVE | Facility: OTHER | Age: 27
End: 2022-10-25

## 2022-10-25 ENCOUNTER — OFFICE VISIT (OUTPATIENT)
Dept: FAMILY MEDICINE CLINIC | Facility: CLINIC | Age: 27
End: 2022-10-25

## 2022-10-25 VITALS
BODY MASS INDEX: 45.99 KG/M2 | RESPIRATION RATE: 16 BRPM | HEIGHT: 67 IN | SYSTOLIC BLOOD PRESSURE: 120 MMHG | WEIGHT: 293 LBS | DIASTOLIC BLOOD PRESSURE: 80 MMHG | TEMPERATURE: 97 F

## 2022-10-25 DIAGNOSIS — N64.4 BREAST PAIN, LEFT: Primary | ICD-10-CM

## 2022-10-25 DIAGNOSIS — L83 ACANTHOSIS NIGRICANS: ICD-10-CM

## 2022-10-25 DIAGNOSIS — R21 RASH: ICD-10-CM

## 2022-10-25 DIAGNOSIS — L70.0 ACNE VULGARIS: ICD-10-CM

## 2022-10-25 DIAGNOSIS — G89.29 CHRONIC MIDLINE THORACIC BACK PAIN: ICD-10-CM

## 2022-10-25 DIAGNOSIS — N62 LARGE BREASTS: ICD-10-CM

## 2022-10-25 DIAGNOSIS — M54.6 CHRONIC MIDLINE THORACIC BACK PAIN: ICD-10-CM

## 2022-10-25 DIAGNOSIS — Z00.00 HEALTHCARE MAINTENANCE: ICD-10-CM

## 2022-10-25 RX ORDER — ADAPALENE AND BENZOYL PEROXIDE .1; 2.5 G/100G; G/100G
1 GEL TOPICAL
Qty: 45 G | Refills: 0 | Status: SHIPPED | OUTPATIENT
Start: 2022-10-25

## 2022-10-25 NOTE — PROGRESS NOTES
Assessment/Plan:         Problem List Items Addressed This Visit        Musculoskeletal and Integument    Acne    Relevant Medications    Adapalene-Benzoyl Peroxide 0 1-2 5 % gel    Rash     Under her bilateral breasts, has used over-the-counter lotions and creams without any success  Relevant Medications    Adapalene-Benzoyl Peroxide 0 1-2 5 % gel    Other Relevant Orders    Ambulatory Referral to Plastic Surgery    Acanthosis nigricans     Most likely due to PCOS  Also reports acne and facial hair  Blood work ordered  Relevant Orders    Insulin, fasting    DHEA-sulfate    Testosterone, free, total    TSH, 3rd generation with Free T4 reflex       Other    Large breasts     Has had physical therapy in the past, and does use over-the-counter pain medications on daily basis  Continues to experience back pain and rash under the breast           Relevant Orders    Ambulatory Referral to Plastic Surgery    Chronic midline thoracic back pain     Attended physical therapy in the past, but the pain persists  Relevant Orders    Ambulatory Referral to Plastic Surgery    Breast pain, left - Primary     Limit caffeine intake, increase water, proper fitted bra, continue to use Motrin, consider starting Primrose oil supplement  Obtain blood and schedule for ultrasound  Relevant Orders    US breast left limited (diagnostic)      Other Visit Diagnoses     Healthcare maintenance        Relevant Orders    Hemoglobin A1C    CBC and differential    Comprehensive metabolic panel    Lipid panel    Vitamin D 25 hydroxy    TSH, 3rd generation with Free T4 reflex            Subjective:      Patient ID: Tam March is a 27 y o  female  Patient presents to office complaining on on and off left-sided breast pain and armpit pain  She has has had this for few years now, but the sharp pain is very bothersome  She does large breast that cause her chronic back pain and rash    Currently she is seeing bariatric surgeon for possible sleeve surgery  The following portions of the patient's history were reviewed and updated as appropriate:   Past Medical History:  She has a past medical history of Anxiety, COVID (01/2021), High cholesterol, Morbid obesity with BMI of 45 0-49 9, adult [facility]), Pre-operative laboratory examination, and Seizures ([address] ) (03/31/2015)  ,  _______________________________________________________________________  Medical Problems:  does not have any pertinent problems on file ,  _______________________________________________________________________  Past Surgical History:   has a past surgical history that includes Upper gastrointestinal endoscopy  ,  _______________________________________________________________________  Family History:  family history includes Coronary artery disease in her maternal grandmother and mother; Diabetes in her maternal grandfather and sister; Hyperlipidemia in her maternal grandfather and maternal grandmother ,  _______________________________________________________________________  Social History:   reports that she quit smoking about 17 months ago  She has a 1 50 pack-year smoking history  She has never used smokeless tobacco  She reports current alcohol use  She reports that she does not use drugs  ,  _______________________________________________________________________  Allergies:  has No Known Allergies     _______________________________________________________________________  Current Outpatient Medications   Medication Sig Dispense Refill   • Adapalene-Benzoyl Peroxide 0 1-2 5 % gel Apply 1 application topically daily at bedtime 45 g 0     No current facility-administered medications for this visit      _______________________________________________________________________  Review of Systems   Constitutional: Negative for chills and fever  Respiratory: Negative for cough and shortness of breath      Cardiovascular: Negative for chest pain and palpitations  Gastrointestinal: Negative for abdominal pain and vomiting  Genitourinary: Negative for menstrual problem  Musculoskeletal: Positive for arthralgias and back pain  Skin: Positive for rash  Negative for color change  All other systems reviewed and are negative  Objective:  Vitals:    10/25/22 0707   BP: 120/80   Resp: 16   Temp: (!) 97 °F (36 1 °C)   Weight: (!) 140 kg (309 lb)   Height: 5' 6 69" (1 694 m)     Body mass index is 48 85 kg/m²  Physical Exam  Vitals and nursing note reviewed  Constitutional:       General: She is not in acute distress  Appearance: Normal appearance  She is obese  She is not ill-appearing  Pulmonary:      Effort: Pulmonary effort is normal    Musculoskeletal:         General: Tenderness ( left breast and axilla) present  Skin:     General: Skin is warm and dry  Findings: Rash (Under bilateral breasts) present  Neurological:      Mental Status: She is alert and oriented to person, place, and time  Psychiatric:         Mood and Affect: Mood normal          Behavior: Behavior normal          Thought Content:  Thought content normal          Judgment: Judgment normal

## 2022-10-25 NOTE — ASSESSMENT & PLAN NOTE
Has had physical therapy in the past, and does use over-the-counter pain medications on daily basis    Continues to experience back pain and rash under the breast

## 2022-10-25 NOTE — ASSESSMENT & PLAN NOTE
Limit caffeine intake, increase water, proper fitted bra, continue to use Motrin, consider starting Primrose oil supplement  Obtain blood and schedule for ultrasound

## 2022-10-25 NOTE — TELEPHONE ENCOUNTER
Upon review of the In Basket request we were able to locate, review, and update the patient chart as requested for Hepatitis C , HIV and Pap Smear (HPV) aka Cervical Cancer Screening  Any additional questions or concerns should be emailed to the Practice Liaisons via the appropriate education email address, please do not reply via In Basket      Thank you  Mike Lux

## 2022-10-25 NOTE — TELEPHONE ENCOUNTER
----- Message from Graham Quintana sent at 10/24/2022  1:23 PM EDT -----  Regarding: HM on Pap  10/24/22 1:23 PM    Hello, our patient Stevie Goltz has had Pap Smear (HPV) aka Cervical Cancer Screening completed/performed  Please assist in updating the patient chart by pulling the document from lab Tab within Chart Review  The date of service is 08/27/2021       Thank you,  Noé Pro

## 2022-10-28 ENCOUNTER — TELEPHONE (OUTPATIENT)
Dept: PLASTIC SURGERY | Facility: CLINIC | Age: 27
End: 2022-10-28

## 2022-10-28 NOTE — TELEPHONE ENCOUNTER
Lm for patient to call and discuss criteria for breast reduction appointment  Currently one note, needs 2 more months of documentation and 3 months of pt/chiro

## 2022-10-31 ENCOUNTER — OFFICE VISIT (OUTPATIENT)
Dept: DERMATOLOGY | Facility: CLINIC | Age: 27
End: 2022-10-31

## 2022-10-31 VITALS — HEIGHT: 66 IN | BODY MASS INDEX: 47.09 KG/M2 | WEIGHT: 293 LBS

## 2022-10-31 DIAGNOSIS — L83 ACANTHOSIS NIGRICANS: ICD-10-CM

## 2022-10-31 DIAGNOSIS — L70.0 ACNE VULGARIS: Primary | ICD-10-CM

## 2022-10-31 DIAGNOSIS — Z13.89 SCREENING FOR SKIN CONDITION: ICD-10-CM

## 2022-10-31 DIAGNOSIS — L91.0 KELOID OF SKIN: ICD-10-CM

## 2022-10-31 RX ORDER — AMMONIUM LACTATE 12 G/100G
CREAM TOPICAL AS NEEDED
Qty: 385 G | Refills: 2 | Status: SHIPPED | OUTPATIENT
Start: 2022-10-31

## 2022-10-31 RX ORDER — TRIAMCINOLONE ACETONIDE 40 MG/ML
20 INJECTION, SUSPENSION INTRA-ARTICULAR; INTRAMUSCULAR ONCE
Status: COMPLETED | OUTPATIENT
Start: 2022-10-31 | End: 2022-10-31

## 2022-10-31 RX ADMIN — TRIAMCINOLONE ACETONIDE 20 MG: 40 INJECTION, SUSPENSION INTRA-ARTICULAR; INTRAMUSCULAR at 08:57

## 2022-10-31 NOTE — PROGRESS NOTES
[facility]  [address] [place] [address]  [facility] [address]  [phone]  [phone]     MRN: [id number] : 1995  Encounter: [id number]  Patient Information: Marcell Morley  Chief complaint: yearly check    History of present illness:  62-year-old female with history of acne and keloids presents for concerns regarding to hyperpigmentation in the left arm  also in the thighs also concerned regarding a scar on her chest   Patient has been using Epiduo on a daily basis with good results with her acne and happy with the way things are going  Past Medical History:   Diagnosis Date   • Anxiety    • COVID 2021   • High cholesterol    • Morbid obesity with BMI of 45 0-49 9, adult ([facility] )    • Pre-operative laboratory examination    • Seizures ([facility] ) 2015     Past Surgical History:   Procedure Laterality Date   • UPPER GASTROINTESTINAL ENDOSCOPY       Social History   Social History     Substance and Sexual Activity   Alcohol Use Yes    Comment: occasionally     Social History     Substance and Sexual Activity   Drug Use No     Social History     Tobacco Use   Smoking Status Former Smoker   • Packs/day: 0 25   • Years: 6 00   • Pack years: 1 50   • Quit date: 2021   • Years since quittin 5   Smokeless Tobacco Never Used   Tobacco Comment    stopped     Family History   Problem Relation Age of Onset   • Coronary artery disease Mother    • Diabetes Sister    • Coronary artery disease Maternal Grandmother    • Hyperlipidemia Maternal Grandmother    • Hyperlipidemia Maternal Grandfather    • Diabetes Maternal Grandfather    • Alcohol abuse Neg Hx    • Substance Abuse Neg Hx    • Mental illness Neg Hx      Meds/Allergies   No Known Allergies    Meds:  Prior to Admission medications    Medication Sig Start Date End Date Taking?  Authorizing Provider   Adapalene-Benzoyl Peroxide 0 1-2 5 % gel Apply 1 application topically daily at bedtime 10/25/22  Yes Melita Mary MIREYA Koroma       Subjective:     Review of Systems:    General: negative for - chills, fatigue, fever,  weight gain or weight loss  Psychological: negative for - anxiety, behavioral disorder, concentration difficulties, decreased libido, depression, irritability, memory difficulties, mood swings, sleep disturbances or suicidal ideation  ENT: negative for - hearing difficulties , nasal congestion, nasal discharge, oral lesions, sinus pain, sneezing, sore throat  Allergy and Immunology: negative for - hives, insect bite sensitivity,  Hematological and Lymphatic: negative for - bleeding problems, blood clots,bruising, swollen lymph nodes  Endocrine: negative for - hair pattern changes, hot flashes, malaise/lethargy, mood swings, palpitations, polydipsia/polyuria, skin changes, temperature intolerance or unexpected weight change  Respiratory: negative for - cough, hemoptysis, orthopnea, shortness of breath, or wheezing  Cardiovascular: negative for - chest pain, dyspnea on exertion, edema,  Gastrointestinal: negative for - abdominal pain, nausea/vomiting  Genito-Urinary: negative for - dysuria, incontinence, irregular/heavy menses or urinary frequency/urgency  Musculoskeletal: negative for - gait disturbance, joint pain, joint stiffness, joint swelling, muscle pain, muscular weakness  Dermatological:  As in HPI  Neurological: negative for confusion, dizziness, headaches, impaired coordination/balance, memory loss, numbness/tingling, seizures, speech problems, tremors or weakness       Objective:   Ht 5' 6" (1 676 m)   Wt (!) 138 kg (305 lb)   LMP 02/10/2022   BMI 49 23 kg/m²     Physical Exam:    General Appearance:    Alert, cooperative, no distress   Head:    Normocephalic, without obvious abnormality, atraumatic           Skin:   A full skin exam was performed including scalp, head scalp, eyes, ears, nose, lips, neck, chest, axilla, abdomen, back, buttocks, bilateral upper extremities, bilateral lower extremities, hands, feet, fingers, toes, fingernails, and toenails * hypertrophic scar noted on the mid chest also numerous lesions on the back occasional comedones and papules on the face much improved hyperkeratotic areas with will be the appearance noted on the left axilla also hyperpigmentation on thighs  Intralesional Injection Procedure Note  Diagnosis:  keloid  Location:  Mid chest and mid back  Informed consent:  Discussed risks (infection, pain, bleeding, bruising, thinning of the skin, loss of skin pigment, lack of resolution, and recurrence of lesion) and benefits of the procedure, as well as the alternatives   Informed consent was obtained  Preparation: The area was prepared a standard fashion  Anesthesia: not required  Procedure Details: An intralesional injection was performed with                              0 5 cc of Kenalog 40 in total were injected  Total number of lesions injected:  2       Assessment:     1  Acne vulgaris     2  Keloid of skin     3  Acanthosis nigricans     4  Screening for skin condition           Plan:   Acne under good control continue same therapy  Keloid went ahead and injected the 2 lesions on the chest and back hopefully this will resolve  Acanthosis nigricans explained to patient that this is a results of insulin resistance and related to her weight will go ahead treat with Lac-Hydrin to see if this will  Screening for Dermatologic Disorders: Nothing else of concern noted on complete exam follow up in 1 year       Cricket Ortiz  10/31/2022,8:52 AM    Portions of the record may have been created with voice recognition software   Occasional wrong word or "sound a like" substitutions may have occurred due to the inherent limitations of voice recognition software   Read the chart carefully and recognize, using context, where substitutions have occurred

## 2022-10-31 NOTE — PROGRESS NOTES
[facility] Note     Patient Name: Harpal Silver  Encounter Date: October 31, 2022     Have you been cared for by a [facility] [address] Dermatologist in the last 3 years and, if so, which description applies to you? Yes  I have been here within the last 3 years, and my medical history has NOT changed since that time  I am FEMALE/of child-bearing potential     REVIEW OF SYSTEMS:  Have you recently had or currently have any of the following? · No changes in my recent health  PAST MEDICAL HISTORY:  Have you personally ever had or currently have any of the following? If "YES," then please provide more detail  · No changes in my medical history  FAMILY HISTORY:  Any "first degree relatives" (parent, brother, sister, or child) with the following? • No changes in my family's known health  PATIENT EXPERIENCE:    • Do you want the Dermatologist to perform a COMPLETE skin exam today including a clinical examination under the "bra and underwear" areas? NO  • If necessary, do we have your permission to call and leave a detailed message on your Preferred Phone number that includes your specific medical information?   Yes      No Known Allergies   Current Outpatient Medications:   •  Adapalene-Benzoyl Peroxide 0 1-2 5 % gel, Apply 1 application topically daily at bedtime, Disp: 45 g, Rfl: 0          • Whom besides the patient is providing clinical information about today's encounter?   o NO ADDITIONAL HISTORIAN (patient alone provided history)    Physical Exam and Assessment/Plan by Diagnosis:

## 2022-10-31 NOTE — PATIENT INSTRUCTIONS
Acne under good control continue same therapy  Keloid went ahead and injected the 2 lesions on the chest and back hopefully this will resolve  Acanthosis nigricans explained to patient that this is a results of insulin resistance and related to her weight will go ahead treat with Lac-Hydrin to see if this will  Screening for Dermatologic Disorders: Nothing else of concern noted on complete exam follow up in 1 year

## 2022-11-08 ENCOUNTER — OFFICE VISIT (OUTPATIENT)
Dept: BARIATRICS | Facility: CLINIC | Age: 27
End: 2022-11-08

## 2022-11-08 VITALS — WEIGHT: 293 LBS | BODY MASS INDEX: 49.87 KG/M2

## 2022-11-08 DIAGNOSIS — E66.01 OBESITY, CLASS III, BMI 40-49.9 (MORBID OBESITY) (HCC): Primary | ICD-10-CM

## 2022-11-08 NOTE — PROGRESS NOTES
Patient presents for 3 of 6 weight check, current weight 309lbs  Eating behaviors/food choices: Patient reports efforts to reduce caloric intake, making coffee at home rather than going to DD as suggested by RD, spending time prepping her green juice each week rather than foods  She is having issues with constipation, green juice helps her with regular BMs  She has taken medication in the past from GI to help with this but didn't want to have to be on medication and be "dependent" on it  Discussed possible contributing factors to constipation including food choices and dehydration  Encouraged patient to spend time on prepping foods so she's not depending on take out and increasing protein intake since she is having green juice and fruit for breakfast and sometimes skipping lunch so she doesn't have to clock out at work  Activity/Exercise:  Patient is going to the gym three days a week, doing a mix of cardio and strength training  She doesn't understand why her weight isn't responding with all of the work she is doing in the gym  Reminded patient that she can't out workout what she's eating, that appetite may increase with activity  Encouraged to be mindful of food intake and to get hydration since thirst can be mistaken for hunger  Sleep/Rest:  Patient reports some struggles with sleep due to being a busy mom  She tries to get to bed around 10:30-11pm and has to get up around 6am   She sleeps through the night but is going to focus on getting better rest      Mental Health/Wellness:  Patient denies any current issues with depression symptoms, she overall is doing well  She has called a few offices to connect to therapist but is on waiting lists  Reviewed resource list again, highlighted practices in [facility] that she can call, also suggested considering virtual therapy options such as [facility] and [facility]   Patient informed that she can come to office for more frequent visits for support with surgical process if needed      Workflow review:    Labs and PCP: needs labs, PCP letter done  Psych and EGD: no eval needed, EGD done  Nicotine: NA  Support Group: needs to complete   Cardiology: needs to schedule  Sleep: NA  Weight and Weight Checks: six weight checks, 5% weight loss by DOS    Goals:    - increase protein intake, meals three times a day, try not to skip meals  - call local therapist to get appointment, consider virtual therapy options  - talk with HR department about Employee Assistance Program  - be aware of appetite increase from workouts at the gym, get in hydration  - work on getting some sleep    Next Appointment:  With RD on 12/7

## 2022-11-08 NOTE — PATIENT INSTRUCTIONS
- increase protein intake, meals three times a day, try not to skip meals  - call local therapist to get appointment, consider virtual therapy options  - talk with HR department about Employee Assistance Program  - be aware of appetite increase from workouts at the gym, get in hydration  - work on getting some sleep

## 2022-12-08 ENCOUNTER — OFFICE VISIT (OUTPATIENT)
Dept: BARIATRICS | Facility: CLINIC | Age: 27
End: 2022-12-08

## 2022-12-08 VITALS — BODY MASS INDEX: 48.61 KG/M2 | WEIGHT: 293 LBS

## 2022-12-08 DIAGNOSIS — E66.01 OBESITY, CLASS III, BMI 40-49.9 (MORBID OBESITY) (HCC): Primary | ICD-10-CM

## 2022-12-08 NOTE — PROGRESS NOTES
Bariatric Nutrition Assessment Note  -   Pt had completed process earlier this year though surgery was cancelled d/t + pregnancy test   Insurance: 6 required monthly weight checks  4/6 today  Type of surgery    Interested in Vertical sleeve gastrectomy  Surgery Date: TBD  Surgeon: Dr Giovanni Bautista on 11/4/2021 and reconsulted on 9/23/2022    Nutrition Assessment   [facility]  27 y o   female   Height: 5'7"  Weight: 301 2#   Re-Eval Weight: 306 2#   BMI: 48 4  Wt with BMI of 25: 158#  Pre-Op Excess Wt: 148 2#  BMI to Qualify at 40 = 253#  PMH includes: HLD; quit smoking  Pt advised not to gain weight during preop process  Pt encouraged to lose weight (5% /15#) via healthy eating and exercise  Pt may follow Liver Shrinking diet 2 weeks prior to DOS depending on BMI at time  This diet will promote weight loss  Last menstrual period 02/10/2022, not currently breastfeeding      Weight History  Reason for WLS: can't sustain weight loss  Onset of Obesity: Childhood but more since pregnancy  Family history of obesity: Yes  Wt Loss Attempts: Counseling with  MD  FAD Diets (Cabbage soup, Grapefruit, Cleanse, etc ) Detox Tea, Apple Cider Vinegar  Fasting  Meal Replacements (Medifast, Slim Fast, etc )  Self Created Diets (Portion Control, Healthy Food Choices, etc ) Exercise -   Maximum Wt Lost: 20#    Review of History and Medications   OTC: Vitamin C and  Green Leaf Vitamin  Past Medical History:   Diagnosis Date   • Anxiety    • COVID 01/2021   • High cholesterol    • Morbid obesity with BMI of 45 0-49 9, adult [facility])    • Pre-operative laboratory examination    • Seizures ([address] ) 03/31/2015     Past Surgical History:   Procedure Laterality Date   • UPPER GASTROINTESTINAL ENDOSCOPY       Social History     Socioeconomic History   • Marital status: Single     Spouse name: Not on file   • Number of children: Not on file   • Years of education: Not on file   • Highest education level: Not on file Occupational History   • Not on file   Tobacco Use   • Smoking status: Former     Packs/day: 0 25     Years: 6 00     Pack years: 1 50     Types: Cigarettes     Quit date: 2021     Years since quittin 6   • Smokeless tobacco: Never   • Tobacco comments:     stopped   Vaping Use   • Vaping Use: Never used   Substance and Sexual Activity   • Alcohol use: Yes     Comment: occasionally   • Drug use: No   • Sexual activity: Yes     Partners: Male   Other Topics Concern   • Not on file   Social History Narrative    Significant other    Works for [employer] office    1 child    Hobbies-    Exercise-occ     Social Determinants of Health     Financial Resource Strain: Not on file   Food Insecurity: Not on file   Transportation Needs: Not on file   Physical Activity: Not on file   Stress: Not on file   Social Connections: Not on file   Intimate Partner Violence: Not on file   Housing Stability: Not on file       Current Outpatient Medications:   •  Adapalene-Benzoyl Peroxide 0 1-2 5 % gel, Apply 1 application topically daily at bedtime, Disp: 45 g, Rfl: 0  •  ammonium lactate (LAC-HYDRIN) 12 % cream, Apply topically as needed for dry skin, Disp: 385 g, Rfl: 2  Food Intake and Lifestyle Assessment   Food Intake Assessment completed via usual diet recall - irregular eating pattern - tries to meal prep  Breakfast: Smoothie (Fruit)   Lunch: Cauliflower, Meatballs Chicken or Pizza  Dinner- Rice & Chicken or spaghetti  (large meal)  Snacks - not much - cut back on cheese  Beverage intake: water (gallon) and coffee ( 16-24 oz  [place] or DD- 2 cups Keurig)  Protein supplement: None  Portion Sizes: 6-8oz Pro   1+ cup  Starch  rare Vegetable  (cauliflower rice)  Estimated protein intake per day: at least 75-85 oz  Estimated fluid intake per day: Adequate 1 gallon water  Meals eaten away from home: now at work if orders out - decreased fast food past month   Typical meal pattern: 2-3  meals per day - "heavy" meal at night  Eating Behaviors: Consumption of high calorie/ high fat foods, Large portion sizes, Frequent snacking/ grazing, Binge eating(stopped), Mindless eating, Emotional eating and Craves salty foods  Food allergies or intolerances: No Known Allergies or intolerances  Cultural or Mandaen considerations: No Pork    Physical Assessment  Physical Activity  Types of exercise: Goes to gym  occasionally - eliptical or treadmill and machines - abdominal  Current physical limitations: None    Psychosocial Assessment  Has 2 yo son  Support systems: relative- cousin who had surgery (RNY) - Sister Chel who is also having WLS  Socioeconomic factors: None  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)  Nutrition Diagnosis  Diagnosis: Overweight / Obesity (NC-3 3)  Related to: Physical inactivity and Excessive energy intake  As Evidenced by: BMI >25     Nutrition Prescription: Recommend the following diet  Low fat, Low sugar, High protein and Regular    Interventions and Teaching   Discussed pre-op and post-op nutrition guidelines  Patient educated and handouts provided    Surgical changes to stomach / GI  Capacity of post-surgery stomach  Diet progression  Adequate hydration  Sugar and fat restriction to decrease "dumping syndrome"  Fat restriction to decrease steatorrhea  Expected weight loss  Weight loss plateaus/ possibility of weight regain  Exercise  Suggestions for pre-op diet  Nutrition considerations after surgery  Protein supplements  Meal planning and preparation  Appropriate carbohydrate, protein, and fat intake, and food/fluid choices to maximize safe weight loss, nutrient intake, and tolerance   Dietary and lifestyle changes  Possible problems with poor eating habits  Intuitive eating  Techniques for self monitoring and keeping daily food journal  Potential for food intolerance after surgery, and ways to deal with them including: lactose intolerance, nausea, reflux, vomiting, diarrhea, food intolerance, appetite changes, gas  Vitamin / Mineral supplementation of Multivitamin with minerals, Calcium, Vitamin B12, Iron and Vitamin D    Education provided to: patient    Barriers to learning: No barriers identified  Readiness to change: preparation    Prior research on procedure: internet, discussed with provider and friends or family    Comprehension: verbalizes understanding     Expected Compliance: Fair Pt states "stuck in my bad habits"  Recommendations  Pt is an appropriate candidate for surgery  Yes  Evaluation / Monitoring  Dietitian to Monitor: Eating pattern as discussed Tolerance of nutrition prescription Body weight Lab values Physical activity Bowel pattern  2/6 Weight Check Visit Summary  Re-started process  Doing better with making changes  Admitted she got depressed after surgery being cancelled and made poor food choices  Now going gym more- 2-3 X week - mainly treadmill and some weight lifting  Reports better with food choices but still going to DD for Iced Coffee & egg SW but started using Equate Protein Drink as meal replacement for lunch and making dinner usually rice beans and chicken vs Fast food consumption    Rarely snacks as identifies her issue is meal size and choice so working ti improve  Fluids good - drinks adequate amounts of water  Not consistent with practicing guidelines but familiar and plans to incorporate more as process progresses  Questions answered during discussion  Advised ways to modify breakfast choice at DD and try plain coffee with protein shake over ice as alternative to high sugar/fat in DD iced beverage  Pt receptive  Workflow reviewed  4/6 Weight Check Visit Summary 12/7/2022  Continues with pre op process  Now  keeping to  3 meal regimen and stopped eating at night  Lost 8# past month  Mindful of guidelines  Eating at slower pace but admits not practicing 30/60 minute rule lately   Holiday eating discussed - stressed moderation as pt fearful she may overeat   Questions answered during discussion  Workflow reviewed and updated  Recall:  B- Yogurt - [place]  L- Premier or Equate at lunch as meal replacement    Dinner- 1-2 cups  rice & beans - Chicken 6 oz   Stops eating at 7 or 8 pm   Eating slower but needs to work on 30/60 minute rule     Water intake adequate - Drink 20 oz hot coffee daily - not iced anymore -   Going to gym 2-3 X week (30 minutes cardio treadmill or eliptical)  Workflow:  • Psych and/or D+A Clearance: N/A  • PCP Letter: Done  • Support Group: Plans to attend January 11, 2023  • Surgeon Appt  Completed  • EGD Completed   • Cardiac Risk Assessment  Scheduled for  1/10/2023  • Sleep Studies N/A  • Blood work To complete in January  • Nicotine test Non smoker  • 6 Month Pre-Operative Program: 2/6 today  • Weight Loss  Not required - advised not to gain      Goals  Food journal, Exercise 30 minutes 5 times per week, Complete lession plans 1-6, Eat 3 meals per day and Eliminate mindless snacking   Follow Pre-Surgery guidelines  >  Trial [employer] for food logging - Follows [employer] Page  - Was using [employer] - not consistent as not measuring  > Maintain regular meal pattern of 3 meals - add more  fruits, vegetables and whole grains  > Focus on protein - include lean protein at each meal and snack - Can use protein drink as meal replacement -  Started Using Equate vs fruit smoothies  > Decrease portions - florina starch at meals  > Limit processed foods and continue to avoid fast foods and dining away from home  > Consistent with meal prepping  > Continue to limit snacks - healthier choices and portion; avoid grazing  > Slow pace of eating and drinking - practice 30/60 minute rule  > Reduce caffeine/ eliminate before pre-op diet (2 cup or 20 oz presently)  > Continue to eliminate carbonation in diet  > Maintain water intake - 64 oz as goal ( drinks 128 oz presently)  > Increase physical activity/establish exercise regimen (goes to gym occ)  > Start multi vitamin and additional Vitamin D 2000IU  Work on skills to cope with emotional eating/mindfull eating and binge eating  Advised 5% weight loss (15#) by DOS (not required)  F/U next month with bariatric provider - FRANK for 5/6 visit  Time Spent:   45 minutes

## 2022-12-14 DIAGNOSIS — L70.0 ACNE VULGARIS: ICD-10-CM

## 2022-12-14 RX ORDER — ADAPALENE AND BENZOYL PEROXIDE .1; 2.5 G/100G; G/100G
1 GEL TOPICAL
Qty: 45 G | Refills: 0 | Status: SHIPPED | OUTPATIENT
Start: 2022-12-14

## 2023-01-10 ENCOUNTER — OFFICE VISIT (OUTPATIENT)
Dept: CARDIOLOGY CLINIC | Facility: CLINIC | Age: 28
End: 2023-01-10

## 2023-01-10 ENCOUNTER — APPOINTMENT (OUTPATIENT)
Dept: LAB | Facility: CLINIC | Age: 28
End: 2023-01-10

## 2023-01-10 VITALS
RESPIRATION RATE: 16 BRPM | WEIGHT: 293 LBS | HEART RATE: 65 BPM | OXYGEN SATURATION: 96 % | DIASTOLIC BLOOD PRESSURE: 86 MMHG | BODY MASS INDEX: 47.09 KG/M2 | HEIGHT: 66 IN | SYSTOLIC BLOOD PRESSURE: 122 MMHG

## 2023-01-10 DIAGNOSIS — Z98.84 BARIATRIC SURGERY STATUS: ICD-10-CM

## 2023-01-10 DIAGNOSIS — E66.01 OBESITY, CLASS III, BMI 40-49.9 (MORBID OBESITY) (HCC): ICD-10-CM

## 2023-01-10 DIAGNOSIS — Z01.818 PREOP TESTING: ICD-10-CM

## 2023-01-10 DIAGNOSIS — L83 ACANTHOSIS NIGRICANS: ICD-10-CM

## 2023-01-10 DIAGNOSIS — Z00.00 HEALTHCARE MAINTENANCE: ICD-10-CM

## 2023-01-10 LAB
25(OH)D3 SERPL-MCNC: 17 NG/ML (ref 30–100)
ALBUMIN SERPL BCP-MCNC: 3.7 G/DL (ref 3.5–5)
ALP SERPL-CCNC: 73 U/L (ref 46–116)
ALT SERPL W P-5'-P-CCNC: 19 U/L (ref 12–78)
ANION GAP SERPL CALCULATED.3IONS-SCNC: 6 MMOL/L (ref 4–13)
AST SERPL W P-5'-P-CCNC: 16 U/L (ref 5–45)
BASOPHILS # BLD AUTO: 0.03 THOUSANDS/ÂΜL (ref 0–0.1)
BASOPHILS NFR BLD AUTO: 1 % (ref 0–1)
BILIRUB SERPL-MCNC: 0.41 MG/DL (ref 0.2–1)
BUN SERPL-MCNC: 12 MG/DL (ref 5–25)
CALCIUM SERPL-MCNC: 8.9 MG/DL (ref 8.3–10.1)
CHLORIDE SERPL-SCNC: 107 MMOL/L (ref 96–108)
CHOLEST SERPL-MCNC: 206 MG/DL
CO2 SERPL-SCNC: 27 MMOL/L (ref 21–32)
CREAT SERPL-MCNC: 0.66 MG/DL (ref 0.6–1.3)
EOSINOPHIL # BLD AUTO: 0.14 THOUSAND/ÂΜL (ref 0–0.61)
EOSINOPHIL NFR BLD AUTO: 3 % (ref 0–6)
ERYTHROCYTE [DISTWIDTH] IN BLOOD BY AUTOMATED COUNT: 12.8 % (ref 11.6–15.1)
GFR SERPL CREATININE-BSD FRML MDRD: 123 ML/MIN/1.73SQ M
GLUCOSE P FAST SERPL-MCNC: 102 MG/DL (ref 65–99)
HCT VFR BLD AUTO: 40.8 % (ref 34.8–46.1)
HDLC SERPL-MCNC: 48 MG/DL
HGB BLD-MCNC: 13.3 G/DL (ref 11.5–15.4)
IMM GRANULOCYTES # BLD AUTO: 0.01 THOUSAND/UL (ref 0–0.2)
IMM GRANULOCYTES NFR BLD AUTO: 0 % (ref 0–2)
INSULIN SERPL-ACNC: 20.1 MU/L (ref 3–25)
LDLC SERPL CALC-MCNC: 133 MG/DL (ref 0–100)
LYMPHOCYTES # BLD AUTO: 1.78 THOUSANDS/ÂΜL (ref 0.6–4.47)
LYMPHOCYTES NFR BLD AUTO: 37 % (ref 14–44)
MCH RBC QN AUTO: 29.2 PG (ref 26.8–34.3)
MCHC RBC AUTO-ENTMCNC: 32.6 G/DL (ref 31.4–37.4)
MCV RBC AUTO: 90 FL (ref 82–98)
MONOCYTES # BLD AUTO: 0.27 THOUSAND/ÂΜL (ref 0.17–1.22)
MONOCYTES NFR BLD AUTO: 6 % (ref 4–12)
NEUTROPHILS # BLD AUTO: 2.65 THOUSANDS/ÂΜL (ref 1.85–7.62)
NEUTS SEG NFR BLD AUTO: 53 % (ref 43–75)
NONHDLC SERPL-MCNC: 158 MG/DL
NRBC BLD AUTO-RTO: 0 /100 WBCS
PLATELET # BLD AUTO: 131 THOUSANDS/UL (ref 149–390)
PMV BLD AUTO: 13.3 FL (ref 8.9–12.7)
POTASSIUM SERPL-SCNC: 4.1 MMOL/L (ref 3.5–5.3)
PROT SERPL-MCNC: 7.3 G/DL (ref 6.4–8.4)
RBC # BLD AUTO: 4.55 MILLION/UL (ref 3.81–5.12)
SODIUM SERPL-SCNC: 140 MMOL/L (ref 135–147)
TRIGL SERPL-MCNC: 127 MG/DL
TSH SERPL DL<=0.05 MIU/L-ACNC: 3.13 UIU/ML (ref 0.45–4.5)
WBC # BLD AUTO: 4.88 THOUSAND/UL (ref 4.31–10.16)

## 2023-01-10 NOTE — PROGRESS NOTES
Cardiology Follow Up    Chelsea Sandoval  1995  [id number]  [facility]  [address] [place] [id number] [phone]    1  Bariatric surgery status  -     Ambulatory referral to Cardiology  -     POCT ECG          Interval History: Patient is back  She has not had her bariatric surgery because she became pregnant and subsequently had an   [facility] and exercising on the treadmill for up to 30 minutes, working up a sweat at the time  She does not get exertional chest discomfort      Patient Active Problem List   Diagnosis   • Neck pain   • Class 3 severe obesity due to excess calories without serious comorbidity with body mass index (BMI) of 45 0 to 49 9 in adult [facility])   • Acne   • Large breasts   • Hyperlipidemia   • Depression   • Vitamin D deficiency   • Class 3 severe obesity due to excess calories without serious comorbidity with body mass index (BMI) of 40 0 to 44 9 in adult ([facility])   • Tear of MCL (medial collateral ligament) of knee, left, initial encounter   • Constipation   • Annual physical exam   • Acute gastritis without hemorrhage   • Eustachian tube dysfunction   • Morbid (severe) obesity due to excess calories ([facility])   • Obesity, Class III, BMI 40-49 9 (morbid obesity) ([facility])   • Chronic midline thoracic back pain   • Breast pain, left   • Rash   • Acanthosis nigricans     Past Medical History:   Diagnosis Date   • Anxiety    • COVID 2021   • High cholesterol    • Morbid obesity with BMI of 45 0-49 9, adult [facility])    • Pre-operative laboratory examination    • Seizures ([address] ) 2015     Social History     Socioeconomic History   • Marital status: Single     Spouse name: Not on file   • Number of children: Not on file   • Years of education: Not on file   • Highest education level: Not on file   Occupational History • Not on file   Tobacco Use   • Smoking status: Former     Packs/day: 0 25     Years: 6 00     Pack years: 1 50     Types: Cigarettes     Quit date: 2021     Years since quittin 7   • Smokeless tobacco: Never   • Tobacco comments:     stopped   Vaping Use   • Vaping Use: Never used   Substance and Sexual Activity   • Alcohol use: Yes     Comment: occasionally   • Drug use: No   • Sexual activity: Yes     Partners: Male   Other Topics Concern   • Not on file   Social History Narrative    Significant other    Works for [employer] office    1 child    Hobbies-    Exercise-occ     Social Determinants of Health     Financial Resource Strain: Not on file   Food Insecurity: Not on file   Transportation Needs: Not on file   Physical Activity: Not on file   Stress: Not on file   Social Connections: Not on file   Intimate Partner Violence: Not on file   Housing Stability: Not on file      Family History   Problem Relation Age of Onset   • Coronary artery disease Mother    • Diabetes Sister    • Coronary artery disease Maternal Grandmother    • Hyperlipidemia Maternal Grandmother    • Hyperlipidemia Maternal Grandfather    • Diabetes Maternal Grandfather    • Alcohol abuse Neg Hx    • Substance Abuse Neg Hx    • Mental illness Neg Hx      Past Surgical History:   Procedure Laterality Date   • UPPER GASTROINTESTINAL ENDOSCOPY         Current Outpatient Medications:   •  Adapalene-Benzoyl Peroxide 0 1-2 5 % gel, Apply 1 application topically daily at bedtime, Disp: 45 g, Rfl: 0  •  ammonium lactate (LAC-HYDRIN) 12 % cream, Apply topically as needed for dry skin, Disp: 385 g, Rfl: 2  No Known Allergies    Labs:  No visits with results within 2 Month(s) from this visit     Latest known visit with results is:   Telephone on 10/25/2022   Component Date Value   • HEP C AB 2022 negative    • HIV Screen 2022 Non-Reactive    • HIV Confirmation 2022 Non-Reactive      Imaging: No results found     Review of Systems:  Review of Systems    Physical Exam:  Morbidly obese  122/86  Heart rate 65 and regular  Lungs clear  Rhythm regular  No murmurs  No carotid bruits  No calf tenderness  No edema  Discussion/Summary:    1  Morbid obesity  2  Hyperlipidemia    Recommendations:    1  Patient cleared for bariatric surgery  2    Recommended follow-up with primary care doctor in 1 year to address cholesterol status      Stacey Farrell MD

## 2023-01-11 ENCOUNTER — OFFICE VISIT (OUTPATIENT)
Dept: FAMILY MEDICINE CLINIC | Facility: CLINIC | Age: 28
End: 2023-01-11

## 2023-01-11 VITALS
HEIGHT: 66 IN | SYSTOLIC BLOOD PRESSURE: 124 MMHG | HEART RATE: 77 BPM | WEIGHT: 293 LBS | OXYGEN SATURATION: 97 % | BODY MASS INDEX: 47.09 KG/M2 | DIASTOLIC BLOOD PRESSURE: 70 MMHG

## 2023-01-11 DIAGNOSIS — L03.211 CELLULITIS OF FACE: ICD-10-CM

## 2023-01-11 DIAGNOSIS — F51.01 PRIMARY INSOMNIA: Primary | ICD-10-CM

## 2023-01-11 DIAGNOSIS — B37.9 YEAST INFECTION: ICD-10-CM

## 2023-01-11 LAB
DHEA-S SERPL-MCNC: 186 UG/DL (ref 84.8–378)
TESTOST FREE SERPL-MCNC: 3.2 PG/ML (ref 0–4.2)
TESTOST SERPL-MCNC: 23 NG/DL (ref 13–71)

## 2023-01-11 RX ORDER — HYDROXYZINE HYDROCHLORIDE 10 MG/1
10 TABLET, FILM COATED ORAL
Qty: 60 TABLET | Refills: 0 | Status: SHIPPED | OUTPATIENT
Start: 2023-01-11

## 2023-01-11 RX ORDER — DOXYCYCLINE HYCLATE 100 MG/1
100 CAPSULE ORAL EVERY 12 HOURS SCHEDULED
Qty: 42 CAPSULE | Refills: 0 | Status: SHIPPED | OUTPATIENT
Start: 2023-01-11 | End: 2023-01-13

## 2023-01-11 RX ORDER — METHYLPREDNISOLONE 4 MG/1
TABLET ORAL
Qty: 21 EACH | Refills: 0 | Status: SHIPPED | OUTPATIENT
Start: 2023-01-11

## 2023-01-11 RX ORDER — FLUCONAZOLE 150 MG/1
150 TABLET ORAL ONCE
Qty: 2 TABLET | Refills: 0 | Status: SHIPPED | OUTPATIENT
Start: 2023-01-11 | End: 2023-01-11

## 2023-01-11 NOTE — PROGRESS NOTES
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  Drinking almost a gallon of water daily and 20 oz coffee daily  Eating 3 meals per day consistently  Patient reports that she is not on any birth control currently, but has been and plans on practicing abstinence  Discussed the importance of protecting against pregnancy for at least 1 year after surgery  Just needs support group and one more weight check to submit  Workflow reviewed:   Psych and/or D+A Clearance: N/A  PCP Letter:   **Support Group: Plans to attend 1/16 at 6pm  Surgeon Appt:   EGD: Completed  Cardiac Risk Assessment: 1/10/23  Sleep Studies: N/A  Blood work: Completed 1/1/23  Nicotine test: N/A  Required weight checks: Today is 5/6  Weight Loss: Not required, encouraged positive lifestyle changes  Patient will follow up with RD next month    Gaby Mariano LCSW

## 2023-01-11 NOTE — PROGRESS NOTES
Assessment/Plan:         Problem List Items Addressed This Visit        Other    Primary insomnia - Primary     Hydroxyzine 1 to 2 pills every evening half hour before bed to help with sleep  Relevant Medications    hydrOXYzine HCL (ATARAX) 10 mg tablet   Other Visit Diagnoses     Cellulitis of face        Doxycycline twice daily mupirocin up to 3 times daily do warm compresses 2-3 times daily  Call if comes to ahead  Follow-up in 2 and half weeks if not improvi    Relevant Medications    doxycycline hyclate (VIBRAMYCIN) 100 mg capsule    methylPREDNISolone 4 MG tablet therapy pack    mupirocin (BACTROBAN) 2 % ointment    Yeast infection        Diflucan if you develop a yeast infection  Take 1 pill if symptoms persist take second pill 72 hours later  Relevant Medications    fluconazole (DIFLUCAN) 150 mg tablet            Subjective:      Patient ID: Daniel Light is a 24 y o  female  Tiffany  Has a cyst in her mouth, about a month ago she went to urgent care and was treated with prednisone and amox can it went down but is back  She also has ear discomfort  Lip pain is 6-7/10      The following portions of the patient's history were reviewed and updated as appropriate:   Past Medical History:  She has a past medical history of Anxiety, COVID (01/2021), High cholesterol, Morbid obesity with BMI of 45 0-49 9, adult [facility]), Pre-operative laboratory examination, and [facility] (03/31/2015)  ,  _______________________________________________________________________  Medical Problems:  does not have any pertinent problems on file ,  _______________________________________________________________________  Past Surgical History:   has a past surgical history that includes Upper gastrointestinal endoscopy  ,  _______________________________________________________________________  Family History:  family history includes Coronary artery disease in her maternal grandmother and mother; Diabetes in her maternal grandfather and sister; Hyperlipidemia in her maternal grandfather and maternal grandmother ,  _______________________________________________________________________  Social History:   reports that she quit smoking about 20 months ago  She has a 1 50 pack-year smoking history  She has never used smokeless tobacco  She reports current alcohol use  She reports that she does not use drugs  ,  _______________________________________________________________________  Allergies:  has No Known Allergies     _______________________________________________________________________  Current Outpatient Medications   Medication Sig Dispense Refill   • doxycycline hyclate (VIBRAMYCIN) 100 mg capsule Take 1 capsule (100 mg total) by mouth every 12 (twelve) hours for 21 days 42 capsule 0   • fluconazole (DIFLUCAN) 150 mg tablet Take 1 tablet (150 mg total) by mouth once for 1 dose Take 1 tablet now and 2 pill 72 hours later 2 tablet 0   • hydrOXYzine HCL (ATARAX) 10 mg tablet Take 1 tablet (10 mg total) by mouth daily at bedtime Take 30 minutes before bed 60 tablet 0   • methylPREDNISolone 4 MG tablet therapy pack Use as directed on package 21 each 0   • mupirocin (BACTROBAN) 2 % ointment Apply topically 3 (three) times a day 22 g 0   • Adapalene-Benzoyl Peroxide 0 1-2 5 % gel Apply 1 application topically daily at bedtime 45 g 0   • ammonium lactate (LAC-HYDRIN) 12 % cream Apply topically as needed for dry skin 385 g 2     No current facility-administered medications for this visit      _______________________________________________________________________  Review of Systems   Constitutional: Negative for chills, diaphoresis, fatigue and fever  HENT: Positive for dental problem, ear pain and sneezing  Negative for rhinorrhea, sinus pressure, sinus pain and sore throat  Eyes: Negative for pain and visual disturbance  Respiratory: Negative for cough, chest tightness, shortness of breath and wheezing      Cardiovascular: Negative for chest pain and palpitations  Gastrointestinal: Negative for abdominal pain, diarrhea, nausea and vomiting  Genitourinary: Negative for dysuria, frequency, hematuria and urgency  Musculoskeletal: Negative for arthralgias, back pain and myalgias  Skin: Negative for color change and rash  Neurological: Negative for dizziness, seizures, syncope, light-headedness and headaches  Psychiatric/Behavioral: Positive for sleep disturbance  All other systems reviewed and are negative  Objective:  Vitals:    01/11/23 0737   BP: 124/70   Pulse: 77   SpO2: 97%   Weight: (!) 137 kg (302 lb)   Height: 5' 6" (1 676 m)     Body mass index is 48 74 kg/m²  Physical Exam  Vitals and nursing note reviewed  Constitutional:       General: She is not in acute distress  Appearance: Normal appearance  She is not ill-appearing  HENT:      Head: Normocephalic  Right Ear: External ear normal       Left Ear: External ear normal       Nose: Nose normal       Mouth/Throat:      Mouth: Mucous membranes are moist    Eyes:      Conjunctiva/sclera: Conjunctivae normal    Cardiovascular:      Rate and Rhythm: Normal rate and regular rhythm  Pulses: Normal pulses  Heart sounds: Normal heart sounds  Pulmonary:      Effort: Pulmonary effort is normal  No respiratory distress  Breath sounds: Normal breath sounds  No wheezing  Abdominal:      General: Bowel sounds are normal       Palpations: Abdomen is soft  Musculoskeletal:         General: No swelling or tenderness  Normal range of motion  Cervical back: Normal range of motion  No tenderness  Right lower leg: No edema  Left lower leg: No edema  Lymphadenopathy:      Cervical: No cervical adenopathy  Skin:     General: Skin is warm and dry  Findings: Lesion present  Neurological:      General: No focal deficit present  Mental Status: She is alert and oriented to person, place, and time     Psychiatric: Mood and Affect: Mood normal          Behavior: Behavior normal          Thought Content:  Thought content normal          Judgment: Judgment normal

## 2023-01-12 ENCOUNTER — OFFICE VISIT (OUTPATIENT)
Dept: BARIATRICS | Facility: CLINIC | Age: 28
End: 2023-01-12

## 2023-01-12 VITALS — WEIGHT: 293 LBS | BODY MASS INDEX: 48.74 KG/M2

## 2023-01-12 DIAGNOSIS — E55.9 VITAMIN D DEFICIENCY: Primary | ICD-10-CM

## 2023-01-12 DIAGNOSIS — Z71.89 ENCOUNTER FOR PRE-BARIATRIC SURGERY COUNSELING AND EDUCATION: Primary | ICD-10-CM

## 2023-01-12 DIAGNOSIS — D69.6 THROMBOCYTOPENIA (HCC): Primary | ICD-10-CM

## 2023-01-12 LAB
EST. AVERAGE GLUCOSE BLD GHB EST-MCNC: 105 MG/DL
HBA1C MFR BLD: 5.3 %

## 2023-01-12 RX ORDER — ERGOCALCIFEROL 1.25 MG/1
50000 CAPSULE ORAL WEEKLY
Qty: 12 CAPSULE | Refills: 0 | Status: SHIPPED | OUTPATIENT
Start: 2023-01-12 | End: 2023-03-01 | Stop reason: SDUPTHER

## 2023-01-13 ENCOUNTER — TELEPHONE (OUTPATIENT)
Dept: FAMILY MEDICINE CLINIC | Facility: CLINIC | Age: 28
End: 2023-01-13

## 2023-01-13 DIAGNOSIS — L03.211 CELLULITIS OF FACE: Primary | ICD-10-CM

## 2023-01-13 RX ORDER — MINOCYCLINE HYDROCHLORIDE 100 MG/1
100 CAPSULE ORAL EVERY 12 HOURS SCHEDULED
Qty: 16 CAPSULE | Refills: 0 | Status: SHIPPED | OUTPATIENT
Start: 2023-01-13 | End: 2023-01-21

## 2023-01-13 RX ORDER — CLINDAMYCIN PHOSPHATE 10 UG/ML
LOTION TOPICAL 2 TIMES DAILY
Qty: 60 ML | Refills: 0 | Status: SHIPPED | OUTPATIENT
Start: 2023-01-13 | End: 2023-05-23

## 2023-01-13 NOTE — TELEPHONE ENCOUNTER
----- Message from Brandyn Maldonado, 10 Jayna Multani sent at 1/12/2023  4:35 PM EST -----  Her platelets are very low, I would like her to see hematology   Order is in

## 2023-01-31 ENCOUNTER — TELEPHONE (OUTPATIENT)
Dept: FAMILY MEDICINE CLINIC | Facility: CLINIC | Age: 28
End: 2023-01-31

## 2023-01-31 DIAGNOSIS — N91.2 AMENORRHEA: Primary | ICD-10-CM

## 2023-01-31 DIAGNOSIS — L02.91 ABSCESS: ICD-10-CM

## 2023-01-31 NOTE — TELEPHONE ENCOUNTER
Tiffany called in would like to know if ref can be placed for the removal of cyst above the lip if possible   Also she would like to know if labs can be placed as well and will go right after work

## 2023-02-01 ENCOUNTER — OFFICE VISIT (OUTPATIENT)
Dept: FAMILY MEDICINE CLINIC | Facility: CLINIC | Age: 28
End: 2023-02-01

## 2023-02-01 VITALS
WEIGHT: 293 LBS | OXYGEN SATURATION: 98 % | BODY MASS INDEX: 47.09 KG/M2 | HEIGHT: 66 IN | SYSTOLIC BLOOD PRESSURE: 112 MMHG | HEART RATE: 78 BPM | DIASTOLIC BLOOD PRESSURE: 78 MMHG

## 2023-02-01 DIAGNOSIS — R63.5 ABNORMAL WEIGHT GAIN: ICD-10-CM

## 2023-02-01 DIAGNOSIS — E66.01 CLASS 3 SEVERE OBESITY DUE TO EXCESS CALORIES WITHOUT SERIOUS COMORBIDITY WITH BODY MASS INDEX (BMI) OF 45.0 TO 49.9 IN ADULT (HCC): Primary | ICD-10-CM

## 2023-02-01 DIAGNOSIS — G44.221 CHRONIC TENSION-TYPE HEADACHE, INTRACTABLE: ICD-10-CM

## 2023-02-01 DIAGNOSIS — F32.A DEPRESSION, UNSPECIFIED DEPRESSION TYPE: ICD-10-CM

## 2023-02-01 DIAGNOSIS — D69.6 THROMBOCYTOPENIA (HCC): ICD-10-CM

## 2023-02-01 RX ORDER — TOPIRAMATE 25 MG/1
25 TABLET ORAL 2 TIMES DAILY
Qty: 45 TABLET | Refills: 0 | Status: SHIPPED | OUTPATIENT
Start: 2023-02-01

## 2023-02-01 RX ORDER — FLUCONAZOLE 150 MG/1
TABLET ORAL
COMMUNITY
Start: 2023-01-11 | End: 2023-02-01

## 2023-02-01 RX ORDER — VALACYCLOVIR HYDROCHLORIDE 500 MG/1
TABLET, FILM COATED ORAL
COMMUNITY
Start: 2023-01-09

## 2023-02-01 NOTE — ASSESSMENT & PLAN NOTE
Patient has been unable to loose weight despite diet and exercise and phentermine  She also has elevated fasting blood sugar  Will try to get ozempic covered, she is also following with weight management  Suggest my fitness Pal   Reduce daily calories by 300 calories a day  Increase activity to 150 minutes a week  Suggest lean protein high fiber diet  Eats small portions every 3 hours  5-9 fruits and vegetables a day  Do not drink sugary drinks

## 2023-02-01 NOTE — ASSESSMENT & PLAN NOTE
Will start topamax, follow up in 4 weeks  Pt with chronic tension migraines, has been suffering for years

## 2023-02-01 NOTE — PROGRESS NOTES
Assessment/Plan:         Problem List Items Addressed This Visit        Nervous and Auditory    Chronic tension-type headache, intractable     Will start topamax, follow up in 4 weeks  Pt with chronic tension migraines, has been suffering for years  Relevant Medications    topiramate (Topamax) 25 mg tablet       Hematopoietic and Hemostatic    Thrombocytopenia ([facility] )     Appointment is scheduled with heme, will continue to monitor  Relevant Orders    CBC and differential       Other    Class 3 severe obesity due to excess calories without serious comorbidity with body mass index (BMI) of 45 0 to 49 9 in adult [facility]) - Primary     Patient has been unable to loose weight despite diet and exercise and phentermine  She also has elevated fasting blood sugar  Will try to get ozempic covered, she is also following with weight management  Suggest my fitness Pal   Reduce daily calories by 300 calories a day  Increase activity to 150 minutes a week  Suggest lean protein high fiber diet  Eats small portions every 3 hours  5-9 fruits and vegetables a day  Do not drink sugary drinks  Relevant Medications    semaglutide, 0 25 or 0 5 mg/dose, (Ozempic) 2 mg/1 5 mL injection pen    Depression     Referral placed to behavioral, will continue to monitor  Relevant Orders    Ambulatory Referral to Behavioral Health    Abnormal weight gain     Patient has been unable to loose weight despite diet and exercise and phentermine  She also has elevated fasting blood sugar  Will try to get ozempic covered, she is also following with weight management  Suggest my fitness Pal   Reduce daily calories by 300 calories a day  Increase activity to 150 minutes a week  Suggest lean protein high fiber diet  Eats small portions every 3 hours  5-9 fruits and vegetables a day  Do not drink sugary drinks           Relevant Medications    semaglutide, 0 25 or 0 5 mg/dose, (Ozempic) 2 mg/1 5 mL injection pen Subjective:      Patient ID: Yolanda Mattson is a 24 y o  female  Tiffany is here for follow up, she still has a round ball in her upper lip  She also is concerned because she has been dieting and exercising, tried phentermine and has not lost any weight  The following portions of the patient's history were reviewed and updated as appropriate:   Past Medical History:  She has a past medical history of Anxiety, COVID (01/2021), High cholesterol, Morbid obesity with BMI of 45 0-49 9, adult [facility]), Pre-operative laboratory examination, and Seizures ([facility] ) (03/31/2015)  ,  _______________________________________________________________________  Medical Problems:  does not have any pertinent problems on file ,  _______________________________________________________________________  Past Surgical History:   has a past surgical history that includes Upper gastrointestinal endoscopy  ,  _______________________________________________________________________  Family History:  family history includes Coronary artery disease in her maternal grandmother and mother; Diabetes in her maternal grandfather and sister; Hyperlipidemia in her maternal grandfather and maternal grandmother ,  _______________________________________________________________________  Social History:   reports that she quit smoking about 21 months ago  Her smoking use included cigarettes  She has a 1 50 pack-year smoking history  She has never used smokeless tobacco  She reports current alcohol use  She reports that she does not use drugs  ,  _______________________________________________________________________  Allergies:  has No Known Allergies     _______________________________________________________________________  Current Outpatient Medications   Medication Sig Dispense Refill   • semaglutide, 0 25 or 0 5 mg/dose, (Ozempic) 2 mg/1 5 mL injection pen Inject 0 19 mL (0 25 mg total) under the skin every 7 days 1 5 mL 0   • topiramate (Topamax) 25 mg tablet Take 1 tablet (25 mg total) by mouth 2 (two) times a day 45 tablet 0   • Adapalene-Benzoyl Peroxide 0 1-2 5 % gel Apply 1 application topically daily at bedtime 45 g 0   • ammonium lactate (LAC-HYDRIN) 12 % cream Apply topically as needed for dry skin 385 g 2   • clindamycin (CLEOCIN T) 1 % lotion Apply topically 2 (two) times a day 60 mL 0   • ergocalciferol (VITAMIN D2) 50,000 units Take 1 capsule (50,000 Units total) by mouth once a week 12 capsule 0   • mupirocin (BACTROBAN) 2 % ointment Apply topically 3 (three) times a day 22 g 0   • valACYclovir (VALTREX) 500 mg tablet take 1 tablet by mouth twice a day for 3 days       No current facility-administered medications for this visit      _______________________________________________________________________  Review of Systems   Constitutional: Negative for chills, diaphoresis, fatigue and fever  HENT: Negative for congestion, ear pain, sinus pressure, sinus pain and sore throat  Eyes: Negative for pain and visual disturbance  Respiratory: Negative for cough, chest tightness, shortness of breath and wheezing  Cardiovascular: Negative for chest pain and palpitations  Gastrointestinal: Negative for abdominal pain, constipation, diarrhea, nausea and vomiting  Genitourinary: Negative for dysuria, frequency, hematuria and urgency  Musculoskeletal: Negative for arthralgias, back pain and myalgias  Skin: Negative for color change and rash  Neurological: Positive for headaches  Negative for dizziness, seizures, syncope and light-headedness  Psychiatric/Behavioral: Positive for dysphoric mood and sleep disturbance  The patient is not nervous/anxious  All other systems reviewed and are negative  Objective:  Vitals:    02/01/23 0822   BP: 112/78   Pulse: 78   SpO2: 98%   Weight: (!) 137 kg (303 lb)   Height: 5' 6" (1 676 m)     Body mass index is 48 91 kg/m²       Physical Exam

## 2023-02-02 ENCOUNTER — TELEPHONE (OUTPATIENT)
Dept: FAMILY MEDICINE CLINIC | Facility: CLINIC | Age: 28
End: 2023-02-02

## 2023-02-02 NOTE — TELEPHONE ENCOUNTER
Fernando Weathers from [facility] called in for the patient's Ozempic denial

## 2023-02-02 NOTE — TELEPHONE ENCOUNTER
Stan Orellana called in from [facility] in regards medication denial for Ozempic   She stated you will also be receiving a fax on this

## 2023-02-03 ENCOUNTER — TELEPHONE (OUTPATIENT)
Dept: PSYCHIATRY | Facility: CLINIC | Age: 28
End: 2023-02-03

## 2023-02-07 NOTE — PROGRESS NOTES
6/6 weight check  All of workflow complete except has to get hematology clearance per RD gail was referred by PCP for low platelets  Appointment 2/22  Vitamin D was low- prescribed 50,000 weekly for 12 weeks  Has to  at the pharmacy today  Gail scheduled to follow up with RD next month    Imani Cabrera LCSW

## 2023-02-08 ENCOUNTER — TELEPHONE (OUTPATIENT)
Dept: FAMILY MEDICINE CLINIC | Facility: CLINIC | Age: 28
End: 2023-02-08

## 2023-02-08 NOTE — TELEPHONE ENCOUNTER
[employer] [employer] called back regarding her Ozempic did inform her it was denied, it does look like per formulary they would cover Trulicity or Victoza  Would either of these work ?

## 2023-02-09 ENCOUNTER — OFFICE VISIT (OUTPATIENT)
Dept: DERMATOLOGY | Facility: CLINIC | Age: 28
End: 2023-02-09

## 2023-02-09 ENCOUNTER — OFFICE VISIT (OUTPATIENT)
Dept: BARIATRICS | Facility: CLINIC | Age: 28
End: 2023-02-09

## 2023-02-09 VITALS — WEIGHT: 293 LBS | HEIGHT: 66 IN | BODY MASS INDEX: 47.09 KG/M2

## 2023-02-09 DIAGNOSIS — L70.0 ACNE VULGARIS: Primary | ICD-10-CM

## 2023-02-09 DIAGNOSIS — Z71.89 ENCOUNTER FOR PRE-BARIATRIC SURGERY COUNSELING AND EDUCATION: Primary | ICD-10-CM

## 2023-02-09 NOTE — PROGRESS NOTES
[facility] DERMATOLOGY  [address] [place] [address] [place] [address]  [phone]  [phone]     MRN: [id number] : 1995  Encounter: [id number]  Patient Information: Pilar Castro    Subjective:      24year old female presents for cyst on upper L lip she was seen by her family physician and was treated with both amoxicillin and doxycycline had any improvement     Objective:   Ht 5' 6" (1 676 m)   Wt (!) 137 kg (303 lb)   LMP 02/10/2022   BMI 48 91 kg/m²     Physical Exam:    General Appearance:    Alert, cooperative, no distress   Skin:  1 cm cystic nodule noted on the left upper lip  Intralesional Injection Procedure Note  Diagnosis: Acneiform cyst  Location: Left cutaneous upper lip informed consent:  Discussed risks (infection, pain, bleeding, bruising, thinning of the skin, loss of skin pigment, lack of resolution, and recurrence of lesion) and benefits of the procedure, as well as the alternatives   Informed consent was obtained  Preparation: The area was prepared a standard fashion  Anesthesia: not required  Procedure Details: An intralesional injection was performed with                              0 2 cc of Kenalog and diluted one-to-one with 0 9 normal saline 0 4 cc injected in total were injected  Total number of lesions injected:  1       Assessment:     1  Acne vulgaris              Plan:   Acneiform cyst question follicular cyst we will see if this responds to intralesional therapy if not we will go ahead and plan on complete excision      Prior to Admission medications    Medication Sig Start Date End Date Taking?  Authorizing Provider   Adapalene-Benzoyl Peroxide 0 1-2 5 % gel Apply 1 application topically daily at bedtime 22   Mortimer Marlin, CRNP   ammonium lactate (LAC-HYDRIN) 12 % cream Apply topically as needed for dry skin 10/31/22   Óscar Sierra MD   clindamycin (CLEOCIN T) 1 % lotion Apply topically 2 (two) times a day 23 MIREYA Mack   ergocalciferol (VITAMIN D2) 50,000 units Take 1 capsule (50,000 Units total) by mouth once a week 1/12/23 4/6/23  MIREYA Hamm   mupirocin [facility]) 2 % ointment Apply topically 3 (three) times a day 1/11/23   MIREYA Mack   semaglutide, 0 25 or 0 5 mg/dose, (Ozempic) 2 mg/1 5 mL injection pen Inject 0 19 mL (0 25 mg total) under the skin every 7 days 2/1/23   MIREYA Mack   topiramate (Topamax) 25 mg tablet Take 1 tablet (25 mg total) by mouth 2 (two) times a day 2/1/23   MIREYA Mack   valACYclovir (VALTREX) 500 mg tablet take 1 tablet by mouth twice a day for 3 days 1/9/23   Historical Provider, MD     No Known Allergies    Dev Bunch MD  7/8/9982,56:08 PM    Portions of the record may have been created with voice recognition software   Occasional wrong word or "sound a like" substitutions may have occurred due to the inherent limitations of voice recognition software   Read the chart carefully and recognize, using context, where substitutions have occurred

## 2023-02-10 DIAGNOSIS — E66.01 OBESITY, CLASS III, BMI 40-49.9 (MORBID OBESITY) (HCC): ICD-10-CM

## 2023-02-10 DIAGNOSIS — E66.01 CLASS 3 SEVERE OBESITY DUE TO EXCESS CALORIES WITHOUT SERIOUS COMORBIDITY WITH BODY MASS INDEX (BMI) OF 40.0 TO 44.9 IN ADULT (HCC): ICD-10-CM

## 2023-02-10 DIAGNOSIS — E78.5 HYPERLIPIDEMIA, UNSPECIFIED HYPERLIPIDEMIA TYPE: ICD-10-CM

## 2023-02-10 DIAGNOSIS — R63.5 ABNORMAL WEIGHT GAIN: ICD-10-CM

## 2023-02-10 DIAGNOSIS — E66.01 CLASS 3 SEVERE OBESITY DUE TO EXCESS CALORIES WITHOUT SERIOUS COMORBIDITY WITH BODY MASS INDEX (BMI) OF 45.0 TO 49.9 IN ADULT (HCC): Primary | ICD-10-CM

## 2023-02-10 DIAGNOSIS — R73.9 BLOOD GLUCOSE ELEVATED: ICD-10-CM

## 2023-02-10 RX ORDER — BLOOD SUGAR DIAGNOSTIC
STRIP MISCELLANEOUS DAILY
Qty: 90 EACH | Refills: 0 | Status: SHIPPED | OUTPATIENT
Start: 2023-02-10 | End: 2023-05-23

## 2023-02-10 NOTE — TELEPHONE ENCOUNTER
Please call in the [address] [place] [address], if that doesn't work then will try for the [address] [place] [address]

## 2023-02-13 DIAGNOSIS — D64.9 ANEMIA, UNSPECIFIED TYPE: Primary | ICD-10-CM

## 2023-02-13 DIAGNOSIS — D69.6 THROMBOCYTOPENIA (HCC): ICD-10-CM

## 2023-02-22 ENCOUNTER — TELEPHONE (OUTPATIENT)
Dept: HEMATOLOGY ONCOLOGY | Facility: CLINIC | Age: 28
End: 2023-02-22

## 2023-02-22 NOTE — TELEPHONE ENCOUNTER
Appointment Cancellation Or Reschedule     Person calling in Patient   If someone other than patient calling, are they listed on the communication consent form? N/A   Provider Dr Raquel Hsu   Office Visit Date and Time 02/22/2023 @9:40AM    Office Visit Location [facility]   Did patient want to reschedule their office appointment? If so, when was it scheduled to? YES, 04/19/2023 @2:20PM    Did you have STAR scheduled for this appointment? No   Do you need STAR set up for your new appointment? If yes, please send to "PATIENT RIDESHARE" pool for STAR rescheduling No   Is this patient calling to reschedule an infusion appointment? No   When is their next infusion appointment? N/A   Is this patient a Chemo patient? No   Reason for Cancellation or Reschedule Patient states she has a meeting at work she can not miss and she tried to cancel VIA automated system prior to this appointment  Was No show policy reviewed with patient if patient canceling within 24 hours? Yes     If the patient is cancelling an appointment and needs their STAR Transport cancelled, please route to [facility]  If the patient is a treatment patient, please route this to the office nurse  If the patient is not on treatment, please route to the Clerical pool based on location  If the patient is a surgical oncology patient, please route to surg/onc clinical pool  Route message as high priority if same day cancellation

## 2023-02-22 NOTE — TELEPHONE ENCOUNTER
Patient called to cancel appt 1 hour prior to appt  PSR informed patient of No-Show policy and patient became concerned with having appt marked as No show and not being made aware of our policy  Patient stated she tried to cancel appt via the automated system  When appt details were checked, the auto confirm status was "Reminder sent/unanswered" I communicated to the patient that she needs to give 24 hours notice to cancel/reschedule appts  Patient vocalized understanding  PSR Rose Marie assisted patient with rescheduling appt

## 2023-03-01 ENCOUNTER — OFFICE VISIT (OUTPATIENT)
Dept: FAMILY MEDICINE CLINIC | Facility: CLINIC | Age: 28
End: 2023-03-01

## 2023-03-01 ENCOUNTER — TELEPHONE (OUTPATIENT)
Dept: FAMILY MEDICINE CLINIC | Facility: CLINIC | Age: 28
End: 2023-03-01

## 2023-03-01 VITALS
BODY MASS INDEX: 47.09 KG/M2 | HEART RATE: 77 BPM | OXYGEN SATURATION: 97 % | WEIGHT: 293 LBS | DIASTOLIC BLOOD PRESSURE: 82 MMHG | SYSTOLIC BLOOD PRESSURE: 112 MMHG | TEMPERATURE: 96.8 F | HEIGHT: 66 IN

## 2023-03-01 DIAGNOSIS — R63.5 ABNORMAL WEIGHT GAIN: ICD-10-CM

## 2023-03-01 DIAGNOSIS — E66.01 MORBID (SEVERE) OBESITY DUE TO EXCESS CALORIES (HCC): ICD-10-CM

## 2023-03-01 DIAGNOSIS — E66.01 CLASS 3 SEVERE OBESITY DUE TO EXCESS CALORIES WITHOUT SERIOUS COMORBIDITY WITH BODY MASS INDEX (BMI) OF 40.0 TO 44.9 IN ADULT (HCC): ICD-10-CM

## 2023-03-01 DIAGNOSIS — E66.01 CLASS 3 SEVERE OBESITY DUE TO EXCESS CALORIES WITHOUT SERIOUS COMORBIDITY WITH BODY MASS INDEX (BMI) OF 45.0 TO 49.9 IN ADULT (HCC): Primary | ICD-10-CM

## 2023-03-01 DIAGNOSIS — E55.9 VITAMIN D DEFICIENCY: ICD-10-CM

## 2023-03-01 PROBLEM — E66.813 CLASS 3 SEVERE OBESITY DUE TO EXCESS CALORIES WITHOUT SERIOUS COMORBIDITY WITH BODY MASS INDEX (BMI) OF 40.0 TO 44.9 IN ADULT (HCC): Status: RESOLVED | Noted: 2021-04-08 | Resolved: 2023-03-01

## 2023-03-01 PROBLEM — E66.813 OBESITY, CLASS III, BMI 40-49.9 (MORBID OBESITY): Status: RESOLVED | Noted: 2022-05-19 | Resolved: 2023-03-01

## 2023-03-01 RX ORDER — ERGOCALCIFEROL 1.25 MG/1
50000 CAPSULE ORAL WEEKLY
Qty: 12 CAPSULE | Refills: 0 | Status: SHIPPED | OUTPATIENT
Start: 2023-03-01 | End: 2023-05-24

## 2023-03-01 NOTE — PROGRESS NOTES
Assessment/Plan:         Problem List Items Addressed This Visit        Other    Class 3 severe obesity due to excess calories without serious comorbidity with body mass index (BMI) of 45 0 to 49 9 in adult [facility]) - Primary     Patient has been unable to loose weight despite diet and exercise and phentermine and victoza  She also has elevated fasting blood sugar  Will try to get wegovy covered, she is also following with weight management  Suggest my fitness Pal   Reduce daily calories by 300 calories a day  Increase activity to 150 minutes a week  Suggest lean protein high fiber diet  Eats small portions every 3 hours  5-9 fruits and vegetables a day  Do not drink sugary drinks  Relevant Medications    Semaglutide-Weight Management (WEGOVY) 0 25 MG/0 5ML    Vitamin D deficiency     Take 50,000 u weekly and 2000 u Vit D3 daily  Relevant Medications    ergocalciferol (VITAMIN D2) 50,000 units    Morbid (severe) obesity due to excess calories [facility])     Patient has been unable to loose weight despite diet and exercise and phentermine  She also has elevated fasting blood sugar  Will try to get ozempic covered, she is also following with weight management  Suggest my fitness Pal   Reduce daily calories by 300 calories a day  Increase activity to 150 minutes a week  Suggest lean protein high fiber diet  Eats small portions every 3 hours  5-9 fruits and vegetables a day  Do not drink sugary drinks  Abnormal weight gain     Patient has been unable to loose weight despite diet and exercise and phentermine  She also has elevated fasting blood sugar  Will try to get ozempic covered, she is also following with weight management  Suggest my fitness Pal   Reduce daily calories by 300 calories a day  Increase activity to 150 minutes a week  Suggest lean protein high fiber diet  Eats small portions every 3 hours  5-9 fruits and vegetables a day  Do not drink sugary drinks  RESOLVED: Class 3 severe obesity due to excess calories without serious comorbidity with body mass index (BMI) of 40 0 to 44 9 in [facility])         Subjective:      Patient ID: Thais Almendarez is a 24 y o  female  Tiffany is here for weight loss, she has only lost 1 lb since last visit  She feels in her heart that she does not want to have surgery but wants to change her life  She has not had any side effects from the victoza  The following portions of the patient's history were reviewed and updated as appropriate:   Past Medical History:  She has a past medical history of Anxiety, COVID (01/2021), High cholesterol, Morbid obesity with BMI of 45 0-49 9, [facility]), Pre-operative laboratory examination, and [facility] (03/[facility]/2015)  ,  _______________________________________________________________________  Medical Problems:  does not have any pertinent problems on file ,  _______________________________________________________________________  Past Surgical History:   has a past surgical history that includes Upper gastrointestinal endoscopy  ,  _______________________________________________________________________  Family History:  family history includes Coronary artery disease in her maternal grandmother and mother; Diabetes in her maternal grandfather and sister; Hyperlipidemia in her maternal grandfather and maternal grandmother ,  _______________________________________________________________________  Social History:   reports that she quit smoking about 22 months ago  Her smoking use included cigarettes  She has a 1 50 pack-year smoking history  She has never used smokeless tobacco  She reports current alcohol use  She reports that she does not use drugs  ,  _______________________________________________________________________  Allergies:  has No Known Allergies     _______________________________________________________________________  Current Outpatient Medications   Medication Sig Dispense Refill • Adapalene-Benzoyl Peroxide 0 1-2 5 % gel Apply 1 application topically daily at bedtime 45 g 0   • ammonium lactate (LAC-HYDRIN) 12 % cream Apply topically as needed for dry skin 385 g 2   • clindamycin (CLEOCIN T) 1 % lotion Apply topically 2 (two) times a day 60 mL 0   • ergocalciferol (VITAMIN D2) 50,000 units Take 1 capsule (50,000 Units total) by mouth once a week 12 capsule 0   • Insulin Pen Needle (Advocate Insulin Pen Needles) 31G X 8 MM MISC Use in the morning 90 each 0   • mupirocin (BACTROBAN) 2 % ointment Apply topically 3 (three) times a day 22 g 0   • Semaglutide-Weight Management (WEGOVY) 0 25 MG/0 5ML Inject 0 5 mL (0 25 mg total) under the skin once a week 2 mL 0   • topiramate (Topamax) 25 mg tablet Take 1 tablet (25 mg total) by mouth 2 (two) times a day 45 tablet 0   • valACYclovir (VALTREX) 500 mg tablet take 1 tablet by mouth twice a day for 3 days       No current facility-administered medications for this visit      _______________________________________________________________________  Review of Systems   Constitutional: Negative for chills, diaphoresis, fatigue and fever  HENT: Negative for congestion, ear pain, postnasal drip, rhinorrhea, sinus pressure, sinus pain and sore throat  Eyes: Negative for pain and visual disturbance  Respiratory: Negative for cough, chest tightness, shortness of breath and wheezing  Cardiovascular: Negative for chest pain and palpitations  Gastrointestinal: Negative for abdominal pain, constipation, diarrhea, nausea and vomiting  Genitourinary: Negative for dysuria, frequency, hematuria and urgency  Musculoskeletal: Positive for arthralgias and myalgias  Negative for back pain  Skin: Negative for color change and rash  Neurological: Negative for dizziness, seizures, syncope, light-headedness and headaches  Psychiatric/Behavioral: Negative for dysphoric mood and sleep disturbance  The patient is nervous/anxious      All other systems reviewed and are negative  Objective:  Vitals:    03/01/23 0914   BP: 112/82   BP Location: Left arm   Patient Position: Sitting   Cuff Size: Standard   Pulse: 77   Temp: (!) 96 8 °F (36 °C)   TempSrc: Tympanic   SpO2: 97%   Weight: (!) 137 kg (302 lb)   Height: 5' 6" (1 676 m)     Body mass index is 48 74 kg/m²  Physical Exam  Vitals and nursing note reviewed  Constitutional:       Appearance: Normal appearance  She is obese  She is not ill-appearing  HENT:      Head: Normocephalic  Right Ear: Tympanic membrane, ear canal and external ear normal  There is no impacted cerumen  Left Ear: Tympanic membrane, ear canal and external ear normal  There is no impacted cerumen  Nose: Nose normal  No congestion  Mouth/Throat:      Mouth: Mucous membranes are moist       Pharynx: No posterior oropharyngeal erythema  Eyes:      Extraocular Movements: Extraocular movements intact  Conjunctiva/sclera: Conjunctivae normal       Pupils: Pupils are equal, round, and reactive to light  Cardiovascular:      Rate and Rhythm: Normal rate and regular rhythm  Heart sounds: Normal heart sounds  No murmur heard  Pulmonary:      Effort: Pulmonary effort is normal       Breath sounds: Normal breath sounds  No wheezing  Abdominal:      General: Bowel sounds are normal       Palpations: Abdomen is soft  Tenderness: There is no abdominal tenderness  Musculoskeletal:         General: Normal range of motion  Cervical back: Normal range of motion  Right lower leg: No edema  Left lower leg: No edema  Skin:     General: Skin is warm and dry  Neurological:      General: No focal deficit present  Mental Status: She is alert     Psychiatric:         Mood and Affect: Mood normal          Behavior: Behavior normal

## 2023-03-01 NOTE — ASSESSMENT & PLAN NOTE
Patient has been unable to loose weight despite diet and exercise and phentermine and victoza  She also has elevated fasting blood sugar  Will try to get wegovy covered, she is also following with weight management  Suggest my fitness Pal   Reduce daily calories by 300 calories a day  Increase activity to 150 minutes a week  Suggest lean protein high fiber diet  Eats small portions every 3 hours  5-9 fruits and vegetables a day  Do not drink sugary drinks

## 2023-03-03 DIAGNOSIS — L70.0 ACNE VULGARIS: ICD-10-CM

## 2023-03-03 RX ORDER — ADAPALENE AND BENZOYL PEROXIDE .1; 2.5 G/100G; G/100G
1 GEL TOPICAL
Qty: 45 G | Refills: 0 | Status: SHIPPED | OUTPATIENT
Start: 2023-03-03

## 2023-03-15 ENCOUNTER — OFFICE VISIT (OUTPATIENT)
Dept: FAMILY MEDICINE CLINIC | Facility: CLINIC | Age: 28
End: 2023-03-15

## 2023-03-15 VITALS
RESPIRATION RATE: 14 BRPM | HEIGHT: 66 IN | OXYGEN SATURATION: 98 % | SYSTOLIC BLOOD PRESSURE: 110 MMHG | TEMPERATURE: 98.2 F | DIASTOLIC BLOOD PRESSURE: 80 MMHG | HEART RATE: 80 BPM | WEIGHT: 293 LBS | BODY MASS INDEX: 47.09 KG/M2

## 2023-03-15 DIAGNOSIS — H66.92 OTITIS OF LEFT EAR: Primary | ICD-10-CM

## 2023-03-15 LAB — S PYO AG THROAT QL: NEGATIVE

## 2023-03-15 RX ORDER — FLUTICASONE PROPIONATE 50 MCG
1 SPRAY, SUSPENSION (ML) NASAL DAILY
Qty: 15.8 ML | Refills: 1 | Status: SHIPPED | OUTPATIENT
Start: 2023-03-15

## 2023-03-15 RX ORDER — AMOXICILLIN 500 MG/1
500 CAPSULE ORAL EVERY 8 HOURS SCHEDULED
Qty: 30 CAPSULE | Refills: 0 | Status: SHIPPED | OUTPATIENT
Start: 2023-03-15 | End: 2023-03-25

## 2023-03-15 NOTE — PROGRESS NOTES
Assessment/Plan:         Problem List Items Addressed This Visit    None  Visit Diagnoses     Otitis of left ear    -  Primary    amoxicillin tid, flonase, vit c, d and zinc, stay well hydrated, call if symptoms are not improved  Relevant Medications    amoxicillin (AMOXIL) 500 mg capsule    fluticasone (FLONASE) 50 mcg/act nasal spray            Subjective:      Patient ID: Nyasia Albert is a 24 y o  female  Ebony Panda is here, she has lost 9 lbs since last visit  She has been having ear pain for 2-3 days  Earache   Associated symptoms include coughing  Pertinent negatives include no abdominal pain, diarrhea, headaches, rash, rhinorrhea, sore throat or vomiting  The following portions of the patient's history were reviewed and updated as appropriate:   Past Medical History:  She has a past medical history of Anxiety, COVID (01/2021), High cholesterol, Morbid obesity with BMI of 45 0-49 9, adult [facility]), Pre-operative laboratory examination, and Seizures ([facility] [address] ) (03/31/2015)  ,  _______________________________________________________________________  Medical Problems:  does not have any pertinent problems on file ,  _______________________________________________________________________  Past Surgical History:   has a past surgical history that includes Upper gastrointestinal endoscopy  ,  _______________________________________________________________________  Family History:  family history includes Coronary artery disease in her maternal grandmother and mother; Diabetes in her maternal grandfather and sister; Hyperlipidemia in her maternal grandfather and maternal grandmother ,  _______________________________________________________________________  Social History:   reports that she quit smoking about 22 months ago  Her smoking use included cigarettes  She has a 1 50 pack-year smoking history  She has never used smokeless tobacco  She reports current alcohol use   She reports that she does not use drugs ,  _______________________________________________________________________  Allergies:  has No Known Allergies     _______________________________________________________________________  Current Outpatient Medications   Medication Sig Dispense Refill   • Adapalene-Benzoyl Peroxide 0 1-2 5 % gel Apply 1 application  topically daily at bedtime 45 g 0   • amoxicillin (AMOXIL) 500 mg capsule Take 1 capsule (500 mg total) by mouth every 8 (eight) hours for 10 days 30 capsule 0   • clindamycin (CLEOCIN T) 1 % lotion Apply topically 2 (two) times a day 60 mL 0   • ergocalciferol (VITAMIN D2) 50,000 units Take 1 capsule (50,000 Units total) by mouth once a week 12 capsule 0   • fluticasone (FLONASE) 50 mcg/act nasal spray 1 spray into each nostril daily 15 8 mL 1   • Semaglutide-Weight Management (WEGOVY) 0 25 MG/0 5ML Inject 0 5 mL (0 25 mg total) under the skin once a week 2 mL 0   • topiramate (Topamax) 25 mg tablet Take 1 tablet (25 mg total) by mouth 2 (two) times a day 45 tablet 0   • valACYclovir (VALTREX) 500 mg tablet take 1 tablet by mouth twice a day for 3 days     • ammonium lactate (LAC-HYDRIN) 12 % cream Apply topically as needed for dry skin 385 g 2   • Insulin Pen Needle (Advocate Insulin Pen Needles) 31G X 8 MM MISC Use in the morning 90 each 0   • mupirocin (BACTROBAN) 2 % ointment Apply topically 3 (three) times a day 22 g 0     No current facility-administered medications for this visit      _______________________________________________________________________  Review of Systems   Constitutional: Negative for chills, diaphoresis and fever  HENT: Positive for ear pain  Negative for congestion, rhinorrhea, sinus pressure, sinus pain and sore throat  Eyes: Negative for pain and visual disturbance  Respiratory: Positive for cough  Negative for chest tightness, shortness of breath and wheezing  Cardiovascular: Negative for chest pain and palpitations     Gastrointestinal: Negative for abdominal pain, constipation, diarrhea, nausea and vomiting  Genitourinary: Negative for dysuria, frequency, hematuria and urgency  Musculoskeletal: Negative for arthralgias, back pain and myalgias  Skin: Negative for color change and rash  Neurological: Negative for dizziness, seizures, syncope, light-headedness and headaches  Psychiatric/Behavioral: Negative for dysphoric mood and sleep disturbance  The patient is not nervous/anxious  All other systems reviewed and are negative  Objective:  Vitals:    03/15/23 0755   BP: 110/80   Pulse: 80   Resp: 14   Temp: 98 2 °F (36 8 °C)   SpO2: 98%   Weight: 133 kg (293 lb)   Height: 5' 6" (1 676 m)     Body mass index is 47 29 kg/m²  Physical Exam  Vitals and nursing note reviewed  Constitutional:       Appearance: Normal appearance  She is not ill-appearing  HENT:      Head: Normocephalic  Right Ear: Tympanic membrane, ear canal and external ear normal  There is no impacted cerumen  Left Ear: Ear canal and external ear normal  There is no impacted cerumen  Ears:      Comments: Red TM left     Nose: Congestion present  Mouth/Throat:      Mouth: Mucous membranes are moist       Pharynx: No posterior oropharyngeal erythema  Eyes:      Extraocular Movements: Extraocular movements intact  Conjunctiva/sclera: Conjunctivae normal       Pupils: Pupils are equal, round, and reactive to light  Cardiovascular:      Rate and Rhythm: Normal rate and regular rhythm  Heart sounds: Normal heart sounds  No murmur heard  Pulmonary:      Effort: Pulmonary effort is normal       Breath sounds: Normal breath sounds  No wheezing  Abdominal:      General: Bowel sounds are normal       Palpations: Abdomen is soft  Tenderness: There is no abdominal tenderness  Musculoskeletal:         General: Normal range of motion  Cervical back: Normal range of motion  Right lower leg: No edema  Left lower leg: No edema  Skin:     General: Skin is warm and dry  Neurological:      General: No focal deficit present  Mental Status: She is alert     Psychiatric:         Mood and Affect: Mood normal          Behavior: Behavior normal

## 2023-03-16 LAB
FLUAV RNA RESP QL NAA+PROBE: NEGATIVE
FLUBV RNA RESP QL NAA+PROBE: NEGATIVE
SARS-COV-2 RNA RESP QL NAA+PROBE: NEGATIVE

## 2023-03-19 LAB — BACTERIA THROAT CULT: ABNORMAL

## 2023-03-30 ENCOUNTER — TELEPHONE (OUTPATIENT)
Dept: HEMATOLOGY ONCOLOGY | Facility: CLINIC | Age: 28
End: 2023-03-30

## 2023-03-30 NOTE — TELEPHONE ENCOUNTER
I phoned the patient and introduced myself and indicated that I was calling from [place] [id number] [facility] regarding the need to reschedule her 4/19 appointment with Dr Toya Allen indicated that she would prefer to have the appointment on a day she is off, and suggested 5/9  I was able to schedule her appointment with Juan Diego Cedeño PA-C on 5/9 at 1400 in the [facility]

## 2023-04-03 ENCOUNTER — OFFICE VISIT (OUTPATIENT)
Dept: FAMILY MEDICINE CLINIC | Facility: CLINIC | Age: 28
End: 2023-04-03

## 2023-04-03 VITALS
DIASTOLIC BLOOD PRESSURE: 78 MMHG | HEIGHT: 66 IN | OXYGEN SATURATION: 98 % | BODY MASS INDEX: 47.09 KG/M2 | HEART RATE: 77 BPM | SYSTOLIC BLOOD PRESSURE: 124 MMHG | WEIGHT: 293 LBS

## 2023-04-03 DIAGNOSIS — L30.4 INTERTRIGO: ICD-10-CM

## 2023-04-03 DIAGNOSIS — E66.01 CLASS 3 SEVERE OBESITY DUE TO EXCESS CALORIES WITHOUT SERIOUS COMORBIDITY WITH BODY MASS INDEX (BMI) OF 45.0 TO 49.9 IN ADULT (HCC): Primary | ICD-10-CM

## 2023-04-03 DIAGNOSIS — G89.29 CHRONIC MIDLINE THORACIC BACK PAIN: ICD-10-CM

## 2023-04-03 DIAGNOSIS — M54.6 CHRONIC MIDLINE THORACIC BACK PAIN: ICD-10-CM

## 2023-04-03 DIAGNOSIS — N62 LARGE BREASTS: ICD-10-CM

## 2023-04-03 PROBLEM — R21 RASH: Status: RESOLVED | Noted: 2022-10-25 | Resolved: 2023-04-03

## 2023-04-03 RX ORDER — NYSTATIN 100000 [USP'U]/G
POWDER TOPICAL 3 TIMES DAILY
Qty: 60 G | Refills: 0 | Status: SHIPPED | OUTPATIENT
Start: 2023-04-03

## 2023-04-03 NOTE — ASSESSMENT & PLAN NOTE
Referral placed to plastic surgery  Large breasts are causing back pain and rashes  Patient has been doing physical therapy at home  Given more instructions for core exercises  We will continue to monitor

## 2023-04-03 NOTE — ASSESSMENT & PLAN NOTE
Under bilateral breasts  The rashes were worse when she works out  Suggest nystatin 3 times daily  Please make appointment with plastic surgery as I think the size of your breast is contributing to the rashes

## 2023-04-03 NOTE — ASSESSMENT & PLAN NOTE
Suggest my fitness Pal   Reduce daily calories by 300 calories a day  Increase activity to 150 minutes a week  Suggest lean protein high fiber diet  Eats small portions every 3 hours  5-9 fruits and vegetables a day  Do not drink sugary drinks   Will increase wegovy, follow up in 1 month for physical

## 2023-04-03 NOTE — ASSESSMENT & PLAN NOTE
Patient has been doing physical therapy at home  Given more instructions for core exercises  We will continue to monitor  Placed referral for plastic surgery as I think back pain is primarily from large breasts

## 2023-04-03 NOTE — PROGRESS NOTES
Assessment/Plan:         Problem List Items Addressed This Visit        Musculoskeletal and Integument    Intertrigo    Relevant Medications    nystatin (MYCOSTATIN) powder       Other    Class 3 severe obesity due to excess calories without serious comorbidity with body mass index (BMI) of 45 0 to 49 9 in [facility]) - Primary       Suggest my fitness Pal   Reduce daily calories by 300 calories a day  Increase activity to 150 minutes a week  Suggest lean protein high fiber diet  Eats small portions every 3 hours  5-9 fruits and vegetables a day  Do not drink sugary drinks  Will increase wegovy, follow up in 1 month for physical            Relevant Medications    Semaglutide-Weight Management (WEGOVY) 0 5 MG/0 5ML    Large breasts    Relevant Orders    Ambulatory Referral to Plastic Surgery    Chronic midline thoracic back pain         Subjective:      Patient ID: Reynolds Dakin is a 24 y o  female  Amna El is here to follow up, she is on wegovy and doing well  She is dieting and exercising but she has hiccups  She is also having breast pain  The following portions of the patient's history were reviewed and updated as appropriate:   Past Medical History:  She has a past medical history of Anxiety, COVID (01/2021), High cholesterol, Morbid obesity with BMI of 45 0-49 9, [facility]), Pre-operative laboratory examination, and Seizures ([facility] [address] ) (03/31/2015)  ,  _______________________________________________________________________  Medical Problems:  does not have any pertinent problems on file ,  _______________________________________________________________________  Past Surgical History:   has a past surgical history that includes Upper gastrointestinal endoscopy  ,  _______________________________________________________________________  Family History:  family history includes Coronary artery disease in her maternal grandmother and mother; Diabetes in her maternal grandfather and sister;  Hyperlipidemia in her maternal grandfather and maternal grandmother ,  _______________________________________________________________________  Social History:   reports that she quit smoking about 23 months ago  Her smoking use included cigarettes  She has a 1 50 pack-year smoking history  She has never used smokeless tobacco  She reports current alcohol use  She reports that she does not use drugs  ,  _______________________________________________________________________  Allergies:  has No Known Allergies     _______________________________________________________________________  Current Outpatient Medications   Medication Sig Dispense Refill   • nystatin (MYCOSTATIN) powder Apply topically 3 (three) times a day 60 g 0   • Semaglutide-Weight Management (WEGOVY) 0 5 MG/0 5ML Inject 0 5 mL (0 5 mg total) under the skin once a week for 28 days 2 mL 0   • Adapalene-Benzoyl Peroxide 0 1-2 5 % gel Apply 1 application  topically daily at bedtime 45 g 0   • ammonium lactate (LAC-HYDRIN) 12 % cream Apply topically as needed for dry skin 385 g 2   • clindamycin (CLEOCIN T) 1 % lotion Apply topically 2 (two) times a day 60 mL 0   • ergocalciferol (VITAMIN D2) 50,000 units Take 1 capsule (50,000 Units total) by mouth once a week 12 capsule 0   • fluticasone (FLONASE) 50 mcg/act nasal spray 1 spray into each nostril daily 15 8 mL 1   • Insulin Pen Needle (Advocate Insulin Pen Needles) 31G X 8 MM MISC Use in the morning 90 each 0   • mupirocin (BACTROBAN) 2 % ointment Apply topically 3 (three) times a day 22 g 0   • topiramate (Topamax) 25 mg tablet Take 1 tablet (25 mg total) by mouth 2 (two) times a day 45 tablet 0   • valACYclovir (VALTREX) 500 mg tablet take 1 tablet by mouth twice a day for 3 days       No current facility-administered medications for this visit      _______________________________________________________________________  Review of Systems   Constitutional: Negative for chills, diaphoresis and fever     HENT: Negative for "congestion, ear pain, postnasal drip, rhinorrhea, sinus pressure, sinus pain and sore throat  Eyes: Negative for pain and visual disturbance  Respiratory: Negative for cough, shortness of breath and wheezing  Cardiovascular: Negative for chest pain and palpitations  Gastrointestinal: Negative for abdominal pain, constipation, diarrhea, nausea and vomiting  Genitourinary: Negative for dysuria, frequency, hematuria and urgency  Musculoskeletal: Positive for arthralgias, back pain and myalgias  Skin: Negative for color change and rash  Neurological: Negative for dizziness, seizures, syncope and light-headedness  Psychiatric/Behavioral: Negative for dysphoric mood and sleep disturbance  The patient is not nervous/anxious  All other systems reviewed and are negative  Objective:  Vitals:    04/03/23 0835   BP: 124/78   Pulse: 77   SpO2: 98%   Weight: 134 kg (295 lb)   Height: 5' 6\" (1 676 m)     Body mass index is 47 61 kg/m²  Physical Exam  Vitals and nursing note reviewed  Constitutional:       Appearance: Normal appearance  She is obese  She is not ill-appearing  HENT:      Head: Normocephalic  Right Ear: Tympanic membrane, ear canal and external ear normal  There is no impacted cerumen  Left Ear: Tympanic membrane, ear canal and external ear normal  There is no impacted cerumen  Nose: Nose normal  No congestion  Mouth/Throat:      Mouth: Mucous membranes are moist       Pharynx: No posterior oropharyngeal erythema  Eyes:      Extraocular Movements: Extraocular movements intact  Conjunctiva/sclera: Conjunctivae normal       Pupils: Pupils are equal, round, and reactive to light  Cardiovascular:      Rate and Rhythm: Normal rate and regular rhythm  Heart sounds: Normal heart sounds  No murmur heard  Pulmonary:      Effort: Pulmonary effort is normal       Breath sounds: Normal breath sounds  No wheezing     Abdominal:      Palpations: Abdomen is " soft       Tenderness: There is no abdominal tenderness  Musculoskeletal:         General: Tenderness present  Normal range of motion  Cervical back: Normal range of motion  Right lower leg: No edema  Left lower leg: No edema  Skin:     General: Skin is warm and dry  Neurological:      General: No focal deficit present  Mental Status: She is alert     Psychiatric:         Mood and Affect: Mood normal          Behavior: Behavior normal

## 2023-05-02 ENCOUNTER — OFFICE VISIT (OUTPATIENT)
Dept: FAMILY MEDICINE CLINIC | Facility: CLINIC | Age: 28
End: 2023-05-02

## 2023-05-02 VITALS
SYSTOLIC BLOOD PRESSURE: 128 MMHG | HEART RATE: 80 BPM | BODY MASS INDEX: 46.52 KG/M2 | DIASTOLIC BLOOD PRESSURE: 80 MMHG | WEIGHT: 288.2 LBS | OXYGEN SATURATION: 98 % | TEMPERATURE: 98.4 F

## 2023-05-02 DIAGNOSIS — H66.92 OTITIS OF LEFT EAR: ICD-10-CM

## 2023-05-02 DIAGNOSIS — R63.5 ABNORMAL WEIGHT GAIN: ICD-10-CM

## 2023-05-02 DIAGNOSIS — E66.01 MORBID (SEVERE) OBESITY DUE TO EXCESS CALORIES (HCC): ICD-10-CM

## 2023-05-02 DIAGNOSIS — E66.01 CLASS 3 SEVERE OBESITY DUE TO EXCESS CALORIES WITHOUT SERIOUS COMORBIDITY WITH BODY MASS INDEX (BMI) OF 45.0 TO 49.9 IN ADULT (HCC): Primary | ICD-10-CM

## 2023-05-02 DIAGNOSIS — R73.01 ELEVATED FASTING BLOOD SUGAR: ICD-10-CM

## 2023-05-02 RX ORDER — CIPROFLOXACIN AND DEXAMETHASONE 3; 1 MG/ML; MG/ML
4 SUSPENSION/ DROPS AURICULAR (OTIC) 2 TIMES DAILY
Qty: 7.5 ML | Refills: 0 | Status: SHIPPED | OUTPATIENT
Start: 2023-05-02

## 2023-05-02 NOTE — PROGRESS NOTES
Assessment/Plan:         Problem List Items Addressed This Visit        Nervous and Auditory    Otitis of left ear     Will treat with ciprodex twice daily for 7 days, call if no improvement  Relevant Medications    ciprofloxacin-dexamethasone (CIPRODEX) otic suspension       Other    Class 3 severe obesity due to excess calories without serious comorbidity with body mass index (BMI) of 45 0 to 49 9 in adult [facility]) - Primary     Will switch back to ozempic as Tiffany is having a difficult time injecting the wegovy, will do prior auth and patient will reach out if she hears that it was denied  Continue diet and exercise lifestyle changes, follow up in 1 month  Relevant Medications    semaglutide, 0 25 or 0 5 mg/dose, (Ozempic, 0 25 or 0 5 MG/DOSE,) 2 mg/3 mL injection pen    Morbid (severe) obesity due to excess calories ([address] )     Will switch back to ozempic as Tiffany is having a difficult time injecting the wegovy, will do prior auth and patient will reach out if she hears that it was denied  Continue diet and exercise lifestyle changes, follow up in 1 month  Relevant Medications    semaglutide, 0 25 or 0 5 mg/dose, (Ozempic, 0 25 or 0 5 MG/DOSE,) 2 mg/3 mL injection pen    Abnormal weight gain     Will switch back to ozempic as Tiffany is having a difficult time injecting the wegovy, will do prior auth and patient will reach out if she hears that it was denied  Continue diet and exercise lifestyle changes, follow up in 1 month  Relevant Medications    semaglutide, 0 25 or 0 5 mg/dose, (Ozempic, 0 25 or 0 5 MG/DOSE,) 2 mg/3 mL injection pen    Elevated fasting blood sugar     Will switch back to ozempic as Tiffany is having a difficult time injecting the wegovy, will do prior auth and patient will reach out if she hears that it was denied  Continue diet and exercise lifestyle changes, follow up in 1 month            Relevant Medications    semaglutide, 0 25 or 0 5 mg/dose, (Ozempic, 0 25 or 0 5 MG/DOSE,) 2 mg/3 mL injection pen         Subjective:      Patient ID: Fartun Mcleod is a 24 y o  female  Tiffany is here for follow up, she has lost 7 lbs but she feels her weight loss is stagnant  She is having a difficult time injecting the wegovy and doesn't feel like she is getting the full injection in  Also, her left ear is hurting but she thinks it is just because she was out in the rain this past weekend  The following portions of the patient's history were reviewed and updated as appropriate:   Past Medical History:  She has a past medical history of Anxiety, COVID (01/2021), High cholesterol, Morbid obesity with BMI of 45 0-49 9, adult [facility]), Pre-operative laboratory examination, and Seizures [facility] (03/31/2015)  ,  _______________________________________________________________________  Medical Problems:  does not have any pertinent problems on file ,  _______________________________________________________________________  Past Surgical History:   has a past surgical history that includes Upper gastrointestinal endoscopy  ,  _______________________________________________________________________  Family History:  family history includes Coronary artery disease in her maternal grandmother and mother; Diabetes in her maternal grandfather and sister; Hyperlipidemia in her maternal grandfather and maternal grandmother ,  _______________________________________________________________________  Social History:   reports that she quit smoking about 2 years ago  Her smoking use included cigarettes  She has a 1 50 pack-year smoking history  She has never used smokeless tobacco  She reports current alcohol use  She reports that she does not use drugs  ,  _______________________________________________________________________  Allergies:  has No Known Allergies     _______________________________________________________________________  Current Outpatient Medications   Medication Sig Dispense Refill    Adapalene-Benzoyl Peroxide 0 1-2 5 % gel Apply 1 application  topically daily at bedtime 45 g 0    ammonium lactate (LAC-HYDRIN) 12 % cream Apply topically as needed for dry skin 385 g 2    ciprofloxacin-dexamethasone (CIPRODEX) otic suspension Administer 4 drops into the left ear 2 (two) times a day 7 5 mL 0    clindamycin (CLEOCIN T) 1 % lotion Apply topically 2 (two) times a day 60 mL 0    ergocalciferol (VITAMIN D2) 50,000 units Take 1 capsule (50,000 Units total) by mouth once a week 12 capsule 0    fluticasone (FLONASE) 50 mcg/act nasal spray 1 spray into each nostril daily 15 8 mL 1    Insulin Pen Needle (Advocate Insulin Pen Needles) 31G X 8 MM MISC Use in the morning 90 each 0    mupirocin (BACTROBAN) 2 % ointment Apply topically 3 (three) times a day 22 g 0    nystatin (MYCOSTATIN) powder Apply topically 3 (three) times a day 60 g 0    semaglutide, 0 25 or 0 5 mg/dose, (Ozempic, 0 25 or 0 5 MG/DOSE,) 2 mg/3 mL injection pen Inject 0 38 mL (0 2533 mg total) under the skin every 7 days for 28 days, THEN 0 75 mL (0 5 mg total) every 7 days  9 mL 0    topiramate (Topamax) 25 mg tablet Take 1 tablet (25 mg total) by mouth 2 (two) times a day 45 tablet 0    valACYclovir (VALTREX) 500 mg tablet take 1 tablet by mouth twice a day for 3 days       No current facility-administered medications for this visit      _______________________________________________________________________  Review of Systems   Constitutional: Positive for fatigue  Negative for chills, diaphoresis and fever  HENT: Positive for ear pain  Negative for congestion, postnasal drip, rhinorrhea, sinus pressure, sinus pain and sore throat  Eyes: Negative for pain and visual disturbance  Respiratory: Negative for cough, chest tightness, shortness of breath and wheezing  Cardiovascular: Negative for chest pain and palpitations  Gastrointestinal: Negative for abdominal pain, constipation, diarrhea, nausea and vomiting  Genitourinary: Negative for dysuria, frequency, hematuria and urgency  Musculoskeletal: Positive for arthralgias and myalgias  Negative for back pain  Skin: Negative for color change and rash  Neurological: Negative for dizziness, seizures, syncope, light-headedness and headaches  Psychiatric/Behavioral: Negative for dysphoric mood  The patient is not nervous/anxious  All other systems reviewed and are negative  Objective:  Vitals:    05/02/23 0709   BP: 128/80   Pulse: 80   Temp: 98 4 °F (36 9 °C)   SpO2: 98%   Weight: 131 kg (288 lb 3 2 oz)     Body mass index is 46 52 kg/m²  Physical Exam  Vitals and nursing note reviewed  Constitutional:       General: She is not in acute distress  Appearance: Normal appearance  She is obese  She is not ill-appearing  HENT:      Head: Normocephalic  Right Ear: Tympanic membrane, ear canal and external ear normal  There is no impacted cerumen  Left Ear: Tympanic membrane and external ear normal  There is no impacted cerumen  Nose: Nose normal  No congestion  Mouth/Throat:      Mouth: Mucous membranes are moist    Eyes:      Extraocular Movements: Extraocular movements intact  Conjunctiva/sclera: Conjunctivae normal       Pupils: Pupils are equal, round, and reactive to light  Cardiovascular:      Rate and Rhythm: Normal rate and regular rhythm  Pulses: Normal pulses  Heart sounds: Normal heart sounds  Pulmonary:      Effort: Pulmonary effort is normal  No respiratory distress  Breath sounds: Normal breath sounds  No wheezing  Abdominal:      General: Bowel sounds are normal       Palpations: Abdomen is soft  Musculoskeletal:         General: No swelling or tenderness  Normal range of motion  Cervical back: Normal range of motion  No tenderness  Right lower leg: No edema  Left lower leg: No edema  Lymphadenopathy:      Cervical: No cervical adenopathy     Skin:     General: Skin is warm and dry  Neurological:      General: No focal deficit present  Mental Status: She is alert and oriented to person, place, and time  Psychiatric:         Mood and Affect: Mood normal          Behavior: Behavior normal          Thought Content:  Thought content normal          Judgment: Judgment normal

## 2023-05-02 NOTE — ASSESSMENT & PLAN NOTE
Will switch back to ozempic as Tiffany is having a difficult time injecting the wegovy, will do prior auth and patient will reach out if she hears that it was denied  Continue diet and exercise lifestyle changes, follow up in 1 month

## 2023-05-03 ENCOUNTER — TELEPHONE (OUTPATIENT)
Dept: BARIATRICS | Facility: CLINIC | Age: 28
End: 2023-05-03

## 2023-05-08 NOTE — PROGRESS NOTES
HEMATOLOGY CLINIC NOTE    Primary Care Provider: MIREYA Urias  Referring Provider: Mouna Colbert  MRN: [id number]  : 1995    Assessment / Plan:   1  [facility]  This is a 22-year-old female with a history of mild thrombocytopenia/borderline thrombocytopenia since at least   Platelet count tends to range from about 120 K to 160 K  She has never seen a hematology clinic before  Her most recent counts are as follows- 2023: WBC 4 88, Hgb 13 3, MCV 90, MPV 13 3, PLT 131K, diff normal  CMP acceptable  She does not have any liver or splenic conditions  No history of autoimmune conditions  No chronic medications that could explain thrombocytopenia as far back as   No herbal supplements  She had a CT scan without contrast of the chest, abdomen, pelvis 2021 which showed no splenomegaly or hepatomegaly  She has no bleeding anywhere, constitutional symptoms that are concerning, rashes such as petechial rash, purpura  She does have mild hot flashes which are likely unrelated  I do have some concern patient could have mild ITP given chronicity, no other likely etiology, elevated MPV associated thrombocytopenia many times  Ever, we will obtain the below work-up to ensure no other etiology present  If unconcerning, will monitor CBC with differential every 3 months  Follow-up in 6 months  Patient understands the most important part of thrombocytopenia especially if this is ITP is monitoring for any signs/symptoms of severe thrombocytopenia such as bleeding into urine or stools, worsening menorrhagia, petechial rashes, purpura, severe fatigue, gum bleeding, epistaxis  She will call her office and/or report to the ED should she notice these  - Ambulatory Referral to Hematology / Oncology  - CBC and differential; Future  - Methylmalonic acid, serum; Future  - Vitamin B12; Future  - Comprehensive metabolic panel; Future  - Folate;  Future  - Immature Platelet Fraction; Future  - HIV 1/2 AG/AB W REFLEX [facility] and [facility] only; Future  - Chronic Hepatitis Panel; Future  - US abdomen complete; Future  - Sedimentation rate, automated; Future  - C-reactive protein; Future  - CBC and differential; Standing  - CBC and differential    2  Class 3 severe obesity due to excess calories without serious comorbidity with body mass index (BMI) of 45 0 to 49 9 in adult [facility])  Patient informed me she was actually coming to this clinic to review if she can have gastric surgery done  She is cleared to have gastric bypass if needed as long as platelet count is above 50 K  · Discussion of decision making    I personally reviewed the following lab results, the image studies, pathology, other specialty/physicians consult notes and recommendations, and outside medical records from [facility]  I had a lengthy discussion with the patient and shared the work-up findings  I spent 30 minutes reviewing the records (labs, clinician notes, outside records, medical history, ordering medicine/tests/procedures, interpreting the imaging/labs previously done) and coordination of care as well as direct time with the patient today, of which greater than 50% of the time was spent in counseling and coordination of care with the patient/family  · Plan/Labs  · Obtain above lab workup, imaging  If unrevealing, monitor CBC-D Q3m  Follow Up: 6 months     All questions were answered to the patient's satisfaction during this encounter  The patient knows the contact information for our office and knows to reach out for any relevant concerns related to this encounter  They are to call for any temperature 100 4 or higher, new symptoms including but not restricted to shaking chills, decreased appetite, nausea, vomiting, diarrhea, increased fatigue, shortness of breath or chest pain, confusion, and not feeling the strength to come to the clinic   For all other listed problems and medical diagnosis in their chart - they are managed by PCP and/or other specialists, which the patient acknowledges  Thank you very much for your consultation and making us a part of this patient's care  We are continuing to follow closely with you  Please do not hesitate to reach out to me with any additional questions or concerns  Reason for visit:       Chief Complaint   Patient presents with   • Consult       History of Hematology Illness:     Chon Carnes is a 24 y o  female who came in for consultation  1  Mild Thrombocytopenia/ Borderline Thrombocytopenia   - Present since at least 2018  Patient has never seen a hematology clinic before  - Platelet count tends to range from 120 to 160 K  Rest of CBC with differential is always normal   - 1/2023: WBC 4 88, Hgb 13 3, MCV 90, MPV 13 3, PLT 131K, diff normal  CMP acceptable  -Patient denies any liver conditions, splenomegaly, autoimmune conditions  She has never been hospitalized for low platelets before  No history of viral infections such as HIV, hepatitis  No history of cancer  No chronic medications since 2018 that could explain low platelet  Not taking herbal supplements  She is taking B12  No history of Q17 or folic acid deficiency  She does not drink alcohol regularly  Interval History:   5/9/2023: This is a 29-year-old female with past medical history of obesity, hyperlipidemia, depression, vitamin D deficiency, MCL tear, gastritis, eustachian tube dysfunction, thrombocytopenia, and more presenting for consultation  Patient occasionally has eczema like rashes that are pruritic  Otherwise, no bleeding anywhere such as in urine or stools or any mucosal bleeding such as epistaxis, gum bleeding, vaginal bleeding that is worsening other than normal menstrual cycle  No petechial-like rashes or purpura  No constitutional symptoms such as fever, chills, drenching night sweats, enlarged lymphadenopathy, other lumps or bumps, sudden weight loss    She does occasionally have hot flashes since her last child that has become more noticeable over the last 2 years  She does not smoke or drink regularly  She works in an office job  No cancer history personally  No family history of cancer  She is up-to-date on her Pap smear  Problem list:       Patient Active Problem List   Diagnosis   • Neck pain   • Class 3 severe obesity due to excess calories without serious comorbidity with body mass index (BMI) of 45 0 to 49 9 in adult [facility])   • Acne   • Large breasts   • Hyperlipidemia   • Depression   • Vitamin D deficiency   • Tear of MCL (medial collateral ligament) of knee, left, initial encounter   • Constipation   • Annual physical exam   • Acute gastritis without hemorrhage   • Eustachian tube dysfunction   • Morbid (severe) obesity due to excess calories (HCC)   • Chronic midline thoracic back pain   • Breast pain, left   • Acanthosis nigricans   • Primary insomnia   • Abnormal weight gain   • Chronic tension-type headache, intractable   • Thrombocytopenia (HCC)   • Intertrigo   • Elevated fasting blood sugar   • Otitis of left ear       REVIEW OF SYMPTOMS:   Review of Systems   Constitutional: Negative for activity change, chills, diaphoresis, fatigue, fever and unexpected weight change  HENT: Negative for nosebleeds  Eyes: Negative for visual disturbance  Respiratory: Negative for cough and shortness of breath  Cardiovascular: Negative for chest pain, palpitations and leg swelling  Gastrointestinal: Negative for abdominal pain, anal bleeding, blood in stool, constipation, diarrhea, nausea and vomiting  Endocrine: Negative for cold intolerance  Genitourinary: Negative for hematuria, menstrual problem and vaginal bleeding  Musculoskeletal: Negative for arthralgias  Skin: Positive for rash (eczema hx)  Negative for color change and pallor  Neurological: Negative for dizziness, syncope, light-headedness and headaches  Hematological: Negative for adenopathy  "Does not bruise/bleed easily  Psychiatric/Behavioral: Negative for sleep disturbance  PHYSICAL EXAMINATION:     Vital Signs:   /78 (BP Location: Left arm, Patient Position: Sitting, Cuff Size: Large)   Pulse 80   Temp 98 °F (36 7 °C) (Temporal)   Resp 15   Ht 5' 6\" (1 676 m)   Wt 130 kg (286 lb 8 oz)   SpO2 98%   BMI 46 24 kg/m²   Body surface area is 2 33 meters squared  Ht Readings from Last 8 Encounters:   05/09/23 5' 6\" (1 676 m)   04/03/23 5' 6\" (1 676 m)   03/15/23 5' 6\" (1 676 m)   03/01/23 5' 6\" (1 676 m)   02/09/23 5' 6\" (1 676 m)   02/01/23 5' 6\" (1 676 m)   01/11/23 5' 6\" (1 676 m)   01/10/23 5' 6\" (1 676 m)       Wt Readings from Last 8 Encounters:   05/09/23 130 kg (286 lb 8 oz)   05/02/23 131 kg (288 lb 3 2 oz)   04/03/23 134 kg (295 lb)   03/15/23 133 kg (293 lb)   03/01/23 (!) 137 kg (302 lb)   02/09/23 (!) 137 kg (303 lb)   02/01/23 (!) 137 kg (303 lb)   01/12/23 (!) 137 kg (302 lb)          Physical Exam  Constitutional:       General: She is not in acute distress  Appearance: Normal appearance  She is obese  She is not ill-appearing, toxic-appearing or diaphoretic  HENT:      Head: Normocephalic and atraumatic  Eyes:      General: No scleral icterus  Extraocular Movements: Extraocular movements intact  Conjunctiva/sclera: Conjunctivae normal       Pupils: Pupils are equal, round, and reactive to light  Pulmonary:      Effort: Pulmonary effort is normal  No respiratory distress  Abdominal:      Tenderness: There is no abdominal tenderness  Musculoskeletal:         General: No tenderness  Normal range of motion  Cervical back: Normal range of motion and neck supple  Right lower leg: No edema  Left lower leg: No edema  Lymphadenopathy:      Cervical: No cervical adenopathy  Skin:     General: Skin is warm and dry  Coloration: Skin is not jaundiced or pale  Findings: No bruising, erythema, lesion or rash        Comments: No " petechia, purpura   Neurological:      General: No focal deficit present  Mental Status: She is alert and oriented to person, place, and time  Mental status is at baseline  Psychiatric:         Mood and Affect: Mood normal          Behavior: Behavior normal          Thought Content: Thought content normal          Judgment: Judgment normal        Reviewed historical information  PAST MEDICAL HISTORY:    Past Medical History:   Diagnosis Date   • Anxiety    • COVID 01/2021   • High cholesterol    • Morbid obesity with BMI of 45 0-49 9, adult [facility])    • Pre-operative laboratory examination    • Seizures ([address] ) 03/31/2015       PAST SURGICAL HISTORY:    Past Surgical History:   Procedure Laterality Date   • UPPER GASTROINTESTINAL ENDOSCOPY           CURRENT MEDICATIONS:     Current Outpatient Medications:   •  Adapalene-Benzoyl Peroxide 0 1-2 5 % gel, Apply 1 application   topically daily at bedtime, Disp: 45 g, Rfl: 0  •  ammonium lactate (LAC-HYDRIN) 12 % cream, Apply topically as needed for dry skin, Disp: 385 g, Rfl: 2  •  clindamycin (CLEOCIN T) 1 % lotion, Apply topically 2 (two) times a day (Patient taking differently: Apply topically 2 (two) times a day As needed), Disp: 60 mL, Rfl: 0  •  ergocalciferol (VITAMIN D2) 50,000 units, Take 1 capsule (50,000 Units total) by mouth once a week, Disp: 12 capsule, Rfl: 0  •  fluticasone (FLONASE) 50 mcg/act nasal spray, 1 spray into each nostril daily (Patient taking differently: 1 spray into each nostril daily as needed), Disp: 15 8 mL, Rfl: 1  •  Insulin Pen Needle (Advocate Insulin Pen Needles) 31G X 8 MM MISC, Use in the morning, Disp: 90 each, Rfl: 0  •  mupirocin (BACTROBAN) 2 % ointment, Apply topically 3 (three) times a day, Disp: 22 g, Rfl: 0  •  nystatin (MYCOSTATIN) powder, Apply topically 3 (three) times a day (Patient taking differently: Apply topically 3 (three) times a day As needed), Disp: 60 g, Rfl: 0  •  semaglutide, 0 25 or 0 5 mg/dose, (Ozempic, 0 25 or 0 5 MG/DOSE,) 2 mg/3 mL injection pen, Inject 0 38 mL (0 2533 mg total) under the skin every 7 days for 28 days, THEN 0 75 mL (0 5 mg total) every 7 days  , Disp: 9 mL, Rfl: 0  •  topiramate (Topamax) 25 mg tablet, Take 1 tablet (25 mg total) by mouth 2 (two) times a day, Disp: 45 tablet, Rfl: 0  •  valACYclovir (VALTREX) 500 mg tablet, if needed, Disp: , Rfl:   •  ciprofloxacin-dexamethasone (CIPRODEX) otic suspension, Administer 4 drops into the left ear 2 (two) times a day (Patient not taking: Reported on 2023), Disp: 7 5 mL, Rfl: 0    SOCIAL HISTORY:    Social History     Tobacco Use   • Smoking status: Former     Packs/day: 0 25     Years: 6 00     Pack years: 1 50     Types: Cigarettes     Quit date: 2021     Years since quittin 0   • Smokeless tobacco: Never   • Tobacco comments:     stopped   Vaping Use   • Vaping Use: Never used   Substance Use Topics   • Alcohol use: Yes     Comment: occasionally   • Drug use: No       FAMILY HISTORY:    Family History   Problem Relation Age of Onset   • Coronary artery disease Mother    • Diabetes Sister    • Coronary artery disease Maternal Grandmother    • Hyperlipidemia Maternal Grandmother    • Hyperlipidemia Maternal Grandfather    • Diabetes Maternal Grandfather    • Alcohol abuse Neg Hx    • Substance Abuse Neg Hx    • Mental illness Neg Hx        ALLERGIES:  No Known Allergies      LAB:    Lab Results   Component Value Date    WBC 4 88 01/10/2023    HGB 13 3 01/10/2023    HCT 40 8 01/10/2023    MCV 90 01/10/2023     (L) 01/10/2023       Lab Results   Component Value Date    SODIUM 140 01/10/2023    K 4 1 01/10/2023     01/10/2023    CO2 27 01/10/2023    AGAP 6 01/10/2023    BUN 12 01/10/2023    CREATININE 0 66 01/10/2023    GLUF 102 (H) 01/10/2023    CALCIUM 8 9 01/10/2023    AST 16 01/10/2023    ALT 19 01/10/2023    ALKPHOS 73 01/10/2023    TP 7 3 01/10/2023    TBILI 0 41 01/10/2023    EGFR 123 01/10/2023 IMAGING:  EGD  Narrative:  [id number] [facility]  [place] [address]  [phone]    DATE OF SERVICE:  2/11/22    PHYSICIAN(S):  Nuno Mendoza MD Proceduralist     INDICATION:  Morbid obesity ([address] )    POST-OP DIAGNOSIS:  See the impression below  PREPROCEDURE:  Informed consent was obtained for the procedure, including sedation  Risks of perforation, hemorrhage, adverse drug reaction and aspiration   were discussed  The patient was placed in the left lateral decubitus   position  Patient was explained about the risks and benefits of the procedure  Risks   including but not limited to bleeding, infection, and perforation were   explained in detail  Also explained about less than 100% sensitivity with   the exam and other alternatives  DETAILS OF PROCEDURE:  Patient was taken to the procedure room where a time out was performed to   confirm correct patient and correct procedure  The patient underwent   monitored anesthesia care, which was administered by an anesthesia   professional  The patient's blood pressure, heart rate, level of   consciousness, respirations and oxygen were monitored throughout the   procedure  The scope was advanced to the second part of the duodenum  Retroflexion was performed in the fundus  The patient experienced no blood   loss  The procedure was not difficult  The patient tolerated the procedure   well  There were no apparent complications  ANESTHESIA INFORMATION:  ASA: II  Anesthesia Type: IV Sedation with Anesthesia    MEDICATIONS:  No administrations occurring from 1008 to 1017 on 02/11/22     FINDINGS:  All observed locations appeared normal, including the esophagus, stomach,   duodenal bulb, 1st part of the duodenum and 2nd part of the duodenum   The   esophagus, stomach, duodenal bulb, 1st part of the duodenum and 2nd part   of the duodenum appeared normal   Performed 2 biopsies to rule out H  pylori in the antrum    SPECIMENS:  ID Type Source Tests Collected by Time Destination   1 :  Tissue Stomach TISSUE EXAM Natasha Molina MD 2/11/2022 1016    Impression: Normal EGD     RECOMMENDATION:  Continue progress           Natasha Molina MD

## 2023-05-09 ENCOUNTER — CONSULT (OUTPATIENT)
Dept: HEMATOLOGY ONCOLOGY | Facility: CLINIC | Age: 28
End: 2023-05-09

## 2023-05-09 VITALS
HEART RATE: 80 BPM | WEIGHT: 286.5 LBS | BODY MASS INDEX: 46.04 KG/M2 | RESPIRATION RATE: 15 BRPM | HEIGHT: 66 IN | TEMPERATURE: 98 F | OXYGEN SATURATION: 98 % | SYSTOLIC BLOOD PRESSURE: 118 MMHG | DIASTOLIC BLOOD PRESSURE: 78 MMHG

## 2023-05-09 DIAGNOSIS — E66.01 CLASS 3 SEVERE OBESITY DUE TO EXCESS CALORIES WITHOUT SERIOUS COMORBIDITY WITH BODY MASS INDEX (BMI) OF 45.0 TO 49.9 IN ADULT (HCC): ICD-10-CM

## 2023-05-09 DIAGNOSIS — D69.6 THROMBOCYTOPENIA (HCC): Primary | ICD-10-CM

## 2023-05-11 ENCOUNTER — TELEPHONE (OUTPATIENT)
Dept: FAMILY MEDICINE CLINIC | Facility: CLINIC | Age: 28
End: 2023-05-11

## 2023-05-16 ENCOUNTER — TELEPHONE (OUTPATIENT)
Dept: FAMILY MEDICINE CLINIC | Facility: CLINIC | Age: 28
End: 2023-05-16

## 2023-05-16 NOTE — TELEPHONE ENCOUNTER
Pt called- she stated that she is waiting in a prior auth for Ozempic  She stated that she called the ins co and they told her that they are waiting on addt'l info  She would like a status update  Please advise, thank you!

## 2023-05-16 NOTE — TELEPHONE ENCOUNTER
Prasanna Stahl from [facility] called in and stated that the Ozempic was denied but have an alternativ  Trulicty or Disha  Prasanna Stahl will also be faxing the paperwork   Thanks

## 2023-05-16 NOTE — TELEPHONE ENCOUNTER
I spoke with the pt, I checked the prior auth that was done 5/11/2023 and it is still waiting for determination and she is aware

## 2023-05-17 ENCOUNTER — TELEPHONE (OUTPATIENT)
Dept: FAMILY MEDICINE CLINIC | Facility: CLINIC | Age: 28
End: 2023-05-17

## 2023-05-17 NOTE — TELEPHONE ENCOUNTER
Called tammy no answer left message regarding Ozempic not being covered  insurance would like for her to try Trulicity first   Would she like to try that or stick with Wegovy ?

## 2023-05-19 ENCOUNTER — TELEPHONE (OUTPATIENT)
Dept: FAMILY MEDICINE CLINIC | Facility: CLINIC | Age: 28
End: 2023-05-19

## 2023-05-19 NOTE — TELEPHONE ENCOUNTER
Called patient to let her know that Ozempic was not covered  We tried three different prior Auth and they denied all three  I explained to her that it is hard because she is not a type 1 or 2 diabetic and they usual want you to try and fail on Trulicty and Victoza  I did let her know that she did try Victoza but she would need to take Trophicity as well but she is not a diabetic so not sure if it would be covered with our a prior Auth       She said whatever you think is best for her she just prefers Ozempic

## 2023-05-23 DIAGNOSIS — E66.01 CLASS 3 SEVERE OBESITY DUE TO EXCESS CALORIES WITHOUT SERIOUS COMORBIDITY WITH BODY MASS INDEX (BMI) OF 45.0 TO 49.9 IN ADULT (HCC): Primary | ICD-10-CM

## 2023-05-23 DIAGNOSIS — R63.5 ABNORMAL WEIGHT GAIN: ICD-10-CM

## 2023-05-23 NOTE — TELEPHONE ENCOUNTER
Spoke with patient and she is willing to try the combination drug first and see because she doesn't want to pay for any drugs

## 2023-05-24 DIAGNOSIS — L70.0 ACNE VULGARIS: ICD-10-CM

## 2023-05-24 NOTE — TELEPHONE ENCOUNTER
Prior Concetta Phillips was completed on [facility] this morning  Waiting for the answer

## 2023-05-25 RX ORDER — ADAPALENE AND BENZOYL PEROXIDE .1; 2.5 G/100G; G/100G
1 GEL TOPICAL
Qty: 45 G | Refills: 0 | Status: SHIPPED | OUTPATIENT
Start: 2023-05-25

## 2023-05-30 ENCOUNTER — TELEPHONE (OUTPATIENT)
Dept: PSYCHIATRY | Facility: CLINIC | Age: 28
End: 2023-05-30

## 2023-05-31 DIAGNOSIS — M54.6 CHRONIC MIDLINE THORACIC BACK PAIN: ICD-10-CM

## 2023-05-31 DIAGNOSIS — F32.A DEPRESSION, UNSPECIFIED DEPRESSION TYPE: ICD-10-CM

## 2023-05-31 DIAGNOSIS — M54.2 NECK PAIN: Primary | ICD-10-CM

## 2023-05-31 DIAGNOSIS — G89.29 CHRONIC MIDLINE THORACIC BACK PAIN: ICD-10-CM

## 2023-05-31 RX ORDER — BUPROPION HYDROCHLORIDE 100 MG/1
100 TABLET ORAL DAILY
Qty: 30 TABLET | Refills: 0 | Status: SHIPPED | OUTPATIENT
Start: 2023-05-31

## 2023-08-02 ENCOUNTER — VBI (OUTPATIENT)
Dept: ADMINISTRATIVE | Facility: OTHER | Age: 28
End: 2023-08-02

## 2023-08-02 ENCOUNTER — OFFICE VISIT (OUTPATIENT)
Dept: FAMILY MEDICINE CLINIC | Facility: CLINIC | Age: 28
End: 2023-08-02
Payer: COMMERCIAL

## 2023-08-02 VITALS
SYSTOLIC BLOOD PRESSURE: 120 MMHG | BODY MASS INDEX: 43.87 KG/M2 | WEIGHT: 273 LBS | OXYGEN SATURATION: 98 % | DIASTOLIC BLOOD PRESSURE: 84 MMHG | HEIGHT: 66 IN | TEMPERATURE: 96.5 F | HEART RATE: 86 BPM

## 2023-08-02 DIAGNOSIS — M77.41 METATARSALGIA OF RIGHT FOOT: Primary | ICD-10-CM

## 2023-08-02 DIAGNOSIS — R51.9 ACUTE NONINTRACTABLE HEADACHE, UNSPECIFIED HEADACHE TYPE: ICD-10-CM

## 2023-08-02 DIAGNOSIS — E66.01 MORBID OBESITY WITH BODY MASS INDEX (BMI) OF 40.0 TO 44.9 IN ADULT (HCC): ICD-10-CM

## 2023-08-02 DIAGNOSIS — M79.671 RIGHT FOOT PAIN: ICD-10-CM

## 2023-08-02 PROCEDURE — 99214 OFFICE O/P EST MOD 30 MIN: CPT | Performed by: NURSE PRACTITIONER

## 2023-08-02 RX ORDER — METRONIDAZOLE 500 MG/1
500 TABLET ORAL 2 TIMES DAILY
COMMUNITY
Start: 2023-07-26

## 2023-08-02 NOTE — ASSESSMENT & PLAN NOTE
Patient was started on Contrave by provider. Patient states she never started medication. Wishes to try to lose weight the natural way. Patient states she has made dietary changes and is going to the gym frequently. Encouraged to continue with diet and lifestyle modifications.

## 2023-08-02 NOTE — PROGRESS NOTES
Name: Thurston Denver      : 1995      MRN: [id number]  Encounter Provider: MIREYA Bedoya  Encounter Date: 2023   Encounter department: [address] [place] [address]  [address] [place] [address]     1. Metatarsalgia of right foot  Comments:  Advised elevation, ice, then heat therapy. Topical creams or NSAIDs prn. Referral enetered for PT for orthotics eval.  Orders:  -     Ambulatory Referral to Physical Therapy; Future    2. Right foot pain  Comments: To obtain x-ray to r/o fracture. Orders:  -     XR foot 3+ vw right; Future; Expected date: 2023  -     Ambulatory Referral to Physical Therapy; Future    3. Acute nonintractable headache, unspecified headache type  Comments:  Discussed importance of stress management, adequate sleep, hydration, rest. Discussed use of magnesium oxide and riboflavin. To return for concerning s/s    4. Morbid obesity with body mass index (BMI) of 40.0 to 44.9 in adult [facility])  Assessment & Plan:  Patient was started on Contrave by provider. Patient states she never started medication. Wishes to try to lose weight the natural way. Patient states she has made dietary changes and is going to the gym frequently. Encouraged to continue with diet and lifestyle modifications. Subjective      Patient presents to the office for evaluation of right foot pain and headaches. Patient notes pain to the bottom of her right foot at the base of her toes for the past week. Patient denies recent injury or trauma. States she has been going to the gym more often and does not know if she may have injured it while working out. Patient notes pain worse when flexing toes and when walking. Patient attempted to change her shoes, but still notes pain. Notes pain is "sharp."  Denies radiation of pain. Denies numbness or tingling in foot, or inability to bear weight on foot. Has not used any over-the-counter medications for foot pain.     Patient also notes generalized headache. Symptoms began a week ago. Patient notes pain as "sharp" and "throbbing."  Patient denies radiation of pain, notes pain is generalized. Pain waxes and wanes. Patient denies nausea, photophobia, visual disturbances, dizziness, unilateral weakness, slurred speech. Patient states she has had increased stress as she is dealing with a break-up. Also notes increased caffeine intake. Patient has not tried any OTC medications for pain. Review of Systems   Constitutional: Negative for chills, fatigue and fever. HENT: Negative for congestion, sore throat and trouble swallowing. Eyes: Negative for photophobia and visual disturbance. Respiratory: Negative for cough and shortness of breath. Cardiovascular: Negative for chest pain and palpitations. Gastrointestinal: Negative for nausea and vomiting. Genitourinary: Negative for decreased urine volume, flank pain and frequency. Musculoskeletal: Positive for arthralgias (right foot). Negative for gait problem and joint swelling. Skin: Negative for color change and rash. Neurological: Positive for headaches. Negative for dizziness, speech difficulty, weakness, light-headedness and numbness. Hematological: Negative for adenopathy. Does not bruise/bleed easily. Psychiatric/Behavioral: Negative for confusion. The patient is not nervous/anxious. Current Outpatient Medications on File Prior to Visit   Medication Sig   • Adapalene-Benzoyl Peroxide 0.1-2.5 % gel Apply 1 application.  topically daily at bedtime   • ammonium lactate (LAC-HYDRIN) 12 % cream Apply topically as needed for dry skin   • metroNIDAZOLE (FLAGYL) 500 mg tablet Take 500 mg by mouth 2 (two) times a day   • valACYclovir (VALTREX) 500 mg tablet if needed   • buPROPion (WELLBUTRIN) 100 mg tablet Take 1 tablet (100 mg total) by mouth in the morning (Patient not taking: Reported on 8/2/2023)   • [DISCONTINUED] ergocalciferol (VITAMIN D2) 50,000 units Take 1 capsule (50,000 Units total) by mouth once a week   • [DISCONTINUED] mupirocin (BACTROBAN) 2 % ointment Apply topically 3 (three) times a day (Patient not taking: Reported on 8/2/2023)   • [DISCONTINUED] Naltrexone HCl, Pain, 4.5 MG CAPS Take 4.5 mg by mouth in the morning (Patient not taking: Reported on 8/2/2023)   • [DISCONTINUED] topiramate (Topamax) 25 mg tablet Take 1 tablet (25 mg total) by mouth 2 (two) times a day (Patient not taking: Reported on 8/2/2023)       Objective     /84 (BP Location: Left arm, Patient Position: Sitting, Cuff Size: Standard)   Pulse 86   Temp (!) 96.5 °F (35.8 °C) (Tympanic)   Ht 5' 6" (1.676 m)   Wt 124 kg (273 lb)   LMP 07/25/2023 (Exact Date)   SpO2 98%   Breastfeeding No   BMI 44.06 kg/m²     Physical Exam  Vitals reviewed. Constitutional:       General: She is not in acute distress. Appearance: Normal appearance. She is not ill-appearing. HENT:      Head: Normocephalic and atraumatic. Right Ear: Tympanic membrane, ear canal and external ear normal.      Left Ear: Tympanic membrane, ear canal and external ear normal.      Nose: Nose normal.      Mouth/Throat:      Mouth: Mucous membranes are moist.      Pharynx: Oropharynx is clear. Eyes:      Extraocular Movements: Extraocular movements intact. Conjunctiva/sclera: Conjunctivae normal.      Pupils: Pupils are equal, round, and reactive to light. Cardiovascular:      Rate and Rhythm: Normal rate and regular rhythm. Pulses: Normal pulses. Dorsalis pedis pulses are 2+ on the right side and 2+ on the left side. Heart sounds: Normal heart sounds. No murmur heard. Pulmonary:      Effort: Pulmonary effort is normal.      Breath sounds: Normal breath sounds. Abdominal:      General: Bowel sounds are normal.      Palpations: Abdomen is soft. Tenderness: There is no abdominal tenderness. Musculoskeletal:         General: Normal range of motion.       Cervical back: Normal range of motion and neck supple. Right lower leg: No edema. Left lower leg: No edema. Feet:    Feet:      Right foot:      Skin integrity: Skin integrity normal.      Left foot:      Skin integrity: Skin integrity normal.   Lymphadenopathy:      Cervical: No cervical adenopathy. Skin:     General: Skin is warm and dry. Capillary Refill: Capillary refill takes less than 2 seconds. Neurological:      General: No focal deficit present. Mental Status: She is alert and oriented to person, place, and time.    Psychiatric:         Mood and Affect: Mood normal.         Behavior: Behavior normal.       MIREYA Luna

## 2023-08-02 NOTE — PATIENT INSTRUCTIONS
Metatarsalgia   WHAT YOU NEED TO KNOW:   Metatarsalgia is pain in the ball of your foot, near your second, third, and fourth toes. DISCHARGE INSTRUCTIONS:   Contact your healthcare provider if:   You develop knee, back, or hip pain. You have more pain or redness in the foot. You have questions or concerns about your condition or care. Medicines:   NSAIDs , such as ibuprofen, help decrease swelling, pain, and fever. This medicine is available with or without a doctor's order. NSAIDs can cause stomach bleeding or kidney problems in certain people. If you take blood thinner medicine, always ask if NSAIDs are safe for you. Always read the medicine label and follow directions. Do not give these medicines to children younger than 6 months without direction from a healthcare provider. Take your medicine as directed. Contact your healthcare provider if you think your medicine is not helping or if you have side effects. Tell your provider if you are allergic to any medicine. Keep a list of the medicines, vitamins, and herbs you take. Include the amounts, and when and why you take them. Bring the list or the pill bottles to follow-up visits. Carry your medicine list with you in case of an emergency. Manage or prevent metatarsalgia:   Rest your foot. If you play sports, you may not be able to do weight-bearing exercises. Examples include swimming and bike riding. Ask your healthcare provider which exercises are safe for you. Apply ice as directed. Ice helps reduce pain and swelling. Use an ice pack, or put crushed ice in a plastic bag. Cover the pack or bag with a towel before you apply it to your foot. Apply ice for 15 to 20 minutes every hour, or as directed. Use a cane or crutch if directed. These devices may help take pressure off your foot while it heals. Wear proper shoes. Do not wear shoes that are narrow or tight. You may need to wear shoes that are wider than you usually wear.  Choose shoes that do not have a raised heel. Shock-absorbing shoes can help prevent injury. These shoes will have extra support under your feet and toes. You can also add shoe cushions inside your shoes or to the bottoms of your feet, near your toes. The cushions may provide more support and make walking or standing more comfortable. Arch supports may help take pressure off your toes. Reach or maintain a healthy weight. Extra weight can put pressure on your feet. Talk to your healthcare provider about a healthy weight for you. Your provider can help you create a safe weight loss plan if you are overweight. Go to physical therapy if directed. A physical therapist can help improve your strength and range of motion. The therapist can also help you improve the way you walk to prevent metatarsalgia from happening again. Your therapist can also teach you exercises to help relieve your pain. Follow up with your doctor as directed:  Write down your questions so you remember to ask them during your visits. © Copyright [employer] 2022 Information is for End User's use only and may not be sold, redistributed or otherwise used for commercial purposes. The above information is an  only. It is not intended as medical advice for individual conditions or treatments. Talk to your doctor, nurse or pharmacist before following any medical regimen to see if it is safe and effective for you.

## 2023-08-04 ENCOUNTER — HOSPITAL ENCOUNTER (OUTPATIENT)
Dept: RADIOLOGY | Facility: HOSPITAL | Age: 28
End: 2023-08-04
Payer: COMMERCIAL

## 2023-08-04 ENCOUNTER — APPOINTMENT (OUTPATIENT)
Dept: LAB | Facility: HOSPITAL | Age: 28
End: 2023-08-04
Payer: COMMERCIAL

## 2023-08-04 DIAGNOSIS — D69.6 THROMBOCYTOPENIA (HCC): ICD-10-CM

## 2023-08-04 DIAGNOSIS — M79.671 RIGHT FOOT PAIN: ICD-10-CM

## 2023-08-04 LAB
ALBUMIN SERPL BCP-MCNC: 4.6 G/DL (ref 3.5–5)
ALP SERPL-CCNC: 60 U/L (ref 34–104)
ALT SERPL W P-5'-P-CCNC: 12 U/L (ref 7–52)
ANION GAP SERPL CALCULATED.3IONS-SCNC: 6 MMOL/L
AST SERPL W P-5'-P-CCNC: 17 U/L (ref 13–39)
BASOPHILS # BLD AUTO: 0.05 THOUSANDS/ÂΜL (ref 0–0.1)
BASOPHILS NFR BLD AUTO: 1 % (ref 0–1)
BILIRUB SERPL-MCNC: 0.53 MG/DL (ref 0.2–1)
BUN SERPL-MCNC: 10 MG/DL (ref 5–25)
CALCIUM SERPL-MCNC: 9.6 MG/DL (ref 8.4–10.2)
CHLORIDE SERPL-SCNC: 103 MMOL/L (ref 96–108)
CO2 SERPL-SCNC: 27 MMOL/L (ref 21–32)
CREAT SERPL-MCNC: 0.76 MG/DL (ref 0.6–1.3)
CRP SERPL QL: <1 MG/L
EOSINOPHIL # BLD AUTO: 0.24 THOUSAND/ÂΜL (ref 0–0.61)
EOSINOPHIL NFR BLD AUTO: 6 % (ref 0–6)
ERYTHROCYTE [DISTWIDTH] IN BLOOD BY AUTOMATED COUNT: 12.8 % (ref 11.6–15.1)
ERYTHROCYTE [SEDIMENTATION RATE] IN BLOOD: 7 MM/HOUR (ref 0–19)
FOLATE SERPL-MCNC: 19.9 NG/ML
GFR SERPL CREATININE-BSD FRML MDRD: 109 ML/MIN/1.73SQ M
GLUCOSE P FAST SERPL-MCNC: 92 MG/DL (ref 65–99)
HBV CORE AB SER QL: NORMAL
HBV CORE IGM SER QL: NORMAL
HBV SURFACE AG SER QL: NORMAL
HCT VFR BLD AUTO: 40.3 % (ref 34.8–46.1)
HCV AB SER QL: NORMAL
HGB BLD-MCNC: 13.6 G/DL (ref 11.5–15.4)
IMM GRANULOCYTES # BLD AUTO: 0.01 THOUSAND/UL (ref 0–0.2)
IMM GRANULOCYTES NFR BLD AUTO: 0 % (ref 0–2)
LYMPHOCYTES # BLD AUTO: 1.58 THOUSANDS/ÂΜL (ref 0.6–4.47)
LYMPHOCYTES NFR BLD AUTO: 40 % (ref 14–44)
MCH RBC QN AUTO: 29.5 PG (ref 26.8–34.3)
MCHC RBC AUTO-ENTMCNC: 33.7 G/DL (ref 31.4–37.4)
MCV RBC AUTO: 87 FL (ref 82–98)
MONOCYTES # BLD AUTO: 0.23 THOUSAND/ÂΜL (ref 0.17–1.22)
MONOCYTES NFR BLD AUTO: 6 % (ref 4–12)
NEUTROPHILS # BLD AUTO: 1.85 THOUSANDS/ÂΜL (ref 1.85–7.62)
NEUTS SEG NFR BLD AUTO: 47 % (ref 43–75)
NRBC BLD AUTO-RTO: 0 /100 WBCS
PLATELET # BLD AUTO: 154 THOUSANDS/UL (ref 149–390)
PLATELETS.RETICULATED NFR BLD AUTO: 12.3 %
PMV BLD AUTO: 12.7 FL (ref 8.9–12.7)
POTASSIUM SERPL-SCNC: 3.7 MMOL/L (ref 3.5–5.3)
PROT SERPL-MCNC: 7.5 G/DL (ref 6.4–8.4)
RBC # BLD AUTO: 4.61 MILLION/UL (ref 3.81–5.12)
SODIUM SERPL-SCNC: 136 MMOL/L (ref 135–147)
VIT B12 SERPL-MCNC: 338 PG/ML (ref 180–914)
WBC # BLD AUTO: 3.96 THOUSAND/UL (ref 4.31–10.16)

## 2023-08-04 PROCEDURE — 85055 RETICULATED PLATELET ASSAY: CPT

## 2023-08-04 PROCEDURE — 86140 C-REACTIVE PROTEIN: CPT

## 2023-08-04 PROCEDURE — 82607 VITAMIN B-12: CPT

## 2023-08-04 PROCEDURE — 86803 HEPATITIS C AB TEST: CPT

## 2023-08-04 PROCEDURE — 86704 HEP B CORE ANTIBODY TOTAL: CPT

## 2023-08-04 PROCEDURE — 86705 HEP B CORE ANTIBODY IGM: CPT

## 2023-08-04 PROCEDURE — 85652 RBC SED RATE AUTOMATED: CPT

## 2023-08-04 PROCEDURE — 36415 COLL VENOUS BLD VENIPUNCTURE: CPT

## 2023-08-04 PROCEDURE — 73630 X-RAY EXAM OF FOOT: CPT

## 2023-08-04 PROCEDURE — 87340 HEPATITIS B SURFACE AG IA: CPT

## 2023-08-04 PROCEDURE — 80053 COMPREHEN METABOLIC PANEL: CPT

## 2023-08-04 PROCEDURE — 87389 HIV-1 AG W/HIV-1&-2 AB AG IA: CPT

## 2023-08-04 PROCEDURE — 82746 ASSAY OF FOLIC ACID SERUM: CPT

## 2023-08-04 PROCEDURE — 83918 ORGANIC ACIDS TOTAL QUANT: CPT

## 2023-08-05 LAB — HIV 1+2 AB+HIV1 P24 AG SERPL QL IA: NON REACTIVE

## 2023-08-08 LAB — METHYLMALONATE SERPL-SCNC: 94 NMOL/L (ref 0–378)

## 2023-10-02 ENCOUNTER — TELEPHONE (OUTPATIENT)
Dept: HEMATOLOGY ONCOLOGY | Facility: CLINIC | Age: 28
End: 2023-10-02

## 2023-10-02 NOTE — TELEPHONE ENCOUNTER
I am reaching out regarding your appointment with Soraida Sagastume on 11/7/23    There has been a change to the providers schedule, and we will need to reschedule your appointment. I left a voicemail explaining to patient that this appointment will need to be rescheduled due to a change in the providers schedule.  Patient was advised to call \[facility]\"" [phone] to reschedule

## 2023-10-16 ENCOUNTER — TELEPHONE (OUTPATIENT)
Dept: HEMATOLOGY ONCOLOGY | Facility: CLINIC | Age: 28
End: 2023-10-16

## 2023-10-16 NOTE — TELEPHONE ENCOUNTER
VAMSI  DR talha MCLEAN   Who are you speaking with? Patient   If it is not the patient, are they listed on an active communication consent form? N/A   Is this a VAMSI or DR talha MCLEAN VAMSI   Which provider is patient currently scheduled or established with? La Jakcson   What is the original appointment date and time? 11/07/23 4PM   At which location is the appointment scheduled to take place? [facility]   Which provider is the patient transitioning care to? Dr. Fauzia Toussaint   What is the new appointment date and time? 11/22/23 11:40AM   At which location is the new appointment scheduled to take place? [facility]   What is the reason for this change?  Provider

## 2023-10-24 DIAGNOSIS — L70.0 ACNE VULGARIS: ICD-10-CM

## 2023-10-24 RX ORDER — ADAPALENE AND BENZOYL PEROXIDE GEL, 0.1%/2.5% 1; 25 MG/G; MG/G
1 GEL TOPICAL
Qty: 45 G | Refills: 0 | Status: SHIPPED | OUTPATIENT
Start: 2023-10-24

## 2023-11-16 ENCOUNTER — TELEPHONE (OUTPATIENT)
Dept: FAMILY MEDICINE CLINIC | Facility: CLINIC | Age: 28
End: 2023-11-16

## 2023-11-16 NOTE — TELEPHONE ENCOUNTER
Patient called to ask if she can go back on the higher dose of Adapalene-Benzoly Peroxide. In her chart it says 0.1-2.5% gel but patient says that's the lower dose. With the lower dose she is breaking out.    [facility]  [place]

## 2023-11-17 DIAGNOSIS — L70.9 ACNE, UNSPECIFIED ACNE TYPE: Primary | ICD-10-CM

## 2023-11-17 RX ORDER — ADAPALENE 0.3% / BENZOYL PEROXIDE 2.5% / CLINDAMYCIN 1% 1; .3; 2.5 G/100G; G/100G; G/100G
1 GEL TOPICAL
Qty: 30 G | Refills: 1 | Status: SHIPPED | OUTPATIENT
Start: 2023-11-17 | End: 2023-11-22

## 2023-11-20 ENCOUNTER — TELEPHONE (OUTPATIENT)
Dept: INTERNAL MEDICINE CLINIC | Facility: CLINIC | Age: 28
End: 2023-11-20

## 2023-11-22 ENCOUNTER — TELEPHONE (OUTPATIENT)
Dept: FAMILY MEDICINE CLINIC | Facility: CLINIC | Age: 28
End: 2023-11-22

## 2023-11-22 ENCOUNTER — OFFICE VISIT (OUTPATIENT)
Dept: HEMATOLOGY ONCOLOGY | Facility: CLINIC | Age: 28
End: 2023-11-22
Payer: COMMERCIAL

## 2023-11-22 ENCOUNTER — APPOINTMENT (OUTPATIENT)
Dept: LAB | Facility: HOSPITAL | Age: 28
End: 2023-11-22
Attending: INTERNAL MEDICINE
Payer: COMMERCIAL

## 2023-11-22 VITALS
HEART RATE: 74 BPM | OXYGEN SATURATION: 97 % | WEIGHT: 270 LBS | BODY MASS INDEX: 43.39 KG/M2 | SYSTOLIC BLOOD PRESSURE: 138 MMHG | DIASTOLIC BLOOD PRESSURE: 82 MMHG | RESPIRATION RATE: 16 BRPM | HEIGHT: 66 IN

## 2023-11-22 DIAGNOSIS — D69.6 THROMBOCYTOPENIA (HCC): ICD-10-CM

## 2023-11-22 DIAGNOSIS — L70.9 ACNE, UNSPECIFIED ACNE TYPE: ICD-10-CM

## 2023-11-22 DIAGNOSIS — D69.6 THROMBOCYTOPENIA (HCC): Primary | ICD-10-CM

## 2023-11-22 LAB
ALBUMIN SERPL BCP-MCNC: 4.5 G/DL (ref 3.5–5)
ALP SERPL-CCNC: 67 U/L (ref 34–104)
ALT SERPL W P-5'-P-CCNC: 10 U/L (ref 7–52)
ANION GAP SERPL CALCULATED.3IONS-SCNC: 7 MMOL/L
AST SERPL W P-5'-P-CCNC: 17 U/L (ref 13–39)
BILIRUB SERPL-MCNC: 0.54 MG/DL (ref 0.2–1)
BUN SERPL-MCNC: 9 MG/DL (ref 5–25)
CALCIUM SERPL-MCNC: 9.5 MG/DL (ref 8.4–10.2)
CHLORIDE SERPL-SCNC: 103 MMOL/L (ref 96–108)
CO2 SERPL-SCNC: 26 MMOL/L (ref 21–32)
CREAT SERPL-MCNC: 0.71 MG/DL (ref 0.6–1.3)
ERYTHROCYTE [DISTWIDTH] IN BLOOD BY AUTOMATED COUNT: 12.4 % (ref 11.6–15.1)
GFR SERPL CREATININE-BSD FRML MDRD: 118 ML/MIN/1.73SQ M
GLUCOSE SERPL-MCNC: 82 MG/DL (ref 65–140)
HCT VFR BLD AUTO: 41.3 % (ref 34.8–46.1)
HGB BLD-MCNC: 14.1 G/DL (ref 11.5–15.4)
MCH RBC QN AUTO: 29.9 PG (ref 26.8–34.3)
MCHC RBC AUTO-ENTMCNC: 34.1 G/DL (ref 31.4–37.4)
MCV RBC AUTO: 88 FL (ref 82–98)
PLATELET # BLD AUTO: 147 THOUSANDS/UL (ref 149–390)
PMV BLD AUTO: 12.7 FL (ref 8.9–12.7)
POTASSIUM SERPL-SCNC: 4.1 MMOL/L (ref 3.5–5.3)
PROT SERPL-MCNC: 7.6 G/DL (ref 6.4–8.4)
RBC # BLD AUTO: 4.72 MILLION/UL (ref 3.81–5.12)
SODIUM SERPL-SCNC: 136 MMOL/L (ref 135–147)
WBC # BLD AUTO: 5.56 THOUSAND/UL (ref 4.31–10.16)

## 2023-11-22 PROCEDURE — 99204 OFFICE O/P NEW MOD 45 MIN: CPT | Performed by: INTERNAL MEDICINE

## 2023-11-22 PROCEDURE — 36415 COLL VENOUS BLD VENIPUNCTURE: CPT

## 2023-11-22 PROCEDURE — 85027 COMPLETE CBC AUTOMATED: CPT

## 2023-11-22 PROCEDURE — 80053 COMPREHEN METABOLIC PANEL: CPT

## 2023-11-22 RX ORDER — ADAPALENE AND BENZOYL PEROXIDE 3; 25 MG/G; MG/G
GEL TOPICAL
Qty: 45 G | Refills: 1 | Status: SHIPPED | OUTPATIENT
Start: 2023-11-22

## 2023-11-22 NOTE — TELEPHONE ENCOUNTER
Does she want the Adapolene and clindamycin called to [facility] or to [facility]. [facility] can not compound it.

## 2023-11-22 NOTE — PROGRESS NOTES
Tiffany Ortiz Rater  1995  [facility] [place] [address] [address] [facility] [address]    CHIEF COMPLAINT:  Thrombocytopenia     HISTORY OF PRESENT ILLNESS:   24year old female, with hx of thrombocytopenia. Etiology unknown. Has aaaaan abdominal ultrasound ordewred, but pending. Platelet count normalized in 8/23. No bleeding symptoms. Patient Active Problem List   Diagnosis    Neck pain    Acne    Large breasts    Hyperlipidemia    Depression    Vitamin D deficiency    Tear of MCL (medial collateral ligament) of knee, left, initial encounter    Constipation    Annual physical exam    Acute gastritis without hemorrhage    Eustachian tube dysfunction    Morbid obesity with body mass index (BMI) of 40.0 to 44.9 in adult [facility])    Chronic midline thoracic back pain    Breast pain, left    Acanthosis nigricans    Primary insomnia    Abnormal weight gain    Chronic tension-type headache, intractable    Thrombocytopenia (HCC)    Intertrigo    Elevated fasting blood sugar    Otitis of left ear     Past Medical History:   Diagnosis Date    Anxiety     COVID 01/2021    High cholesterol     Morbid obesity with BMI of 45.0-49.9, adult [facility])     Pre-operative laboratory examination     Seizures ([address]) 03/31/2015     Oncology History    No history exists.       Past Surgical History:   Procedure Laterality Date    UPPER GASTROINTESTINAL ENDOSCOPY       Family History   Problem Relation Age of Onset    Coronary artery disease Mother     Diabetes Sister     Coronary artery disease Maternal Grandmother     Hyperlipidemia Maternal Grandmother     Hyperlipidemia Maternal Grandfather     Diabetes Maternal Grandfather     Alcohol abuse Neg Hx     Substance Abuse Neg Hx     Mental illness Neg Hx      Social History     Socioeconomic History    Marital status: Single     Spouse name: Not on file    Number of children: Not on file    Years of education: Not on file Highest education level: Not on file   Occupational History    Not on file   Tobacco Use    Smoking status: Former     Packs/day: 0.25     Years: 6.00     Total pack years: 1.50     Types: Cigarettes     Quit date: 2021     Years since quittin.5    Smokeless tobacco: Never    Tobacco comments:     stopped   Vaping Use    Vaping Use: Never used   Substance and Sexual Activity    Alcohol use: Yes     Comment: occasionally    Drug use: No    Sexual activity: Yes     Partners: Male   Other Topics Concern    Not on file   Social History Narrative    Significant other    Works for [employer] office    1 child    Hobbies-    Exercise-occ     Social Determinants of Health     Financial Resource Strain: Not on file   Food Insecurity: Not on file   Transportation Needs: Not on file   Physical Activity: Not on file   Stress: Not on file   Social Connections: Not on file   Intimate Partner Violence: Not on file   Housing Stability: Not on file       No Known Allergies        Meds:    Current Outpatient Medications:     Adapalene-Benzoyl Per-Clindamy 0.3-2.5-1 % GEL, Apply 1 Application topically daily at bedtime, Disp: 30 g, Rfl: 1    metroNIDAZOLE (FLAGYL) 500 mg tablet, Take 500 mg by mouth 2 (two) times a day, Disp: , Rfl:     valACYclovir (VALTREX) 500 mg tablet, if needed, Disp: , Rfl:     ammonium lactate (LAC-HYDRIN) 12 % cream, Apply topically as needed for dry skin, Disp: 385 g, Rfl: 2    buPROPion (WELLBUTRIN) 100 mg tablet, Take 1 tablet (100 mg total) by mouth in the morning (Patient not taking: Reported on 2023), Disp: 30 tablet, Rfl: 0         REVIEW OF SYSTEMS:  Constitutional:  Denies any fever; denies night sweats; denies weight loss. HEENT:  Denies blurred vission; denies eue drainage; denies bulging eyes; denies hearing impairment; denies tinnitis; denies vertigo; denies sore-throat; denies sinues drainage or post nasal drip. Neck:  Denies neck swelling.     Respiratory: Denies cough; denises coughing up blood; denies chest pains on breathing; denies shortness of breath. Cardiovascular:  Denies chest pains on exertion; denies heart palpitations; denies light headedness or feeling of fainting; denies leg swelling; denies pains in calves; denies pain in legs on walking. GI:  Denies difficulty swallowing; denies heartburn; denies vomiting blood; denies black-colored stools; denies nausea; denies vomiting; denies abdominal paindenies passage of bright red blood. :  Denies blood in urine; denies urinary frequency; denies pain on urination; denies difficulty in passing urine. Spine:  Denies neck pain; denies radiation of pain into arms or legs; denies lower back pain. Hemotology:  Denies easy bruising. Lymphalics:  Denies new lumps anywhere    Neurological:  Denies headaches; denies dizziness; denies numbness in extremities; denies weakness in extremities; denies poor balance or disequilibrium. Dermatologic:  denies rash; denies itching. PHYSICAL EXAM:  /82   Pulse 74   Resp 16   Ht 5' 6" (1.676 m)   Wt 122 kg (270 lb)   SpO2 97%   BMI 43.58 kg/m²      General:  Patient alert; oriented. HEENT:  PERRL; EOM intact; conjunctiva normal; no scleral icterus. Neck:  Trachea midline; thyroid not enlarged; No carotid bruits    Lymphatics:  No submandibular adenopathy; no cervical adenopathy; no supraclavicular adenopathy; no axillary adenopathy; no inguinal adenopathy. Hent:  Regular rhythm; S1 and S2 normal; No murmurs; no rubs; no gallops; peripheral pulses normal and equal bilaterally. Lungs:  Clear to ausculation and percussion    Abdomen:  Soft; non-tender; no masses; no organomegaly; no superficial veins; no hernia; no abdominal bruise. Extremities:  No leg swelling; no calf tenderness    Spine:  No gross spinal deformity; no spinal tenderness; no CVA tenderness.     Neurological: patient alert and oriented; crainials II - XII intact; no motor deficit; no sensory deficit; Gait normal.    Psychiatric:  Mood is appropriate, affect is normal, no active hallucinations or delusions. Labs:  CBC, CMP today, and again in 3 months. To have ultrasoubd done. F/u in 3 months. Assessment/Plan:     F/U in 3 months.

## 2023-11-22 NOTE — TELEPHONE ENCOUNTER
Lalo Wilson from [facility] called this morning and left a message on [employer]. Wanted to let us know that the patient's medication adapalene-benzol per-clindamy 0.3-2.5-1% gel was approved by them. Wanted to let us know if was approved as of yesterday 11/21/23 and is open ended. Will be faxing over the approval letter. If she hasn't started it can go ahead and pick it up and start using it. Ref# if we called them back with any questions is: [phone]. I have called the patient and left a message letting her know it was approved by her health insurance.

## 2023-12-20 ENCOUNTER — OFFICE VISIT (OUTPATIENT)
Dept: FAMILY MEDICINE CLINIC | Facility: CLINIC | Age: 28
End: 2023-12-20
Payer: COMMERCIAL

## 2023-12-20 VITALS
DIASTOLIC BLOOD PRESSURE: 82 MMHG | BODY MASS INDEX: 43.71 KG/M2 | HEART RATE: 74 BPM | WEIGHT: 272 LBS | HEIGHT: 66 IN | SYSTOLIC BLOOD PRESSURE: 140 MMHG | TEMPERATURE: 97.8 F | OXYGEN SATURATION: 98 %

## 2023-12-20 DIAGNOSIS — K59.00 CONSTIPATION, UNSPECIFIED CONSTIPATION TYPE: ICD-10-CM

## 2023-12-20 DIAGNOSIS — R63.5 ABNORMAL WEIGHT GAIN: ICD-10-CM

## 2023-12-20 DIAGNOSIS — E55.9 VITAMIN D DEFICIENCY: Primary | ICD-10-CM

## 2023-12-20 DIAGNOSIS — R79.89 LOW VITAMIN B12 LEVEL: ICD-10-CM

## 2023-12-20 DIAGNOSIS — E78.5 HYPERLIPIDEMIA, UNSPECIFIED HYPERLIPIDEMIA TYPE: ICD-10-CM

## 2023-12-20 DIAGNOSIS — E66.01 MORBID OBESITY WITH BODY MASS INDEX (BMI) OF 40.0 TO 44.9 IN ADULT (HCC): ICD-10-CM

## 2023-12-20 PROCEDURE — 99214 OFFICE O/P EST MOD 30 MIN: CPT

## 2023-12-20 NOTE — ASSESSMENT & PLAN NOTE
Continue high-fiber diet, recommend Benefiber or Metamucil with probiotic, MiraLAX twice daily.  Increase hydration to 1 to 2 L daily.  Have lab work follow-up in 1 month.  
Great job with weight loss and diet changes, have fasting lab work follow-up in 1 months for physical.  
Great job,    Suggest lean protein high fiber diet.    Eats small portions every 3 hours. 5-9 fruits and vegetables a day.  Do not drink sugary drinks.  Will start Wegovy, did discuss that we may have to get it from a different pharmacy.  Has tried phentermine and had bad side effects in the past.  Also did not do well with Wellbutrin.  
Great job,    Suggest lean protein high fiber diet.    Eats small portions every 3 hours. 5-9 fruits and vegetables a day.  Do not drink sugary drinks.  Will start Wegovy, did discuss that we may have to get it from a different pharmacy.  Has tried phentermine and had bad side effects in the past.  Also did not do well with Wellbutrin.    
Have lab work follow-up in 1 month  
Have lab work, discussed supplement in future.  Is interested in a vegan diet.  Follow-up in 1 month.  
25-Apr-2018 12:38
26-Apr-2018 12:15

## 2023-12-20 NOTE — PATIENT INSTRUCTIONS
Problem List Items Addressed This Visit          Other    Hyperlipidemia     Great job with weight loss and diet changes, have fasting lab work follow-up in 1 months for physical.         Relevant Orders    Hemoglobin A1C    Lipid panel    TSH, 3rd generation with Free T4 reflex    Vitamin D deficiency - Primary     Have lab work follow-up in 1 month         Relevant Orders    Vitamin D 25 hydroxy    Constipation     Continue high-fiber diet, recommend Benefiber or Metamucil with probiotic, MiraLAX twice daily.  Increase hydration to 1 to 2 L daily.  Have lab work follow-up in 1 month.         Relevant Orders    Food Allergy Profile    Celiac Disease Antibody Profile    Morbid obesity with body mass index (BMI) of 40.0 to 44.9 in adult (HCC)     Great job,    Suggest lean protein high fiber diet.    Eats small portions every 3 hours. 5-9 fruits and vegetables a day.  Do not drink sugary drinks.  Will start Wegovy, did discuss that we may have to get it from a different pharmacy.  Has tried phentermine and had bad side effects in the past.  Also did not do well with Wellbutrin.           Relevant Medications    Semaglutide-Weight Management (WEGOVY) 0.25 MG/0.5ML    Abnormal weight gain     Great job,    Suggest lean protein high fiber diet.    Eats small portions every 3 hours. 5-9 fruits and vegetables a day.  Do not drink sugary drinks.  Will start Wegovy, did discuss that we may have to get it from a different pharmacy.  Has tried phentermine and had bad side effects in the past.  Also did not do well with Wellbutrin.         Relevant Medications    Semaglutide-Weight Management (WEGOVY) 0.25 MG/0.5ML    Low vitamin B12 level     Have lab work, discussed supplement in future.  Is interested in a vegan diet.  Follow-up in 1 month.         Relevant Orders    Vitamin B12    Folate

## 2023-12-20 NOTE — PROGRESS NOTES
Assessment/Plan:         Problem List Items Addressed This Visit        Other    Hyperlipidemia     Great job with weight loss and diet changes, have fasting lab work follow-up in 1 months for physical.         Relevant Orders    Hemoglobin A1C    Lipid panel    TSH, 3rd generation with Free T4 reflex    Vitamin D deficiency - Primary     Have lab work follow-up in 1 month         Relevant Orders    Vitamin D 25 hydroxy    Constipation     Continue high-fiber diet, recommend Benefiber or Metamucil with probiotic, MiraLAX twice daily.  Increase hydration to 1 to 2 L daily.  Have lab work follow-up in 1 month.         Relevant Orders    Food Allergy Profile    Celiac Disease Antibody Profile    Morbid obesity with body mass index (BMI) of 40.0 to 44.9 in adult (HCC)     Great job,    Suggest lean protein high fiber diet.    Eats small portions every 3 hours. 5-9 fruits and vegetables a day.  Do not drink sugary drinks.  Will start Wegovy, did discuss that we may have to get it from a different pharmacy.  Has tried phentermine and had bad side effects in the past.  Also did not do well with Wellbutrin.           Relevant Medications    Semaglutide-Weight Management (WEGOVY) 0.25 MG/0.5ML    Abnormal weight gain     Great job,    Suggest lean protein high fiber diet.    Eats small portions every 3 hours. 5-9 fruits and vegetables a day.  Do not drink sugary drinks.  Will start Wegovy, did discuss that we may have to get it from a different pharmacy.  Has tried phentermine and had bad side effects in the past.  Also did not do well with Wellbutrin.         Relevant Medications    Semaglutide-Weight Management (WEGOVY) 0.25 MG/0.5ML    Low vitamin B12 level     Have lab work, discussed supplement in future.  Is interested in a vegan diet.  Follow-up in 1 month.         Relevant Orders    Vitamin B12    Folate         Subjective:      Patient ID: Tiffany Jean-Baptiste is a 27 y.o. female.    Tiffany is here for follow up, she did  great with weight loss her only problem is that she is at a plateau, she is also struggling with dairy and feels there could be association with dairy.         The following portions of the patient's history were reviewed and updated as appropriate:   Past Medical History:  She has a past medical history of Anxiety, COVID (01/2021), High cholesterol, Morbid obesity with BMI of 45.0-49.9, adult ([facility]), Pre-operative laboratory examination, and Seizures ([facility]) (03/31/2015).,  _______________________________________________________________________  Medical Problems:  does not have any pertinent problems on file.,  _______________________________________________________________________  Past Surgical History:   has a past surgical history that includes Upper gastrointestinal endoscopy.,  _______________________________________________________________________  Family History:  family history includes Coronary artery disease in her maternal grandmother and mother; Diabetes in her maternal grandfather and sister; Hyperlipidemia in her maternal grandfather and maternal grandmother.,  _______________________________________________________________________  Social History:   reports that she quit smoking about 2 years ago. Her smoking use included cigarettes. She started smoking about 8 years ago. She has a 1.5 pack-year smoking history. She has never used smokeless tobacco. She reports current alcohol use. She reports that she does not use drugs.,  _______________________________________________________________________  Allergies:  has No Known Allergies..  _______________________________________________________________________  Current Outpatient Medications   Medication Sig Dispense Refill   • Adapalene-Benzoyl Peroxide 0.3-2.5 % GEL APPLY 1 APPLICATION TOPICALLY DAILY AT BEDTIME 45 g 1   • ammonium lactate (LAC-HYDRIN) 12 % cream Apply topically as needed for dry skin 385 g 2   • Semaglutide-Weight Management (WEGOVY) 0.25  "MG/0.5ML Inject 0.5 mL (0.25 mg total) under the skin once a week 2 mL 0   • valACYclovir (VALTREX) 500 mg tablet if needed       No current facility-administered medications for this visit.     _______________________________________________________________________  Review of Systems   Constitutional:  Positive for fatigue. Negative for chills and fever.   HENT:  Negative for congestion, ear pain, rhinorrhea, sinus pressure, sinus pain and sore throat.    Eyes:  Negative for pain and visual disturbance.   Respiratory:  Negative for cough, chest tightness and shortness of breath.    Cardiovascular:  Negative for chest pain and palpitations.   Gastrointestinal:  Positive for abdominal pain and constipation. Negative for vomiting.   Skin:  Negative for color change and rash.   Neurological:  Negative for dizziness and headaches.   Psychiatric/Behavioral:  Negative for dysphoric mood and sleep disturbance. The patient is not nervous/anxious.    All other systems reviewed and are negative.        Objective:  Vitals:    12/20/23 0704   BP: 140/82   BP Location: Left arm   Patient Position: Sitting   Pulse: 74   Temp: 97.8 °F (36.6 °C)   SpO2: 98%   Weight: 123 kg (272 lb)   Height: 5' 6\" (1.676 m)     Body mass index is 43.9 kg/m².     Physical Exam  Vitals and nursing note reviewed.   Constitutional:       General: She is not in acute distress.     Appearance: Normal appearance. She is not ill-appearing.   HENT:      Head: Normocephalic.      Right Ear: Tympanic membrane, ear canal and external ear normal. There is no impacted cerumen.      Left Ear: Tympanic membrane, ear canal and external ear normal. There is no impacted cerumen.      Nose: Nose normal.      Mouth/Throat:      Mouth: Mucous membranes are moist.   Eyes:      Extraocular Movements: Extraocular movements intact.      Conjunctiva/sclera: Conjunctivae normal.      Pupils: Pupils are equal, round, and reactive to light.   Cardiovascular:      Rate and " Rhythm: Normal rate and regular rhythm.      Pulses: Normal pulses.      Heart sounds: Normal heart sounds.   Pulmonary:      Effort: Pulmonary effort is normal. No respiratory distress.      Breath sounds: Normal breath sounds. No wheezing.   Abdominal:      General: Bowel sounds are normal.      Palpations: Abdomen is soft.   Musculoskeletal:         General: No swelling or tenderness. Normal range of motion.      Cervical back: Normal range of motion. No tenderness.      Right lower leg: No edema.      Left lower leg: No edema.   Lymphadenopathy:      Cervical: No cervical adenopathy.   Skin:     General: Skin is warm and dry.   Neurological:      General: No focal deficit present.      Mental Status: She is alert and oriented to person, place, and time.   Psychiatric:         Mood and Affect: Mood normal.         Behavior: Behavior normal.         Thought Content: Thought content normal.         Judgment: Judgment normal.

## 2023-12-21 ENCOUNTER — TELEPHONE (OUTPATIENT)
Dept: FAMILY MEDICINE CLINIC | Facility: CLINIC | Age: 28
End: 2023-12-21

## 2024-01-03 ENCOUNTER — TELEPHONE (OUTPATIENT)
Age: 29
End: 2024-01-03

## 2024-01-03 NOTE — TELEPHONE ENCOUNTER
Patient called to request an appt w/ dr prescott.  She said she has another cyst on her lip and her face is swollen, she said he injected it last time which helped.  I advised patient dr prescott is booked full. She is not willing to travel to another office and is asking if there is any way he can see her? Please advise, can he see her or should she go to urgent care?

## 2024-01-04 ENCOUNTER — OFFICE VISIT (OUTPATIENT)
Dept: FAMILY MEDICINE CLINIC | Facility: CLINIC | Age: 29
End: 2024-01-04
Payer: COMMERCIAL

## 2024-01-04 VITALS
SYSTOLIC BLOOD PRESSURE: 110 MMHG | WEIGHT: 273 LBS | OXYGEN SATURATION: 98 % | RESPIRATION RATE: 16 BRPM | BODY MASS INDEX: 43.87 KG/M2 | TEMPERATURE: 97.6 F | DIASTOLIC BLOOD PRESSURE: 80 MMHG | HEART RATE: 64 BPM | HEIGHT: 66 IN

## 2024-01-04 DIAGNOSIS — L02.01 FACIAL ABSCESS: Primary | ICD-10-CM

## 2024-01-04 DIAGNOSIS — R10.9 ABDOMINAL DISCOMFORT: ICD-10-CM

## 2024-01-04 PROCEDURE — 10060 I&D ABSCESS SIMPLE/SINGLE: CPT

## 2024-01-04 PROCEDURE — 10160 PNXR ASPIR ABSC HMTMA BULLA: CPT

## 2024-01-04 PROCEDURE — 99214 OFFICE O/P EST MOD 30 MIN: CPT

## 2024-01-04 RX ORDER — DOXYCYCLINE HYCLATE 100 MG/1
100 CAPSULE ORAL EVERY 12 HOURS SCHEDULED
Qty: 20 CAPSULE | Refills: 0 | Status: SHIPPED | OUTPATIENT
Start: 2024-01-04 | End: 2024-01-14

## 2024-01-04 NOTE — PROGRESS NOTES
Assessment/Plan:         Problem List Items Addressed This Visit    None  Visit Diagnoses     Facial abscess    -  Primary    Half a milliliter of Kenalog injected today.  Purulent drainage expressed, doxycycline twice daily.  Referral placed to plastics.    Relevant Medications    doxycycline hyclate (VIBRAMYCIN) 100 mg capsule    Other Relevant Orders    Ambulatory Referral to Plastic Surgery    Incision and Drainage (Completed)    Abdominal discomfort        Referral placed to GI for abdominal discomfort, Tiffany thinks it could be related to dairy requests follow-up with GI recommend probiotic    Relevant Orders    Ambulatory Referral to Gastroenterology              Subjective:      Patient ID: Tiffany Jean-Baptiste is a 28 y.o. female.    Tiffany is here for cyst on her face, she also is very tired and feels she has a new sensitivity to dairy.         The following portions of the patient's history were reviewed and updated as appropriate:   Past Medical History:  She has a past medical history of Anxiety, COVID (01/2021), High cholesterol, Morbid obesity with BMI of 45.0-49.9, adult ([facility]), Pre-operative laboratory examination, and Seizures ([facility]) (03/31/2015).,  _______________________________________________________________________  Medical Problems:  does not have any pertinent problems on file.,  _______________________________________________________________________  Past Surgical History:   has a past surgical history that includes Upper gastrointestinal endoscopy.,  _______________________________________________________________________  Family History:  family history includes Coronary artery disease in her maternal grandmother and mother; Diabetes in her maternal grandfather and sister; Hyperlipidemia in her maternal grandfather and maternal grandmother.,  _______________________________________________________________________  Social History:   reports that she quit smoking about 2 years ago. Her smoking use  "included cigarettes. She started smoking about 8 years ago. She has a 1.5 pack-year smoking history. She has never used smokeless tobacco. She reports current alcohol use. She reports that she does not use drugs.,  _______________________________________________________________________  Allergies:  has No Known Allergies..  _______________________________________________________________________  Current Outpatient Medications   Medication Sig Dispense Refill   • Adapalene-Benzoyl Peroxide 0.3-2.5 % GEL APPLY 1 APPLICATION TOPICALLY DAILY AT BEDTIME 45 g 1   • doxycycline hyclate (VIBRAMYCIN) 100 mg capsule Take 1 capsule (100 mg total) by mouth every 12 (twelve) hours for 10 days 20 capsule 0   • Semaglutide-Weight Management (WEGOVY) 0.25 MG/0.5ML Inject 0.5 mL (0.25 mg total) under the skin once a week 2 mL 0   • ammonium lactate (LAC-HYDRIN) 12 % cream Apply topically as needed for dry skin 385 g 2   • valACYclovir (VALTREX) 500 mg tablet if needed       No current facility-administered medications for this visit.     _______________________________________________________________________  Review of Systems   Constitutional:  Positive for fatigue. Negative for chills and fever.   HENT:  Negative for congestion, ear pain and sore throat.    Eyes:  Negative for pain and visual disturbance.   Respiratory:  Negative for cough and shortness of breath.    Cardiovascular:  Negative for chest pain and palpitations.   Gastrointestinal:  Positive for abdominal pain.   Genitourinary:  Negative for dysuria and hematuria.   Skin:  Positive for wound. Negative for color change and rash.   Neurological:  Positive for headaches. Negative for seizures and syncope.   Psychiatric/Behavioral:  Positive for sleep disturbance.    All other systems reviewed and are negative.        Objective:  Vitals:    01/04/24 0728   BP: 110/80   Pulse: 64   Resp: 16   Temp: 97.6 °F (36.4 °C)   SpO2: 98%   Weight: 124 kg (273 lb)   Height: 5' 6\" " "(1.676 m)     Body mass index is 44.06 kg/m².     Physical Exam  Vitals and nursing note reviewed.   Constitutional:       Appearance: Normal appearance. She is not ill-appearing.   HENT:      Head: Normocephalic.      Right Ear: Tympanic membrane, ear canal and external ear normal. There is no impacted cerumen.      Left Ear: Tympanic membrane, ear canal and external ear normal. There is no impacted cerumen.      Nose: Nose normal. No congestion.      Mouth/Throat:      Mouth: Mucous membranes are moist.      Pharynx: No posterior oropharyngeal erythema.   Eyes:      Extraocular Movements: Extraocular movements intact.      Conjunctiva/sclera: Conjunctivae normal.      Pupils: Pupils are equal, round, and reactive to light.   Cardiovascular:      Rate and Rhythm: Normal rate and regular rhythm.      Heart sounds: Normal heart sounds. No murmur heard.  Pulmonary:      Effort: Pulmonary effort is normal.      Breath sounds: Normal breath sounds. No wheezing.   Abdominal:      Palpations: Abdomen is soft.      Tenderness: There is no abdominal tenderness.   Musculoskeletal:         General: Normal range of motion.      Cervical back: Normal range of motion.      Right lower leg: No edema.   Skin:     General: Skin is warm and dry.   Neurological:      General: No focal deficit present.      Mental Status: She is alert.   Psychiatric:         Mood and Affect: Mood normal.         Behavior: Behavior normal.           Incision and Drainage    Date/Time: 1/4/2024 7:20 AM    Performed by: MIREYA Ordoñez  Authorized by: MIREYA Ordoñez  Universal Protocol:  Consent: Verbal consent obtained.  Risks and benefits: risks, benefits and alternatives were discussed  Consent given by: patient  Time out: Immediately prior to procedure a \"time out\" was called to verify the correct patient, procedure, equipment, support staff and site/side marked as required.  Timeout called at: 1/4/2024 7:57 AM.  Patient understanding: " patient states understanding of the procedure being performed  Patient consent: the patient's understanding of the procedure matches consent given  Procedure consent: procedure consent matches procedure scheduled  Required items: required blood products, implants, devices, and special equipment available    Patient location:  Clinic  Location:     Type:  Abscess    Location:  Head/neck    Head/neck location:  Face  Pre-procedure details:     Skin preparation:  Betadine  Anesthesia (see MAR for exact dosages):     Anesthesia method:  Local infiltration    Local anesthetic:  Lidocaine 1% w/o epi  Procedure details:     Complexity:  Simple    Needle aspiration: yes      Needle size:  22 G    Incision types:  Stab incision    Aspiration type: puncture aspiration      Approach:  Puncture    Incision depth:  Subcutaneous    Irrigation with saline:  Kenalog .5 cc instilled    Drainage:  Purulent    Drainage amount:  Moderate    Wound treatment:  Wound left open    Packing materials:  None  Post-procedure details:     Patient tolerance of procedure:  Tolerated well, no immediate complications

## 2024-01-17 NOTE — TELEPHONE ENCOUNTER
Opened in error   Please see telephone message from 6/21/2019 [Access issues (e.g., transportation, impaired mobility, etc.)] : due to patient's access issues [Continuity of care] : to ensure continuity of care [Continuing, patient not seen in-person within last 12 months (provide details below)] : Telehealth services are continuing, patient not seen in-person within last 12 months.  [Telehealth (audio & video) - Individual/Group] : This visit was provided via telehealth using real-time 2-way audio visual technology. [Other Location: e.g. Home (Enter Location, City,State)___] : The provider was located at [unfilled]. [Home] : The patient, [unfilled], was located at home, [unfilled], at the time of the visit. [Verbal consent obtained from patient/other participant(s)] : Verbal consent for telehealth/telephonic services obtained from patient/other participant(s) [FreeTextEntry4] : 4:00 PM [FreeTextEntry5] : 4:45 PM [FreeTextEntry3] : In-person session not clinically indicated. Pt prefers telehealth due to access issues.  [Patient] : Patient

## 2024-01-29 ENCOUNTER — NURSE TRIAGE (OUTPATIENT)
Age: 29
End: 2024-01-29

## 2024-01-29 ENCOUNTER — OFFICE VISIT (OUTPATIENT)
Dept: GASTROENTEROLOGY | Facility: CLINIC | Age: 29
End: 2024-01-29
Payer: COMMERCIAL

## 2024-01-29 VITALS
HEART RATE: 68 BPM | BODY MASS INDEX: 43.39 KG/M2 | WEIGHT: 270 LBS | HEIGHT: 66 IN | DIASTOLIC BLOOD PRESSURE: 76 MMHG | SYSTOLIC BLOOD PRESSURE: 118 MMHG

## 2024-01-29 DIAGNOSIS — R10.9 ABDOMINAL DISCOMFORT: ICD-10-CM

## 2024-01-29 DIAGNOSIS — K59.00 CONSTIPATION, UNSPECIFIED CONSTIPATION TYPE: Primary | ICD-10-CM

## 2024-01-29 PROCEDURE — 99213 OFFICE O/P EST LOW 20 MIN: CPT | Performed by: INTERNAL MEDICINE

## 2024-01-29 RX ORDER — LINACLOTIDE 145 UG/1
145 CAPSULE, GELATIN COATED ORAL DAILY
Qty: 30 CAPSULE | Refills: 3 | Status: SHIPPED | OUTPATIENT
Start: 2024-01-29

## 2024-01-29 RX ORDER — OMEPRAZOLE 40 MG/1
40 CAPSULE, DELAYED RELEASE ORAL DAILY
Qty: 90 CAPSULE | Refills: 0 | Status: SHIPPED | OUTPATIENT
Start: 2024-01-29 | End: 2024-01-30 | Stop reason: SDUPTHER

## 2024-01-29 NOTE — TELEPHONE ENCOUNTER
PA for Linzess 145     Submitted via  []CMM-KEY   []Surescripts-Case ID #   []Faxed to plan   [x]Other website prompt pa for Olamide Bone Auth (EOC) ID: [id number]     []Phone call Case ID #     Office notes sent, clinical questions answered. Awaiting determination

## 2024-01-29 NOTE — TELEPHONE ENCOUNTER
"April calling in from [employer] [facility] regarding prior auth for linzess. I clinical notes that were faxed in were from 2021 they are requiring sooner OV.     Please fax OV notes from today once signed by provider to [phone]   Reason for Disposition   Information only question and nurse able to answer    Answer Assessment - Initial Assessment Questions  1. REASON FOR CALL or QUESTION: \"What is your reason for calling today?\" or \"How can I best help you?\" or \"What question do you have that I can help answer?\"      Fax clinical notes    Protocols used: Information Only Call - No Triage-ADULT-[place]    "

## 2024-01-29 NOTE — PROGRESS NOTES
[facility] - Outpatient Note  Tiffany Jean-Baptiste 28 y.o. female MRN: [id number]  Encounter: [id number]      ASSESSMENT AND PLAN:    Tiffany Jean-Baptiste is a 28 y.o. old pleasant female with PMH of constipation, obesity who presents for followup for abdominal pain    Abdominal discomfort, gas and bloating, constipation-suspicious for lactose intolerance as much of her symptoms are from dairy intake.  She may have a component of constipation playing a role.  She has failed MiraLAX for constipation.  Trial lactose free diet.  Can use lactase tablets.  Start omeprazole 40 mg daily  Start Linzess 145 mcg daily  Start psyllium fiber  Check right upper quadrant ultrasound  Check celiac serologies  Hold off on EGD at this time as this was done 2 years ago    Obesity-previously was following bariatric surgery with plans for sleeve gastrectomy.  Underwent EGD which was unremarkable biopsies negative H. pylori.  She ended up canceling her procedure and is try to lose weight on her own.  I congratulated her on the weight loss she has had.  Monitor clinically.  Can consider referral to weight management if she has not already been seen.      Miralax    1. Abdominal discomfort    - Ambulatory Referral to Gastroenterology    ______________________________________________________________________    SUBJECTIVE:      Patient reports abdominal pain in the epigastric, umbilicus and lower abdominal regions occurring most days a week associated gas bloating and abdominal distention.  She gets intermittent nausea.  She reports she can go to 3 days at a bowel movement and has difficulty evacuating completely.  She does attribute much of her symptoms to lactose intolerance.  She has not followed a strict lactose free diet yet.  She has never had colonoscopy for.  She had EGD in February 2022 which was unremarkable for prebariatric surgery which was eventually canceled.    Miralax and Tea  Bentyl    I reviewed prior external  "notes    I reviewed previous lab results and images      REVIEW OF SYSTEMS:     REVIEW OF ALL OTHER SYSTEMS IS OTHERWISE NEGATIVE.      Historical Information   Past Medical History:   Diagnosis Date    Anxiety     COVID 2021    High cholesterol     Morbid obesity with BMI of 45.0-49.9, adult ([facility])     Pre-operative laboratory examination     Seizures (HCC) 2015     Past Surgical History:   Procedure Laterality Date    UPPER GASTROINTESTINAL ENDOSCOPY       Social History   Social History     Substance and Sexual Activity   Alcohol Use Yes    Comment: occasionally     Social History     Substance and Sexual Activity   Drug Use No     Social History     Tobacco Use   Smoking Status Former    Current packs/day: 0.00    Average packs/day: 0.3 packs/day for 6.0 years (1.5 ttl pk-yrs)    Types: Cigarettes    Start date: 2015    Quit date: 2021    Years since quittin.7   Smokeless Tobacco Never   Tobacco Comments    stopped     Family History   Problem Relation Age of Onset    Coronary artery disease Mother     Diabetes Sister     Coronary artery disease Maternal Grandmother     Hyperlipidemia Maternal Grandmother     Hyperlipidemia Maternal Grandfather     Diabetes Maternal Grandfather     Alcohol abuse Neg Hx     Substance Abuse Neg Hx     Mental illness Neg Hx        Meds/Allergies       Current Outpatient Medications:     Adapalene-Benzoyl Peroxide 0.3-2.5 % GEL    ammonium lactate (LAC-HYDRIN) 12 % cream    linaCLOtide (Linzess) 145 MCG CAPS    omeprazole (PriLOSEC) 40 MG capsule    Semaglutide-Weight Management (WEGOVY) 0.25 MG/0.5ML    valACYclovir (VALTREX) 500 mg tablet    No Known Allergies        Objective     Blood pressure 118/76, pulse 68, height 5' 6\" (1.676 m), weight 122 kg (270 lb), not currently breastfeeding. Body mass index is 43.58 kg/m².      PHYSICAL EXAM:      General Appearance:   Alert, cooperative, no distress   HEENT:   Normocephalic, atraumatic, anicteric.     Neck:  " Supple, symmetrical, trachea midline   Lungs:   Clear to auscultation bilaterally; no rales, rhonchi or wheezing; respirations unlabored    Heart::   Regular rate and rhythm; no murmur, rub, or gallop.   Abdomen:   Soft, non-tender, non-distended; normal bowel sounds; no masses, no organomegaly    Genitalia:   Deferred    Rectal:   Deferred    Extremities:  No cyanosis, clubbing or edema    Pulses:  2+ and symmetric    Skin:  No jaundice, rashes, or lesions    Lymph nodes:  No palpable cervical lymphadenopathy        Lab Results:   No visits with results within 1 Day(s) from this visit.   Latest known visit with results is:   Appointment on 11/22/2023   Component Date Value    WBC 11/22/2023 5.56     RBC 11/22/2023 4.72     Hemoglobin 11/22/2023 14.1     Hematocrit 11/22/2023 41.3     MCV 11/22/2023 88     MCH 11/22/2023 29.9     MCHC 11/22/2023 34.1     RDW 11/22/2023 12.4     Platelets 11/22/2023 147 (L)     MPV 11/22/2023 12.7     Sodium 11/22/2023 136     Potassium 11/22/2023 4.1     Chloride 11/22/2023 103     CO2 11/22/2023 26     ANION GAP 11/22/2023 7     BUN 11/22/2023 9     Creatinine 11/22/2023 0.71     Glucose 11/22/2023 82     Calcium 11/22/2023 9.5     AST 11/22/2023 17     ALT 11/22/2023 10     Alkaline Phosphatase 11/22/2023 67     Total Protein 11/22/2023 7.6     Albumin 11/22/2023 4.5     Total Bilirubin 11/22/2023 0.54     eGFR 11/22/2023 118          Radiology Results:   No results found.

## 2024-01-30 ENCOUNTER — TELEPHONE (OUTPATIENT)
Age: 29
End: 2024-01-30

## 2024-01-30 ENCOUNTER — NURSE TRIAGE (OUTPATIENT)
Age: 29
End: 2024-01-30

## 2024-01-30 DIAGNOSIS — R10.9 ABDOMINAL DISCOMFORT: ICD-10-CM

## 2024-01-30 NOTE — TELEPHONE ENCOUNTER
[facility] called to let us know that the omeprazole does not need a prior auth because it is one of their preferred medications. Representative stated that the medication would just need to be sent to the pharmacy. Informed her that it looks like it was sent yesterday but she stated that the pharmacy has not received it. Please resend the omeprazole

## 2024-01-30 NOTE — TELEPHONE ENCOUNTER
PA for Linzess Approved   Date(s) approved begins 1/30/2024  Case #    Patient advised by [x] [employer] Message                      [] Phone call       Pharmacy advised by [x]Fax                                     []Phone call    Approval letter scanned into Media Yes    Advised GI OFFICE

## 2024-01-30 NOTE — TELEPHONE ENCOUNTER
"Sandy from [facility] called in regarding recent office visit records reequired for Linzess. I reviewed that per note the office visit notes were faxed this morning 8:06 am.      Reason for Disposition   Information only question and nurse able to answer    Answer Assessment - Initial Assessment Questions  1. REASON FOR CALL or QUESTION: \"What is your reason for calling today?\" or \"How can I best help you?\" or \"What question do you have that I can help answer?\"      Call regarding prior auth.    Protocols used: Information Only Call - No Triage-ADULT-[place]    "

## 2024-01-30 NOTE — TELEPHONE ENCOUNTER
PA for Omeprazole    Submitted via  []CMM-KEY   []Surescripts-Case ID #   []Faxed to plan   [x]Other website Prompt PA Olamide PA ID  [id number]  []Phone call Case ID #     Office notes sent, clinical questions answered. Awaiting determination

## 2024-01-31 RX ORDER — OMEPRAZOLE 40 MG/1
40 CAPSULE, DELAYED RELEASE ORAL DAILY
Qty: 90 CAPSULE | Refills: 0 | Status: SHIPPED | OUTPATIENT
Start: 2024-01-31

## 2024-02-09 ENCOUNTER — OFFICE VISIT (OUTPATIENT)
Dept: FAMILY MEDICINE CLINIC | Facility: CLINIC | Age: 29
End: 2024-02-09
Payer: COMMERCIAL

## 2024-02-09 VITALS
HEIGHT: 66 IN | WEIGHT: 270 LBS | SYSTOLIC BLOOD PRESSURE: 110 MMHG | HEART RATE: 69 BPM | DIASTOLIC BLOOD PRESSURE: 80 MMHG | BODY MASS INDEX: 43.39 KG/M2 | OXYGEN SATURATION: 98 % | TEMPERATURE: 97.6 F

## 2024-02-09 DIAGNOSIS — H66.90 ACUTE OTITIS MEDIA, UNSPECIFIED OTITIS MEDIA TYPE: Primary | ICD-10-CM

## 2024-02-09 DIAGNOSIS — B37.9 YEAST INFECTION: ICD-10-CM

## 2024-02-09 LAB — S PYO AG THROAT QL: NEGATIVE

## 2024-02-09 PROCEDURE — 99214 OFFICE O/P EST MOD 30 MIN: CPT

## 2024-02-09 PROCEDURE — 87880 STREP A ASSAY W/OPTIC: CPT

## 2024-02-09 RX ORDER — FLUCONAZOLE 150 MG/1
150 TABLET ORAL ONCE
Qty: 2 TABLET | Refills: 0 | Status: SHIPPED | OUTPATIENT
Start: 2024-02-09 | End: 2024-02-09

## 2024-02-09 RX ORDER — FLUTICASONE PROPIONATE 50 MCG
1 SPRAY, SUSPENSION (ML) NASAL DAILY
Qty: 15.8 ML | Refills: 1 | Status: SHIPPED | OUTPATIENT
Start: 2024-02-09

## 2024-02-09 RX ORDER — AMOXICILLIN AND CLAVULANATE POTASSIUM 875; 125 MG/1; MG/1
1 TABLET, FILM COATED ORAL EVERY 12 HOURS SCHEDULED
Qty: 20 TABLET | Refills: 0 | Status: SHIPPED | OUTPATIENT
Start: 2024-02-09 | End: 2024-02-19

## 2024-02-09 NOTE — PROGRESS NOTES
Assessment/Plan:         Problem List Items Addressed This Visit    None  Visit Diagnoses     Acute otitis media, unspecified otitis media type    -  Primary    augmentin, flonase and azestaline, ok to use otc mucinex and vit c, d and zinc. Steam/humidification and hydration. Free sample astepro Lot [facility] exp 7/25    Relevant Medications    amoxicillin-clavulanate (AUGMENTIN) 875-125 mg per tablet    fluticasone (FLONASE) 50 mcg/act nasal spray    Other Relevant Orders    POCT rapid ANTIGEN strepA (Completed)    Yeast infection        diflucan as prescribed.    Relevant Medications    fluconazole (DIFLUCAN) 150 mg tablet            Subjective:      Patient ID: Tiffany Jean-Baptiste is a 28 y.o. female.    Tiffany is here for sinus infection, she wants to make sure not an ear infection or strep.     Sore Throat   Associated symptoms include congestion, coughing and ear pain. Pertinent negatives include no abdominal pain, diarrhea, headaches, shortness of breath or vomiting.   Earache   Associated symptoms include coughing, rhinorrhea and a sore throat. Pertinent negatives include no abdominal pain, diarrhea, headaches, rash or vomiting.   Sinusitis  Associated symptoms include congestion, coughing, ear pain, sinus pressure and a sore throat. Pertinent negatives include no chills, diaphoresis, headaches or shortness of breath.       The following portions of the patient's history were reviewed and updated as appropriate:   Past Medical History:  She has a past medical history of Anxiety, COVID (01/2021), High cholesterol, Morbid obesity with BMI of 45.0-49.9, adult ([facility]), Pre-operative laboratory examination, and Seizures ([facility]) (03/31/2015).,  _______________________________________________________________________  Medical Problems:  does not have any pertinent problems on file.,  _______________________________________________________________________  Past Surgical History:   has a past surgical history that includes Upper  gastrointestinal endoscopy.,  _______________________________________________________________________  Family History:  family history includes Coronary artery disease in her maternal grandmother and mother; Diabetes in her maternal grandfather and sister; Hyperlipidemia in her maternal grandfather and maternal grandmother.,  _______________________________________________________________________  Social History:   reports that she quit smoking about 2 years ago. Her smoking use included cigarettes. She started smoking about 8 years ago. She has a 1.5 pack-year smoking history. She has never used smokeless tobacco. She reports current alcohol use. She reports that she does not use drugs.,  _______________________________________________________________________  Allergies:  has No Known Allergies..  _______________________________________________________________________  Current Outpatient Medications   Medication Sig Dispense Refill   • Adapalene-Benzoyl Peroxide 0.3-2.5 % GEL APPLY 1 APPLICATION TOPICALLY DAILY AT BEDTIME 45 g 1   • ammonium lactate (LAC-HYDRIN) 12 % cream Apply topically as needed for dry skin 385 g 2   • amoxicillin-clavulanate (AUGMENTIN) 875-125 mg per tablet Take 1 tablet by mouth every 12 (twelve) hours for 10 days 20 tablet 0   • fluconazole (DIFLUCAN) 150 mg tablet Take 1 tablet (150 mg total) by mouth once for 1 dose Take one tablet at symptom onset and second 72 hours later if symptoms persist. 2 tablet 0   • fluticasone (FLONASE) 50 mcg/act nasal spray 1 spray into each nostril daily 15.8 mL 1   • linaCLOtide (Linzess) 145 MCG CAPS Take 1 capsule (145 mcg total) by mouth in the morning 30 capsule 3   • omeprazole (PriLOSEC) 40 MG capsule Take 1 capsule (40 mg total) by mouth daily 90 capsule 0   • Semaglutide-Weight Management (WEGOVY) 0.25 MG/0.5ML Inject 0.5 mL (0.25 mg total) under the skin once a week 2 mL 0   • valACYclovir (VALTREX) 500 mg tablet if needed       No current  "facility-administered medications for this visit.     _______________________________________________________________________  Review of Systems   Constitutional:  Negative for chills, diaphoresis and fever.   HENT:  Positive for congestion, ear pain, rhinorrhea, sinus pressure, sinus pain and sore throat.    Eyes:  Negative for pain and visual disturbance.   Respiratory:  Positive for cough. Negative for chest tightness, shortness of breath and wheezing.    Gastrointestinal:  Negative for abdominal pain, diarrhea, nausea and vomiting.   Genitourinary:  Negative for dysuria, frequency, hematuria and urgency.   Musculoskeletal:  Negative for arthralgias, back pain and myalgias.   Skin:  Negative for color change and rash.   Neurological:  Negative for dizziness, seizures, syncope, light-headedness and headaches.   All other systems reviewed and are negative.        Objective:  Vitals:    02/09/24 0930   BP: 110/80   Pulse: 69   Temp: 97.6 °F (36.4 °C)   SpO2: 98%   Weight: 122 kg (270 lb)   Height: 5' 6\" (1.676 m)     Body mass index is 43.58 kg/m².     Physical Exam  Vitals and nursing note reviewed.   Constitutional:       Appearance: Normal appearance. She is not ill-appearing.   HENT:      Head: Normocephalic.      Right Ear: There is no impacted cerumen. Tympanic membrane is bulging.      Left Ear: There is no impacted cerumen. Tympanic membrane is erythematous and bulging.      Nose: Congestion present.      Mouth/Throat:      Mouth: Mucous membranes are moist.      Pharynx: No posterior oropharyngeal erythema.   Eyes:      Extraocular Movements: Extraocular movements intact.      Conjunctiva/sclera: Conjunctivae normal.      Pupils: Pupils are equal, round, and reactive to light.   Cardiovascular:      Rate and Rhythm: Normal rate and regular rhythm.      Heart sounds: Normal heart sounds. No murmur heard.  Pulmonary:      Effort: Pulmonary effort is normal.      Breath sounds: Normal breath sounds. No " wheezing.   Abdominal:      Palpations: Abdomen is soft.      Tenderness: There is no abdominal tenderness.   Musculoskeletal:         General: Normal range of motion.      Cervical back: Normal range of motion.      Right lower leg: No edema.      Left lower leg: No edema.   Skin:     General: Skin is warm and dry.   Neurological:      General: No focal deficit present.      Mental Status: She is alert.   Psychiatric:         Mood and Affect: Mood normal.         Behavior: Behavior normal.

## 2024-02-12 ENCOUNTER — TELEPHONE (OUTPATIENT)
Dept: GASTROENTEROLOGY | Facility: CLINIC | Age: 29
End: 2024-02-12

## 2024-02-21 ENCOUNTER — TELEPHONE (OUTPATIENT)
Dept: FAMILY MEDICINE CLINIC | Facility: CLINIC | Age: 29
End: 2024-02-21

## 2024-02-21 NOTE — TELEPHONE ENCOUNTER
Spoke with patient- she stated that she was in about 2 weeks ago because her ears and throat hurt and she was given medications for it. However, she has finished those medications and she still does not feel any better. She wants to know, what should she do next? Does she need to make another appointment or can we try sending in different medication?    Please advise, thank you!

## 2024-02-22 ENCOUNTER — OFFICE VISIT (OUTPATIENT)
Dept: FAMILY MEDICINE CLINIC | Facility: CLINIC | Age: 29
End: 2024-02-22
Payer: COMMERCIAL

## 2024-02-22 VITALS
DIASTOLIC BLOOD PRESSURE: 82 MMHG | HEIGHT: 66 IN | SYSTOLIC BLOOD PRESSURE: 128 MMHG | HEART RATE: 114 BPM | WEIGHT: 276 LBS | TEMPERATURE: 97.8 F | BODY MASS INDEX: 44.36 KG/M2 | OXYGEN SATURATION: 98 %

## 2024-02-22 DIAGNOSIS — R05.9 COUGH, UNSPECIFIED TYPE: ICD-10-CM

## 2024-02-22 DIAGNOSIS — J02.9 SORE THROAT: Primary | ICD-10-CM

## 2024-02-22 LAB
SARS-COV-2 AG UPPER RESP QL IA: NEGATIVE
VALID CONTROL: NORMAL

## 2024-02-22 PROCEDURE — 87651 STREP A DNA AMP PROBE: CPT

## 2024-02-22 PROCEDURE — 87070 CULTURE OTHR SPECIMN AEROBIC: CPT

## 2024-02-22 PROCEDURE — 99214 OFFICE O/P EST MOD 30 MIN: CPT

## 2024-02-22 PROCEDURE — 87811 SARS-COV-2 COVID19 W/OPTIC: CPT

## 2024-02-22 RX ORDER — PREDNISONE 10 MG/1
TABLET ORAL DAILY
Qty: 20 TABLET | Refills: 0 | Status: SHIPPED | OUTPATIENT
Start: 2024-02-22 | End: 2024-02-29

## 2024-02-22 RX ORDER — DOXYCYCLINE HYCLATE 100 MG/1
100 CAPSULE ORAL EVERY 12 HOURS SCHEDULED
Qty: 20 CAPSULE | Refills: 0 | Status: SHIPPED | OUTPATIENT
Start: 2024-02-22 | End: 2024-03-03

## 2024-02-22 RX ORDER — FLUCONAZOLE 150 MG/1
TABLET ORAL
COMMUNITY
Start: 2024-02-09

## 2024-02-22 NOTE — PROGRESS NOTES
Assessment/Plan:         Problem List Items Addressed This Visit    None  Visit Diagnoses     Sore throat    -  Primary    son with strep, will send doxy and prednisone taper, salt water gargles, new tooth brush, vit c, d and zinc    Relevant Medications    doxycycline hyclate (VIBRAMYCIN) 100 mg capsule    predniSONE 10 mg tablet    Other Relevant Orders    POCT rapid PCR strepA (Completed)    Cough, unspecified type        son with strep, will send doxy and prednisone taper, salt water gargles, new tooth brush, vit c, d and zinc    Relevant Medications    doxycycline hyclate (VIBRAMYCIN) 100 mg capsule    predniSONE 10 mg tablet    Other Relevant Orders    POCT Rapid Covid Ag (Completed)            Subjective:      Patient ID: Tiffany Jean-Baptiste is a 28 y.o. female.    Tiffany is here for ear pain and sore throat and her son has strep. She just finished amoxicillin but did not feel better since the last time she was here.     Sore Throat   Associated symptoms include congestion, coughing, ear pain and headaches. Pertinent negatives include no abdominal pain, shortness of breath or vomiting.   Earache   Associated symptoms include coughing, headaches and a sore throat. Pertinent negatives include no abdominal pain, rash or vomiting.       The following portions of the patient's history were reviewed and updated as appropriate:   Past Medical History:  She has a past medical history of Anxiety, COVID (01/2021), High cholesterol, Morbid obesity with BMI of 45.0-49.9, adult ([facility]), Pre-operative laboratory examination, and Seizures ([facility]) (03/31/2015).,  _______________________________________________________________________  Medical Problems:  does not have any pertinent problems on file.,  _______________________________________________________________________  Past Surgical History:   has a past surgical history that includes Upper gastrointestinal  endoscopy.,  _______________________________________________________________________  Family History:  family history includes Coronary artery disease in her maternal grandmother and mother; Diabetes in her maternal grandfather and sister; Hyperlipidemia in her maternal grandfather and maternal grandmother.,  _______________________________________________________________________  Social History:   reports that she quit smoking about 2 years ago. Her smoking use included cigarettes. She started smoking about 8 years ago. She has a 1.5 pack-year smoking history. She has never used smokeless tobacco. She reports current alcohol use. She reports that she does not use drugs.,  _______________________________________________________________________  Allergies:  has No Known Allergies..  _______________________________________________________________________  Current Outpatient Medications   Medication Sig Dispense Refill   • Adapalene-Benzoyl Peroxide 0.3-2.5 % GEL APPLY 1 APPLICATION TOPICALLY DAILY AT BEDTIME 45 g 1   • ammonium lactate (LAC-HYDRIN) 12 % cream Apply topically as needed for dry skin 385 g 2   • doxycycline hyclate (VIBRAMYCIN) 100 mg capsule Take 1 capsule (100 mg total) by mouth every 12 (twelve) hours for 10 days 20 capsule 0   • fluconazole (DIFLUCAN) 150 mg tablet TAKE 1 TABLET AT SYMPTOM ONSET AND SECOND 72 HOURS LATER IF SYMPTOMS PERSIST     • fluticasone (FLONASE) 50 mcg/act nasal spray 1 spray into each nostril daily 15.8 mL 1   • linaCLOtide (Linzess) 145 MCG CAPS Take 1 capsule (145 mcg total) by mouth in the morning 30 capsule 3   • omeprazole (PriLOSEC) 40 MG capsule Take 1 capsule (40 mg total) by mouth daily 90 capsule 0   • predniSONE 10 mg tablet Take 4 tablets (40 mg total) by mouth daily for 2 days, THEN 3 tablets (30 mg total) daily for 2 days, THEN 2 tablets (20 mg total) daily for 2 days, THEN 1 tablet (10 mg total) daily for 2 days. 20 tablet 0   • Semaglutide-Weight Management  "(WEGOVY) 0.25 MG/0.5ML Inject 0.5 mL (0.25 mg total) under the skin once a week 2 mL 0   • valACYclovir (VALTREX) 500 mg tablet if needed       No current facility-administered medications for this visit.     _______________________________________________________________________  Review of Systems   Constitutional:  Positive for fatigue. Negative for chills, diaphoresis and fever.   HENT:  Positive for congestion, ear pain, sinus pressure, sinus pain and sore throat.    Eyes:  Negative for pain and visual disturbance.   Respiratory:  Positive for cough. Negative for chest tightness, shortness of breath and wheezing.    Cardiovascular:  Negative for chest pain and palpitations.   Gastrointestinal:  Negative for abdominal pain and vomiting.   Genitourinary:  Negative for dysuria and hematuria.   Musculoskeletal:  Negative for arthralgias and back pain.   Skin:  Negative for color change and rash.   Neurological:  Positive for headaches. Negative for seizures and syncope.   All other systems reviewed and are negative.        Objective:  Vitals:    02/22/24 1048   BP: 128/82   BP Location: Left arm   Patient Position: Sitting   Pulse: (!) 114   Temp: 97.8 °F (36.6 °C)   SpO2: 98%   Weight: 125 kg (276 lb)   Height: 5' 6\" (1.676 m)     Body mass index is 44.55 kg/m².     Physical Exam  Vitals and nursing note reviewed.   Constitutional:       Appearance: Normal appearance. She is ill-appearing.   HENT:      Head: Normocephalic.      Right Ear: Tympanic membrane normal. There is no impacted cerumen. Tympanic membrane is not erythematous.      Left Ear: Swelling present. There is no impacted cerumen. Tympanic membrane is erythematous.      Nose: Congestion present.      Mouth/Throat:      Mouth: Mucous membranes are moist.      Pharynx: No posterior oropharyngeal erythema.      Tonsils: 3+ on the right.   Eyes:      Extraocular Movements: Extraocular movements intact.      Right eye: Normal extraocular motion.      Left " eye: Normal extraocular motion.      Pupils: Pupils are equal, round, and reactive to light.   Cardiovascular:      Rate and Rhythm: Normal rate and regular rhythm.      Heart sounds: Normal heart sounds. No murmur heard.  Pulmonary:      Effort: Pulmonary effort is normal.      Breath sounds: Normal breath sounds. No wheezing.   Abdominal:      Palpations: Abdomen is soft.      Tenderness: There is no abdominal tenderness.   Musculoskeletal:         General: Normal range of motion.      Cervical back: Normal range of motion.      Right lower leg: No edema.      Left lower leg: No edema.   Lymphadenopathy:      Cervical: No cervical adenopathy.   Skin:     General: Skin is warm and dry.   Neurological:      General: No focal deficit present.      Mental Status: She is alert.   Psychiatric:         Mood and Affect: Mood normal.         Behavior: Behavior normal.

## 2024-02-24 LAB — BACTERIA THROAT CULT: NORMAL

## 2024-03-14 ENCOUNTER — OFFICE VISIT (OUTPATIENT)
Dept: FAMILY MEDICINE CLINIC | Facility: CLINIC | Age: 29
End: 2024-03-14
Payer: COMMERCIAL

## 2024-03-14 VITALS
SYSTOLIC BLOOD PRESSURE: 108 MMHG | HEART RATE: 80 BPM | HEIGHT: 66 IN | TEMPERATURE: 97.6 F | BODY MASS INDEX: 43.68 KG/M2 | DIASTOLIC BLOOD PRESSURE: 76 MMHG | WEIGHT: 271.8 LBS | OXYGEN SATURATION: 98 %

## 2024-03-14 DIAGNOSIS — R05.2 SUBACUTE COUGH: ICD-10-CM

## 2024-03-14 DIAGNOSIS — J02.9 SORE THROAT: Primary | ICD-10-CM

## 2024-03-14 DIAGNOSIS — H65.193 ACUTE EFFUSION OF BOTH MIDDLE EARS: ICD-10-CM

## 2024-03-14 LAB — S PYO DNA THROAT QL NAA+PROBE: NOT DETECTED

## 2024-03-14 PROCEDURE — 99213 OFFICE O/P EST LOW 20 MIN: CPT | Performed by: NURSE PRACTITIONER

## 2024-03-14 PROCEDURE — 87651 STREP A DNA AMP PROBE: CPT | Performed by: NURSE PRACTITIONER

## 2024-03-14 RX ORDER — VALACYCLOVIR HYDROCHLORIDE 1 G/1
TABLET, FILM COATED ORAL
COMMUNITY
Start: 2024-03-13

## 2024-03-14 RX ORDER — PREDNISONE 20 MG/1
20 TABLET ORAL 2 TIMES DAILY WITH MEALS
Qty: 10 TABLET | Refills: 0 | Status: SHIPPED | OUTPATIENT
Start: 2024-03-14 | End: 2024-03-19

## 2024-03-14 RX ORDER — DEXTROMETHORPHAN HYDROBROMIDE AND PROMETHAZINE HYDROCHLORIDE 15; 6.25 MG/5ML; MG/5ML
5 SYRUP ORAL 4 TIMES DAILY PRN
Qty: 473 ML | Refills: 0 | Status: SHIPPED | OUTPATIENT
Start: 2024-03-14 | End: 2024-03-14 | Stop reason: CLARIF

## 2024-03-14 NOTE — PATIENT INSTRUCTIONS
Acute Cough   AMBULATORY CARE:   An acute cough  can last up to 3 weeks. Common causes of an acute cough include a cold, allergies, or a lung infection.  Seek care immediately if:   You have trouble breathing or feel short of breath.    You cough up blood, or you see blood in your mucus.    You faint or feel weak or dizzy.    You have chest pain when you cough or take a deep breath.    You have new wheezing.    Contact your healthcare provider if:   You have a fever.    Your cough lasts longer than 4 weeks.    Your symptoms do not improve with treatment.    You have questions or concerns about your condition or care.    Treatment:  An acute cough usually goes away on its own. Ask your healthcare provider about medicines you can take to decrease your cough. You may need medicine to stop the cough, decrease swelling in your airways, or help open your airways. Medicine may also be given to help you cough up mucus. If you have an infection caused by bacteria, you may need antibiotics.  Manage your symptoms:   Do not smoke and stay away from others who smoke.  Nicotine and other chemicals in cigarettes and cigars can cause lung damage and make your cough worse. Ask your healthcare provider for information if you currently smoke and need help to quit. E-cigarettes or smokeless tobacco still contain nicotine. Talk to your healthcare provider before you use these products.    Drink extra liquids as directed.  Liquids will help thin and loosen mucus so you can cough it up. Liquids will also help prevent dehydration. Examples of good liquids to drink include water, fruit juice, and broth. Do not drink liquids that contain caffeine. Caffeine can increase your risk for dehydration. Ask your healthcare provider how much liquid to drink each day.    Rest as directed.  Do not do activities that make your cough worse, such as exercise.    Use a humidifier or vaporizer.  Use a cool mist humidifier or a vaporizer to increase air  moisture in your home. This may make it easier for you to breathe and help decrease your cough.    Eat 2 to 5 mL of honey 2 times each day.  Honey can help thin mucus and decrease your cough.    Use cough drops or lozenges.  These can help decrease throat irritation and your cough.    Follow up with your healthcare provider as directed:  Write down your questions so you remember to ask them during your visits.  © Copyright [employer] 2023 Information is for End User's use only and may not be sold, redistributed or otherwise used for commercial purposes.  The above information is an  only. It is not intended as medical advice for individual conditions or treatments. Talk to your doctor, nurse or pharmacist before following any medical regimen to see if it is safe and effective for you.

## 2024-03-14 NOTE — PROGRESS NOTES
Name: Tiffany Jean-Baptiste      : 1995      MRN: [id number]  Encounter Provider: MIREYA Hernandez  Encounter Date: 3/14/2024   Encounter department: [facility] [address]    Assessment & Plan     1. Acute effusion of both middle ears  Comments:  Advised steam, humidified air, saline rinses twice daily, to restart Flonase after course of prednisone.  Orders:  -     predniSONE 20 mg tablet; Take 1 tablet (20 mg total) by mouth 2 (two) times a day with meals for 5 days    2. Subacute cough  Comments:  Advised increased hydration, steam, humidified air, saline rinses twice daily, to sart daily anti-histamine. Prednisone as prescribed.  Orders:  -     predniSONE 20 mg tablet; Take 1 tablet (20 mg total) by mouth 2 (two) times a day with meals for 5 days           Subjective      Patient presents to the office for evaluation of ear pain, sore throat, and cough.  Symptoms have been waxing and waning for a month.  Was seen twice in office last month for otitis media and pharyngitis.  Was placed on Augmentin, then doxycycline.  Patient was also prescribed prednisone, but states she did not take it, only the antibiotics.  Diagnosed with flu a week at urgent care.  Is leaving for vacation and would like symptoms to subside.  Has not been taking any OTC medications for symptoms.  Patient denies fever, chills.      Review of Systems   Constitutional:  Negative for activity change, appetite change, chills and fever.   HENT:  Positive for congestion, ear pain, postnasal drip and sore throat. Negative for sinus pressure, sinus pain and trouble swallowing.    Eyes:  Negative for photophobia and visual disturbance.   Respiratory:  Positive for cough. Negative for chest tightness and shortness of breath.    Cardiovascular:  Negative for chest pain and palpitations.   Gastrointestinal:  Negative for abdominal pain, nausea and vomiting.   Genitourinary:  Negative for decreased urine volume.  "  Musculoskeletal:  Negative for arthralgias and myalgias.   Skin:  Negative for color change and rash.   Neurological:  Negative for dizziness and headaches.   Psychiatric/Behavioral:  Negative for confusion.        Current Outpatient Medications on File Prior to Visit   Medication Sig    Adapalene-Benzoyl Peroxide 0.3-2.5 % GEL APPLY 1 APPLICATION TOPICALLY DAILY AT BEDTIME    ammonium lactate (LAC-HYDRIN) 12 % cream Apply topically as needed for dry skin    fluticasone (FLONASE) 50 mcg/act nasal spray 1 spray into each nostril daily    linaCLOtide (Linzess) 145 MCG CAPS Take 1 capsule (145 mcg total) by mouth in the morning    omeprazole (PriLOSEC) 40 MG capsule Take 1 capsule (40 mg total) by mouth daily    Semaglutide-Weight Management (WEGOVY) 0.25 MG/0.5ML Inject 0.5 mL (0.25 mg total) under the skin once a week    valACYclovir (VALTREX) 1,000 mg tablet     valACYclovir (VALTREX) 500 mg tablet if needed    [DISCONTINUED] fluconazole (DIFLUCAN) 150 mg tablet TAKE 1 TABLET AT SYMPTOM ONSET AND SECOND 72 HOURS LATER IF SYMPTOMS PERSIST (Patient not taking: Reported on 3/14/2024)       Objective     /76 (BP Location: Left arm, Patient Position: Sitting)   Pulse 80   Temp 97.6 °F (36.4 °C)   Ht 5' 6\" (1.676 m)   Wt 123 kg (271 lb 12.8 oz)   SpO2 98%   BMI 43.87 kg/m²     Physical Exam  Vitals reviewed.   Constitutional:       General: She is not in acute distress.     Appearance: She is not ill-appearing.   HENT:      Head: Normocephalic and atraumatic.      Right Ear: Ear canal normal. A middle ear effusion is present.      Left Ear: Ear canal normal. A middle ear effusion is present.      Nose: Congestion present.      Right Turbinates: Enlarged.      Left Turbinates: Enlarged.      Right Sinus: No maxillary sinus tenderness or frontal sinus tenderness.      Left Sinus: No maxillary sinus tenderness or frontal sinus tenderness.      Mouth/Throat:      Pharynx: Oropharynx is clear.      Tonsils: No " tonsillar exudate or tonsillar abscesses. 1+ on the right. 1+ on the left.   Eyes:      Conjunctiva/sclera: Conjunctivae normal.      Pupils: Pupils are equal, round, and reactive to light.   Cardiovascular:      Rate and Rhythm: Normal rate and regular rhythm.      Heart sounds: Normal heart sounds. No murmur heard.  Pulmonary:      Effort: Pulmonary effort is normal.      Breath sounds: Normal breath sounds. No wheezing.   Abdominal:      General: Bowel sounds are normal.      Palpations: Abdomen is soft.      Tenderness: There is no abdominal tenderness.   Musculoskeletal:      Cervical back: Normal range of motion and neck supple.   Lymphadenopathy:      Cervical: No cervical adenopathy.   Skin:     General: Skin is warm and dry.   Neurological:      General: No focal deficit present.      Mental Status: She is alert and oriented to person, place, and time.   Psychiatric:         Mood and Affect: Mood normal.         Behavior: Behavior normal.       MIREYA Hernandez

## 2024-04-30 DIAGNOSIS — R10.9 ABDOMINAL DISCOMFORT: ICD-10-CM

## 2024-05-01 RX ORDER — OMEPRAZOLE 40 MG/1
40 CAPSULE, DELAYED RELEASE ORAL DAILY
Qty: 90 CAPSULE | Refills: 1 | Status: SHIPPED | OUTPATIENT
Start: 2024-05-01

## 2024-05-03 ENCOUNTER — OFFICE VISIT (OUTPATIENT)
Dept: FAMILY MEDICINE CLINIC | Facility: CLINIC | Age: 29
End: 2024-05-03
Payer: COMMERCIAL

## 2024-05-03 VITALS
SYSTOLIC BLOOD PRESSURE: 124 MMHG | WEIGHT: 284.2 LBS | TEMPERATURE: 98.4 F | RESPIRATION RATE: 18 BRPM | HEIGHT: 66 IN | OXYGEN SATURATION: 98 % | BODY MASS INDEX: 45.67 KG/M2 | DIASTOLIC BLOOD PRESSURE: 80 MMHG | HEART RATE: 88 BPM

## 2024-05-03 DIAGNOSIS — M54.9 CHRONIC UPPER BACK PAIN: Primary | ICD-10-CM

## 2024-05-03 DIAGNOSIS — S69.92XA INJURY OF LEFT THUMB, INITIAL ENCOUNTER: ICD-10-CM

## 2024-05-03 DIAGNOSIS — E66.01 OBESITY, CLASS III, BMI 40-49.9 (MORBID OBESITY) (HCC): ICD-10-CM

## 2024-05-03 DIAGNOSIS — N62 MACROMASTIA: ICD-10-CM

## 2024-05-03 DIAGNOSIS — G89.29 CHRONIC UPPER BACK PAIN: Primary | ICD-10-CM

## 2024-05-03 PROCEDURE — 99214 OFFICE O/P EST MOD 30 MIN: CPT | Performed by: NURSE PRACTITIONER

## 2024-05-03 NOTE — PROGRESS NOTES
Name: Tiffany Jean-Baptiste      : 1995      MRN: [id number]  Encounter Provider: MIREYA Hernandez  Encounter Date: 5/3/2024   Encounter department: [facility] [address]    Assessment & Plan     1. Chronic upper back pain  Assessment & Plan:  Notes longstanding history of upper back pain.  Has attempted physical therapy, medication management, chiropractor treatment in the past.  To attempt to find a plastic surgery to help with macromastia.  Referral placed.  Discussed range of motion exercises/stretches, can continue with acetaminophen and ibuprofen as needed.  Discussed use of over-the-counter lidocaine or salon-pas patches.    Orders:  -     Ambulatory Referral to Plastic Surgery; Future    2. Macromastia  Assessment & Plan:  Discussed range of motion exercises/stretches for upper back and neck.  Discussed importance of supportive bra, proper body mechanics.  Referral for plastic surgery entered    Orders:  -     Ambulatory Referral to Plastic Surgery; Future    3. Injury of left thumb, initial encounter  Comments:  Advised heat therapy, ibuprofen as needed, to obtain x-ray as ordered.  Orders:  -     XR hand 3+ vw left; Future; Expected date: 2024    4. Obesity, Class III, BMI 40-49.9 (morbid obesity) (HCC)  Assessment & Plan:  Was able to lose 15 pounds on her own, but has had recent weight gain.  Previous provider ordered Wegovy, but patient has been unable to obtain medication due to availability.  Patient will attempt to call other pharmacies for availability.  Discussed importance of making healthier food choices, engaging in physical activity.    Orders:  -     Ambulatory Referral to Plastic Surgery; Future           Subjective      Patient presents to the office for flareup of upper back pain.  Per patient, has had chronic upper back pain since she was a teenager.  Throughout the years, patient has had medication management, physical therapy, chiropractor  "therapy.  Pain continues to return.  Patient is noted to have macromastia, was attempting to find a plastic surgeon for breast reduction, but has been unable to find a provider that accepts her insurance.  Patient notes pain starts in lower neck region and radiates to between shoulder blades and to mid back.  Denies numbness or tingling in upper extremities, fever, chills.    Notes pain to left thumb.  Had a \"jamming\" injury to it a month ago.  Notes increased pain in joint with certain movements.  Denies redness, swelling.      Review of Systems   Constitutional:  Negative for activity change, appetite change, chills and fever.   HENT:  Negative for sore throat and trouble swallowing.    Eyes:  Negative for photophobia and visual disturbance.   Respiratory:  Negative for cough, chest tightness and shortness of breath.    Cardiovascular:  Negative for chest pain and palpitations.   Gastrointestinal:  Negative for abdominal pain, nausea and vomiting.   Genitourinary:  Negative for dysuria, flank pain and urgency.   Musculoskeletal:  Positive for arthralgias, back pain and neck pain.   Skin:  Negative for color change and rash.   Neurological:  Negative for dizziness, weakness, light-headedness, numbness and headaches.   Psychiatric/Behavioral:  Negative for confusion.        Current Outpatient Medications on File Prior to Visit   Medication Sig    Adapalene-Benzoyl Peroxide 0.3-2.5 % GEL APPLY 1 APPLICATION TOPICALLY DAILY AT BEDTIME    ammonium lactate (LAC-HYDRIN) 12 % cream Apply topically as needed for dry skin    fluticasone (FLONASE) 50 mcg/act nasal spray 1 spray into each nostril daily    linaCLOtide (Linzess) 145 MCG CAPS Take 1 capsule (145 mcg total) by mouth in the morning    omeprazole (PriLOSEC) 40 MG capsule TAKE 1 CAPSULE (40 MG TOTAL) BY MOUTH DAILY.    valACYclovir (VALTREX) 1,000 mg tablet     valACYclovir (VALTREX) 500 mg tablet if needed    Semaglutide-Weight Management (WEGOVY) 0.25 MG/0.5ML " "Inject 0.5 mL (0.25 mg total) under the skin once a week (Patient not taking: Reported on 5/3/2024)       Objective     /80 (BP Location: Left arm, Patient Position: Sitting)   Pulse 88   Temp 98.4 °F (36.9 °C)   Resp 18   Ht 5' 6\" (1.676 m)   Wt 129 kg (284 lb 3.2 oz)   SpO2 98%   BMI 45.87 kg/m²     Physical Exam  Vitals reviewed.   Constitutional:       Appearance: She is obese.   HENT:      Head: Normocephalic and atraumatic.      Right Ear: Tympanic membrane, ear canal and external ear normal.      Left Ear: Tympanic membrane, ear canal and external ear normal.      Nose: Nose normal.      Mouth/Throat:      Mouth: Mucous membranes are moist.      Pharynx: Oropharynx is clear.   Eyes:      Conjunctiva/sclera: Conjunctivae normal.      Pupils: Pupils are equal, round, and reactive to light.   Cardiovascular:      Rate and Rhythm: Normal rate and regular rhythm.      Pulses: Normal pulses.      Heart sounds: Normal heart sounds. No murmur heard.  Pulmonary:      Effort: Pulmonary effort is normal.      Breath sounds: Normal breath sounds.   Chest:   Breasts:     Breasts are symmetrical.      Comments: Macromastia noted  Musculoskeletal:         General: Normal range of motion.      Right hand: Normal.      Left hand: Bony tenderness present. Normal strength. Normal sensation. There is no disruption of two-point discrimination. Normal capillary refill.      Cervical back: Normal range of motion and neck supple. Tenderness present. No bony tenderness. Pain with movement present.      Thoracic back: Tenderness present. No swelling or bony tenderness. Normal range of motion.      Lumbar back: No tenderness or bony tenderness.   Skin:     General: Skin is warm and dry.   Neurological:      General: No focal deficit present.      Mental Status: She is alert and oriented to person, place, and time.   Psychiatric:         Mood and Affect: Mood normal.         Behavior: Behavior normal.       Marysol Sandoval " CRNP

## 2024-05-03 NOTE — ASSESSMENT & PLAN NOTE
Notes longstanding history of upper back pain.  Has attempted physical therapy, medication management, chiropractor treatment in the past.  To attempt to find a plastic surgery to help with macromastia.  Referral placed.  Discussed range of motion exercises/stretches, can continue with acetaminophen and ibuprofen as needed.  Discussed use of over-the-counter lidocaine or salon-pas patches.

## 2024-05-03 NOTE — ASSESSMENT & PLAN NOTE
Was able to lose 15 pounds on her own, but has had recent weight gain.  Previous provider ordered Wegovy, but patient has been unable to obtain medication due to availability.  Patient will attempt to call other pharmacies for availability.  Discussed importance of making healthier food choices, engaging in physical activity.

## 2024-05-03 NOTE — PATIENT INSTRUCTIONS
Upper Back Exercises   AMBULATORY CARE:   Upper back exercises  help heal and strengthen your back muscles and prevent another injury. Ask your healthcare provider if you need to see a physical therapist for more advanced exercises.  Seek care immediately if:   You have severe pain that prevents you from moving.      Call your doctor if:   Your pain becomes worse.    You have new pain.    You have questions or concerns about your condition, care, or exercise program.    What you need to know about exercise safety:   Do the exercises on a mat or firm surface (not on a bed).  A firm surface will support your spine and prevent upper back pain.    Move slowly and smoothly.  Avoid fast or jerky motions.    Breathe normally.  Do not hold your breath.    Stop if you feel pain.  It is normal to feel some discomfort at first, but you should not feel pain. Regular exercise will help decrease your discomfort over time.    Perform upper back exercises safely:  Ask your healthcare provider which of the following exercises are best for you and how often to do them.  Head rolls:  Sit in a chair or stand. Bring your chin toward your chest and roll your head to the right. Your ear should be over your shoulder. Hold this position for 5 seconds. Roll your head back toward your chest and to the left. Your ear should be over your left shoulder. Hold this position for 5 seconds. Next, roll your head back slowly in a clockwise [place] and repeat 3 times. Do 3 sets of head rolls.         Scapular squeeze:  Sit or stand with your arms at your sides. Squeeze your shoulder blades together and hold for 3 seconds. Relax and repeat 3 times.         Pectoralis stretch:   a doorway. Lift your hands and place them on each side of the door frame or wall slightly higher than your head. Lean forward slowly until you feel a gentle stretch. Hold for 15 seconds. Repeat 3 times, or as directed.         Cat and camel exercise:  Place your hands and  knees on the floor. Arch your back upward toward the ceiling and lower your head. Round out your spine as much as you can. Hold for 5 seconds. Lift your head upward and push your chest downward toward the floor. Hold for 5 seconds. Do 3 sets or as directed.         Bird dog:  Place your hands and knees on the floor. Keep your wrists directly below your shoulders and your knees directly below your hips. Pull your belly button in toward your spine. Do not flatten or arch your back. Tighten your abdominal muscles. Raise one arm straight out so that it is aligned with your head. Next, raise the leg opposite your arm. Hold this position for 15 seconds. Lower your arm and leg slowly and change sides. Do 5 sets.       Follow up with your doctor as directed:  Write down your questions so you remember to ask them during your visits.  © Copyright [employer] 2023 Information is for End User's use only and may not be sold, redistributed or otherwise used for commercial purposes.  The above information is an  only. It is not intended as medical advice for individual conditions or treatments. Talk to your doctor, nurse or pharmacist before following any medical regimen to see if it is safe and effective for you.

## 2024-05-03 NOTE — ASSESSMENT & PLAN NOTE
Discussed range of motion exercises/stretches for upper back and neck.  Discussed importance of supportive bra, proper body mechanics.  Referral for plastic surgery entered

## 2024-05-14 ENCOUNTER — TELEPHONE (OUTPATIENT)
Age: 29
End: 2024-05-14

## 2024-05-14 NOTE — TELEPHONE ENCOUNTER
Patient is scheduled for a physical on 5/21.  She would like to come in sooner for a TB test as she needs it for work.  However, I cannot schedule it because there is no order in the system for it.  Please ask the doctor to place an order for the TB and please call the patient to schedule.  She would like to come into the office on Wed 5/15 and do the read on Friday 5/17.

## 2024-05-14 NOTE — TELEPHONE ENCOUNTER
Hey are you able to just place an quantiferon tb gold for Tiffany she stated her employer will accept that as well.

## 2024-05-15 DIAGNOSIS — Z02.1 PRE-EMPLOYMENT EXAMINATION: Primary | ICD-10-CM

## 2024-05-22 ENCOUNTER — TELEPHONE (OUTPATIENT)
Age: 29
End: 2024-05-22

## 2024-05-22 DIAGNOSIS — M54.42 ACUTE BILATERAL LOW BACK PAIN WITH BILATERAL SCIATICA: ICD-10-CM

## 2024-05-22 DIAGNOSIS — M54.2 NECK PAIN: Primary | ICD-10-CM

## 2024-05-22 DIAGNOSIS — M54.41 ACUTE BILATERAL LOW BACK PAIN WITH BILATERAL SCIATICA: ICD-10-CM

## 2024-05-22 NOTE — TELEPHONE ENCOUNTER
Patient is requesting a referral to Physical Therapy due to Neck and Back Pain. Appointment is scheduled for tomorrow. Patient requests call when referral is placed.

## 2024-05-23 ENCOUNTER — EVALUATION (OUTPATIENT)
Dept: PHYSICAL THERAPY | Facility: CLINIC | Age: 29
End: 2024-05-23
Payer: COMMERCIAL

## 2024-05-23 DIAGNOSIS — M54.2 NECK PAIN: ICD-10-CM

## 2024-05-23 DIAGNOSIS — M54.6 CHRONIC MIDLINE THORACIC BACK PAIN: ICD-10-CM

## 2024-05-23 DIAGNOSIS — G89.29 CHRONIC MIDLINE THORACIC BACK PAIN: ICD-10-CM

## 2024-05-23 DIAGNOSIS — N62 LARGE BREASTS: Primary | ICD-10-CM

## 2024-05-23 DIAGNOSIS — M54.42 ACUTE BILATERAL LOW BACK PAIN WITH BILATERAL SCIATICA: ICD-10-CM

## 2024-05-23 DIAGNOSIS — M54.41 ACUTE BILATERAL LOW BACK PAIN WITH BILATERAL SCIATICA: ICD-10-CM

## 2024-05-23 PROCEDURE — 97110 THERAPEUTIC EXERCISES: CPT | Performed by: PHYSICAL THERAPIST

## 2024-05-23 PROCEDURE — 97140 MANUAL THERAPY 1/> REGIONS: CPT | Performed by: PHYSICAL THERAPIST

## 2024-05-23 PROCEDURE — 97161 PT EVAL LOW COMPLEX 20 MIN: CPT | Performed by: PHYSICAL THERAPIST

## 2024-05-23 NOTE — PROGRESS NOTES
PT Evaluation     Today's date: 2024  Patient name: Tiffany Jean-Baptiste  : 1995  MRN: [id number]  Referring provider: Padmini Vyas CRNP  Dx:   Encounter Diagnosis     ICD-10-CM    1. Neck pain  M54.2 Ambulatory Referral to Physical Therapy      2. Acute bilateral low back pain with bilateral sciatica  M54.42 Ambulatory Referral to Physical Therapy    M54.41                      Assessment  Impairments: abnormal or restricted ROM, activity intolerance, impaired physical strength and pain with function    Assessment details: Pt is a 27 y/o female who presents to physical therapy with primary nociceptive pain associated with chronic neck and back pain complicated by high BMI. Pt does not present with any red flag symptoms at this time. Pt demonstrates good thoracic and cervical ROM, even demonstrating hypermobility into thoracic and lumbar extension as a compensation for increased breast weight. Decreased strength in periscapulars as well. Education provided regarding POC, prognosis and HEP, pt verablized understanding. Pt would benefit from skilled physical therapy in order to decrease deficits and return to prior level of function.    Understanding of Dx/Px/POC: good    Goals  STG (4 weeks):  Pt will be independent with HEP.  Pt will demonstrate increase in mid trap MMT grade by 1/3.  Pt will demonstrate increase in MMT grade by 1/3 for mid trap.    LTG (8 weeks):  FOTO will be expected outcome.   Pt will report decreased of 2 SPR.   Pt will demonstrate MMT grade comparable to contralateral limb in all deficient muscle groups.      Plan  Patient would benefit from: skilled physical therapy  Planned modality interventions: cryotherapy    Planned therapy interventions: manual therapy, neuromuscular re-education, patient education, self care, strengthening, stretching, therapeutic activities, therapeutic exercise and home exercise program    Frequency: 1-2x/week.  Duration in weeks: 8  Treatment plan  discussed with: patient    Subjective Evaluation    History of Present Illness  Mechanism of injury: Chief Complaint: Pt reports chronic base of neck and upper back pain that began when she was 19. After 2019, it has become constant. She reports it is due to her large breasts, for which she is considering a reduction. Her mother and sister have both had the procedures, and neither have any pain.     Severity: severe  Irritability: low   Nature: nociceptive  Stage: chronic  Stability: worsening    P1: see body chart  Patient Goals  Patient goals for therapy: decreased pain  Patient goal: potentially avoid surgery    Objective     Postural Observations    Additional Postural Observation Details  Over compensation via thoracic extension and hinge at mid lumbar to offload breast weight    Active Range of Motion     Additional Active Range of Motion Details  WNL in all directions but increased pain with cervical flexion and bilateral SB  Hypermobile thoracic extension, all others WNL    Joint Play   Joints within functional limits: C2, C3, C4, C5, C6, C7, T1, T2, T3, T4, T5, T6, T7, T8 and T9     Strength/Myotome Testing     Left Shoulder     Isolated Muscles   Lower trapezius: 4   Middle trapezius: 4     Right Shoulder     Isolated Muscles   Lower trapezius: 4   Middle trapezius: 4     General Comments:    Upper quarter screen   Shoulder: unremarkable           Precautions: None      Manuals 5/23            CTJ mobs MICHELE                                                   Neuro Re-Ed             Prone T HEP            Prone Y HEP            Banded rows             Prone scap retract                                                    Ther Ex             Pt edu MICHELE                                                                                          UBE             Ther Activity                                       Gait Training                                       Modalities

## 2024-05-28 ENCOUNTER — TELEPHONE (OUTPATIENT)
Dept: BARIATRICS | Facility: CLINIC | Age: 29
End: 2024-05-28

## 2024-05-28 NOTE — TELEPHONE ENCOUNTER
Spoke with pt about rescheduling [facility] appointment at another location due to provider departing from off and pt requested to be added to wait list in [place] office  due to wanting to stay in [place] location instead.

## 2024-05-29 ENCOUNTER — TELEPHONE (OUTPATIENT)
Dept: PLASTIC SURGERY | Facility: CLINIC | Age: 29
End: 2024-05-29

## 2024-06-05 ENCOUNTER — VBI (OUTPATIENT)
Dept: ADMINISTRATIVE | Facility: OTHER | Age: 29
End: 2024-06-05

## 2024-06-06 ENCOUNTER — OFFICE VISIT (OUTPATIENT)
Dept: PHYSICAL THERAPY | Facility: CLINIC | Age: 29
End: 2024-06-06
Payer: COMMERCIAL

## 2024-06-06 DIAGNOSIS — N62 LARGE BREASTS: ICD-10-CM

## 2024-06-06 DIAGNOSIS — G89.29 CHRONIC MIDLINE THORACIC BACK PAIN: ICD-10-CM

## 2024-06-06 DIAGNOSIS — M54.6 CHRONIC MIDLINE THORACIC BACK PAIN: ICD-10-CM

## 2024-06-06 DIAGNOSIS — M54.41 ACUTE BILATERAL LOW BACK PAIN WITH BILATERAL SCIATICA: ICD-10-CM

## 2024-06-06 DIAGNOSIS — M54.2 NECK PAIN: Primary | ICD-10-CM

## 2024-06-06 DIAGNOSIS — M54.42 ACUTE BILATERAL LOW BACK PAIN WITH BILATERAL SCIATICA: ICD-10-CM

## 2024-06-06 PROCEDURE — 97110 THERAPEUTIC EXERCISES: CPT | Performed by: PHYSICAL THERAPIST

## 2024-06-06 PROCEDURE — 97140 MANUAL THERAPY 1/> REGIONS: CPT | Performed by: PHYSICAL THERAPIST

## 2024-06-06 NOTE — PROGRESS NOTES
"Daily Note     Today's date: 2024  Patient name: Tiffany Jean-Baptiste  : 1995  MRN: [id number]  Referring provider: Padmini Vyas CRNP  Dx:   Encounter Diagnosis     ICD-10-CM    1. Neck pain  M54.2       2. Acute bilateral low back pain with bilateral sciatica  M54.42     M54.41       3. Large breasts  N62       4. Chronic midline thoracic back pain  M54.6     G89.29           Start Time: 1804  Stop Time: 183  Total time in clinic (min): 32 minutes    Subjective: Patient states her back has been hurting all day today with a rating of 8/10. She states that she had to lay down to relieve the pain.        Objective: See treatment diary below      Assessment: Tolerated treatment well. Patient performed UBE to improve UE muscular endurance. Patient tolerated thoracic extension over a half foam roll with a slight decrease in pain to 7/10. Patient tolerated addition of open book stretch, banded rows, banded ext, prone scap retraction, and bilateral banded ER with no increase in pain. Patient would benefit from continued PT      Plan: Continue per plan of care.      Precautions: None      Manuals            CTJ mobs MICHELE                                                   Neuro Re-Ed             Prone T HEP 3x10 2#           Prone Y HEP 3x10 2#            Banded rows  3x10            Prone scap retract             Banded ext  2x10            B/l banded Er   3x10                         Ther Ex             Pt edu MICHELE            Open book   10 x10\"           Thoracic ext   3x1'                                                               UBE  3'/3'            Ther Activity                                       Gait Training                                       Modalities                                            "

## 2024-06-13 ENCOUNTER — OFFICE VISIT (OUTPATIENT)
Dept: PHYSICAL THERAPY | Facility: CLINIC | Age: 29
End: 2024-06-13
Payer: COMMERCIAL

## 2024-06-13 DIAGNOSIS — M54.41 ACUTE BILATERAL LOW BACK PAIN WITH BILATERAL SCIATICA: ICD-10-CM

## 2024-06-13 DIAGNOSIS — M54.2 NECK PAIN: Primary | ICD-10-CM

## 2024-06-13 DIAGNOSIS — N62 LARGE BREASTS: ICD-10-CM

## 2024-06-13 DIAGNOSIS — M54.42 ACUTE BILATERAL LOW BACK PAIN WITH BILATERAL SCIATICA: ICD-10-CM

## 2024-06-13 DIAGNOSIS — M54.6 CHRONIC MIDLINE THORACIC BACK PAIN: ICD-10-CM

## 2024-06-13 DIAGNOSIS — G89.29 CHRONIC MIDLINE THORACIC BACK PAIN: ICD-10-CM

## 2024-06-13 PROCEDURE — 97112 NEUROMUSCULAR REEDUCATION: CPT | Performed by: PHYSICAL THERAPIST

## 2024-06-13 PROCEDURE — 97110 THERAPEUTIC EXERCISES: CPT | Performed by: PHYSICAL THERAPIST

## 2024-06-13 NOTE — PROGRESS NOTES
"Daily Note     Today's date: 2024  Patient name: Tiffany Jean-Baptiste  : 1995  MRN: [id number]  Referring provider: Padmini Vyas CRNP  Dx:   Encounter Diagnosis     ICD-10-CM    1. Neck pain  M54.2       2. Acute bilateral low back pain with bilateral sciatica  M54.42     M54.41       3. Large breasts  N62       4. Chronic midline thoracic back pain  M54.6     G89.29                      Subjective: Pt reports her back pain is doing well.      Objective: See treatment diary below      Assessment: Tolerated treatment well. Improvement in strength noted today. Will discuss gym program at next follow-up. Patient would benefit from continued PT      Plan: Continue per plan of care.  Progress treatment as tolerated.       Precautions: None      Manuals           CTJ mobs MICHELE                                                   Neuro Re-Ed             Prone T HEP 3x10 2# 2x15 3#          Prone Y HEP 3x10 2#  2x15 3#          Banded rows  3x10  2x15 BTB          Prone scap retract             Banded ext  2x10  2x15 black TB with ER          B/l banded Er   3x10  2x15 BTB                       Ther Ex             Pt edu MICHELE            Open book   10 x10\" 20x          Thoracic ext   3x1' 30x seated                                                              UBE  3'/3'  3'/3'          Ther Activity                                       Gait Training                                       Modalities                                              "

## 2024-06-18 ENCOUNTER — OFFICE VISIT (OUTPATIENT)
Age: 29
End: 2024-06-18
Payer: COMMERCIAL

## 2024-06-18 VITALS — WEIGHT: 284 LBS | TEMPERATURE: 98.1 F | HEIGHT: 66 IN | BODY MASS INDEX: 45.64 KG/M2

## 2024-06-18 DIAGNOSIS — L91.0 KELOID OF SKIN: ICD-10-CM

## 2024-06-18 DIAGNOSIS — L30.4 INTERTRIGO: ICD-10-CM

## 2024-06-18 DIAGNOSIS — L70.0 ACNE VULGARIS: Primary | ICD-10-CM

## 2024-06-18 DIAGNOSIS — L70.9 ACNE, UNSPECIFIED ACNE TYPE: ICD-10-CM

## 2024-06-18 PROCEDURE — 99214 OFFICE O/P EST MOD 30 MIN: CPT | Performed by: NURSE PRACTITIONER

## 2024-06-18 PROCEDURE — NC001 PR NO CHARGE: Performed by: NURSE PRACTITIONER

## 2024-06-18 PROCEDURE — 11900 INJECT SKIN LESIONS </W 7: CPT | Performed by: NURSE PRACTITIONER

## 2024-06-18 RX ORDER — ADAPALENE AND BENZOYL PEROXIDE 3; 25 MG/G; MG/G
1 GEL TOPICAL
Qty: 45 G | Refills: 1 | Status: SHIPPED | OUTPATIENT
Start: 2024-06-18

## 2024-06-18 RX ORDER — TRIAMCINOLONE ACETONIDE 40 MG/ML
40 INJECTION, SUSPENSION INTRA-ARTICULAR; INTRAMUSCULAR ONCE
Status: COMPLETED | OUTPATIENT
Start: 2024-06-18 | End: 2024-06-18

## 2024-06-18 RX ADMIN — TRIAMCINOLONE ACETONIDE 40 MG: 40 INJECTION, SUSPENSION INTRA-ARTICULAR; INTRAMUSCULAR at 15:53

## 2024-06-18 NOTE — PROGRESS NOTES
"[facility]atology [facility] Note     Patient Name: Tiffany Jean-Baptiste  Encounter Date: June 18, 2024     Have you been cared for by a [facility] Dermatologist in the last 3 years and, if so, which description applies to you?    Yes.  I have been here within the last 3 years, and my medical history has NOT changed since that time.  I am FEMALE/of child-bearing potential.    REVIEW OF SYSTEMS:  Have you recently had or currently have any of the following? No changes in my recent health.   PAST MEDICAL HISTORY:  Have you personally ever had or currently have any of the following?  If \"YES,\" then please provide more detail. No changes in my medical history.   HISTORY OF IMMUNOSUPPRESSION: Do you have a history of any of the following:  Systemic Immunosuppression such as Diabetes, Biologic or Immunotherapy, Chemotherapy, Organ Transplantation, Bone Marrow Transplantation?  No     Answering \"YES\" requires the addition of the dotphrase \"IMMUNOSUPPRESSED\" as the first diagnosis of the patient's visit.   FAMILY HISTORY:  Any \"first degree relatives\" (parent, brother, sister, or child) with the following?    No changes in my family's known health.   PATIENT EXPERIENCE:    Do you want the Dermatologist to perform a COMPLETE skin exam today including a clinical examination under the \"bra and underwear\" areas?  NO  If necessary, do we have your permission to call and leave a detailed message on your Preferred Phone number that includes your specific medical information?  Yes      No Known Allergies   Current Outpatient Medications:     ammonium lactate (LAC-HYDRIN) 12 % cream, Apply topically as needed for dry skin, Disp: 385 g, Rfl: 2    fluticasone (FLONASE) 50 mcg/act nasal spray, 1 spray into each nostril daily, Disp: 15.8 mL, Rfl: 1    linaCLOtide (Linzess) 145 MCG CAPS, Take 1 capsule (145 mcg total) by mouth in the morning, Disp: 30 capsule, Rfl: 3    omeprazole (PriLOSEC) 40 MG capsule, TAKE 1 CAPSULE (40 MG TOTAL) BY MOUTH " "DAILY., Disp: 90 capsule, Rfl: 1    Semaglutide-Weight Management (WEGOVY) 0.25 MG/0.5ML, Inject 0.5 mL (0.25 mg total) under the skin once a week, Disp: 2 mL, Rfl: 0    Adapalene-Benzoyl Peroxide 0.3-2.5 % GEL, APPLY 1 APPLICATION TOPICALLY DAILY AT BEDTIME (Patient not taking: Reported on 6/18/2024), Disp: 45 g, Rfl: 1    valACYclovir (VALTREX) 1,000 mg tablet, , Disp: , Rfl:     valACYclovir (VALTREX) 500 mg tablet, if needed, Disp: , Rfl:           Whom besides the patient is providing clinical information about today's encounter?   NO ADDITIONAL HISTORIAN (patient alone provided history)    Physical Exam and Assessment/Plan by Diagnosis:    Chief complaint: Patient is a 27 y/o female present for follow up of (1) Acne on the face ran out of Epiduo rx and has been using tea tree oil. (2) Keloids on the Post Shoulders (3) rash under the breast, she believes is due to sweating.      ACNE VULGARIS    Physical Exam:  Anatomic Locations Involved: Face  Global Assessment: MILD:  LESS THAN half the face is involved. Some comedones and some papules and/or pustules.  Scarring Present? Post-Inflammatory Hyperpigmentation  Pertinent Positives:  Pertinent Negatives:    Additional History of Present Condition:  Currently using Tea-Tree Oil since she ran out of Epiduo gel.  Patient admits that she used Epiduo as spot treatment, and discontinued when she would get her eyebrows done.         TODAY'S PLAN:     PRESCRIPTION MANAGEMENT:  Several treatment options were discussed including topical retinoids and their side effects.     Skin Hygiene:      Wash affected areas (face, chest, and back) TWICE A DAY with a mild cleanser such as Cera-Ve or Cetaphil Cleanser.  Use only mild cleansers (hypoallergenic and without fragrances) and fragrance free detergent (not \"unscented\" products which contain a masking agent); we discussed avoiding irritants/fragranced products.  Apply a good oil-free facial moisturizer AT LEAST TWO TIMES A DAY " "\" such as Cera-Ve or Cetaphil Cream.  Minimize the application of oils and cosmetics to the affected skin.  This includes HAIR PRODUCTS such as \"leave in\" conditioners.  Unless the product specifically states that it \"won't cause acne,\" \"won't clog pores,\" and/or \"is non-comedogenic\" then it may actually CAUSE acne.  If you smoke, STOP. Nicotine increases sebum retention and increased scale within the follicles, forming comedones (blackheads and whiteheads).  Abrasive treatments such as dermabrasion and spa facials may aggravate inflammatory acne.  Do NOT scratch or pick your acne bumps.  The evidence that diet directly affects acne remains weak.  However, diet does affect your overall health.  Eat plenty of fresh fruit and vegetables.  Avoid protein or amino acid supplements, particularly if they contain leucine. Consider a low-glycemic, low-protein and low-dairy diet.  Be mindful that certain medications may cause of aggravate acne.  Make sure to tell your Dermatologist if you start a new prescription, nutritional supplement, and/or herbal remedy.      MORNING Topical Regimen:      NONE.      EVENING Topical Regimen:      Epiduo 0.3% FORTE gel (Adapalene 0.3% + Benzoyl Peroxide 2.5%). AT LEAST 1 HOUR BEFORE BEDTIME:  Evenly spread a SINGLE pea-sized amount of this medication over your entire face, avoiding the eyes and corners of the mouth.      SYSTEMIC Strategies:      NONE        MEDICAL DECISION MAKING  Treatment Goal:  Resolution of the CHRONIC condition.       Chronic condition is NOT at treatment goal.  It is progressing along its expected course OR is poorly-controlled.          KELOID SCAR    Physical Exam:  Anatomic Location Affected:  Bilateral posterior shoulders/upper back  Morphological Description:  multiple hypertrophic scarring  Pertinent Positives:  Pertinent Negatives:    Additional History of Present Condition:  present on exam.  Has history of cysts which caused scarring.  Patient has received " kenalog injections in the past.  Notes minimal improvement.      Assessment and Plan:  Based on a thorough discussion of this condition and the management approach to it (including a comprehensive discussion of the known risks, side effects and potential benefits of treatment), the patient (family) agrees to implement the following specific plan:  PROCEDURE:  INTRALESIONAL STEROID INJECTION (KENALOG INJECTION)    Purpose: Triamcinolone is a synthetic glucocorticoid corticosteroid that has marked anti-inflammatory action. It is prepared in sterile aqueous suspension suitable for injecting directly into a lesion on or immediately below the skin to treat a dermal inflammatory process.     Indications: It is indicated for alopecia areata; inflammatory acne cysts; discoid lupus erythematosus; keloids and hypertrophic scars; inflammatory lesions of granuloma annulare, lichen planus, lichen simplex chronicus (neurodermatitis), psoriatic plaques, and other localized inflammatory skin conditions.     Potential Side Effects: I understand that triamcinolone injection can potentially cause early and/or delayed adverse effects such as:    Pain    Impaired wound healing    Increased hair growth    Bleeding    White or brown marks    Steroid acne    Infection    Telangiectasia    Skin thinning    Cutaneous and subcutaneous lipoatrophy (most common) appearing as skin indentations or dimples around the injection sites a few weeks after treatment     PROCEDURE NOTE:  After verbal and written consent were obtained, the to-be-treated area was wiped and cleaned with rubbing alcohol 70%.      Then, a total of 1 mL of Kenalog CONCENTRATION:  40 mg/mL   (Lot# [id number]; Expiration 11/30/2024, NDC#: 6063-8038-95) was injected intralesionally into a total of 10 lesion/s on the following anatomic areas:  Bilateral Shoulders using a 3-mL syringe and a 30 1/2-gauge needle.      There was less than 1 mL of blood loss and little to no discomfort.   The area was bandaged with a Band-aid.  The patient tolerated the procedure well and remained in the office for observation.  With no signs of an adverse reaction, the patient was eventually discharged from clinic.      INTERTRIGO    Physical Exam:  Anatomic Location Affected:  Inframammary area  Morphological Description:  no rash present today  Pertinent Positives:  Pertinent Negatives:    Additional History of Present Condition:  states it worsens with heat and sweat due to the weight of breasts.  Patient only wears underwire bras.    Assessment and Plan:  Based on a thorough discussion of this condition and the management approach to it (including a comprehensive discussion of the known risks, side effects and potential benefits of treatment), the patient (family) agrees to implement the following specific plan:  Advised Inter-Dry Cloths  Recommend wearing bras without wire  Keep breasts clean/dry      Scribe Attestation      I,:  Oriana Sheridan am acting as a scribe while in the presence of the attending physician.:       I,:  MIREYA Christie personally performed the services described in this documentation    as scribed in my presence.:

## 2024-06-18 NOTE — PATIENT INSTRUCTIONS
"ACNE VULGARIS     TODAY'S PLAN:     PRESCRIPTION MANAGEMENT:  Several treatment options were discussed including topical retinoids and their side effects.     Skin Hygiene:      Wash affected areas (face, chest, and back) TWICE A DAY with a mild cleanser such as Cera-Ve or Cetaphil Cleanser.  Use only mild cleansers (hypoallergenic and without fragrances) and fragrance free detergent (not \"unscented\" products which contain a masking agent); we discussed avoiding irritants/fragranced products.  Apply a good oil-free facial moisturizer AT LEAST TWO TIMES A DAY \" such as Cera-Ve or Cetaphil Cream.  Minimize the application of oils and cosmetics to the affected skin.  This includes HAIR PRODUCTS such as \"leave in\" conditioners.  Unless the product specifically states that it \"won't cause acne,\" \"won't clog pores,\" and/or \"is non-comedogenic\" then it may actually CAUSE acne.  If you smoke, STOP. Nicotine increases sebum retention and increased scale within the follicles, forming comedones (blackheads and whiteheads).  Abrasive treatments such as dermabrasion and spa facials may aggravate inflammatory acne.  Do NOT scratch or pick your acne bumps.  The evidence that diet directly affects acne remains weak.  However, diet does affect your overall health.  Eat plenty of fresh fruit and vegetables.  Avoid protein or amino acid supplements, particularly if they contain leucine. Consider a low-glycemic, low-protein and low-dairy diet.  Be mindful that certain medications may cause of aggravate acne.  Make sure to tell your Dermatologist if you start a new prescription, nutritional supplement, and/or herbal remedy.      MORNING Topical Regimen:      NONE.      EVENING Topical Regimen:      Epiduo 0.3% FORTE gel (Adapalene 0.3% + Benzoyl Peroxide 2.5%). AT LEAST 1 HOUR BEFORE BEDTIME:  Evenly spread a SINGLE pea-sized amount of this medication over your entire face, avoiding the eyes and corners of the mouth.      SYSTEMIC " Strategies:      NONE        MEDICAL DECISION MAKING  Treatment Goal:  Resolution of the CHRONIC condition.       Chronic condition is NOT at treatment goal.  It is progressing along its expected course OR is poorly-controlled.          KELOID SCAR    PROCEDURE:  INTRALESIONAL STEROID INJECTION (KENALOG INJECTION)    Purpose: Triamcinolone is a synthetic glucocorticoid corticosteroid that has marked anti-inflammatory action. It is prepared in sterile aqueous suspension suitable for injecting directly into a lesion on or immediately below the skin to treat a dermal inflammatory process.     Indications: It is indicated for alopecia areata; inflammatory acne cysts; discoid lupus erythematosus; keloids and hypertrophic scars; inflammatory lesions of granuloma annulare, lichen planus, lichen simplex chronicus (neurodermatitis), psoriatic plaques, and other localized inflammatory skin conditions.     Potential Side Effects: I understand that triamcinolone injection can potentially cause early and/or delayed adverse effects such as:    Pain    Impaired wound healing    Increased hair growth    Bleeding    White or brown marks    Steroid acne    Infection    Telangiectasia    Skin thinning    Cutaneous and subcutaneous lipoatrophy (most common) appearing as skin indentations or dimples around the injection sites a few weeks after treatment     A keloid scar is a firm, smooth, hard growth due to spontaneous scar formation. It can arise soon after an injury, or develop months later. Keloids may be uncomfortable or itchy and extend well beyond the original wound. They may form on any part of the body, although the upper chest and shoulders are especially prone to them.    The precise reason that wound healing sometimes leads to keloid formation is under investigation but is not yet clear.  While most people never form keloids, others develop them after minor injuries, burns, insect bites and acne spots. Dark skinned people  form keloids more easily than Caucasians. A keloid is harmless to general health and does not change into skin cancer.    The following measures are helpful in at least some patients.  Emollients (creams and oils)  Polyurethane or silicone scar reduction patches  Silicone gel  Oral or topical tranilast (an inhibitor of collagen synthesis)  Pressure dressings  Surgical excision (but in keloids, excision may result in a new keloid even larger than the original one)  Intralesional corticosteroid injection, repeated every few weeks  Intralesional 5-fluorouracil  Cryotherapy  Superficial X-ray treatment soon after surgery.  Pulsed dye laser   Skin needling  Subcision    Scar dressings should be worn for 12-24 hours per day, for at least 8 to 12 weeks, and perhaps for much longer.    INTERTRIGO    Assessment and Plan:  Based on a thorough discussion of this condition and the management approach to it (including a comprehensive discussion of the known risks, side effects and potential benefits of treatment), the patient (family) agrees to implement the following specific plan:  Advised Inter-Dry Cloths    Assessment and Plan  Intertrigo describes a rash in the flexures or body folds, such as behind the ears, in the folds of the neck, under the arms (axillae), under a protruding abdomen, in the groin, between the buttocks, in the finger webs or toe spaces.  Although intertrigo may affect one skin fold, it is common for it to involve multiple sites.    Intertrigo can affect males and females of any age. It is particularly common in people that are overweight or obese (see metabolic syndrome). Other contributing factors are:  Genetic tendency to skin disease  Hyperhidrosis (excessive sweating)    Intertrigo can be acute (recent onset), relapsing (recurrent), or chronic (present for more than 6 weeks). The exact appearance and behaviour depends on the underlying cause or causes.  The skin affected by intertrigo is inflamed, ie  reddened and uncomfortable. It may become moist and macerated, leading to fissuring (cracks) and peeling.  Intertrigo is due to genetic and environmental factors.  Flexural skin has relatively high surface temperature  Moisture from insensible water loss and sweating cannot evaporate due to occlusion.  Friction from movement of adjacent skin results in chafing.    The microorganisms that are normally resident on flexural skin, the microbiome, include corynebacteria, other bacteria and yeasts. These multiply in warm moist environments and may cause disease.    We can classify intertrigo into infectious and inflammatory origin but there is often overlap.  Infections tend to be unilateral and asymmetrical.  Inflammatory disorders tend to be symmetrical affecting armpits, groins, under the breasts and the abdominal folds, except atopic dermatitis, which more often arises on the neck, and in elbow and knee creases.    Investigations may be necessary to determine the cause of intertrigo.  A swab for microscopy and culture of bacteria (microbiology)  A scraping for microscopy and culture of fungi (mycology)  A skin biopsy may be performed for histopathology if the skin condition is unusual or fails to respond to treatment.    What is the treatment for intertrigo?  Treatment depends on the underlying cause, if identified, and on which micro-organisms are present in the rash. Combinations are common.  Sweating may be reduced with a gentle antiperspirant.  Physical exertion should be followed by a bath and completely drying the skin folds using a hair dryer on cool setting, soft towel and/or corn starch powder.  Triple paste contains petrolatum, zinc oxide, and aluminum acetate solution to reduce friction, irritation and sweating.  Bacteria may be treated with topical antibiotics such as fusidic acid cream, mupirocin ointment, or oral antibiotics such as flucloxacillin and erythromycin.  Yeasts and fungi may be treated with  topical antifungals such as clotrimazole and terbinafine cream or oral antifungal agents such as itraconazole or terbinafine.  Inflammatory skin diseases are often treated with low potency topical steroid creams such as hydrocortisone. More potent steroids are usually avoided in the flexures because they may cause skin thinning resulting in stretch marks (striae) and even ulcers. Calcineurin inhibitors such as tacrolimus ointment or pimecrolimus cream may also prove effective.

## 2024-06-20 ENCOUNTER — OFFICE VISIT (OUTPATIENT)
Dept: PHYSICAL THERAPY | Facility: CLINIC | Age: 29
End: 2024-06-20
Payer: COMMERCIAL

## 2024-06-20 DIAGNOSIS — N62 LARGE BREASTS: ICD-10-CM

## 2024-06-20 DIAGNOSIS — M54.2 NECK PAIN: Primary | ICD-10-CM

## 2024-06-20 DIAGNOSIS — M54.42 ACUTE BILATERAL LOW BACK PAIN WITH BILATERAL SCIATICA: ICD-10-CM

## 2024-06-20 DIAGNOSIS — M54.6 CHRONIC MIDLINE THORACIC BACK PAIN: ICD-10-CM

## 2024-06-20 DIAGNOSIS — G89.29 CHRONIC MIDLINE THORACIC BACK PAIN: ICD-10-CM

## 2024-06-20 DIAGNOSIS — M54.41 ACUTE BILATERAL LOW BACK PAIN WITH BILATERAL SCIATICA: ICD-10-CM

## 2024-06-20 PROCEDURE — 97110 THERAPEUTIC EXERCISES: CPT

## 2024-06-20 PROCEDURE — 97112 NEUROMUSCULAR REEDUCATION: CPT

## 2024-06-20 NOTE — PROGRESS NOTES
"Daily Note     Today's date: 2024  Patient name: Tiffany Jean-Baptiste  : 1995  MRN: [id number]  Referring provider: Padmini Vyas CRNP  Dx:   Encounter Diagnosis     ICD-10-CM    1. Neck pain  M54.2       2. Acute bilateral low back pain with bilateral sciatica  M54.42     M54.41       3. Large breasts  N62       4. Chronic midline thoracic back pain  M54.6     G89.29                      Subjective: Patient states her back just hurts. She has not seen any improvement as of yet.       Objective: See treatment diary below      Assessment: Tolerated treatment well. Education provided on muscle strengthening. She is able to perform outlined program with good effort and minimal pain. Small cues to ensure proper muscle activation. Decreased resistance with Ys to decrease difficulty.  Patient demonstrated fatigue post treatment and would benefit from continued PT      Plan: Progress treatment as tolerated.       Precautions: None      Manuals          CTJ mobs MICHELE                                                   Neuro Re-Ed             Prone T HEP 3x10 2# 2x15 3# 2x15 3#         Prone Y HEP 3x10 2#  2x15 3# 3x10  2#          Banded rows  3x10  2x15 BTB 2x15 blk          Prone scap retract             Banded ext  2x10  2x15 black TB with ER 2x15  Blk w/ scap retraction          B/l banded Er   3x10  2x15 BTB At corner 2x5 Blk         H-Abd    RTB 2x15         Ther Ex             Pt edu MICHELE            Open book   10 x10\" 20x 20x 5\"         Thoracic ext   3x1' 30x seated 3\"x30                                                             UBE  3'/3'  3'/3' 3'/3'         Ther Activity                                       Gait Training                                       Modalities                                                "

## 2024-06-27 ENCOUNTER — OFFICE VISIT (OUTPATIENT)
Dept: PHYSICAL THERAPY | Facility: CLINIC | Age: 29
End: 2024-06-27
Payer: COMMERCIAL

## 2024-06-27 DIAGNOSIS — N62 LARGE BREASTS: ICD-10-CM

## 2024-06-27 DIAGNOSIS — M54.42 ACUTE BILATERAL LOW BACK PAIN WITH BILATERAL SCIATICA: ICD-10-CM

## 2024-06-27 DIAGNOSIS — M54.6 CHRONIC MIDLINE THORACIC BACK PAIN: ICD-10-CM

## 2024-06-27 DIAGNOSIS — M54.2 NECK PAIN: Primary | ICD-10-CM

## 2024-06-27 DIAGNOSIS — M54.41 ACUTE BILATERAL LOW BACK PAIN WITH BILATERAL SCIATICA: ICD-10-CM

## 2024-06-27 DIAGNOSIS — G89.29 CHRONIC MIDLINE THORACIC BACK PAIN: ICD-10-CM

## 2024-06-27 PROCEDURE — 97110 THERAPEUTIC EXERCISES: CPT

## 2024-06-27 PROCEDURE — 97112 NEUROMUSCULAR REEDUCATION: CPT

## 2024-06-27 NOTE — PROGRESS NOTES
"Daily Note     Today's date: 2024  Patient name: Tiffany Jean-Baptiste  : 1995  MRN: [id number]  Referring provider: Padmini Vyas CRNP  Dx:   Encounter Diagnosis     ICD-10-CM    1. Neck pain  M54.2       2. Acute bilateral low back pain with bilateral sciatica  M54.42     M54.41       3. Large breasts  N62       4. Chronic midline thoracic back pain  M54.6     G89.29             Start Time: 1630  Stop Time: 1715  Total time in clinic (min): 45 minutes    Subjective: Patient states her back just hurts. She has not seen any improvement as of yet.     - pt states she gets relief after her exercises but her pain slowly creeps back up.       Objective: See treatment diary below      Assessment: Pt shows decrease in deep cervical extensors and deep neck flexors. Chest flys were introduced to strengthen pectoral muscles in order to provide more support to the breast. Paloff press was introduced for core strengthening. Tolerated treatment well.  Patient demonstrated fatigue post treatment and would benefit from continued PT      Plan: Progress treatment as tolerated.       Precautions: None      Manuals         CTJ mobs MICHELE                                                   Neuro Re-Ed             Prone T HEP 3x10 2# 2x15 3# 2x15 3# 2x15 3#        Prone Y HEP 3x10 2#  2x15 3# 3x10  2#  3x10  2#         Banded rows  3x10  2x15 BTB 2x15 blk          Prone scap retract             Banded ext  2x10  2x15 black TB with ER 2x15  Blk w/ scap retraction  3x10  20# w/ scap retraction         B/l banded Er   3x10  2x15 BTB At corner 2x5 Blk         Chest flys     2x10  9#        Quadraped chin tucks      2x10 5s holds        H-Abd    RTB 2x15         Ther Ex             Pt edu MICHELE            Open book   10 x10\" 20x 20x 5\" 20x 5\"        Thoracic ext   3x1' 30x seated 3\"x30         Chest press      2x8  20#         Paloff Press      3x10  20#                                   UBE  3'/3'  3'/3' 3'/3' " 4'/4'         Ther Activity                                       Gait Training                                       Modalities

## 2024-07-03 ENCOUNTER — OFFICE VISIT (OUTPATIENT)
Dept: PHYSICAL THERAPY | Facility: CLINIC | Age: 29
End: 2024-07-03
Payer: COMMERCIAL

## 2024-07-03 DIAGNOSIS — M54.6 CHRONIC MIDLINE THORACIC BACK PAIN: ICD-10-CM

## 2024-07-03 DIAGNOSIS — M54.2 NECK PAIN: Primary | ICD-10-CM

## 2024-07-03 DIAGNOSIS — G89.29 CHRONIC MIDLINE THORACIC BACK PAIN: ICD-10-CM

## 2024-07-03 DIAGNOSIS — M54.41 ACUTE BILATERAL LOW BACK PAIN WITH BILATERAL SCIATICA: ICD-10-CM

## 2024-07-03 DIAGNOSIS — M54.42 ACUTE BILATERAL LOW BACK PAIN WITH BILATERAL SCIATICA: ICD-10-CM

## 2024-07-03 DIAGNOSIS — N62 LARGE BREASTS: ICD-10-CM

## 2024-07-03 PROCEDURE — 97112 NEUROMUSCULAR REEDUCATION: CPT | Performed by: PHYSICAL THERAPIST

## 2024-07-03 PROCEDURE — 97110 THERAPEUTIC EXERCISES: CPT | Performed by: PHYSICAL THERAPIST

## 2024-07-03 NOTE — PROGRESS NOTES
"Daily Note     Today's date: 7/3/2024  Patient name: Tiffany Jean-Baptiste  : 1995  MRN: [id number]  Referring provider: Padmini Vyas CRNP  Dx:   Encounter Diagnosis     ICD-10-CM    1. Neck pain  M54.2       2. Acute bilateral low back pain with bilateral sciatica  M54.42     M54.41       3. Large breasts  N62       4. Chronic midline thoracic back pain  M54.6     G89.29                      Subjective: Pt reports that the exercise helps but that it doesn't last. She states that she has started back up with gym.       Objective: See treatment diary below      Assessment: UBE for muscular endurance. Tolerated treatment well. Continued focus on pec strengthening. Thoracic stretching still improves back pain. Patient would benefit from continued PT      Plan: Continue per plan of care.  Progress treatment as tolerated.       Precautions: None      Manuals  7/3       CTJ mobs MICHELE                                                   Neuro Re-Ed             Prone T HEP 3x10 2# 2x15 3# 2x15 3# 2x15 3# 2x15 5#       Prone Y HEP 3x10 2#  2x15 3# 3x10  2#  3x10  2#         Banded rows  3x10  2x15 BTB 2x15 blk          Prone scap retract             Banded ext  2x10  2x15 black TB with ER 2x15  Blk w/ scap retraction  3x10  20# w/ scap retraction  3x10 Blk        B/l banded Er   3x10  2x15 BTB At corner 2x5 Blk         Chest flys     2x10  9# 2x10 9#       Quadraped chin tucks      2x10 5s holds 2x10 5s holds       H-Abd    RTB 2x15         Ther Ex             Pt edu MICHELE            Open book   10 x10\" 20x 20x 5\" 20x 5\" 25x       Thoracic ext   3x1' 30x seated 3\"x30         Chest press      2x8  20#  2x10 20#       Paloff Press      3x10  20#  3x10 20#                                 UBE  3'/3'  3'/3' 3'/3' 4'/4'  3'/3'       Ther Activity                                       Gait Training                                       Modalities                                                  "

## 2024-07-11 ENCOUNTER — OFFICE VISIT (OUTPATIENT)
Dept: PHYSICAL THERAPY | Facility: CLINIC | Age: 29
End: 2024-07-11
Payer: COMMERCIAL

## 2024-07-11 DIAGNOSIS — N62 LARGE BREASTS: ICD-10-CM

## 2024-07-11 DIAGNOSIS — M54.41 ACUTE BILATERAL LOW BACK PAIN WITH BILATERAL SCIATICA: ICD-10-CM

## 2024-07-11 DIAGNOSIS — M54.42 ACUTE BILATERAL LOW BACK PAIN WITH BILATERAL SCIATICA: ICD-10-CM

## 2024-07-11 DIAGNOSIS — G89.29 CHRONIC MIDLINE THORACIC BACK PAIN: ICD-10-CM

## 2024-07-11 DIAGNOSIS — M54.6 CHRONIC MIDLINE THORACIC BACK PAIN: ICD-10-CM

## 2024-07-11 DIAGNOSIS — M54.2 NECK PAIN: Primary | ICD-10-CM

## 2024-07-11 PROCEDURE — 97110 THERAPEUTIC EXERCISES: CPT | Performed by: PHYSICAL THERAPIST

## 2024-07-11 PROCEDURE — 97112 NEUROMUSCULAR REEDUCATION: CPT | Performed by: PHYSICAL THERAPIST

## 2024-07-11 NOTE — PROGRESS NOTES
"Daily Note     Today's date: 2024  Patient name: Tiffany Jean-Baptiste  : 1995  MRN: [id number]  Referring provider: Padmini Vyas CRNP  Dx:   Encounter Diagnosis     ICD-10-CM    1. Neck pain  M54.2       2. Acute bilateral low back pain with bilateral sciatica  M54.42     M54.41       3. Large breasts  N62       4. Chronic midline thoracic back pain  M54.6     G89.29                      Subjective: Pt reports some improvement since beginning physical therapy.      Objective: See treatment diary below      Assessment: Tolerated treatment well. Improving strength. Challenge with chest flies and plinth push-ups. Patient would benefit from continued PT      Plan: Continue per plan of care.  Progress treatment as tolerated.       Precautions: None      Manuals 5/23 6/6 6/13 6/20 6/27 7/3 7/11      CTJ mobs MICHELE                                                   Neuro Re-Ed             Prone T HEP 3x10 2# 2x15 3# 2x15 3# 2x15 3# 2x15 5# 2x15 5#      Prone Y HEP 3x10 2#  2x15 3# 3x10  2#  3x10  2#   2x15 5#      Banded rows  3x10  2x15 BTB 2x15 blk          Prone scap retract             Banded ext  2x10  2x15 black TB with ER 2x15  Blk w/ scap retraction  3x10  20# w/ scap retraction  3x10 Blk        B/l banded Er   3x10  2x15 BTB At corner 2x5 Blk         Chest flys     2x10  9# 2x10 9# 2x12 10#      Quadraped chin tucks      2x10 5s holds 2x10 5s holds       H-Abd    RTB 2x15         Ther Ex             Pt edu MICHELE            Open book   10 x10\" 20x 20x 5\" 20x 5\" 25x 25x      Plinth push-up       2x6      Thoracic ext   3x1' 30x seated 3\"x30   15x over 1/2 foam roll arms OH      Chest press      2x8  20#  2x10 20# 2x12 20#      Paloff Press      3x10  20#  3x10 20#                                 UBE  3'/3'  3'/3' 3'/3' 4'/4'  3'/3' 3'/3'      Ther Activity                                       Gait Training                                       Modalities                                                    "

## 2024-07-17 ENCOUNTER — VBI (OUTPATIENT)
Dept: ADMINISTRATIVE | Facility: OTHER | Age: 29
End: 2024-07-17

## 2024-07-17 NOTE — TELEPHONE ENCOUNTER
07/17/24 3:02 PM     Chart reviewed for Diabetic Eye Exam was/were not submitted to the patient's insurance.     Leigha Sanchez MA   [facility]

## 2024-07-18 ENCOUNTER — OFFICE VISIT (OUTPATIENT)
Dept: PHYSICAL THERAPY | Facility: CLINIC | Age: 29
End: 2024-07-18
Payer: COMMERCIAL

## 2024-07-18 DIAGNOSIS — M54.6 CHRONIC MIDLINE THORACIC BACK PAIN: ICD-10-CM

## 2024-07-18 DIAGNOSIS — M54.2 NECK PAIN: Primary | ICD-10-CM

## 2024-07-18 DIAGNOSIS — M54.42 ACUTE BILATERAL LOW BACK PAIN WITH BILATERAL SCIATICA: ICD-10-CM

## 2024-07-18 DIAGNOSIS — G89.29 CHRONIC MIDLINE THORACIC BACK PAIN: ICD-10-CM

## 2024-07-18 DIAGNOSIS — N62 LARGE BREASTS: ICD-10-CM

## 2024-07-18 DIAGNOSIS — M54.41 ACUTE BILATERAL LOW BACK PAIN WITH BILATERAL SCIATICA: ICD-10-CM

## 2024-07-18 PROCEDURE — 97110 THERAPEUTIC EXERCISES: CPT | Performed by: PHYSICAL THERAPIST

## 2024-07-18 NOTE — PROGRESS NOTES
"Daily Note     Today's date: 2024  Patient name: Tiffany Jean-Baptiste  : 1995  MRN: [id number]  Referring provider: Padmini Vyas CRNP  Dx:   Encounter Diagnosis     ICD-10-CM    1. Neck pain  M54.2       2. Acute bilateral low back pain with bilateral sciatica  M54.42     M54.41       3. Large breasts  N62       4. Chronic midline thoracic back pain  M54.6     G89.29                      Subjective: Pt reports that her chest was sore following last session.       Objective: See treatment diary below      Assessment: Pt arrived 25 mins late, accomodated. Tolerated treatment well. Improving strength. Cueing for proper hand position with push-ups. Patient would benefit from continued PT      Plan: Continue per plan of care.  Progress treatment as tolerated.       Precautions: None      Manuals 5/23 6/6 6/13 6/20 6/27 7/3 7/11 7/18     CTJ mobs MICHELE                                                   Neuro Re-Ed             Prone T HEP 3x10 2# 2x15 3# 2x15 3# 2x15 3# 2x15 5# 2x15 5#      Prone Y HEP 3x10 2#  2x15 3# 3x10  2#  3x10  2#   2x15 5#      Banded rows  3x10  2x15 BTB 2x15 blk          Prone scap retract             Banded ext  2x10  2x15 black TB with ER 2x15  Blk w/ scap retraction  3x10  20# w/ scap retraction  3x10 Blk        B/l banded Er   3x10  2x15 BTB At corner 2x5 Blk         Chest flys     2x10  9# 2x10 9# 2x12 10# 2x12 10#     Quadraped chin tucks      2x10 5s holds 2x10 5s holds       H-Abd    RTB 2x15         Ther Ex             Pt edu MICHELE            Open book   10 x10\" 20x 20x 5\" 20x 5\" 25x 25x      Plinth push-up       2x6 2x7     DB row        20# 2x12     Thoracic ext   3x1' 30x seated 3\"x30   15x over 1/2 foam roll arms OH      Chest press      2x8  20#  2x10 20# 2x12 20#      Paloff Press      3x10  20#  3x10 20#                                 UBE  3'/3'  3'/3' 3'/3' 4'/4'  3'/3' 3'/3'      Ther Activity                                       Gait Training                               "         Modalities

## 2024-07-23 ENCOUNTER — OFFICE VISIT (OUTPATIENT)
Age: 29
End: 2024-07-23
Payer: COMMERCIAL

## 2024-07-23 VITALS
RESPIRATION RATE: 20 BRPM | WEIGHT: 293 LBS | TEMPERATURE: 98 F | HEART RATE: 77 BPM | HEIGHT: 66 IN | DIASTOLIC BLOOD PRESSURE: 80 MMHG | BODY MASS INDEX: 47.09 KG/M2 | OXYGEN SATURATION: 99 % | SYSTOLIC BLOOD PRESSURE: 124 MMHG

## 2024-07-23 DIAGNOSIS — L70.0 ACNE VULGARIS: Primary | ICD-10-CM

## 2024-07-23 DIAGNOSIS — L91.0 KELOID OF SKIN: ICD-10-CM

## 2024-07-23 PROCEDURE — 11900 INJECT SKIN LESIONS </W 7: CPT | Performed by: NURSE PRACTITIONER

## 2024-07-23 PROCEDURE — NC001 PR NO CHARGE: Performed by: NURSE PRACTITIONER

## 2024-07-23 RX ORDER — TRIAMCINOLONE ACETONIDE 40 MG/ML
40 INJECTION, SUSPENSION INTRA-ARTICULAR; INTRAMUSCULAR ONCE
Status: COMPLETED | OUTPATIENT
Start: 2024-07-23 | End: 2024-07-23

## 2024-07-23 RX ADMIN — TRIAMCINOLONE ACETONIDE 40 MG: 40 INJECTION, SUSPENSION INTRA-ARTICULAR; INTRAMUSCULAR at 14:59

## 2024-07-23 NOTE — PATIENT INSTRUCTIONS
KELOID SCAR        Assessment and Plan:  Based on a thorough discussion of this condition and the management approach to it (including a comprehensive discussion of the known risks, side effects and potential benefits of treatment), the patient (family) agrees to implement the following specific plan:  PROCEDURE:  INTRALESIONAL STEROID INJECTION (KENALOG INJECTION)     Purpose: Triamcinolone is a synthetic glucocorticoid corticosteroid that has marked anti-inflammatory action. It is prepared in sterile aqueous suspension suitable for injecting directly into a lesion on or immediately below the skin to treat a dermal inflammatory process.      Indications: It is indicated for alopecia areata; inflammatory acne cysts; discoid lupus erythematosus; keloids and hypertrophic scars; inflammatory lesions of granuloma annulare, lichen planus, lichen simplex chronicus (neurodermatitis), psoriatic plaques, and other localized inflammatory skin conditions.      Potential Side Effects: I understand that triamcinolone injection can potentially cause early and/or delayed adverse effects such as:    Pain    Impaired wound healing    Increased hair growth    Bleeding    White or brown marks    Steroid acne    Infection    Telangiectasia    Skin thinning    Cutaneous and subcutaneous lipoatrophy (most common) appearing as skin indentations or dimples around the injection sites a few weeks after treatment      PROCEDURE NOTE:  After verbal and written consent were obtained, the to-be-treated area was wiped and cleaned with rubbing alcohol 70%.            ACNE VULGARIS          TODAY'S PLAN:     PRESCRIPTION MANAGEMENT:  Several treatment options were discussed including topical retinoids and their side effects.     Skin Hygiene:      Wash affected areas (face, chest, and back) TWICE A DAY with a mild cleanser such as sensitive dove soap .  Use only mild cleansers (hypoallergenic and without fragrances) and fragrance free detergent  "(not \"unscented\" products which contain a masking agent); we discussed avoiding irritants/fragranced products.  Apply a good oil-free facial moisturizer AT LEAST TWO TIMES A DAY \" such as Cetaphil.  Minimize the application of oils and cosmetics to the affected skin.  This includes HAIR PRODUCTS such as \"leave in\" conditioners.  Unless the product specifically states that it \"won't cause acne,\" \"won't clog pores,\" and/or \"is non-comedogenic\" then it may actually CAUSE acne.  If you smoke, STOP. Nicotine increases sebum retention and increased scale within the follicles, forming comedones (blackheads and whiteheads).  Abrasive treatments such as dermabrasion and spa facials may aggravate inflammatory acne.  Do NOT scratch or pick your acne bumps.  The evidence that diet directly affects acne remains weak.  However, diet does affect your overall health.  Eat plenty of fresh fruit and vegetables.  Avoid protein or amino acid supplements, particularly if they contain leucine. Consider a low-glycemic, low-protein and low-dairy diet.  Be mindful that certain medications may cause of aggravate acne.  Make sure to tell your Dermatologist if you start a new prescription, nutritional supplement, and/or herbal remedy.      MORNING Topical Regimen:      NONE.      EVENING Topical Regimen:      Epiduo 0.3% FORTE gel (Adapalene 0.3% + Benzoyl Peroxide 2.5%). AT LEAST 1 HOUR BEFORE BEDTIME:  Evenly spread a SINGLE pea-sized amount of this medication over your entire face, avoiding the eyes and corners of the mouth.      SYSTEMIC Strategies:      NONE        MEDICAL DECISION MAKING  Treatment Goal:  Resolution of the CHRONIC condition.       Chronic condition is NOT at treatment goal.  It is progressing along its expected course OR is poorly-controlled.            "

## 2024-07-23 NOTE — Clinical Note
Patient following in parallel with myself and Dr. Benitez for treatment of keloid scars allover shoulders and upper back.  She is asking about laser treatment for hyperpigmentation.  Would you recommend laser, and what type, once keloids are flattened?  She is also asking about getting her back tattooed to help cover the scars.  Would you advise this?  Thank you!

## 2024-07-23 NOTE — PROGRESS NOTES
"[facility] Note     Patient Name: Tiffany Jean-Baptiste  Encounter Date: 07/23/2024     Have you been cared for by a [facility] Dermatologist in the last 3 years and, if so, which description applies to you?    Yes.  I have been here within the last 3 years, and my medical history has NOT changed since that time.  I am FEMALE/of child-bearing potential.    REVIEW OF SYSTEMS:  Have you recently had or currently have any of the following? No changes in my recent health.   PAST MEDICAL HISTORY:  Have you personally ever had or currently have any of the following?  If \"YES,\" then please provide more detail. No changes in my medical history.   HISTORY OF IMMUNOSUPPRESSION: Do you have a history of any of the following:  Systemic Immunosuppression such as Diabetes, Biologic or Immunotherapy, Chemotherapy, Organ Transplantation, Bone Marrow Transplantation?  No     Answering \"YES\" requires the addition of the dotphrase \"IMMUNOSUPPRESSED\" as the first diagnosis of the patient's visit.   FAMILY HISTORY:  Any \"first degree relatives\" (parent, brother, sister, or child) with the following?    No changes in my family's known health.   PATIENT EXPERIENCE:    Do you want the Dermatologist to perform a COMPLETE skin exam today including a clinical examination under the \"bra and underwear\" areas?  NO  If necessary, do we have your permission to call and leave a detailed message on your Preferred Phone number that includes your specific medical information?  Yes      No Known Allergies   Current Outpatient Medications:     Adapalene-Benzoyl Peroxide 0.3-2.5 % GEL, Apply 1 Application topically daily at bedtime, Disp: 45 g, Rfl: 1    ammonium lactate (LAC-HYDRIN) 12 % cream, Apply topically as needed for dry skin, Disp: 385 g, Rfl: 2    fluticasone (FLONASE) 50 mcg/act nasal spray, 1 spray into each nostril daily, Disp: 15.8 mL, Rfl: 1    linaCLOtide (Linzess) 145 MCG CAPS, Take 1 capsule (145 mcg total) by mouth in the " morning, Disp: 30 capsule, Rfl: 3    omeprazole (PriLOSEC) 40 MG capsule, TAKE 1 CAPSULE (40 MG TOTAL) BY MOUTH DAILY., Disp: 90 capsule, Rfl: 1    Semaglutide-Weight Management (WEGOVY) 0.25 MG/0.5ML, Inject 0.5 mL (0.25 mg total) under the skin once a week, Disp: 2 mL, Rfl: 0    valACYclovir (VALTREX) 1,000 mg tablet, , Disp: , Rfl:     valACYclovir (VALTREX) 500 mg tablet, if needed, Disp: , Rfl:           Whom besides the patient is providing clinical information about today's encounter?   NO ADDITIONAL HISTORIAN (patient alone provided history)    Physical Exam and Assessment/Plan by Diagnosis:  Chief Complaint   Patient presents with    Follow-up     Spot of concern shoulder bilaterally keloid      KELOID SCAR     Physical Exam:  Anatomic Location Affected:  Bilateral posterior shoulders/upper back  Morphological Description:  multiple hypertrophic scarring  Pertinent Positives:  Pertinent Negatives:     Additional History of Present Condition:  present on exam.  Has history of acne cysts/nodules which caused scarring.  Patient received intralesional Kenalog 40 mg at last office visit.  She notices mild improvement.  Patient had previously received Kenalog injections in the past.  She was started on Epiduo 0.3% Forte gel at last visit and reports improvement of acne on face.        Assessment and Plan:  Based on a thorough discussion of this condition and the management approach to it (including a comprehensive discussion of the known risks, side effects and potential benefits of treatment), the patient (family) agrees to implement the following specific plan:    Continue Epidu 0.3% Forte gel (Adapalene 0.3% + Benzoyl Peroxide 2.5%) at night    PROCEDURE:  INTRALESIONAL STEROID INJECTION (KENALOG INJECTION)    Purpose: Triamcinolone is a synthetic glucocorticoid corticosteroid that has marked anti-inflammatory action. It is prepared in sterile aqueous suspension suitable for injecting directly into a lesion on  or immediately below the skin to treat a dermal inflammatory process.     Indications: It is indicated for alopecia areata; inflammatory acne cysts; discoid lupus erythematosus; keloids and hypertrophic scars; inflammatory lesions of granuloma annulare, lichen planus, lichen simplex chronicus (neurodermatitis), psoriatic plaques, and other localized inflammatory skin conditions.     Potential Side Effects: I understand that triamcinolone injection can potentially cause early and/or delayed adverse effects such as:    Pain    Impaired wound healing    Increased hair growth    Bleeding    White or brown marks    Steroid acne    Infection    Telangiectasia    Skin thinning    Cutaneous and subcutaneous lipoatrophy (most common) appearing as skin indentations or dimples around the injection sites a few weeks after treatment      PROCEDURE NOTE:  After verbal and written consent were obtained, the to-be-treated area was wiped and cleaned with rubbing alcohol 70%.       Then, a total of 0.7 mL of Kenalog CONCENTRATION:  40 mg/mL   (Lot# [id number]; Expiration 11/30/2024, NDC#: 0339-7685-89) was injected intralesionally into a total of 10 lesion/s on the following anatomic areas:  Bilateral Shoulders using a 3-mL syringe and a 30 1/2-gauge needle.       There was less than 1 mL of blood loss and little to no discomfort.  The area was bandaged with a Band-aid.  The patient tolerated the procedure well and remained in the office for observation.  With no signs of an adverse reaction, the patient was eventually discharged from clinic.        Scribe Attestation      I,:  Alexandre Schwartz am acting as a scribe while in the presence of the attending physician.:       I,:  MIREYA Christie personally performed the services described in this documentation    as scribed in my presence.:

## 2024-07-25 ENCOUNTER — OFFICE VISIT (OUTPATIENT)
Dept: PHYSICAL THERAPY | Facility: CLINIC | Age: 29
End: 2024-07-25
Payer: COMMERCIAL

## 2024-07-25 DIAGNOSIS — M54.6 CHRONIC MIDLINE THORACIC BACK PAIN: ICD-10-CM

## 2024-07-25 DIAGNOSIS — G89.29 CHRONIC MIDLINE THORACIC BACK PAIN: ICD-10-CM

## 2024-07-25 DIAGNOSIS — M54.42 ACUTE BILATERAL LOW BACK PAIN WITH BILATERAL SCIATICA: ICD-10-CM

## 2024-07-25 DIAGNOSIS — N62 LARGE BREASTS: ICD-10-CM

## 2024-07-25 DIAGNOSIS — M54.2 NECK PAIN: Primary | ICD-10-CM

## 2024-07-25 DIAGNOSIS — M54.41 ACUTE BILATERAL LOW BACK PAIN WITH BILATERAL SCIATICA: ICD-10-CM

## 2024-07-25 PROCEDURE — 97110 THERAPEUTIC EXERCISES: CPT | Performed by: PHYSICAL THERAPIST

## 2024-07-25 PROCEDURE — 97112 NEUROMUSCULAR REEDUCATION: CPT | Performed by: PHYSICAL THERAPIST

## 2024-07-25 NOTE — PROGRESS NOTES
"Daily Note     Today's date: 2024  Patient name: Tiffany Jean-Baptiste  : 1995  MRN: [id number]  Referring provider: Padmini Vyas CRNP  Dx:   Encounter Diagnosis     ICD-10-CM    1. Neck pain  M54.2       2. Acute bilateral low back pain with bilateral sciatica  M54.42     M54.41       3. Large breasts  N62       4. Chronic midline thoracic back pain  M54.6     G89.29                      Subjective: Pt offers no new complaints.       Objective: See treatment diary below      Assessment: Tolerated treatment well. Improving strength. Better coordination for push-up with less shakiness. Patient would benefit from continued PT      Plan: Continue per plan of care.  Progress treatment as tolerated.       Precautions: None      Manuals 5/23 6/6 6/13 6/20 6/27 7/3 7/11 7/18 7/25    CTJ mobs MICHELE                                                   Neuro Re-Ed             Prone T HEP 3x10 2# 2x15 3# 2x15 3# 2x15 3# 2x15 5# 2x15 5#  3x12 5#    Prone Y HEP 3x10 2#  2x15 3# 3x10  2#  3x10  2#   2x15 5#  3x12 5#    Banded rows  3x10  2x15 BTB 2x15 blk          Prone scap retract             Banded ext  2x10  2x15 black TB with ER 2x15  Blk w/ scap retraction  3x10  20# w/ scap retraction  3x10 Blk        B/l banded Er   3x10  2x15 BTB At corner 2x5 Blk         Chest flys     2x10  9# 2x10 9# 2x12 10# 2x12 10# 2x15 10#    Quadraped chin tucks      2x10 5s holds 2x10 5s holds       H-Abd    RTB 2x15         Ther Ex             Pt edu MICHELE            Open book   10 x10\" 20x 20x 5\" 20x 5\" 25x 25x      Plinth push-up       2x6 2x7 2x8    DB row        20# 2x12 20# 3x12    Thoracic ext   3x1' 30x seated 3\"x30   15x over 1/2 foam roll arms OH      Chest press      2x8  20#  2x10 20# 2x12 20#  3x12 20#    Paloff Press      3x10  20#  3x10 20#                                 UBE  3'/3'  3'/3' 3'/3' 4'/4'  3'/3' 3'/3'  3'/3'    Ther Activity                                       Gait Training                                     "   Modalities

## 2024-08-01 ENCOUNTER — OFFICE VISIT (OUTPATIENT)
Dept: PHYSICAL THERAPY | Facility: CLINIC | Age: 29
End: 2024-08-01
Payer: COMMERCIAL

## 2024-08-01 DIAGNOSIS — M54.2 NECK PAIN: Primary | ICD-10-CM

## 2024-08-01 DIAGNOSIS — M54.41 ACUTE BILATERAL LOW BACK PAIN WITH BILATERAL SCIATICA: ICD-10-CM

## 2024-08-01 DIAGNOSIS — N62 LARGE BREASTS: ICD-10-CM

## 2024-08-01 DIAGNOSIS — M54.42 ACUTE BILATERAL LOW BACK PAIN WITH BILATERAL SCIATICA: ICD-10-CM

## 2024-08-01 PROCEDURE — 97112 NEUROMUSCULAR REEDUCATION: CPT

## 2024-08-01 PROCEDURE — 97110 THERAPEUTIC EXERCISES: CPT

## 2024-08-01 NOTE — PROGRESS NOTES
"Daily Note     Today's date: 2024  Patient name: Tiffany Jean-Baptiste  : 1995  MRN: [id number]  Referring provider: Padmini Vyas CRNP  Dx:   Encounter Diagnosis     ICD-10-CM    1. Neck pain  M54.2       2. Acute bilateral low back pain with bilateral sciatica  M54.42     M54.41       3. Large breasts  N62                      Subjective: Tiffany is only getting short term relief with PT.       Objective: See treatment diary below      Assessment: Tolerated treatment well. Her strength continues to improve, increased weight for rows today. Cuing for control eccentric motion with Ts/Ys, with good carryover. She is able to complete TE without adverse effects, reports feeling better at the end of session. Continued PT would be beneficial to improve function.          Plan: Continue per plan of care.       Precautions: None      Manuals  7/3 7/11 7/18 7/25 8/1   CTJ mobs MICHELE                                                   Neuro Re-Ed             Prone T HEP 3x10 2# 2x15 3# 2x15 3# 2x15 3# 2x15 5# 2x15 5#  3x12 5# 3x12 5#   Prone Y HEP 3x10 2#  2x15 3# 3x10  2#  3x10  2#   2x15 5#  3x12 5# 3x12 5#   Banded rows  3x10  2x15 BTB 2x15 blk          Prone scap retract             Banded ext  2x10  2x15 black TB with ER 2x15  Blk w/ scap retraction  3x10  20# w/ scap retraction  3x10 Blk        B/l banded Er   3x10  2x15 BTB At corner 2x5 Blk         Chest flys     2x10  9# 2x10 9# 2x12 10# 2x12 10# 2x15 10# 2x15 10#   Quadraped chin tucks      2x10 5s holds 2x10 5s holds       H-Abd    RTB 2x15         Ther Ex             Pt edu MICHELE            Open book   10 x10\" 20x 20x 5\" 20x 5\" 25x 25x      Plinth push-up       2x6 2x7 2x8 2x8   DB row        20# 2x12 20# 3x12 20# x12. 25# 2x12   Thoracic ext   3x1' 30x seated 3\"x30   15x over 1/2 foam roll arms OH      Chest press      2x8  20#  2x10 20# 2x12 20#  3x12 20# 3x12 20#   Paloff Press      3x10  20#  3x10 20#                                 UBE  " 3'/3'  3'/3' 3'/3' 4'/4'  3'/3' 3'/3'  3'/3' 3'/3'   Ther Activity                                       Gait Training                                       Modalities

## 2024-08-07 ENCOUNTER — OFFICE VISIT (OUTPATIENT)
Dept: PHYSICAL THERAPY | Facility: CLINIC | Age: 29
End: 2024-08-07
Payer: COMMERCIAL

## 2024-08-07 DIAGNOSIS — M54.2 NECK PAIN: Primary | ICD-10-CM

## 2024-08-07 DIAGNOSIS — M54.6 CHRONIC MIDLINE THORACIC BACK PAIN: ICD-10-CM

## 2024-08-07 DIAGNOSIS — M54.41 ACUTE BILATERAL LOW BACK PAIN WITH BILATERAL SCIATICA: ICD-10-CM

## 2024-08-07 DIAGNOSIS — N62 LARGE BREASTS: ICD-10-CM

## 2024-08-07 DIAGNOSIS — G89.29 CHRONIC MIDLINE THORACIC BACK PAIN: ICD-10-CM

## 2024-08-07 DIAGNOSIS — M54.42 ACUTE BILATERAL LOW BACK PAIN WITH BILATERAL SCIATICA: ICD-10-CM

## 2024-08-07 PROCEDURE — 97112 NEUROMUSCULAR REEDUCATION: CPT | Performed by: PHYSICAL THERAPIST

## 2024-08-07 PROCEDURE — 97110 THERAPEUTIC EXERCISES: CPT | Performed by: PHYSICAL THERAPIST

## 2024-08-07 NOTE — PROGRESS NOTES
PT Re-evaluation     Today's date: 2024  Patient name: Tiffany Jean-Baptiste  : 1995  MRN: [id number]  Referring provider: Padmini Vyas CRNP  Dx:   Encounter Diagnosis     ICD-10-CM    1. Neck pain  M54.2 Ambulatory Referral to Physical Therapy      2. Acute bilateral low back pain with bilateral sciatica  M54.42 Ambulatory Referral to Physical Therapy    M54.41                      Assessment  Impairments: abnormal or restricted ROM, activity intolerance, impaired physical strength and pain with function    Assessment details: Pt is a 29 y/o female who presents to physical therapy with primary nociceptive pain associated with chronic neck and back pain complicated by high BMI. Pt has made good improvement in periscapular and UE strength. However, she continues to have pain and some reduced strength. Pt would continue to benefit from skilled physical therapy in order to decrease deficits and return to prior level of function.    Understanding of Dx/Px/POC: good    Goals  STG (4 weeks):  Pt will be independent with HEP. - MET  Pt will demonstrate increase in mid trap MMT grade by 1/3. - MET  Pt will demonstrate increase in MMT grade by 1/3 for mid trap. - MET    LTG (8 weeks):  FOTO will be expected outcome. - Ongoing  Pt will report decreased of 2 SPR. - Ongoing  Pt will demonstrate MMT grade comparable to contralateral limb in all deficient muscle groups. - ongoing      Plan  Patient would benefit from: skilled physical therapy  Planned modality interventions: cryotherapy    Planned therapy interventions: manual therapy, neuromuscular re-education, patient education, self care, strengthening, stretching, therapeutic activities, therapeutic exercise and home exercise program    Frequency: 1x/week.  Duration in weeks: 4  Treatment plan discussed with: patient    Subjective Evaluation    History of Present Illness  Mechanism of injury: Chief Complaint: Pt reports chronic base of neck and upper back pain that  began when she was 19. After 2019, it has become constant. She reports it is due to her large breasts, for which she is considering a reduction. Her mother and sister have both had the procedures, and neither have any pain.     (8/7): Pt reports that after PT she feels improvement in symptoms, however, it doesn't last. She is reporting some improved strength.     Severity: severe  Irritability: low   Nature: nociceptive  Stage: chronic  Stability: worsening    P1: see body chart  Patient Goals  Patient goals for therapy: decreased pain  Patient goal: potentially avoid surgery    Objective     Postural Observations    Additional Postural Observation Details  Over compensation via thoracic extension and hinge at mid lumbar to offload breast weight    Active Range of Motion     Additional Active Range of Motion Details  WNL in all directions but increased pain with cervical flexion and bilateral SB  Hypermobile thoracic extension, all others WNL    Joint Play   Joints within functional limits: C2, C3, C4, C5, C6, C7, T1, T2, T3, T4, T5, T6, T7, T8 and T9     Strength/Myotome Testing     Left Shoulder     Isolated Muscles   Lower trapezius: 4+  Middle trapezius: 4+    Right Shoulder     Isolated Muscles   Lower trapezius: 4+   Middle trapezius: 4+    General Comments:    Upper quarter screen   Shoulder: unremarkable           Precautions: None    Manuals 8/7 6/6 6/13 6/20 6/27 7/3 7/11 7/18 7/25 8/1   CTJ mobs                                                    Neuro Re-Ed             Prone T 2x10 7# 3x10 2# 2x15 3# 2x15 3# 2x15 3# 2x15 5# 2x15 5#  3x12 5# 3x12 5#   Prone Y 2x10 7# 3x10 2#  2x15 3# 3x10  2#  3x10  2#   2x15 5#  3x12 5# 3x12 5#   Banded rows  3x10  2x15 BTB 2x15 blk          Prone scap retract             Banded ext  2x10  2x15 black TB with ER 2x15  Blk w/ scap retraction  3x10  20# w/ scap retraction  3x10 Blk        B/l banded Er   3x10  2x15 BTB At corner 2x5 Blk         Chest flys 2x15 10#     "2x10  9# 2x10 9# 2x12 10# 2x12 10# 2x15 10# 2x15 10#   Quadraped chin tucks      2x10 5s holds 2x10 5s holds       H-Abd    RTB 2x15         Ther Ex             Pt edu             Open book   10 x10\" 20x 20x 5\" 20x 5\" 25x 25x      Plinth push-up 2x10      2x6 2x7 2x8 2x8   DB row 25# 2x12       20# 2x12 20# 3x12 20# x12. 25# 2x12   Thoracic ext   3x1' 30x seated 3\"x30   15x over 1/2 foam roll arms OH      Chest press  2x15 20#    2x8  20#  2x10 20# 2x12 20#  3x12 20# 3x12 20#   Paloff Press      3x10  20#  3x10 20#                                 UBE 3'/3' 3'/3'  3'/3' 3'/3' 4'/4'  3'/3' 3'/3'  3'/3' 3'/3'   Ther Activity                                       Gait Training                                       Modalities                                            "

## 2024-08-08 ENCOUNTER — APPOINTMENT (OUTPATIENT)
Dept: PHYSICAL THERAPY | Facility: CLINIC | Age: 29
End: 2024-08-08
Payer: COMMERCIAL

## 2024-08-15 ENCOUNTER — OFFICE VISIT (OUTPATIENT)
Dept: PHYSICAL THERAPY | Facility: CLINIC | Age: 29
End: 2024-08-15
Payer: COMMERCIAL

## 2024-08-15 DIAGNOSIS — M54.2 NECK PAIN: Primary | ICD-10-CM

## 2024-08-15 DIAGNOSIS — M54.41 ACUTE BILATERAL LOW BACK PAIN WITH BILATERAL SCIATICA: ICD-10-CM

## 2024-08-15 DIAGNOSIS — M54.6 CHRONIC MIDLINE THORACIC BACK PAIN: ICD-10-CM

## 2024-08-15 DIAGNOSIS — G89.29 CHRONIC MIDLINE THORACIC BACK PAIN: ICD-10-CM

## 2024-08-15 DIAGNOSIS — M54.42 ACUTE BILATERAL LOW BACK PAIN WITH BILATERAL SCIATICA: ICD-10-CM

## 2024-08-15 DIAGNOSIS — N62 LARGE BREASTS: ICD-10-CM

## 2024-08-15 PROCEDURE — 97110 THERAPEUTIC EXERCISES: CPT

## 2024-08-15 PROCEDURE — 97112 NEUROMUSCULAR REEDUCATION: CPT

## 2024-08-15 NOTE — PROGRESS NOTES
"Daily Note     Today's date: 8/15/2024  Patient name: Tiffany Jean-Baptiste  : 1995  MRN: [id number]  Referring provider: Padmini Vyas CRNP  Dx:   Encounter Diagnosis     ICD-10-CM    1. Neck pain  M54.2       2. Acute bilateral low back pain with bilateral sciatica  M54.42     M54.41       3. Large breasts  N62       4. Chronic midline thoracic back pain  M54.6     G89.29                      Subjective: Pt reports she has had periodic neck/back pain.  States that she has not noticed significant difference in pain.      Objective: See treatment diary below      Assessment: Pt experiences exacerbation of neck sx while performing table push ups.  Demonstrates decreased scapular endurance and presents with some strength deficits still.  Pt would benefit from continued PT.      Plan: Continue per plan of care.      Precautions: None    Manuals 8/7 8/15 6/13 6/20 6/27 7/3 7/11 7/18 7/25 8/1   CTJ mobs                                                    Neuro Re-Ed             Prone T 2x10 7# 2x12 7# 2x15 3# 2x15 3# 2x15 3# 2x15 5# 2x15 5#  3x12 5# 3x12 5#   Prone Y 2x10 7# 2x12 7# 2x15 3# 3x10  2#  3x10  2#   2x15 5#  3x12 5# 3x12 5#   Banded rows   2x15 BTB 2x15 blk          Prone scap retract             Banded ext   2x15 black TB with ER 2x15  Blk w/ scap retraction  3x10  20# w/ scap retraction  3x10 Blk        B/l banded Er    2x15 BTB At corner 2x5 Blk         Chest flys 2x15 10# 2x15 10#   2x10  9# 2x10 9# 2x12 10# 2x12 10# 2x15 10# 2x15 10#   Quadraped chin tucks      2x10 5s holds 2x10 5s holds       H-Abd    RTB 2x15         Ther Ex             Pt edu             Open book    20x 20x 5\" 20x 5\" 25x 25x      Plinth push-up 2x10 2x12     2x6 2x7 2x8 2x8   DB row 25# 2x12 25# 2x12      20# 2x12 20# 3x12 20# x12. 25# 2x12   Thoracic ext    30x seated 3\"x30   15x over 1/2 foam roll arms OH      Chest press  2x15 20# 2x15 20#   2x8  20#  2x10 20# 2x12 20#  3x12 20# 3x12 20#   Paloff Press      3x10  20#  3x10 20# "                                 UBE 3'/3' 3'/3' 3'/3' 3'/3' 4'/4'  3'/3' 3'/3'  3'/3' 3'/3'   Ther Activity                                       Gait Training                                       Modalities

## 2024-08-16 ENCOUNTER — TELEPHONE (OUTPATIENT)
Age: 29
End: 2024-08-16

## 2024-08-16 ENCOUNTER — OFFICE VISIT (OUTPATIENT)
Dept: FAMILY MEDICINE CLINIC | Facility: CLINIC | Age: 29
End: 2024-08-16
Payer: COMMERCIAL

## 2024-08-16 VITALS
BODY MASS INDEX: 47.09 KG/M2 | SYSTOLIC BLOOD PRESSURE: 130 MMHG | HEART RATE: 79 BPM | RESPIRATION RATE: 18 BRPM | HEIGHT: 66 IN | OXYGEN SATURATION: 98 % | DIASTOLIC BLOOD PRESSURE: 80 MMHG | WEIGHT: 293 LBS

## 2024-08-16 DIAGNOSIS — E66.01 OBESITY, CLASS III, BMI 40-49.9 (MORBID OBESITY) (HCC): ICD-10-CM

## 2024-08-16 DIAGNOSIS — R63.5 ABNORMAL WEIGHT GAIN: ICD-10-CM

## 2024-08-16 DIAGNOSIS — Z00.00 ANNUAL PHYSICAL EXAM: Primary | ICD-10-CM

## 2024-08-16 PROCEDURE — 99395 PREV VISIT EST AGE 18-39: CPT | Performed by: NURSE PRACTITIONER

## 2024-08-16 NOTE — ASSESSMENT & PLAN NOTE
Patient was able to lose 50 pounds with diet and exercise.  States she gained 30 pounds back.  Was prescribed Wegovy by PCP, but did not start it because she was losing weight on her own.  Would like to start Wegovy. Prescription filled.  Advised to obtain blood work that was ordered by PCP and to return in 4 weeks form start of medication.

## 2024-08-16 NOTE — PATIENT INSTRUCTIONS
"Patient Education     Routine physical for adults   The Basics   Written by the doctors and editors at [facility]   What is a physical? -- A physical is a routine visit, or \"check-up,\" with your doctor. You might also hear it called a \"wellness visit\" or \"preventive visit.\"  During each visit, the doctor will:   Ask about your physical and mental health   Ask about your habits, behaviors, and lifestyle   Do an exam   Give you vaccines if needed   Talk to you about any medicines you take   Give advice about your health   Answer your questions  Getting regular check-ups is an important part of taking care of your health. It can help your doctor find and treat any problems you have. But it's also important for preventing health problems.  A routine physical is different from a \"sick visit.\" A sick visit is when you see a doctor because of a health concern or problem. Since physicals are scheduled ahead of time, you can think about what you want to ask the doctor.  How often should I get a physical? -- It depends on your age and health. In general, for people age 21 years and older:   If you are younger than 50 years, you might be able to get a physical every 3 years.   If you are 50 years or older, your doctor might recommend a physical every year.  If you have an ongoing health condition, like diabetes or high blood pressure, your doctor will probably want to see you more often.  What happens during a physical? -- In general, each visit will include:   Physical exam - The doctor or nurse will check your height, weight, heart rate, and blood pressure. They will also look at your eyes and ears. They will ask about how you are feeling and whether you have any symptoms that bother you.   Medicines - It's a good idea to bring a list of all the medicines you take to each doctor visit. Your doctor will talk to you about your medicines and answer any questions. Tell them if you are having any side effects that bother you. You " "should also tell them if you are having trouble paying for any of your medicines.   Habits and behaviors - This includes:   Your diet   Your exercise habits   Whether you smoke, drink alcohol, or use drugs   Whether you are sexually active   Whether you feel safe at home  Your doctor will talk to you about things you can do to improve your health and lower your risk of health problems. They will also offer help and support. For example, if you want to quit smoking, they can give you advice and might prescribe medicines. If you want to improve your diet or get more physical activity, they can help you with this, too.   Lab tests, if needed - The tests you get will depend on your age and situation. For example, your doctor might want to check your:   Cholesterol   Blood sugar   Iron level   Vaccines - The recommended vaccines will depend on your age, health, and what vaccines you already had. Vaccines are very important because they can prevent certain serious or deadly infections.   Discussion of screening - \"Screening\" means checking for diseases or other health problems before they cause symptoms. Your doctor can recommend screening based on your age, risk, and preferences. This might include tests to check for:   Cancer, such as breast, prostate, cervical, ovarian, colorectal, prostate, lung, or skin cancer   Sexually transmitted infections, such as chlamydia and gonorrhea   Mental health conditions like depression and anxiety  Your doctor will talk to you about the different types of screening tests. They can help you decide which screenings to have. They can also explain what the results might mean.   Answering questions - The physical is a good time to ask the doctor or nurse questions about your health. If needed, they can refer you to other doctors or specialists, too.  Adults older than 65 years often need other care, too. As you get older, your doctor will talk to you about:   How to prevent falling at " home   Hearing or vision tests   Memory testing   How to take your medicines safely   Making sure that you have the help and support you need at home  All topics are updated as new evidence becomes available and our peer review process is complete.  This topic retrieved from [employer] on: May 02, 2024.  Topic 922249 Version 1.0  Release: 32.4.3 - C32.122  © 2024 UpToDate, Inc. and/or its affiliates. All rights reserved.  Consumer Information Use and Disclaimer   Disclaimer: This generalized information is a limited summary of diagnosis, treatment, and/or medication information. It is not meant to be comprehensive and should be used as a tool to help the user understand and/or assess potential diagnostic and treatment options. It does NOT include all information about conditions, treatments, medications, side effects, or risks that may apply to a specific patient. It is not intended to be medical advice or a substitute for the medical advice, diagnosis, or treatment of a health care provider based on the health care provider's examination and assessment of a patient's specific and unique circumstances. Patients must speak with a health care provider for complete information about their health, medical questions, and treatment options, including any risks or benefits regarding use of medications. This information does not endorse any treatments or medications as safe, effective, or approved for treating a specific patient. UpToDate, Inc. and its affiliates disclaim any warranty or liability relating to this information or the use thereof.The use of this information is governed by the Terms of Use, available at https://www.woltersPollVaultruwer.com/en/know/clinical-effectiveness-terms. 2024© UpToDate, Inc. and its affiliates and/or licensors. All rights reserved.  Copyright   © 2024 UpToDate, Inc. and/or its affiliates. All rights reserved.

## 2024-08-16 NOTE — TELEPHONE ENCOUNTER
Patient called in to check the status of her Semaglutide-Weight Management (WEGOVY) 0.25 MG/0.5ML. It looks like it will require a prior Auth? Please advise.

## 2024-08-16 NOTE — PROGRESS NOTES
Adult Annual Physical  Name: Tiffany Jean-Baptiste      : 1995      MRN: [id number]  Encounter Provider: MIREYA Hernandez  Encounter Date: 2024   Encounter department: [facility] [address]    Assessment & Plan   1. Annual physical exam  2. Obesity, Class III, BMI 40-49.9 (morbid obesity) ([facility])  Assessment & Plan:  Patient was able to lose 50 pounds with diet and exercise.  States she gained 30 pounds back.  Was prescribed Wegovy by PCP, but did not start it because she was losing weight on her own.  Would like to start Wegovy. Prescription filled.  Advised to obtain blood work that was ordered by PCP and to return in 4 weeks form start of medication.  Orders:  -     Semaglutide-Weight Management (WEGOVY) 0.25 MG/0.5ML; Inject 0.5 mL (0.25 mg total) under the skin once a week for 4 doses  3. Abnormal weight gain  -     Semaglutide-Weight Management (WEGOVY) 0.25 MG/0.5ML; Inject 0.5 mL (0.25 mg total) under the skin once a week for 4 doses  Immunizations and preventive care screenings were discussed with patient today. Appropriate education was printed on patient's after visit summary.    Counseling:  Alcohol/drug use: discussed moderation in alcohol intake, the recommendations for healthy alcohol use, and avoidance of illicit drug use.  Dental Health: discussed importance of regular tooth brushing, flossing, and dental visits.  Injury prevention: discussed safety/seat belts, safety helmets, smoke detectors, carbon dioxide detectors, and smoking near bedding or upholstery.  Sexual health: discussed sexually transmitted diseases, partner selection, use of condoms, avoidance of unintended pregnancy, and contraceptive alternatives.  Exercise: the importance of regular exercise/physical activity was discussed. Recommend exercise 3-5 times per week for at least 30 minutes.          History of Present Illness     Adult Annual Physical:  Patient presents for annual physical.      Diet and Physical Activity:  - Diet/Nutrition:. inconsistent  - Exercise: moderate cardiovascular exercise, strength training exercises and 5-7 times a week on average.    General Health:  - Sleep: 4-6 hours of sleep on average.  - Hearing: normal hearing bilateral ears.  - Vision: wears glasses.  - Dental: regular dental visits and brushes teeth twice daily.    /GYN Health:  - Follows with GYN: yes.   - Menopause: premenopausal.   - Last menstrual cycle: 8/5/2024.   - History of STDs: no    Advanced Care Planning:  - Has an advanced directive?: no    - Has a durable medical POA?: no    - ACP document given to patient?: no      Review of Systems   Constitutional:  Positive for unexpected weight change (weight gain). Negative for activity change, appetite change and fatigue.   HENT:  Negative for congestion, ear pain, sore throat and trouble swallowing.    Eyes:  Negative for photophobia and visual disturbance.   Respiratory:  Negative for chest tightness, shortness of breath and wheezing.    Cardiovascular:  Negative for chest pain and palpitations.   Gastrointestinal:  Negative for abdominal pain, blood in stool, diarrhea and vomiting.   Endocrine: Negative for polydipsia, polyphagia and polyuria.   Genitourinary:  Negative for decreased urine volume, dysuria, flank pain and frequency.   Musculoskeletal:  Positive for back pain. Negative for arthralgias and myalgias.   Skin:  Negative for color change and rash.   Neurological:  Negative for dizziness, weakness, light-headedness and headaches.   Hematological:  Negative for adenopathy. Does not bruise/bleed easily.   Psychiatric/Behavioral:  Negative for dysphoric mood. The patient is not nervous/anxious.      Pertinent Medical History         Medical History Reviewed by provider this encounter:  Allergies  Meds  Problems       Past Medical History   Past Medical History:   Diagnosis Date    Anxiety     COVID 01/2021    High cholesterol     Morbid obesity  with BMI of 45.0-49.9, adult ([facility])     Pre-operative laboratory examination     Seizures ([facility]) 03/31/2015     Past Surgical History:   Procedure Laterality Date    UPPER GASTROINTESTINAL ENDOSCOPY       Family History   Problem Relation Age of Onset    Coronary artery disease Mother     Diabetes Sister     Coronary artery disease Maternal Grandmother     Hyperlipidemia Maternal Grandmother     Hyperlipidemia Maternal Grandfather     Diabetes Maternal Grandfather     Alcohol abuse Neg Hx     Substance Abuse Neg Hx     Mental illness Neg Hx      Current Outpatient Medications on File Prior to Visit   Medication Sig Dispense Refill    Adapalene-Benzoyl Peroxide 0.3-2.5 % GEL Apply 1 Application topically daily at bedtime 45 g 1    ammonium lactate (LAC-HYDRIN) 12 % cream Apply topically as needed for dry skin 385 g 2    fluticasone (FLONASE) 50 mcg/act nasal spray 1 spray into each nostril daily 15.8 mL 1    linaCLOtide (Linzess) 145 MCG CAPS Take 1 capsule (145 mcg total) by mouth in the morning 30 capsule 3    omeprazole (PriLOSEC) 40 MG capsule TAKE 1 CAPSULE (40 MG TOTAL) BY MOUTH DAILY. 90 capsule 1    valACYclovir (VALTREX) 1,000 mg tablet       valACYclovir (VALTREX) 500 mg tablet if needed      [DISCONTINUED] Semaglutide-Weight Management (WEGOVY) 0.25 MG/0.5ML Inject 0.5 mL (0.25 mg total) under the skin once a week 2 mL 0     No current facility-administered medications on file prior to visit.   No Known Allergies   Current Outpatient Medications on File Prior to Visit   Medication Sig Dispense Refill    Adapalene-Benzoyl Peroxide 0.3-2.5 % GEL Apply 1 Application topically daily at bedtime 45 g 1    ammonium lactate (LAC-HYDRIN) 12 % cream Apply topically as needed for dry skin 385 g 2    fluticasone (FLONASE) 50 mcg/act nasal spray 1 spray into each nostril daily 15.8 mL 1    linaCLOtide (Linzess) 145 MCG CAPS Take 1 capsule (145 mcg total) by mouth in the morning 30 capsule 3    omeprazole (PriLOSEC) 40 MG  "capsule TAKE 1 CAPSULE (40 MG TOTAL) BY MOUTH DAILY. 90 capsule 1    valACYclovir (VALTREX) 1,000 mg tablet       valACYclovir (VALTREX) 500 mg tablet if needed      [DISCONTINUED] Semaglutide-Weight Management (WEGOVY) 0.25 MG/0.5ML Inject 0.5 mL (0.25 mg total) under the skin once a week 2 mL 0     No current facility-administered medications on file prior to visit.      Social History     Tobacco Use    Smoking status: Former     Current packs/day: 0.00     Average packs/day: 0.3 packs/day for 6.0 years (1.5 ttl pk-yrs)     Types: Cigarettes     Start date: 4/28/2015     Quit date: 4/28/2021     Years since quitting: 3.3    Smokeless tobacco: Never    Tobacco comments:     stopped   Vaping Use    Vaping status: Never Used   Substance and Sexual Activity    Alcohol use: Yes     Comment: occasionally    Drug use: No    Sexual activity: Yes     Partners: Male       Objective     /80 (BP Location: Left arm, Patient Position: Sitting)   Pulse 79   Resp 18   Ht 5' 6\" (1.676 m)   Wt 135 kg (297 lb 12.8 oz)   LMP 08/05/2024 (Exact Date)   SpO2 98%   BMI 48.07 kg/m²     Physical Exam  Vitals reviewed.   Constitutional:       General: She is not in acute distress.     Appearance: She is obese. She is not ill-appearing.   HENT:      Head: Normocephalic and atraumatic.      Right Ear: Tympanic membrane, ear canal and external ear normal.      Left Ear: Tympanic membrane, ear canal and external ear normal.      Nose: Nose normal.      Mouth/Throat:      Mouth: Mucous membranes are moist.      Pharynx: Oropharynx is clear.   Eyes:      Conjunctiva/sclera: Conjunctivae normal.      Pupils: Pupils are equal, round, and reactive to light.   Neck:      Vascular: No carotid bruit.   Cardiovascular:      Rate and Rhythm: Normal rate and regular rhythm.      Pulses: Normal pulses.      Heart sounds: Normal heart sounds. No murmur heard.  Pulmonary:      Effort: Pulmonary effort is normal.      Breath sounds: Normal " breath sounds.   Abdominal:      General: Bowel sounds are normal.      Palpations: Abdomen is soft.   Musculoskeletal:         General: Normal range of motion.      Cervical back: Normal range of motion and neck supple.      Right lower leg: No edema.      Left lower leg: No edema.   Lymphadenopathy:      Cervical: No cervical adenopathy.   Skin:     General: Skin is warm and dry.      Capillary Refill: Capillary refill takes less than 2 seconds.   Neurological:      General: No focal deficit present.      Mental Status: She is alert and oriented to person, place, and time.   Psychiatric:         Mood and Affect: Mood normal.         Behavior: Behavior normal.

## 2024-08-21 ENCOUNTER — OFFICE VISIT (OUTPATIENT)
Age: 29
End: 2024-08-21
Payer: COMMERCIAL

## 2024-08-21 VITALS
SYSTOLIC BLOOD PRESSURE: 132 MMHG | TEMPERATURE: 98 F | HEART RATE: 84 BPM | BODY MASS INDEX: 47.09 KG/M2 | HEIGHT: 66 IN | OXYGEN SATURATION: 98 % | DIASTOLIC BLOOD PRESSURE: 76 MMHG | WEIGHT: 293 LBS

## 2024-08-21 DIAGNOSIS — L91.0 KELOID OF SKIN: Primary | ICD-10-CM

## 2024-08-21 DIAGNOSIS — L70.0 ACNE VULGARIS: ICD-10-CM

## 2024-08-21 PROCEDURE — 11900 INJECT SKIN LESIONS </W 7: CPT | Performed by: NURSE PRACTITIONER

## 2024-08-21 PROCEDURE — 99214 OFFICE O/P EST MOD 30 MIN: CPT | Performed by: NURSE PRACTITIONER

## 2024-08-21 RX ORDER — TRIAMCINOLONE ACETONIDE 40 MG/ML
40 INJECTION, SUSPENSION INTRA-ARTICULAR; INTRAMUSCULAR ONCE
Status: COMPLETED | OUTPATIENT
Start: 2024-08-21 | End: 2024-08-21

## 2024-08-21 RX ORDER — ADAPALENE AND BENZOYL PEROXIDE 3; 25 MG/G; MG/G
1 GEL TOPICAL
Qty: 45 G | Refills: 1 | Status: SHIPPED | OUTPATIENT
Start: 2024-08-21

## 2024-08-21 RX ORDER — DOXYCYCLINE 100 MG/1
100 CAPSULE ORAL 2 TIMES DAILY
Qty: 120 CAPSULE | Refills: 0 | Status: SHIPPED | OUTPATIENT
Start: 2024-08-21 | End: 2024-10-20

## 2024-08-21 RX ORDER — CLINDAMYCIN PHOSPHATE 10 UG/ML
LOTION TOPICAL 2 TIMES DAILY
Qty: 60 ML | Refills: 2 | Status: SHIPPED | OUTPATIENT
Start: 2024-08-21

## 2024-08-21 RX ADMIN — TRIAMCINOLONE ACETONIDE 40 MG: 40 INJECTION, SUSPENSION INTRA-ARTICULAR; INTRAMUSCULAR at 14:56

## 2024-08-21 NOTE — PATIENT INSTRUCTIONS
"ACNE VULGARIS     TODAY'S PLAN:     PRESCRIPTION MANAGEMENT:  Several treatment options were discussed including topical retinoids and their side effects.     Skin Hygiene:      Wash affected areas (face, chest, and back) TWICE A DAY with a mild cleanser such as Cerave or Cetaphil.  Use only mild cleansers (hypoallergenic and without fragrances) and fragrance free detergent (not \"unscented\" products which contain a masking agent); we discussed avoiding irritants/fragranced products.  Apply a good oil-free facial moisturizer AT LEAST TWO TIMES A DAY \" such as Cerave or Cetaphil.  Minimize the application of oils and cosmetics to the affected skin.  This includes HAIR PRODUCTS such as \"leave in\" conditioners.  Unless the product specifically states that it \"won't cause acne,\" \"won't clog pores,\" and/or \"is non-comedogenic\" then it may actually CAUSE acne.  If you smoke, STOP. Nicotine increases sebum retention and increased scale within the follicles, forming comedones (blackheads and whiteheads).  Abrasive treatments such as dermabrasion and spa facials may aggravate inflammatory acne.  Do NOT scratch or pick your acne bumps.  The evidence that diet directly affects acne remains weak.  However, diet does affect your overall health.  Eat plenty of fresh fruit and vegetables.  Avoid protein or amino acid supplements, particularly if they contain leucine. Consider a low-glycemic, low-protein and low-dairy diet.  Be mindful that certain medications may cause of aggravate acne.  Make sure to tell your Dermatologist if you start a new prescription, nutritional supplement, and/or herbal remedy.      MORNING Topical Regimen:      Clindamycin 1% lotion IN THE MORNING:  After gently washing and drying your skin, apply this TOPICAL medication evenly over your entire face, avoiding the eyes and corners of the mouth      EVENING Topical Regimen:      Epiduo 0.3% FORTE gel (Adapalene 0.3% + Benzoyl Peroxide 2.5%). AT LEAST 1 HOUR " BEFORE BEDTIME:  Evenly spread a SINGLE pea-sized amount of this medication over your entire face, avoiding the eyes and corners of the mouth.      SYSTEMIC Strategies:      ORAL Doxycycline (MONOhydrate preferred over HYCLATE)  WITH A FULL GLASS OF WATER:  Take 100 mg AFTER BREAKFAST and 100 mg AFTER DINNER.  Do not lie down for at least 30 minutes after taking.  If you feel ill or overly tired, stop taking and call us immediately.  Practice excellent sun protection.        MEDICAL DECISION MAKING  Treatment Goal:  Resolution of the CHRONIC condition.       Chronic condition is NOT at treatment goal.  It is progressing along its expected course OR is poorly-controlled.  Follow up in three months.            KELOID SCAR      Assessment and Plan:  Based on a thorough discussion of this condition and the management approach to it (including a comprehensive discussion of the known risks, side effects and potential benefits of treatment), the patient (family) agrees to implement the following specific plan:    Intralesional injection with Kenalog 40 done in office today   Follow up in six weeks

## 2024-08-21 NOTE — PROGRESS NOTES
"[facility] Dermatology Clinic Note     Patient Name: Tiffany Jean-Baptiste  Encounter Date: 08/21/2024     Have you been cared for by a [facility] Dermatologist in the last 3 years and, if so, which description applies to you?    Yes.  I have been here within the last 3 years, and my medical history has NOT changed since that time.  I am FEMALE/of child-bearing potential.    REVIEW OF SYSTEMS:  Have you recently had or currently have any of the following? No changes in my recent health.   PAST MEDICAL HISTORY:  Have you personally ever had or currently have any of the following?  If \"YES,\" then please provide more detail. No changes in my medical history.   HISTORY OF IMMUNOSUPPRESSION: Do you have a history of any of the following:  Systemic Immunosuppression such as Diabetes, Biologic or Immunotherapy, Chemotherapy, Organ Transplantation, Bone Marrow Transplantation?  No     Answering \"YES\" requires the addition of the dotphrase \"IMMUNOSUPPRESSED\" as the first diagnosis of the patient's visit.   FAMILY HISTORY:  Any \"first degree relatives\" (parent, brother, sister, or child) with the following?    No changes in my family's known health.   PATIENT EXPERIENCE:    Do you want the Dermatologist to perform a COMPLETE skin exam today including a clinical examination under the \"bra and underwear\" areas?  NO  If necessary, do we have your permission to call and leave a detailed message on your Preferred Phone number that includes your specific medical information?  Yes      No Known Allergies   Current Outpatient Medications:     Adapalene-Benzoyl Peroxide 0.3-2.5 % GEL, Apply 1 Application topically daily at bedtime, Disp: 45 g, Rfl: 1    ammonium lactate (LAC-HYDRIN) 12 % cream, Apply topically as needed for dry skin, Disp: 385 g, Rfl: 2    fluticasone (FLONASE) 50 mcg/act nasal spray, 1 spray into each nostril daily, Disp: 15.8 mL, Rfl: 1    linaCLOtide (Linzess) 145 MCG CAPS, Take 1 capsule (145 mcg total) by mouth in the " morning, Disp: 30 capsule, Rfl: 3    omeprazole (PriLOSEC) 40 MG capsule, TAKE 1 CAPSULE (40 MG TOTAL) BY MOUTH DAILY., Disp: 90 capsule, Rfl: 1    Semaglutide-Weight Management (WEGOVY) 0.25 MG/0.5ML, Inject 0.5 mL (0.25 mg total) under the skin once a week for 4 doses, Disp: 2 mL, Rfl: 0    valACYclovir (VALTREX) 1,000 mg tablet, , Disp: , Rfl:     valACYclovir (VALTREX) 500 mg tablet, if needed, Disp: , Rfl:           Whom besides the patient is providing clinical information about today's encounter?   NO ADDITIONAL HISTORIAN (patient alone provided history)    Physical Exam and Assessment/Plan by Diagnosis:    KELOID    Physical exam:    Anatomic Location Affected:  Bilateral posterior shoulders/upper back  Morphological Description:  skin colored dome shaped shiny nodules     Additional History of Present Condition:  patient history of acne cysts/nodules which caused scarring on her upper back. She has received intralesional Kenalog 40 mg at last office visit. She notices some improvement.     Assessment/Plan:   - Educated patient that keloids are dense fibrous nodules usually seen at previously traumatized areas (e.g.burns, incisions, acne scars, piercings, tattoos), but also occurring spontaneously on normal skin.  - Discussed that keloids can arise months after trauma and wound repair.  - Discussed that keloids can grow to become large, can become painful, tender, and/or pruritic.  - Discussed that treatment of keloids is extremely difficult given high rates of recurrence. However, options include intralesional kenalog +/- cryotherapy, excision in combination with imiquimod, kenalog or radiation therapy, silicone scar patches. Educated that with all treatments recurrence rates are high ranging from 15% (excision plus radiation) to >50% in the literature.   - Educated that in the instance of inflamed, symptomatic keloids intralesional kenalog (20-40 mg/cc in small doses) can be helpful but carry the risk of  hypopigmentation in individuals of darker skin types, atrophy.  - Discussed that patients who make keloids may be prone to making more keloids should they traumatize the skin with elective procedures(e.g. piercings, tattoos)  - Based on a thorough discussion of this condition and the management approach to it (including a comprehensive discussion of the known risks, side effects and potential benefits of treatment), the patient (family) agrees to implement the following specific plan:    Kenalog injections performed today; consent obtained   F/u in 4-6 weeks     PROCEDURE:  INTRALESIONAL STEROID INJECTION (KENALOG INJECTION)     Purpose: Triamcinolone is a synthetic glucocorticoid corticosteroid that has marked anti-inflammatory action. It is prepared in sterile aqueous suspension suitable for injecting directly into a lesion on or immediately below the skin to treat a dermal inflammatory process.      Indications: It is indicated for alopecia areata; inflammatory acne cysts; discoid lupus erythematosus; keloids and hypertrophic scars; inflammatory lesions of granuloma annulare, lichen planus, lichen simplex chronicus (neurodermatitis), psoriatic plaques, and other localized inflammatory skin conditions.      Potential Side Effects: I understand that triamcinolone injection can potentially cause early and/or delayed adverse effects such as:    Pain    Impaired wound healing    Increased hair growth    Bleeding    White or brown marks    Steroid acne    Infection    Telangiectasia    Skin thinning    Cutaneous and subcutaneous lipoatrophy (most common) appearing as skin indentations or dimples around the injection sites a few weeks after treatment      PROCEDURE NOTE:  After verbal and written consent were obtained, the to-be-treated area was wiped and cleaned with rubbing alcohol 70%.       Then, a total of 0.5 mL of Kenalog CONCENTRATION:  40 mg/mL   (Lot# [id number]; Expiration 04/30/2026, NDC#: 0797-2773-33) was  "injected intralesionally into a total of 5 lesion/s on the following anatomic areas: upper back using a 3-mL syringe and a 30 1/2-gauge needle.       There was less than 1 mL of blood loss and little to no discomfort.  The area was bandaged with a Band-aid.  The patient tolerated the procedure well and remained in the office for observation.  With no signs of an adverse reaction, the patient was eventually discharged from clinic.         ACNE VULGARIS    Physical Exam:  Anatomic Locations Involved: Face  Global Assessment: MILD:  LESS THAN half the face is involved. Some comedones and some papules and/or pustules.  Scarring Present? Yes; Atrophic scarring:  MILD    Additional History of Present Condition:  patient has hx of cystic/ inflammatory acne; she reports active flare. She has been using Epiduo at bedtime.      TODAY'S PLAN:     PRESCRIPTION MANAGEMENT:  Several treatment options were discussed including topical retinoids and their side effects.     Skin Hygiene:      Wash affected areas (face, chest, and back) TWICE A DAY with a mild cleanser such as Cerave or Cetaphil.  Use only mild cleansers (hypoallergenic and without fragrances) and fragrance free detergent (not \"unscented\" products which contain a masking agent); we discussed avoiding irritants/fragranced products.  Apply a good oil-free facial moisturizer AT LEAST TWO TIMES A DAY \" such as Cerave or Cetaphil.  Minimize the application of oils and cosmetics to the affected skin.  This includes HAIR PRODUCTS such as \"leave in\" conditioners.  Unless the product specifically states that it \"won't cause acne,\" \"won't clog pores,\" and/or \"is non-comedogenic\" then it may actually CAUSE acne.  If you smoke, STOP. Nicotine increases sebum retention and increased scale within the follicles, forming comedones (blackheads and whiteheads).  Abrasive treatments such as dermabrasion and spa facials may aggravate inflammatory acne.  Do NOT scratch or pick your acne " bumps.  The evidence that diet directly affects acne remains weak.  However, diet does affect your overall health.  Eat plenty of fresh fruit and vegetables.  Avoid protein or amino acid supplements, particularly if they contain leucine. Consider a low-glycemic, low-protein and low-dairy diet.  Be mindful that certain medications may cause of aggravate acne.  Make sure to tell your Dermatologist if you start a new prescription, nutritional supplement, and/or herbal remedy.      MORNING Topical Regimen:      Clindamycin 1% lotion IN THE MORNING:  After gently washing and drying your skin, apply this TOPICAL medication evenly over your entire face, avoiding the eyes and corners of the mouth      EVENING Topical Regimen:      Epiduo 0.3% FORTE gel (Adapalene 0.3% + Benzoyl Peroxide 2.5%). AT LEAST 1 HOUR BEFORE BEDTIME:  Evenly spread a SINGLE pea-sized amount of this medication over your entire face, avoiding the eyes and corners of the mouth.      SYSTEMIC Strategies:      ORAL Doxycycline (MONOhydrate preferred over HYCLATE)  WITH A FULL GLASS OF WATER:  Take 100 mg AFTER BREAKFAST and 100 mg AFTER DINNER.  Do not lie down for at least 30 minutes after taking.  If you feel ill or overly tired, stop taking and call us immediately.  Practice excellent sun protection.        MEDICAL DECISION MAKING  Treatment Goal:  Resolution of the CHRONIC condition.       Chronic condition is NOT at treatment goal.  It is progressing along its expected course OR is poorly-controlled.  Follow up in three months          Scribe Attestation      I,:  Stephanie Casper MA am acting as a scribe while in the presence of the attending physician.:       I,:  MIREYA Gonzalez personally performed the services described in this documentation    as scribed in my presence.:

## 2024-08-22 ENCOUNTER — OFFICE VISIT (OUTPATIENT)
Dept: PHYSICAL THERAPY | Facility: CLINIC | Age: 29
End: 2024-08-22
Payer: COMMERCIAL

## 2024-08-22 DIAGNOSIS — G89.29 CHRONIC MIDLINE THORACIC BACK PAIN: ICD-10-CM

## 2024-08-22 DIAGNOSIS — M54.42 ACUTE BILATERAL LOW BACK PAIN WITH BILATERAL SCIATICA: ICD-10-CM

## 2024-08-22 DIAGNOSIS — M54.2 NECK PAIN: Primary | ICD-10-CM

## 2024-08-22 DIAGNOSIS — M54.41 ACUTE BILATERAL LOW BACK PAIN WITH BILATERAL SCIATICA: ICD-10-CM

## 2024-08-22 DIAGNOSIS — N62 LARGE BREASTS: ICD-10-CM

## 2024-08-22 DIAGNOSIS — M54.6 CHRONIC MIDLINE THORACIC BACK PAIN: ICD-10-CM

## 2024-08-22 PROCEDURE — 97110 THERAPEUTIC EXERCISES: CPT | Performed by: PHYSICAL THERAPIST

## 2024-08-22 PROCEDURE — 97112 NEUROMUSCULAR REEDUCATION: CPT | Performed by: PHYSICAL THERAPIST

## 2024-08-22 NOTE — PROGRESS NOTES
PT Discharge     Today's date: 2024  Patient name: Tiffany Jean-Baptiste  : 1995  MRN: [id number]  Referring provider: Padmini Vyas CRNP  Dx:   Encounter Diagnosis     ICD-10-CM    1. Neck pain  M54.2 Ambulatory Referral to Physical Therapy      2. Acute bilateral low back pain with bilateral sciatica  M54.42 Ambulatory Referral to Physical Therapy    M54.41                      Assessment  Impairments: abnormal or restricted ROM, activity intolerance, impaired physical strength and pain with function    Assessment details: Pt is a 29 y/o female who presents to physical therapy with primary nociceptive pain associated with chronic neck and back pain complicated by high BMI. Pt has made some improvement since IE. However, she continues to have consistent neck and back pain. She is going back to [facility] for further follow-up. Skilled PT no longer indicated, pt d/c.    Understanding of Dx/Px/POC: good    Goals  STG (4 weeks):  Pt will be independent with HEP. - MET  Pt will demonstrate increase in mid trap MMT grade by 1/3. - MET  Pt will demonstrate increase in MMT grade by 1/3 for mid trap. - MET    LTG (8 weeks):  FOTO will be expected outcome. - NOT MET  Pt will report decreased of 2 SPR. - NOT MET  Pt will demonstrate MMT grade comparable to contralateral limb in all deficient muscle groups. - MET      Plan: d/c    Subjective Evaluation    History of Present Illness  Mechanism of injury: Chief Complaint: Pt reports chronic base of neck and upper back pain that began when she was 19. After 2019, it has become constant. She reports it is due to her large breasts, for which she is considering a reduction. Her mother and sister have both had the procedures, and neither have any pain.     (): Pt reports that after PT she feels improvement in symptoms, however, it doesn't last. She is reporting some improved strength.     Severity: severe  Irritability: low   Nature: nociceptive  Stage: chronic  Stability:  "worsening    P1: see body chart  Patient Goals  Patient goals for therapy: decreased pain  Patient goal: potentially avoid surgery    Objective     Postural Observations    Additional Postural Observation Details  Over compensation via thoracic extension and hinge at mid lumbar to offload breast weight    Active Range of Motion     Additional Active Range of Motion Details  WNL in all directions but increased pain with cervical flexion and bilateral SB  Hypermobile thoracic extension, all others WNL    Joint Play   Joints within functional limits: C2, C3, C4, C5, C6, C7, T1, T2, T3, T4, T5, T6, T7, T8 and T9     Strength/Myotome Testing     Left Shoulder     Isolated Muscles   Lower trapezius: 5  Middle trapezius: 5    Right Shoulder     Isolated Muscles   Lower trapezius: 5  Middle trapezius: 5    General Comments:    Upper quarter screen   Shoulder: unremarkable           Precautions: None    Manuals 8/7 8/22 6/13 6/20 6/27 7/3 7/11 7/18 7/25 8/1   CTJ mobs                                                    Neuro Re-Ed             Prone T 2x10 7#  2x15 3# 2x15 3# 2x15 3# 2x15 5# 2x15 5#  3x12 5# 3x12 5#   Prone Y 2x10 7#  2x15 3# 3x10  2#  3x10  2#   2x15 5#  3x12 5# 3x12 5#   Banded rows   2x15 BTB 2x15 blk          Prone scap retract             Banded ext   2x15 black TB with ER 2x15  Blk w/ scap retraction  3x10  20# w/ scap retraction  3x10 Blk        B/l banded Er    2x15 BTB At corner 2x5 Blk         Chest flys 2x15 10#    2x10  9# 2x10 9# 2x12 10# 2x12 10# 2x15 10# 2x15 10#   Quadraped chin tucks      2x10 5s holds 2x10 5s holds       H-Abd    RTB 2x15         Ther Ex             Pt edu             Open book    20x 20x 5\" 20x 5\" 25x 25x      Plinth push-up 2x10 2x10     2x6 2x7 2x8 2x8   DB row 25# 2x12 25# 2x15      20# 2x12 20# 3x12 20# x12. 25# 2x12   Thoracic ext    30x seated 3\"x30   15x over 1/2 foam roll arms OH      Chest press  2x15 20#    2x8  20#  2x10 20# 2x12 20#  3x12 20# 3x12 20#   Paloff " Press      3x10  20#  3x10 20#                                 UBE 3'/3' 3'/3'  3'/3' 3'/3' 4'/4'  3'/3' 3'/3'  3'/3' 3'/3'   Ther Activity                                       Gait Training                                       Modalities

## 2024-08-30 ENCOUNTER — APPOINTMENT (OUTPATIENT)
Dept: LAB | Facility: HOSPITAL | Age: 29
End: 2024-08-30
Payer: COMMERCIAL

## 2024-08-30 DIAGNOSIS — E78.5 HYPERLIPIDEMIA, UNSPECIFIED HYPERLIPIDEMIA TYPE: ICD-10-CM

## 2024-08-30 DIAGNOSIS — Z02.1 PRE-EMPLOYMENT EXAMINATION: ICD-10-CM

## 2024-08-30 DIAGNOSIS — R10.9 ABDOMINAL DISCOMFORT: ICD-10-CM

## 2024-08-30 DIAGNOSIS — E55.9 VITAMIN D DEFICIENCY: ICD-10-CM

## 2024-08-30 DIAGNOSIS — R79.89 LOW VITAMIN B12 LEVEL: ICD-10-CM

## 2024-08-30 DIAGNOSIS — K59.00 CONSTIPATION, UNSPECIFIED CONSTIPATION TYPE: ICD-10-CM

## 2024-08-30 LAB
25(OH)D3 SERPL-MCNC: 20.9 NG/ML (ref 30–100)
CHOLEST SERPL-MCNC: 234 MG/DL
EST. AVERAGE GLUCOSE BLD GHB EST-MCNC: 108 MG/DL
FOLATE SERPL-MCNC: 14.7 NG/ML
HBA1C MFR BLD: 5.4 %
HDLC SERPL-MCNC: 61 MG/DL
LDLC SERPL CALC-MCNC: 153 MG/DL (ref 0–100)
NONHDLC SERPL-MCNC: 173 MG/DL
TRIGL SERPL-MCNC: 100 MG/DL
TSH SERPL DL<=0.05 MIU/L-ACNC: 2.39 UIU/ML (ref 0.45–4.5)
VIT B12 SERPL-MCNC: 282 PG/ML (ref 180–914)

## 2024-08-30 PROCEDURE — 86231 EMA EACH IG CLASS: CPT

## 2024-08-30 PROCEDURE — 83036 HEMOGLOBIN GLYCOSYLATED A1C: CPT

## 2024-08-30 PROCEDURE — 82607 VITAMIN B-12: CPT

## 2024-08-30 PROCEDURE — 80061 LIPID PANEL: CPT

## 2024-08-30 PROCEDURE — 82784 ASSAY IGA/IGD/IGG/IGM EACH: CPT

## 2024-08-30 PROCEDURE — 86003 ALLG SPEC IGE CRUDE XTRC EA: CPT

## 2024-08-30 PROCEDURE — 86480 TB TEST CELL IMMUN MEASURE: CPT

## 2024-08-30 PROCEDURE — 86364 TISS TRNSGLTMNASE EA IG CLAS: CPT

## 2024-08-30 PROCEDURE — 82746 ASSAY OF FOLIC ACID SERUM: CPT

## 2024-08-30 PROCEDURE — 84443 ASSAY THYROID STIM HORMONE: CPT

## 2024-08-30 PROCEDURE — 36415 COLL VENOUS BLD VENIPUNCTURE: CPT

## 2024-08-30 PROCEDURE — 86258 DGP ANTIBODY EACH IG CLASS: CPT

## 2024-08-30 PROCEDURE — 82785 ASSAY OF IGE: CPT

## 2024-08-30 PROCEDURE — 82306 VITAMIN D 25 HYDROXY: CPT

## 2024-08-30 NOTE — TELEPHONE ENCOUNTER
[facility] called in to let the provider know patients Wegovy has been APPROVED. Approval letter will be faxed. Please advise.

## 2024-08-30 NOTE — TELEPHONE ENCOUNTER
PA for WEGOVY 0.25 MG/0.5ML SUBMITTED     via    []CMM-KEY:   []Surescripts-Case ID #   []Faxed to plan   [x]Other website [employer]Ds[id number]: [id number]  []Phone call Case ID #     Office notes sent, clinical questions answered. Awaiting determination    Turnaround time for your insurance to make a decision on your Prior Authorization can take 7-21 business days.            0

## 2024-08-31 LAB
GAMMA INTERFERON BACKGROUND BLD IA-ACNC: 0.04 IU/ML
M TB IFN-G BLD-IMP: NEGATIVE
M TB IFN-G CD4+ BCKGRND COR BLD-ACNC: 0 IU/ML
M TB IFN-G CD4+ BCKGRND COR BLD-ACNC: 0.02 IU/ML
MITOGEN IGNF BCKGRD COR BLD-ACNC: 9.96 IU/ML

## 2024-09-01 LAB
ALMOND IGE QN: <0.1 KUA/I
CASHEW NUT IGE QN: <0.1 KUA/I
CODFISH IGE QN: <0.1 KUA/I
EGG WHITE IGE QN: <0.1 KUA/I
GLUTEN IGE QN: <0.1 KUA/I
HAZELNUT IGE QN: <0.1 KUA/L
MILK IGE QN: <0.1 KUA/I
PEANUT IGE QN: <0.1 KUA/I
SALMON IGE QN: <0.1 KUA/I
SCALLOP IGE QN: <0.1 KUA/L
SESAME SEED IGE QN: <0.1 KUA/I
SHRIMP IGE QN: <0.1 KUA/L
SOYBEAN IGE QN: <0.1 KUA/I
TOTAL IGE SMQN RAST: 29.5 KU/L (ref 0–113)
TUNA IGE QN: <0.1 KUA/I
WALNUT IGE QN: <0.1 KUA/I
WHEAT IGE QN: <0.1 KUA/I

## 2024-09-03 LAB
ENDOMYSIUM IGA SER QL: NEGATIVE
GLIADIN PEPTIDE IGA SER-ACNC: 6 UNITS (ref 0–19)
GLIADIN PEPTIDE IGG SER-ACNC: 2 UNITS (ref 0–19)
IGA SERPL-MCNC: 284 MG/DL (ref 87–352)
TTG IGA SER-ACNC: <2 U/ML (ref 0–3)
TTG IGG SER-ACNC: 4 U/ML (ref 0–5)

## 2024-09-05 DIAGNOSIS — E55.9 VITAMIN D DEFICIENCY: Primary | ICD-10-CM

## 2024-09-05 RX ORDER — ERGOCALCIFEROL 1.25 MG/1
50000 CAPSULE, LIQUID FILLED ORAL WEEKLY
Qty: 16 CAPSULE | Refills: 1 | Status: SHIPPED | OUTPATIENT
Start: 2024-09-05

## 2024-09-17 ENCOUNTER — VBI (OUTPATIENT)
Dept: ADMINISTRATIVE | Facility: OTHER | Age: 29
End: 2024-09-17

## 2024-09-17 NOTE — TELEPHONE ENCOUNTER
09/17/24 7:52 AM     Chart reviewed for Diabetic Eye Exam was/were not submitted to the patient's insurance.     Leigha Sanchez MA   [facility]
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
## 2024-09-26 ENCOUNTER — OFFICE VISIT (OUTPATIENT)
Dept: FAMILY MEDICINE CLINIC | Facility: CLINIC | Age: 29
End: 2024-09-26
Payer: COMMERCIAL

## 2024-09-26 VITALS
TEMPERATURE: 98 F | OXYGEN SATURATION: 98 % | HEIGHT: 66 IN | DIASTOLIC BLOOD PRESSURE: 90 MMHG | SYSTOLIC BLOOD PRESSURE: 120 MMHG | BODY MASS INDEX: 47.09 KG/M2 | RESPIRATION RATE: 16 BRPM | WEIGHT: 293 LBS | HEART RATE: 80 BPM

## 2024-09-26 DIAGNOSIS — E66.01 MORBID OBESITY WITH BODY MASS INDEX (BMI) OF 40.0 TO 44.9 IN ADULT (HCC): ICD-10-CM

## 2024-09-26 DIAGNOSIS — E66.01 OBESITY, CLASS III, BMI 40-49.9 (MORBID OBESITY) (HCC): Primary | ICD-10-CM

## 2024-09-26 DIAGNOSIS — R53.83 OTHER FATIGUE: ICD-10-CM

## 2024-09-26 DIAGNOSIS — E78.5 HYPERLIPIDEMIA, UNSPECIFIED HYPERLIPIDEMIA TYPE: ICD-10-CM

## 2024-09-26 PROCEDURE — 99214 OFFICE O/P EST MOD 30 MIN: CPT

## 2024-09-26 RX ORDER — SEMAGLUTIDE 0.25 MG/.5ML
INJECTION, SOLUTION SUBCUTANEOUS
COMMUNITY
Start: 2024-09-09 | End: 2024-09-26

## 2024-09-26 RX ORDER — SEMAGLUTIDE 0.5 MG/.5ML
INJECTION, SOLUTION SUBCUTANEOUS
Qty: 2 ML | Refills: 0 | Status: SHIPPED | OUTPATIENT
Start: 2024-09-26

## 2024-09-26 NOTE — PROGRESS NOTES
Assessment/Plan:         Problem List Items Addressed This Visit          Other    Hyperlipidemia     Cholesterol is still high, continue to try to cut down on high cholesterol foods such as full fat daily, butter, red meat, eggs, blancas/pork, mayonnaise and increase high fiber foods suck as oatmeal and vegetables. I also suggest increasing healthy fats such as olive oil, nuts/nut butters and avocados.   Will recheck in 3 months.          Relevant Orders    CBC and differential    Comprehensive metabolic panel    Lipid panel    Morbid obesity with body mass index (BMI) of 40.0 to 44.9 in adult ([facility])     Will increase wegovy, great job, recommend [email or web address] steadly increase activity until about 30 minutes most days.          Obesity, Class III, BMI 40-49.9 (morbid obesity) ([facility]) - Primary     Will increase wegovy, great job, recommend [email or web address] steadly increase activity until about 30 minutes most days.          Relevant Medications    Semaglutide-Weight Management (Wegovy) 0.5 MG/0.5ML     Other Visit Diagnoses       Other fatigue        Have lab work, will call with results    Relevant Orders    Vitamin B12    Folate              Subjective:      Patient ID: Tiffany Jean-Baptiste is a 28 y.o. female.    Tiffany is here for weight loss, she has lost about 3 lbs and is doing well on wegovy. She is doing pt and is seeing surgery for her back.         The following portions of the patient's history were reviewed and updated as appropriate:   Past Medical History:  She has a past medical history of Anxiety, COVID (01/2021), High cholesterol, Morbid obesity with BMI of 45.0-49.9, adult ([facility]), Pre-operative laboratory examination, and Seizures ([facility]) (03/31/2015).,  _______________________________________________________________________  Medical Problems:  does not have any pertinent problems on file.,  _______________________________________________________________________  Past Surgical History:   has a past surgical history that  includes Upper gastrointestinal endoscopy.,  _______________________________________________________________________  Family History:  family history includes Coronary artery disease in her maternal grandmother and mother; Diabetes in her maternal grandfather and sister; Hyperlipidemia in her maternal grandfather and maternal grandmother.,  _______________________________________________________________________  Social History:   reports that she quit smoking about 3 years ago. Her smoking use included cigarettes. She started smoking about 9 years ago. She has a 1.5 pack-year smoking history. She has never used smokeless tobacco. She reports current alcohol use. She reports that she does not use drugs.,  _______________________________________________________________________  Allergies:  has No Known Allergies..  _______________________________________________________________________  Current Outpatient Medications   Medication Sig Dispense Refill    Adapalene-Benzoyl Peroxide 0.3-2.5 % GEL Apply 1 Application topically daily at bedtime 45 g 1    clindamycin (CLEOCIN T) 1 % lotion Apply topically 2 (two) times a day Apply a thin film 1-2 times daily. 60 mL 2    doxycycline hyclate (VIBRAMYCIN) 100 mg capsule Take 1 capsule (100 mg total) by mouth 2 (two) times a day 120 capsule 0    ergocalciferol (VITAMIN D2) 50,000 units Take 1 capsule (50,000 Units total) by mouth once a week 16 capsule 1    fluticasone (FLONASE) 50 mcg/act nasal spray 1 spray into each nostril daily 15.8 mL 1    omeprazole (PriLOSEC) 40 MG capsule TAKE 1 CAPSULE (40 MG TOTAL) BY MOUTH DAILY. 90 capsule 1    Semaglutide-Weight Management (Wegovy) 0.5 MG/0.5ML Inject 0.5 mg under the skin weekly 2 mL 0    ammonium lactate (LAC-HYDRIN) 12 % cream Apply topically as needed for dry skin 385 g 2    linaCLOtide (Linzess) 145 MCG CAPS Take 1 capsule (145 mcg total) by mouth in the morning 30 capsule 3     No current facility-administered medications for  "this visit.     _______________________________________________________________________  Review of Systems   Constitutional:  Negative for chills and fever.   HENT:  Positive for congestion and ear pain.    Eyes:  Negative for pain and visual disturbance.   Respiratory:  Negative for cough and shortness of breath.    Cardiovascular:  Negative for chest pain and palpitations.   Gastrointestinal:  Positive for nausea. Negative for abdominal pain, blood in stool, constipation and vomiting.   Musculoskeletal:  Positive for arthralgias and back pain.   Neurological:  Negative for dizziness and light-headedness.   Psychiatric/Behavioral:  Negative for dysphoric mood and sleep disturbance. The patient is not nervous/anxious.    All other systems reviewed and are negative.        Objective:  Vitals:    09/26/24 0722   BP: 120/90   Pulse: 80   Resp: 16   Temp: 98 °F (36.7 °C)   SpO2: 98%   Weight: 135 kg (297 lb)   Height: 5' 6\" (1.676 m)     Body mass index is 47.94 kg/m².     Physical Exam  Vitals and nursing note reviewed.   Constitutional:       Appearance: Normal appearance. She is not ill-appearing.   HENT:      Head: Normocephalic.      Right Ear: Ear canal and external ear normal. There is no impacted cerumen. Tympanic membrane is bulging.      Left Ear: Tympanic membrane, ear canal and external ear normal. There is no impacted cerumen.      Nose: Nose normal.      Mouth/Throat:      Mouth: Mucous membranes are moist.      Pharynx: No posterior oropharyngeal erythema.   Eyes:      Extraocular Movements: Extraocular movements intact.      Conjunctiva/sclera: Conjunctivae normal.      Pupils: Pupils are equal, round, and reactive to light.   Cardiovascular:      Rate and Rhythm: Normal rate and regular rhythm.      Heart sounds: Normal heart sounds. No murmur heard.  Pulmonary:      Effort: Pulmonary effort is normal.      Breath sounds: Normal breath sounds. No wheezing.   Abdominal:      Palpations: Abdomen is soft. "      Tenderness: There is no abdominal tenderness.   Musculoskeletal:         General: Normal range of motion.      Cervical back: Normal range of motion.      Right lower leg: No edema.      Left lower leg: No edema.   Skin:     General: Skin is warm and dry.   Neurological:      General: No focal deficit present.      Mental Status: She is alert.   Psychiatric:         Mood and Affect: Mood normal.         Behavior: Behavior normal.

## 2024-09-26 NOTE — ASSESSMENT & PLAN NOTE
Will increase wegovy, great job, recommend [email or web address] steadly increase activity until about 30 minutes most days.

## 2024-09-26 NOTE — ASSESSMENT & PLAN NOTE
Cholesterol is still high, continue to try to cut down on high cholesterol foods such as full fat daily, butter, red meat, eggs, blancas/pork, mayonnaise and increase high fiber foods suck as oatmeal and vegetables. I also suggest increasing healthy fats such as olive oil, nuts/nut butters and avocados.   Will recheck in 3 months.

## 2024-10-15 ENCOUNTER — TELEPHONE (OUTPATIENT)
Age: 29
End: 2024-10-15

## 2024-11-01 ENCOUNTER — OFFICE VISIT (OUTPATIENT)
Dept: FAMILY MEDICINE CLINIC | Facility: CLINIC | Age: 29
End: 2024-11-01
Payer: COMMERCIAL

## 2024-11-01 VITALS
SYSTOLIC BLOOD PRESSURE: 122 MMHG | OXYGEN SATURATION: 98 % | BODY MASS INDEX: 47.09 KG/M2 | DIASTOLIC BLOOD PRESSURE: 84 MMHG | WEIGHT: 293 LBS | HEIGHT: 66 IN | HEART RATE: 78 BPM | RESPIRATION RATE: 18 BRPM

## 2024-11-01 DIAGNOSIS — L30.4 INTERTRIGO: ICD-10-CM

## 2024-11-01 DIAGNOSIS — E66.01 MORBID OBESITY WITH BODY MASS INDEX (BMI) OF 40.0 TO 44.9 IN ADULT (HCC): ICD-10-CM

## 2024-11-01 DIAGNOSIS — M54.2 NECK PAIN: ICD-10-CM

## 2024-11-01 DIAGNOSIS — G89.29 CHRONIC UPPER BACK PAIN: ICD-10-CM

## 2024-11-01 DIAGNOSIS — E66.01 CLASS 3 SEVERE OBESITY DUE TO EXCESS CALORIES WITHOUT SERIOUS COMORBIDITY WITH BODY MASS INDEX (BMI) OF 45.0 TO 49.9 IN ADULT (HCC): Primary | ICD-10-CM

## 2024-11-01 DIAGNOSIS — E66.813 CLASS 3 SEVERE OBESITY DUE TO EXCESS CALORIES WITHOUT SERIOUS COMORBIDITY WITH BODY MASS INDEX (BMI) OF 45.0 TO 49.9 IN ADULT (HCC): Primary | ICD-10-CM

## 2024-11-01 DIAGNOSIS — M54.9 CHRONIC UPPER BACK PAIN: ICD-10-CM

## 2024-11-01 PROCEDURE — 99214 OFFICE O/P EST MOD 30 MIN: CPT

## 2024-11-01 RX ORDER — CLOTRIMAZOLE AND BETAMETHASONE DIPROPIONATE 10; .64 MG/G; MG/G
CREAM TOPICAL 2 TIMES DAILY
Qty: 45 G | Refills: 1 | Status: SHIPPED | OUTPATIENT
Start: 2024-11-01

## 2024-11-01 RX ORDER — TIRZEPATIDE 2.5 MG/.5ML
2.5 INJECTION, SOLUTION SUBCUTANEOUS WEEKLY
Qty: 2 ML | Refills: 0 | Status: SHIPPED | OUTPATIENT
Start: 2024-11-01 | End: 2024-11-29

## 2024-11-01 NOTE — ASSESSMENT & PLAN NOTE
Still struggling with severe neck and upper back pain, does have consult scheduled with plastics for breast reduction.  I do think that a breast reduction would be beneficial to the patient as she has completed physical therapy and is also considering chiropractics but continues with neck and back pain.  Over-the-counter medications have not shown any significant improvement in pain.

## 2024-11-01 NOTE — ASSESSMENT & PLAN NOTE
Do recommend [email or web address], and also calorie counting for at least a few days.  Coffee with creamer cappuccino are big contributors to calorie intake.  Do recommend 30 minutes of exercise most days.  No significant weight loss with Wegovy will switch to tirzepatide.  Follow-up in 1 month for weight check.  Call with any questions concerns or side effects.

## 2024-11-01 NOTE — ASSESSMENT & PLAN NOTE
On doxycycline through dermatology, will add antifungal and steroid topically.  Continues to have intertrigo under the breasts keep dry and clean.  Consult is scheduled with plastics for breast reduction.  I do think that this would be the best treatment for the patient as oral and topical medications have not resolved the intertrigo.

## 2024-11-01 NOTE — PROGRESS NOTES
Assessment/Plan:         Problem List Items Addressed This Visit          Musculoskeletal and Integument    Intertrigo     On doxycycline through dermatology, will add antifungal and steroid topically.  Continues to have intertrigo under the breasts keep dry and clean.  Consult is scheduled with plastics for breast reduction.  I do think that this would be the best treatment for the patient as oral and topical medications have not resolved the intertrigo.         Relevant Medications    clotrimazole-betamethasone (LOTRISONE) 1-0.05 % cream       Surgery/Wound/Pain    Neck pain     Still struggling with severe neck and upper back pain, does have consult scheduled with plastics for breast reduction.  I do think that a breast reduction would be beneficial to the patient as she has completed physical therapy and is also considering chiropractics but continues with neck and back pain.  Over-the-counter medications have not shown any significant improvement in pain.         Chronic upper back pain     Still struggling with severe neck and upper back pain, does have consult scheduled with plastics for breast reduction.  I do think that a breast reduction would be beneficial to the patient as she has completed physical therapy and is also considering chiropractics but continues with neck and back pain.  Over-the-counter medications have not shown any significant improvement in pain.            Other    Morbid obesity with body mass index (BMI) of 40.0 to 44.9 in adult ([facility])       Do recommend [email or web address], and also calorie counting for at least a few days.  Coffee with creamer cappuccino are big contributors to calorie intake.  Do recommend 30 minutes of exercise most days.  No significant weight loss with Wegovy will switch to tirzepatide.  Follow-up in 1 month for weight check.  Call with any questions concerns or side effects.          Other Visit Diagnoses       Class 3 severe obesity due to excess calories without  serious comorbidity with body mass index (BMI) of 45.0 to 49.9 in adult ([facility])    -  Primary    Relevant Medications    tirzepatide (Zepbound) 2.5 mg/0.5 mL auto-injector              Subjective:      Patient ID: Tiffany Jean-Baptiste is a 28 y.o. female.    Tiffany feels like the wegovy is not working for her.         The following portions of the patient's history were reviewed and updated as appropriate:   Past Medical History:  She has a past medical history of Anxiety, COVID (01/2021), High cholesterol, Morbid obesity with BMI of 45.0-49.9, adult ([facility]), Pre-operative laboratory examination, and Seizures ([facility]) (03/31/2015).,  _______________________________________________________________________  Medical Problems:  does not have any pertinent problems on file.,  _______________________________________________________________________  Past Surgical History:   has a past surgical history that includes Upper gastrointestinal endoscopy.,  _______________________________________________________________________  Family History:  family history includes Coronary artery disease in her maternal grandmother and mother; Diabetes in her maternal grandfather and sister; Hyperlipidemia in her maternal grandfather and maternal grandmother.,  _______________________________________________________________________  Social History:   reports that she quit smoking about 3 years ago. Her smoking use included cigarettes. She started smoking about 9 years ago. She has a 1.5 pack-year smoking history. She has never used smokeless tobacco. She reports current alcohol use. She reports that she does not use drugs.,  _______________________________________________________________________  Allergies:  has No Known Allergies..  _______________________________________________________________________  Current Outpatient Medications   Medication Sig Dispense Refill    Adapalene-Benzoyl Peroxide 0.3-2.5 % GEL Apply 1 Application topically daily at bedtime  "45 g 1    ammonium lactate (LAC-HYDRIN) 12 % cream Apply topically as needed for dry skin 385 g 2    clindamycin (CLEOCIN T) 1 % lotion Apply topically 2 (two) times a day Apply a thin film 1-2 times daily. 60 mL 2    clotrimazole-betamethasone (LOTRISONE) 1-0.05 % cream Apply topically 2 (two) times a day 45 g 1    ergocalciferol (VITAMIN D2) 50,000 units Take 1 capsule (50,000 Units total) by mouth once a week 16 capsule 1    fluticasone (FLONASE) 50 mcg/act nasal spray 1 spray into each nostril daily 15.8 mL 1    linaCLOtide (Linzess) 145 MCG CAPS Take 1 capsule (145 mcg total) by mouth in the morning 30 capsule 3    omeprazole (PriLOSEC) 40 MG capsule TAKE 1 CAPSULE (40 MG TOTAL) BY MOUTH DAILY. 90 capsule 1    tirzepatide (Zepbound) 2.5 mg/0.5 mL auto-injector Inject 0.5 mL (2.5 mg total) under the skin once a week for 28 days 2 mL 0     No current facility-administered medications for this visit.     _______________________________________________________________________  Review of Systems   HENT:  Positive for congestion, ear pain, postnasal drip, rhinorrhea, sinus pressure, sinus pain and sore throat.    Respiratory:  Negative for cough, chest tightness, shortness of breath and wheezing.    Cardiovascular:  Negative for chest pain and palpitations.   Gastrointestinal:  Positive for nausea. Negative for abdominal pain, constipation, diarrhea and vomiting.   Musculoskeletal:  Positive for arthralgias and back pain.   Neurological:  Positive for headaches. Negative for dizziness.   Psychiatric/Behavioral:  Positive for sleep disturbance. Negative for dysphoric mood. The patient is not nervous/anxious.    All other systems reviewed and are negative.        Objective:  Vitals:    11/01/24 0746   BP: 122/84   BP Location: Left arm   Patient Position: Sitting   Pulse: 78   Resp: 18   SpO2: 98%   Weight: 135 kg (297 lb 3.2 oz)   Height: 5' 6\" (1.676 m)     Body mass index is 47.97 kg/m².     Physical Exam  Vitals " and nursing note reviewed.   Constitutional:       Appearance: Normal appearance. She is not ill-appearing.   HENT:      Head: Normocephalic.      Right Ear: Ear canal and external ear normal. There is no impacted cerumen. Tympanic membrane is erythematous and bulging.      Left Ear: Ear canal and external ear normal. There is no impacted cerumen. Tympanic membrane is erythematous and bulging.      Nose: Nose normal. No congestion.      Mouth/Throat:      Mouth: Mucous membranes are moist.      Pharynx: Posterior oropharyngeal erythema present.   Eyes:      Extraocular Movements: Extraocular movements intact.      Conjunctiva/sclera: Conjunctivae normal.      Pupils: Pupils are equal, round, and reactive to light.   Cardiovascular:      Rate and Rhythm: Normal rate and regular rhythm.      Heart sounds: Normal heart sounds. No murmur heard.  Pulmonary:      Effort: Pulmonary effort is normal.      Breath sounds: Normal breath sounds. No wheezing.   Abdominal:      Palpations: Abdomen is soft.      Tenderness: There is no abdominal tenderness.   Musculoskeletal:         General: Normal range of motion.      Cervical back: Normal range of motion.      Right lower leg: No edema.      Left lower leg: No edema.   Skin:     General: Skin is warm and dry.   Neurological:      General: No focal deficit present.      Mental Status: She is alert.   Psychiatric:         Mood and Affect: Mood normal.         Behavior: Behavior normal.

## 2024-11-12 ENCOUNTER — TELEPHONE (OUTPATIENT)
Age: 29
End: 2024-11-12

## 2024-11-14 NOTE — TELEPHONE ENCOUNTER
PA for     tirzepatide (Zepbound) 2.5 mg/0.5 mL auto-injector    SUBMITTED to [facility]    via    [x]CMM-KEY: [id number]  []Surescripts-Case ID #   []Availity-Auth ID # NDC [id number]   []Faxed to plan   []Other website   []Phone call Case ID #     [x]PA sent as URGENT    All office notes, labs and other pertaining documents and studies sent. Clinical questions answered. Awaiting determination from insurance company.     Turnaround time for your insurance to make a decision on your Prior Authorization can take 7-21 business days.

## 2024-11-22 ENCOUNTER — HOSPITAL ENCOUNTER (EMERGENCY)
Facility: HOSPITAL | Age: 29
Discharge: HOME | End: 2024-11-23
Attending: EMERGENCY MEDICINE
Payer: COMMERCIAL

## 2024-11-22 VITALS
SYSTOLIC BLOOD PRESSURE: 114 MMHG | OXYGEN SATURATION: 99 % | DIASTOLIC BLOOD PRESSURE: 56 MMHG | RESPIRATION RATE: 16 BRPM | HEART RATE: 73 BPM | TEMPERATURE: 97.7 F

## 2024-11-22 DIAGNOSIS — S93.402A LEFT ANKLE SPRAIN: Primary | ICD-10-CM

## 2024-11-22 PROCEDURE — 99283 EMERGENCY DEPT VISIT LOW MDM: CPT

## 2024-11-23 ENCOUNTER — APPOINTMENT (EMERGENCY)
Dept: RADIOLOGY | Facility: HOSPITAL | Age: 29
End: 2024-11-23
Payer: COMMERCIAL

## 2024-11-23 PROCEDURE — 96372 THER/PROPH/DIAG INJ SC/IM: CPT

## 2024-11-23 PROCEDURE — 73610 X-RAY EXAM OF ANKLE: CPT

## 2024-11-23 PROCEDURE — 99284 EMERGENCY DEPT VISIT MOD MDM: CPT | Performed by: EMERGENCY MEDICINE

## 2024-11-23 PROCEDURE — 73630 X-RAY EXAM OF FOOT: CPT

## 2024-11-23 RX ORDER — KETOROLAC TROMETHAMINE 30 MG/ML
15 INJECTION, SOLUTION INTRAMUSCULAR; INTRAVENOUS ONCE
Status: COMPLETED | OUTPATIENT
Start: 2024-11-23 | End: 2024-11-23

## 2024-11-23 RX ORDER — IBUPROFEN 800 MG/1
800 TABLET, FILM COATED ORAL 3 TIMES DAILY
Qty: 21 TABLET | Refills: 0 | Status: SHIPPED | OUTPATIENT
Start: 2024-11-23

## 2024-11-23 RX ADMIN — KETOROLAC TROMETHAMINE 15 MG: 30 INJECTION, SOLUTION INTRAMUSCULAR at 00:38

## 2024-11-23 NOTE — ED PROVIDER NOTES
Time reflects when diagnosis was documented in both MDM as applicable and the Disposition within this note       Time User Action Codes Description Comment    11/23/2024  1:08 AM John Munoz Add [S93.402A] Left ankle sprain           ED Disposition       ED Disposition   Discharge    Condition   Stable    Date/Time   Sat Nov 23, 2024  1:13 AM    Comment   Tiffany Jean-Baptiste discharge to home/self care.                   Assessment & Plan       Medical Decision Making  28-year-old female with left ankle pain will check x-ray, give NSAID, reassess.    Amount and/or Complexity of Data Reviewed  Radiology: ordered.    Risk  Prescription drug management.             Medications   ketorolac (TORADOL) injection 15 mg (15 mg Intramuscular Given 11/23/24 0038)       ED Risk Strat Scores                                               History of Present Illness       Chief Complaint   Patient presents with    Foot Pain     Patient arrives to the ER from home with complaints of left foot injury that occurred in the tub while shaving.        Past Medical History:   Diagnosis Date    Anxiety     COVID 01/2021    High cholesterol     Morbid obesity with BMI of 45.0-49.9, adult ([facility])     Pre-operative laboratory examination     Seizures ([facility]) 03/31/2015      Past Surgical History:   Procedure Laterality Date    UPPER GASTROINTESTINAL ENDOSCOPY        Family History   Problem Relation Age of Onset    Coronary artery disease Mother     Diabetes Sister     Coronary artery disease Maternal Grandmother     Hyperlipidemia Maternal Grandmother     Hyperlipidemia Maternal Grandfather     Diabetes Maternal Grandfather     Alcohol abuse Neg Hx     Substance Abuse Neg Hx     Mental illness Neg Hx       Social History     Tobacco Use    Smoking status: Former     Current packs/day: 0.00     Average packs/day: 0.3 packs/day for 6.0 years (1.5 ttl pk-yrs)     Types: Cigarettes     Start date: 4/28/2015     Quit date: 4/28/2021     Years since quitting:  3.5    Smokeless tobacco: Never    Tobacco comments:     stopped   Vaping Use    Vaping status: Never Used   Substance Use Topics    Alcohol use: Yes     Comment: occasionally    Drug use: No      E-Cigarette/Vaping    E-Cigarette Use Never User       E-Cigarette/Vaping Substances    Nicotine No     THC No     CBD No     Flavoring No     Other No     Unknown No       I have reviewed and agree with the history as documented.     48-year-old female presented to the emergency department for evaluation of foot pain.  Patient twisted her left ankle when getting out of shower she has aching pain over the left lateral malleolus that radiates into the foot no numbness weakness or tingling, able to bear weight but causes pain.        Review of Systems   Constitutional:  Negative for appetite change, chills, fatigue and fever.   HENT:  Negative for sneezing and sore throat.    Eyes:  Negative for visual disturbance.   Respiratory:  Negative for cough, choking, chest tightness, shortness of breath and wheezing.    Cardiovascular:  Negative for chest pain and palpitations.   Gastrointestinal:  Negative for abdominal pain, constipation, diarrhea, nausea and vomiting.   Genitourinary:  Negative for difficulty urinating and dysuria.   Musculoskeletal:  Positive for arthralgias.   Neurological:  Negative for dizziness, weakness, light-headedness, numbness and headaches.   All other systems reviewed and are negative.          Objective       ED Triage Vitals [11/22/24 2211]   Temperature Pulse Blood Pressure Respirations SpO2 Patient Position - Orthostatic VS   97.7 °F (36.5 °C) 73 114/56 16 99 % --      Temp Source Heart Rate Source BP Location FiO2 (%) Pain Score    Oral Monitor -- -- 10 - Worst Possible Pain      Vitals      Date and Time Temp Pulse SpO2 Resp BP Pain Score FACES Pain Rating User   11/23/24 0038 -- -- -- -- -- 8 -- FT   11/22/24 2211 97.7 °F (36.5 °C) 73 99 % 16 114/56 10 - Worst Possible Pain -- [place]             Physical Exam  Vitals and nursing note reviewed.   Constitutional:       General: She is not in acute distress.     Appearance: She is well-developed. She is not diaphoretic.   HENT:      Head: Normocephalic and atraumatic.   Eyes:      Pupils: Pupils are equal, round, and reactive to light.   Neck:      Vascular: No JVD.      Trachea: No tracheal deviation.   Cardiovascular:      Rate and Rhythm: Normal rate and regular rhythm.      Heart sounds: Normal heart sounds. No murmur heard.     No friction rub. No gallop.   Pulmonary:      Effort: Pulmonary effort is normal. No respiratory distress.      Breath sounds: Normal breath sounds. No wheezing or rales.   Abdominal:      General: Bowel sounds are normal. There is no distension.      Palpations: Abdomen is soft.      Tenderness: There is no abdominal tenderness. There is no guarding or rebound.   Skin:     General: Skin is warm and dry.      Coloration: Skin is not pale.   Neurological:      Mental Status: She is alert and oriented to person, place, and time.      Cranial Nerves: No cranial nerve deficit.      Motor: No abnormal muscle tone.   Psychiatric:         Behavior: Behavior normal.         Results Reviewed       None            XR foot 3+ views LEFT   Final Interpretation by Diego Berg MD (11/23 0228)      No acute osseous abnormality.         Computerized Assisted Algorithm (CAA) may have been used to analyze all applicable images.         Workstation performed: [id number]         XR ankle 3+ vw left   Final Interpretation by Diego Berg MD (11/23 0229)      No acute osseous abnormality.         Computerized Assisted Algorithm (CAA) may have been used to analyze all applicable images.               Workstation performed: [id number]             Procedures    ED Medication and Procedure Management   Prior to Admission Medications   Prescriptions Last Dose Informant Patient Reported? Taking?   Adapalene-Benzoyl Peroxide 0.3-2.5 % GEL    No No   Sig: Apply 1 Application topically daily at bedtime   ammonium lactate (LAC-HYDRIN) 12 % cream  Self No No   Sig: Apply topically as needed for dry skin   clindamycin (CLEOCIN T) 1 % lotion   No No   Sig: Apply topically 2 (two) times a day Apply a thin film 1-2 times daily.   clotrimazole-betamethasone (LOTRISONE) 1-0.05 % cream   No No   Sig: Apply topically 2 (two) times a day   ergocalciferol (VITAMIN D2) 50,000 units   No No   Sig: Take 1 capsule (50,000 Units total) by mouth once a week   fluticasone (FLONASE) 50 mcg/act nasal spray   No No   Si spray into each nostril daily   linaCLOtide (Linzess) 145 MCG CAPS   No No   Sig: Take 1 capsule (145 mcg total) by mouth in the morning   omeprazole (PriLOSEC) 40 MG capsule   No No   Sig: TAKE 1 CAPSULE (40 MG TOTAL) BY MOUTH DAILY.   tirzepatide (Zepbound) 2.5 mg/0.5 mL auto-injector   No No   Sig: Inject 0.5 mL (2.5 mg total) under the skin once a week for 28 days      Facility-Administered Medications: None     Discharge Medication List as of 2024  1:13 AM        START taking these medications    Details   ibuprofen (MOTRIN) 800 mg tablet Take 1 tablet (800 mg total) by mouth 3 (three) times a day, Starting Sat 2024, Normal           CONTINUE these medications which have NOT CHANGED    Details   Adapalene-Benzoyl Peroxide 0.3-2.5 % GEL Apply 1 Application topically daily at bedtime, Starting 2024, Normal      ammonium lactate (LAC-HYDRIN) 12 % cream Apply topically as needed for dry skin, Starting Mon 10/31/2022, Normal      clindamycin (CLEOCIN T) 1 % lotion Apply topically 2 (two) times a day Apply a thin film 1-2 times daily., Starting 2024, Normal      clotrimazole-betamethasone (LOTRISONE) 1-0.05 % cream Apply topically 2 (two) times a day, Starting Fri 2024, Normal      ergocalciferol (VITAMIN D2) 50,000 units Take 1 capsule (50,000 Units total) by mouth once a week, Starting Thu 2024, Normal       fluticasone (FLONASE) 50 mcg/act nasal spray 1 spray into each nostril daily, Starting Fri 2/9/2024, Normal      linaCLOtide (Linzess) 145 MCG CAPS Take 1 capsule (145 mcg total) by mouth in the morning, Starting Mon 1/29/2024, Normal      omeprazole (PriLOSEC) 40 MG capsule TAKE 1 CAPSULE (40 MG TOTAL) BY MOUTH DAILY., Starting Wed 5/1/2024, Normal      tirzepatide (Zepbound) 2.5 mg/0.5 mL auto-injector Inject 0.5 mL (2.5 mg total) under the skin once a week for 28 days, Starting Fri 11/1/2024, Until Fri 11/29/2024, Normal           No discharge procedures on file.  ED SEPSIS DOCUMENTATION   Time reflects when diagnosis was documented in both MDM as applicable and the Disposition within this note       Time User Action Codes Description Comment    11/23/2024  1:08 AM John Munoz Add [S93.402A] Left ankle sprain                  John Munoz MD  11/23/24 0458

## 2024-12-02 ENCOUNTER — OFFICE VISIT (OUTPATIENT)
Dept: OBGYN CLINIC | Facility: CLINIC | Age: 29
End: 2024-12-02
Payer: COMMERCIAL

## 2024-12-02 VITALS
BODY MASS INDEX: 47.09 KG/M2 | HEIGHT: 66 IN | DIASTOLIC BLOOD PRESSURE: 83 MMHG | RESPIRATION RATE: 18 BRPM | WEIGHT: 293 LBS | SYSTOLIC BLOOD PRESSURE: 127 MMHG | HEART RATE: 90 BPM

## 2024-12-02 DIAGNOSIS — S93.422A SPRAIN OF DELTOID LIGAMENT OF LEFT ANKLE, INITIAL ENCOUNTER: Primary | ICD-10-CM

## 2024-12-02 PROCEDURE — 99203 OFFICE O/P NEW LOW 30 MIN: CPT | Performed by: STUDENT IN AN ORGANIZED HEALTH CARE EDUCATION/TRAINING PROGRAM

## 2024-12-02 NOTE — PROGRESS NOTES
ASSESSMENT/PLAN:    Diagnoses and all orders for this visit:    Sprain of deltoid ligament of left ankle, initial encounter      Discussed history, exam, and imaging with patient. Presentation most consistent with deltoid ligament sprain left ankle and we will plan for non-operative management at this time.  Discussed oral/topical medication regimen. Will plan for continued use of ibuprofen and tylenol as needed.  Discussed rehabilitation efforts. Will plan for continued activity and weight bearing as tolerated to the left lower extremity. Discussed CAM boot which could provide some relief but patient declined at this time. Provided patient with foot and ankle exercises at today's visit. Discussed formal PT which patient declined.  Follow-up with me as needed  _____________________________________________________  CHIEF COMPLAINT:  Chief Complaint   Patient presents with    Left Ankle - Pain     XR 11/23 foot  XR 11/23 ankle   Left foot injury occurred in the tub while showering   Was giving crutches and ankle brace in ED not helpful still having pain   Pain is in the side of the foot and shooting pain and aching towards the knee            SUBJECTIVE:  Tiffany Jean-Baptiste is a 28 y.o. year old female who presents for evaluation of left ankle pain. The issue began on 11/22 after an injury while shaving in the tub. She notes that she had a twisting injury but does not know exactly what happened and if it was an inversion or eversion. She did not have pain immediately after, but later in the evening she was sitting with her ankle and foot in a weird position on the couch and she began to have significant pain in the left ankle and had trouble ambulating. She presented to the ED where xrays were taken. She was given aircast and crutches which she has not been using. She states that the aircast did not fit with her sneaker and that she did think she needed the crutches. She has been taking ibuprofen every couple of days with  moderate relief of pain. Pain today is a 7/10. She states that she works at a  and wants to make sure she can return to work. She denies numbness or tingling. She denies previous injury to the ankle.       PAST MEDICAL HISTORY:  Past Medical History:   Diagnosis Date    Anxiety     COVID 01/2021    High cholesterol     Morbid obesity with BMI of 45.0-49.9, adult ([facility])     Pre-operative laboratory examination     Seizures ([facility]) 03/31/2015       PAST SURGICAL HISTORY:  Past Surgical History:   Procedure Laterality Date    UPPER GASTROINTESTINAL ENDOSCOPY         FAMILY HISTORY:  Family History   Problem Relation Age of Onset    Coronary artery disease Mother     Diabetes Sister     Coronary artery disease Maternal Grandmother     Hyperlipidemia Maternal Grandmother     Hyperlipidemia Maternal Grandfather     Diabetes Maternal Grandfather     Alcohol abuse Neg Hx     Substance Abuse Neg Hx     Mental illness Neg Hx        SOCIAL HISTORY:  Social History     Tobacco Use    Smoking status: Former     Current packs/day: 0.00     Average packs/day: 0.3 packs/day for 6.0 years (1.5 ttl pk-yrs)     Types: Cigarettes     Start date: 4/28/2015     Quit date: 4/28/2021     Years since quitting: 3.6    Smokeless tobacco: Never    Tobacco comments:     stopped   Vaping Use    Vaping status: Never Used   Substance Use Topics    Alcohol use: Yes     Comment: occasionally    Drug use: No       MEDICATIONS:    Current Outpatient Medications:     Adapalene-Benzoyl Peroxide 0.3-2.5 % GEL, Apply 1 Application topically daily at bedtime, Disp: 45 g, Rfl: 1    ammonium lactate (LAC-HYDRIN) 12 % cream, Apply topically as needed for dry skin, Disp: 385 g, Rfl: 2    clindamycin (CLEOCIN T) 1 % lotion, Apply topically 2 (two) times a day Apply a thin film 1-2 times daily., Disp: 60 mL, Rfl: 2    clotrimazole-betamethasone (LOTRISONE) 1-0.05 % cream, Apply topically 2 (two) times a day, Disp: 45 g, Rfl: 1    ergocalciferol (VITAMIN D2)  "50,000 units, Take 1 capsule (50,000 Units total) by mouth once a week, Disp: 16 capsule, Rfl: 1    fluticasone (FLONASE) 50 mcg/act nasal spray, 1 spray into each nostril daily, Disp: 15.8 mL, Rfl: 1    ibuprofen (MOTRIN) 800 mg tablet, Take 1 tablet (800 mg total) by mouth 3 (three) times a day, Disp: 21 tablet, Rfl: 0    linaCLOtide (Linzess) 145 MCG CAPS, Take 1 capsule (145 mcg total) by mouth in the morning, Disp: 30 capsule, Rfl: 3    omeprazole (PriLOSEC) 40 MG capsule, TAKE 1 CAPSULE (40 MG TOTAL) BY MOUTH DAILY., Disp: 90 capsule, Rfl: 1    ALLERGIES:  No Known Allergies    Review of systems:   Constitutional: Negative for fatigue, fever or loss of apetite.   HENT: Negative.    Respiratory: Negative for shortness of breath, dyspnea.    Cardiovascular: Negative for chest pain/tightness.   Gastrointestinal: Negative for abdominal pain, N/V.   Endocrine: Negative for cold/heat intolerance, unexplained weight loss/gain.   Genitourinary: Negative for flank pain, dysuria, hematuria.   Musculoskeletal: As in HPI   Skin: Negative for rash.    Neurological: Negative for numbness tingling  Psychiatric/Behavioral: Negative for agitation.  _____________________________________________________  PHYSICAL EXAMINATION:    Blood pressure 127/83, pulse 90, resp. rate 18, height 5' 6\" (1.676 m), weight (!) 140 kg (309 lb), not currently breastfeeding.    General: well developed and well nourished, alert, oriented times 3, and appears comfortable  HEENT: Benign, normocephalic, atraumatic  Cardiovascular: regular rate    Pulmonary: No wheezing or stridor  Abdomen: Soft, Nontender  Skin: No masses, erythema, lacerations, fluctation, ulcerations  Neurovascular: as per MSK exam below    MUSCULOSKELETAL EXAMINATION:    Left ankle  No swelling, ecchymosis  TTP anterior and posterior to medial malleolus   NonTTP lateral malleolus, ATFL, elsewhere about the ankle  Passive dorsiflexion to 5 degrees  Passive plantarflexion to 25 " degrees  Supple hindfoot motion  No pain with passive inversion and eversion  Negative anterior drawer  5/5 strength all planes  Neurovascularly intact distally  2+ DP pulse    _____________________________________________________  STUDIES REVIEWED:  Images personally reviewed by me today:    Xrays of the left ankle taken on 11/23/24 demonstrate no acute fracture or dislocation.      Scribe Attestation      I,:  Amol Dorado PA-C am acting as a scribe while in the presence of the attending physician.:       I,:  Silviano Giordano MD personally performed the services described in this documentation    as scribed in my presence.:

## 2024-12-17 ENCOUNTER — OFFICE VISIT (OUTPATIENT)
Dept: FAMILY MEDICINE CLINIC | Facility: CLINIC | Age: 29
End: 2024-12-17
Payer: COMMERCIAL

## 2024-12-17 VITALS
WEIGHT: 293 LBS | DIASTOLIC BLOOD PRESSURE: 74 MMHG | SYSTOLIC BLOOD PRESSURE: 112 MMHG | HEART RATE: 78 BPM | OXYGEN SATURATION: 98 % | RESPIRATION RATE: 18 BRPM | BODY MASS INDEX: 47.09 KG/M2 | HEIGHT: 66 IN

## 2024-12-17 DIAGNOSIS — E66.813 CLASS 3 SEVERE OBESITY DUE TO EXCESS CALORIES WITHOUT SERIOUS COMORBIDITY WITH BODY MASS INDEX (BMI) OF 45.0 TO 49.9 IN ADULT (HCC): Primary | ICD-10-CM

## 2024-12-17 DIAGNOSIS — E66.01 CLASS 3 SEVERE OBESITY DUE TO EXCESS CALORIES WITHOUT SERIOUS COMORBIDITY WITH BODY MASS INDEX (BMI) OF 45.0 TO 49.9 IN ADULT (HCC): Primary | ICD-10-CM

## 2024-12-17 DIAGNOSIS — R79.89 LOW VITAMIN B12 LEVEL: ICD-10-CM

## 2024-12-17 DIAGNOSIS — R53.83 OTHER FATIGUE: ICD-10-CM

## 2024-12-17 DIAGNOSIS — H65.93 FLUID LEVEL BEHIND TYMPANIC MEMBRANE OF BOTH EARS: ICD-10-CM

## 2024-12-17 PROCEDURE — 99214 OFFICE O/P EST MOD 30 MIN: CPT

## 2024-12-17 PROCEDURE — 96372 THER/PROPH/DIAG INJ SC/IM: CPT

## 2024-12-17 RX ORDER — CYANOCOBALAMIN 1000 UG/ML
1000 INJECTION, SOLUTION INTRAMUSCULAR; SUBCUTANEOUS
Status: SHIPPED | OUTPATIENT
Start: 2024-12-17

## 2024-12-17 RX ORDER — GUAIFENESIN 600 MG/1
1200 TABLET, EXTENDED RELEASE ORAL EVERY 12 HOURS SCHEDULED
Qty: 120 TABLET | Refills: 1 | Status: SHIPPED | OUTPATIENT
Start: 2024-12-17

## 2024-12-17 RX ORDER — TIRZEPATIDE 2.5 MG/.5ML
2.5 INJECTION, SOLUTION SUBCUTANEOUS WEEKLY
Qty: 2 ML | Refills: 0 | Status: SHIPPED | OUTPATIENT
Start: 2024-12-17 | End: 2025-01-14

## 2024-12-17 RX ADMIN — CYANOCOBALAMIN 1000 MCG: 1000 INJECTION, SOLUTION INTRAMUSCULAR; SUBCUTANEOUS at 08:14

## 2024-12-17 NOTE — PROGRESS NOTES
Name: Tiffany Jean-Baptiste      : 1995      MRN: [id number]  Encounter Provider: MIREYA Ordoñez  Encounter Date: 2024   Encounter department: [facility] [address]  :  Assessment & Plan  Class 3 severe obesity due to excess calories without serious comorbidity with body mass index (BMI) of 45.0 to 49.9 in adult ([facility])  Prior Authorization Clinical Questions for Weight Management Pharmacotherapy          Will increase dose of Zepbound, continue to try to work on hydration increase protein decrease refined carbohydrates.  Work on better eating habits while studying.  Only exercise recommendation is 10 to 30 minutes most days.  Can be anything that is enjoyable and manageable.  Follow-up in 1 month for weight check    Orders:  •  tirzepatide (Zepbound) 2.5 mg/0.5 mL auto-injector; Inject 0.5 mL (2.5 mg total) under the skin once a week for 28 days    Other fatigue  Will do fasting lab work and call with results, B12 shot today.  Orders:  •  Iron Panel (Includes Ferritin, Iron Sat%, Iron, and TIBC); Future    Low vitamin B12 level  Will give B12 shot today, lab work is ordered.  Orders:  •  cyanocobalamin injection 1,000 mcg    Fluid level behind tympanic membrane of both ears  Recommend Flonase and Mucinex to keep secretions thin, referral placed to ear nose and throat.  Not infected at this time however patient does have prescription of amoxicillin if hearing becomes decrease or if there is pain.  Follow-up as needed  Orders:  •  guaiFENesin (MUCINEX) 600 mg 12 hr tablet; Take 2 tablets (1,200 mg total) by mouth every 12 (twelve) hours  •  Ambulatory Referral to Otolaryngology; Future           History of Present Illness     Tiffany is here for follow up, was on zepbound, was working but now has started to gain more weight, she is very busy with online school, work and her son.     Review of Systems   Constitutional:  Positive for fatigue. Negative for chills,  "diaphoresis and fever.   HENT:  Negative for congestion, ear pain, sinus pressure, sinus pain and sore throat.    Respiratory:  Negative for cough, chest tightness, shortness of breath and wheezing.    Cardiovascular:  Negative for chest pain and palpitations.   Gastrointestinal:  Negative for abdominal pain, constipation, diarrhea, nausea and vomiting.   Musculoskeletal:  Positive for arthralgias, back pain and myalgias.   Skin:  Negative for rash.   Neurological:  Negative for dizziness, seizures, syncope, light-headedness and headaches.   Psychiatric/Behavioral:  Negative for dysphoric mood and sleep disturbance. The patient is nervous/anxious.    All other systems reviewed and are negative.      Objective   /74 (BP Location: Left arm, Patient Position: Sitting)   Pulse 78   Resp 18   Ht 5' 6\" (1.676 m)   Wt (!) 139 kg (306 lb)   SpO2 98%   BMI 49.39 kg/m²      Physical Exam  Vitals and nursing note reviewed.   Constitutional:       General: She is not in acute distress.     Appearance: Normal appearance. She is well-developed. She is not ill-appearing.   HENT:      Head: Normocephalic and atraumatic.      Right Ear: Tympanic membrane, ear canal and external ear normal. There is no impacted cerumen.      Left Ear: Tympanic membrane, ear canal and external ear normal. There is no impacted cerumen.      Nose: Nose normal. No congestion.      Mouth/Throat:      Mouth: Mucous membranes are moist.   Eyes:      Extraocular Movements: Extraocular movements intact.      Conjunctiva/sclera: Conjunctivae normal.      Pupils: Pupils are equal, round, and reactive to light.   Cardiovascular:      Rate and Rhythm: Normal rate and regular rhythm.      Heart sounds: No murmur heard.  Pulmonary:      Effort: Pulmonary effort is normal. No respiratory distress.      Breath sounds: Normal breath sounds.   Abdominal:      Palpations: Abdomen is soft.      Tenderness: There is no abdominal tenderness.   Musculoskeletal:  "        General: No swelling.      Cervical back: Normal range of motion and neck supple.      Right lower leg: No edema.      Left lower leg: No edema.   Lymphadenopathy:      Cervical: No cervical adenopathy.   Skin:     General: Skin is warm and dry.      Capillary Refill: Capillary refill takes less than 2 seconds.   Neurological:      General: No focal deficit present.      Mental Status: She is alert.   Psychiatric:         Mood and Affect: Mood normal.         Behavior: Behavior normal.

## 2024-12-18 ENCOUNTER — TELEPHONE (OUTPATIENT)
Dept: FAMILY MEDICINE CLINIC | Facility: CLINIC | Age: 29
End: 2024-12-18

## 2024-12-18 NOTE — TELEPHONE ENCOUNTER
PA for guaiFENesin (MUCINEX) 600 mg 12 hr tablet  DENIED    Reason:(Screenshot if applicable)        Message sent to office clinical pool Yes    Denial letter scanned into Media Yes    Appeal started No (Provider will need to decide if appeal is warranted and send clinical documentation to Prior Authorization Team for initiation.)    **Please follow up with your patient regarding denial and next steps**

## 2024-12-18 NOTE — TELEPHONE ENCOUNTER
Pharmacy comment: Alternative Requested:INSURANCE IS REQUIRING A PRIOR AUTH.     Reason for call:   [] Refill   [x] Prior Auth  [] Other:     Office:   [] PCP/Provider -   [x] Specialty/Provider -     Medication: guaiFENesin (MUCINEX) 600 mg 12 hr tablet     Dose/Frequency: Take 2 tablets (1,200 mg total) by mouth every 12 (twelve) hours     Quantity: 120    Pharmacy: [facility]/pharmacy #[id number] - [place] - [address] [place] [address].     Does the patient have enough for 3 days?   [] Yes   [] No - Send as HP to POD

## 2024-12-18 NOTE — TELEPHONE ENCOUNTER
PA for guaiFENesin (MUCINEX) 600 mg 12 hr tablet SUBMITTED to     via    []CMM-KEY:   []Surescripts-Case ID #   []Availity-Auth ID # NDC #   []Faxed to plan   [x]Other website PromptPA [facility] - [place] [id number]  []Phone call Case ID #     [x]PA sent as URGENT    All office notes, labs and other pertaining documents and studies sent. Clinical questions answered. Awaiting determination from insurance company.     Turnaround time for your insurance to make a decision on your Prior Authorization can take 7-21 business days.

## 2025-01-04 NOTE — PATIENT INSTRUCTIONS
Acneiform cyst question follicular cyst we will see if this responds to intralesional therapy if not we will go ahead and plan on complete excision
No

## 2025-01-08 NOTE — ASSESSMENT & PLAN NOTE
DATE: 1/8/2025  PATIENT: David Solomon    Attending Physician: Suresh Yeh M.D.    8/27/24: L4-5 PLDF/TLIF    HISTORY:  David Solomon is a 74 y.o. male who returns to me today for CT and EMG review. He had L piriformis injection, which helped with his pain. He says that he was initially doing great but developed pain going down the left lateral leg about 2 weeks after surgery. He had an MRI for this and then underwent L5-S1 interlaminar JUAN which help some for a few days. He did have some wound healing problems post op and had to use a prevena. 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    PMH/PSH/FamHx/SocHx:  Unchanged from prior visit    ROS:  Positive for left lateral leg pain   Denies perineal paresthesias, bowel or bladder incontinence    EXAM:  There were no vitals taken for this visit.    My physical examination was notable for the following findings: 5/5 strength through the bilateral lower extremities, SILT throughout. 2+ DP pulses.     IMAGING:  Today I independently reviewed the following images and my interpretations are as follows:    Lumbar Xrs showed L4-5 TLIF without hardware complications.    MRI lumbar showed L3-4 mod adjacent level stenosis. L L5-S1 mod foraminal stenosis.    CT lumbar showed appropriate hardware and some interbody fusion.    EMG BLE showed L L5 radiculopathy.    ASSESSMENT/PLAN:  Patient has lumbar spondylosis and stenosis with LLE radiculopathy, in the setting of recent L4-5 TLIF. I educated the patient on the importance of core/back strengthening, correct posture, bending/lifting ergonomics, and low-impact aerobic exercises (walking, elliptical, and aquatherapy). Continue medications. He will start PT at outside location. Patient is a candidate for L3-S1 revision fusion and L L3-4 and L L5-S1 TLIF if he fails conservative management. RTC PRN.    Suresh Yeh MD  Orthopaedic Spine Surgeon  Department of Orthopaedic  To begin vitamin-D supplement weekly  Surgery  [phone]

## 2025-01-24 ENCOUNTER — OFFICE VISIT (OUTPATIENT)
Dept: FAMILY MEDICINE CLINIC | Facility: CLINIC | Age: 30
End: 2025-01-24
Payer: COMMERCIAL

## 2025-01-24 VITALS
DIASTOLIC BLOOD PRESSURE: 80 MMHG | WEIGHT: 293 LBS | BODY MASS INDEX: 47.09 KG/M2 | HEIGHT: 66 IN | HEART RATE: 76 BPM | RESPIRATION RATE: 16 BRPM | SYSTOLIC BLOOD PRESSURE: 110 MMHG | OXYGEN SATURATION: 98 %

## 2025-01-24 DIAGNOSIS — E78.5 HYPERLIPIDEMIA, UNSPECIFIED HYPERLIPIDEMIA TYPE: Primary | ICD-10-CM

## 2025-01-24 DIAGNOSIS — E66.01 MORBID OBESITY WITH BODY MASS INDEX (BMI) OF 40.0 TO 44.9 IN ADULT (HCC): ICD-10-CM

## 2025-01-24 DIAGNOSIS — R63.5 ABNORMAL WEIGHT GAIN: ICD-10-CM

## 2025-01-24 DIAGNOSIS — E66.01 OBESITY, CLASS III, BMI 40-49.9 (MORBID OBESITY) (HCC): ICD-10-CM

## 2025-01-24 DIAGNOSIS — R73.01 ELEVATED FASTING BLOOD SUGAR: ICD-10-CM

## 2025-01-24 PROCEDURE — 99214 OFFICE O/P EST MOD 30 MIN: CPT

## 2025-01-24 RX ORDER — TIRZEPATIDE 2.5 MG/.5ML
INJECTION, SOLUTION SUBCUTANEOUS
COMMUNITY
Start: 2025-01-21 | End: 2025-01-24

## 2025-01-24 RX ORDER — TIRZEPATIDE 5 MG/.5ML
5 INJECTION, SOLUTION SUBCUTANEOUS WEEKLY
Qty: 2 ML | Refills: 0 | Status: SHIPPED | OUTPATIENT
Start: 2025-01-24

## 2025-01-24 NOTE — PROGRESS NOTES
Name: Tiffany Jean-Baptiste      : 1995      MRN: [id number]  Encounter Provider: MIREYA Ordoñez  Encounter Date: 2025   Encounter department: [facility] [address]  :  Assessment & Plan  Hyperlipidemia, unspecified hyperlipidemia type  Have ordered labs, continue Zepbound continue with diet and exercise changes.  Follow-up in 1 month or sooner if needed  Orders:  •  tirzepatide (Zepbound) 5 mg/0.5 mL auto-injector; Inject 0.5 mL (5 mg total) under the skin once a week    Obesity, Class III, BMI 40-49.9 (morbid obesity) ([facility])  Have ordered labs, continue Zepbound continue with diet and exercise changes.  Follow-up in 1 month or sooner if needed  Orders:  •  tirzepatide (Zepbound) 5 mg/0.5 mL auto-injector; Inject 0.5 mL (5 mg total) under the skin once a week    Morbid obesity with body mass index (BMI) of 40.0 to 44.9 in adult ([facility])  Have ordered labs, continue Zepbound continue with diet and exercise changes.  Follow-up in 1 month or sooner if needed  Orders:  •  tirzepatide (Zepbound) 5 mg/0.5 mL auto-injector; Inject 0.5 mL (5 mg total) under the skin once a week    Abnormal weight gain  Have ordered labs, continue Zepbound continue with diet and exercise changes.  Follow-up in 1 month or sooner if needed  Orders:  •  tirzepatide (Zepbound) 5 mg/0.5 mL auto-injector; Inject 0.5 mL (5 mg total) under the skin once a week    Elevated fasting blood sugar  Have ordered labs, continue Zepbound continue with diet and exercise changes.  Follow-up in 1 month or sooner if needed  Orders:  •  tirzepatide (Zepbound) 5 mg/0.5 mL auto-injector; Inject 0.5 mL (5 mg total) under the skin once a week           History of Present Illness   Tiffany is here for follow up, no side effects from zepbound. She is feeling a little constipation.       Review of Systems   Constitutional:  Negative for chills, diaphoresis, fatigue and fever.   HENT:  Negative for congestion, ear pain,  "rhinorrhea, sinus pressure, sinus pain and sore throat.    Eyes:  Negative for visual disturbance.   Respiratory:  Negative for cough, chest tightness, shortness of breath and wheezing.    Cardiovascular:  Negative for chest pain and palpitations.   Gastrointestinal:  Negative for abdominal pain, constipation, diarrhea, nausea and vomiting.   Genitourinary:  Negative for dysuria, frequency and hematuria.   Musculoskeletal:  Negative for arthralgias, back pain and myalgias.   Skin:  Negative for rash.   Neurological:  Negative for dizziness, syncope, light-headedness and headaches.   Psychiatric/Behavioral:  Negative for dysphoric mood and sleep disturbance. The patient is not nervous/anxious.    All other systems reviewed and are negative.      Objective   /80 (BP Location: Left arm, Patient Position: Sitting, Cuff Size: Extra-Large)   Pulse 76   Resp 16   Ht 5' 6\" (1.676 m)   Wt (!) 140 kg (309 lb)   SpO2 98%   BMI 49.87 kg/m²      Physical Exam  Vitals and nursing note reviewed.   Constitutional:       General: She is not in acute distress.     Appearance: Normal appearance. She is well-developed. She is not ill-appearing.   HENT:      Head: Normocephalic and atraumatic.      Right Ear: Tympanic membrane, ear canal and external ear normal. There is no impacted cerumen.      Left Ear: Tympanic membrane, ear canal and external ear normal. There is no impacted cerumen.      Nose: Nose normal. No congestion.      Mouth/Throat:      Mouth: Mucous membranes are moist.      Pharynx: No posterior oropharyngeal erythema.   Eyes:      Extraocular Movements: Extraocular movements intact.      Conjunctiva/sclera: Conjunctivae normal.      Pupils: Pupils are equal, round, and reactive to light.   Cardiovascular:      Rate and Rhythm: Normal rate and regular rhythm.      Heart sounds: No murmur heard.  Pulmonary:      Effort: Pulmonary effort is normal. No respiratory distress.      Breath sounds: Normal breath " sounds.   Abdominal:      Palpations: Abdomen is soft.      Tenderness: There is no abdominal tenderness.   Musculoskeletal:         General: No swelling.      Cervical back: Normal range of motion and neck supple.      Right lower leg: No edema.      Left lower leg: No edema.   Lymphadenopathy:      Cervical: No cervical adenopathy.   Skin:     General: Skin is warm and dry.      Capillary Refill: Capillary refill takes less than 2 seconds.   Neurological:      General: No focal deficit present.      Mental Status: She is alert and oriented to person, place, and time.   Psychiatric:         Mood and Affect: Mood normal.         Behavior: Behavior normal.

## 2025-01-24 NOTE — ASSESSMENT & PLAN NOTE
Have ordered labs, continue Zepbound continue with diet and exercise changes.  Follow-up in 1 month or sooner if needed  Orders:  •  tirzepatide (Zepbound) 5 mg/0.5 mL auto-injector; Inject 0.5 mL (5 mg total) under the skin once a week

## 2025-02-26 ENCOUNTER — OFFICE VISIT (OUTPATIENT)
Dept: FAMILY MEDICINE CLINIC | Facility: CLINIC | Age: 30
End: 2025-02-26
Payer: COMMERCIAL

## 2025-02-26 VITALS
BODY MASS INDEX: 47.09 KG/M2 | OXYGEN SATURATION: 99 % | DIASTOLIC BLOOD PRESSURE: 80 MMHG | WEIGHT: 293 LBS | RESPIRATION RATE: 16 BRPM | HEIGHT: 66 IN | HEART RATE: 96 BPM | SYSTOLIC BLOOD PRESSURE: 110 MMHG

## 2025-02-26 DIAGNOSIS — E78.5 HYPERLIPIDEMIA, UNSPECIFIED HYPERLIPIDEMIA TYPE: ICD-10-CM

## 2025-02-26 DIAGNOSIS — E66.01 OBESITY, CLASS III, BMI 40-49.9 (MORBID OBESITY) (HCC): Primary | ICD-10-CM

## 2025-02-26 DIAGNOSIS — R73.01 ELEVATED FASTING BLOOD SUGAR: ICD-10-CM

## 2025-02-26 DIAGNOSIS — R06.81 WITNESSED EPISODE OF APNEA: ICD-10-CM

## 2025-02-26 DIAGNOSIS — R06.83 LOUD SNORING: ICD-10-CM

## 2025-02-26 PROCEDURE — 96372 THER/PROPH/DIAG INJ SC/IM: CPT

## 2025-02-26 PROCEDURE — 99214 OFFICE O/P EST MOD 30 MIN: CPT

## 2025-02-26 RX ORDER — TIRZEPATIDE 7.5 MG/.5ML
7.5 INJECTION, SOLUTION SUBCUTANEOUS WEEKLY
Qty: 2 ML | Refills: 0 | Status: SHIPPED | OUTPATIENT
Start: 2025-02-26

## 2025-02-26 RX ADMIN — CYANOCOBALAMIN 1000 MCG: 1000 INJECTION, SOLUTION INTRAMUSCULAR; SUBCUTANEOUS at 13:56

## 2025-02-26 NOTE — PROGRESS NOTES
Name: Tiffany Jean-Baptiste      : 1995      MRN: [id number]  Encounter Provider: MIREYA Ordoñez  Encounter Date: 2025   Encounter department: [facility] [address]  :  Assessment & Plan  Obesity, Class III, BMI 40-49.9 (morbid obesity) ([facility])  Prior Authorization Clinical Questions for Weight Management Pharmacotherapy    1. Does the patient have a contrainidcation to medication prescribed for weight management?: No  2. Does the patient have a diagnosis of obesity, confirmed by a BMI greater than or equal to 30 kg/m^2?: Yes  3. Does the patient have a BMI of greater than or equal to 27 kg/m^2 with at least one weight-related comorbidity/risk factor/complication (e.g. diabetes, dyslipidemia, coronary artery disease)?: Yes  4. Weight-related co-morbidities/risk factors: prediabetes, dyslipidemia, polycystic ovary syndrome (PCOS), GERD, depression  5. WEGOVY CVA Indication: Does patient have established documented cardiovascular disease (history of a prior heart attack (myocardial infarction), stroke, or symptomatic peripheral arterial disease (PAD)?: N/A  6. ZEPBOUND JESSICA Indication: Does patient have documented JESSICA diagnosed via sleep study (insurance will require copy of sleep study results for approval)?: N/A  7. Has the patient been on a weight loss regimen of low-calorie diet, increased physical activity, and lifestyle modifications for a minimum of 6 months?: Yes  8. Has the patient completed a comprehensive weight loss program (ie, Weight Watchers, Noom, Bariatrics, other roberto on phone)? If so, what?: No  9. Does the patient have a history of type 2 diabetes?: No  10. Has the member tried and failed other weight loss medication within the past 12 months?: No  11. Will the member use requested medication in combination with another GLP agonist or weight loss drug?: No  12. Is the medication a controlled substance?: No  For renewals: Has the patient had a positive  outcome with current weight management medication (i.e., change in body weight of at least 4-5% after 12-16 weeks on maximally tolerated dose)?: Yes     Baseline weight (in pounds): 309 lbs  Current weight (in pounds): 304 lbs  Weight loss percentage: -1.62%       Great job with weight loss, continue myplate diet and recommend 2 and a half hours of exercise weekly, will continue to increase zepbound. Follow up in 1 month   Orders:  •  tirzepatide (Zepbound) 7.5 mg/0.5 mL auto-injector; Inject 0.5 mL (7.5 mg total) under the skin once a week    Elevated fasting blood sugar  Great job with weight loss, continue myplate diet and recommend 2 and a half hours of exercise weekly, will continue to increase zepbound. Follow up in 1 month   Orders:  •  tirzepatide (Zepbound) 7.5 mg/0.5 mL auto-injector; Inject 0.5 mL (7.5 mg total) under the skin once a week    Hyperlipidemia, unspecified hyperlipidemia type  Great job with weight loss, continue myplate diet and recommend 2 and a half hours of exercise weekly, will continue to increase zepbound. Follow up in 1 month   Orders:  •  tirzepatide (Zepbound) 7.5 mg/0.5 mL auto-injector; Inject 0.5 mL (7.5 mg total) under the skin once a week    Loud snoring  Discussed snoring and apnea and daytime fatigue, will do sleep study and call with results   Orders:  •  Ambulatory Referral to Sleep Medicine; Future    Witnessed episode of apnea  Discussed snoring and apnea and daytime fatigue, will do sleep study and call with results   Orders:  •  Ambulatory Referral to Sleep Medicine; Future           History of Present Illness   HPI  Review of Systems   Constitutional:  Positive for fatigue. Negative for fever.   HENT:  Positive for ear pain. Negative for congestion and sore throat.    Eyes:  Negative for pain and visual disturbance.   Respiratory:  Negative for cough, chest tightness and shortness of breath.    Cardiovascular:  Negative for chest pain and palpitations.  "  Gastrointestinal:  Negative for abdominal pain, constipation, diarrhea, nausea and vomiting.   Musculoskeletal:  Positive for back pain. Negative for arthralgias.   Skin:  Positive for rash. Negative for color change.   Neurological:  Negative for dizziness, seizures, syncope, light-headedness and headaches.   Psychiatric/Behavioral:  Negative for dysphoric mood and sleep disturbance. The patient is not nervous/anxious.    All other systems reviewed and are negative.      Objective   /80 (BP Location: Left arm, Cuff Size: Large)   Pulse 96   Resp 16   Ht 5' 6\" (1.676 m)   Wt (!) 138 kg (304 lb)   SpO2 99%   BMI 49.07 kg/m²      Physical Exam  Vitals and nursing note reviewed.   Constitutional:       General: She is not in acute distress.     Appearance: Normal appearance. She is well-developed. She is not ill-appearing.   HENT:      Head: Normocephalic and atraumatic.      Right Ear: Tympanic membrane, ear canal and external ear normal. There is no impacted cerumen.      Left Ear: Tympanic membrane, ear canal and external ear normal. There is no impacted cerumen.      Nose: Nose normal. No congestion or rhinorrhea.      Mouth/Throat:      Mouth: Mucous membranes are moist.      Pharynx: No posterior oropharyngeal erythema.   Eyes:      Extraocular Movements: Extraocular movements intact.      Conjunctiva/sclera: Conjunctivae normal.      Pupils: Pupils are equal, round, and reactive to light.   Cardiovascular:      Rate and Rhythm: Normal rate and regular rhythm.      Pulses: Normal pulses.      Heart sounds: Normal heart sounds. No murmur heard.  Pulmonary:      Effort: Pulmonary effort is normal. No respiratory distress.      Breath sounds: Normal breath sounds.   Abdominal:      General: Bowel sounds are normal. There is no distension.      Palpations: Abdomen is soft.      Tenderness: There is no abdominal tenderness.   Musculoskeletal:         General: No swelling or tenderness. Normal range of " motion.      Cervical back: Normal range of motion and neck supple. No tenderness.      Right lower leg: No edema.      Left lower leg: No edema.   Lymphadenopathy:      Cervical: No cervical adenopathy.   Skin:     General: Skin is warm and dry.      Capillary Refill: Capillary refill takes less than 2 seconds.   Neurological:      General: No focal deficit present.      Mental Status: She is alert and oriented to person, place, and time.   Psychiatric:         Mood and Affect: Mood normal.         Behavior: Behavior normal.         Thought Content: Thought content normal.         Judgment: Judgment normal.

## 2025-02-26 NOTE — ASSESSMENT & PLAN NOTE
Great job with weight loss, continue myplate diet and recommend 2 and a half hours of exercise weekly, will continue to increase zepbound. Follow up in 1 month   Orders:  •  tirzepatide (Zepbound) 7.5 mg/0.5 mL auto-injector; Inject 0.5 mL (7.5 mg total) under the skin once a week

## 2025-02-26 NOTE — ASSESSMENT & PLAN NOTE
Prior Authorization Clinical Questions for Weight Management Pharmacotherapy    1. Does the patient have a contrainidcation to medication prescribed for weight management?: No  2. Does the patient have a diagnosis of obesity, confirmed by a BMI greater than or equal to 30 kg/m^2?: Yes  3. Does the patient have a BMI of greater than or equal to 27 kg/m^2 with at least one weight-related comorbidity/risk factor/complication (e.g. diabetes, dyslipidemia, coronary artery disease)?: Yes  4. Weight-related co-morbidities/risk factors: prediabetes, dyslipidemia, polycystic ovary syndrome (PCOS), GERD, depression  5. WEGOVY CVA Indication: Does patient have established documented cardiovascular disease (history of a prior heart attack (myocardial infarction), stroke, or symptomatic peripheral arterial disease (PAD)?: N/A  6. ZEPBOUND JESSICA Indication: Does patient have documented JESSICA diagnosed via sleep study (insurance will require copy of sleep study results for approval)?: N/A  7. Has the patient been on a weight loss regimen of low-calorie diet, increased physical activity, and lifestyle modifications for a minimum of 6 months?: Yes  8. Has the patient completed a comprehensive weight loss program (ie, Weight Watchers, Noom, Bariatrics, other roberto on phone)? If so, what?: No  9. Does the patient have a history of type 2 diabetes?: No  10. Has the member tried and failed other weight loss medication within the past 12 months?: No  11. Will the member use requested medication in combination with another GLP agonist or weight loss drug?: No  12. Is the medication a controlled substance?: No  For renewals: Has the patient had a positive outcome with current weight management medication (i.e., change in body weight of at least 4-5% after 12-16 weeks on maximally tolerated dose)?: Yes     Baseline weight (in pounds): 309 lbs  Current weight (in pounds): 304 lbs  Weight loss percentage: -1.62%       Great job with weight loss,  continue myplate diet and recommend 2 and a half hours of exercise weekly, will continue to increase zepbound. Follow up in 1 month   Orders:  •  tirzepatide (Zepbound) 7.5 mg/0.5 mL auto-injector; Inject 0.5 mL (7.5 mg total) under the skin once a week

## 2025-02-28 ENCOUNTER — TELEPHONE (OUTPATIENT)
Dept: SLEEP CENTER | Facility: CLINIC | Age: 30
End: 2025-02-28

## 2025-02-28 ENCOUNTER — TRANSCRIBE ORDERS (OUTPATIENT)
Dept: SLEEP CENTER | Facility: CLINIC | Age: 30
End: 2025-02-28

## 2025-02-28 DIAGNOSIS — R06.81 WITNESSED EPISODE OF APNEA: ICD-10-CM

## 2025-02-28 DIAGNOSIS — R06.83 LOUD SNORING: Primary | ICD-10-CM

## 2025-03-20 ENCOUNTER — TELEPHONE (OUTPATIENT)
Age: 30
End: 2025-03-20

## 2025-03-20 NOTE — TELEPHONE ENCOUNTER
Received call from Patient for Follow Up - Skin - Acne flare up upper lip. Scheduled 6/25/25 12:40 pm Regina [facility]. Verified insurance, provided Gautam addr.     Patient verbalized understanding.

## 2025-03-21 ENCOUNTER — OFFICE VISIT (OUTPATIENT)
Dept: FAMILY MEDICINE CLINIC | Facility: CLINIC | Age: 30
End: 2025-03-21
Payer: COMMERCIAL

## 2025-03-21 VITALS
HEART RATE: 85 BPM | TEMPERATURE: 97.6 F | BODY MASS INDEX: 47.09 KG/M2 | SYSTOLIC BLOOD PRESSURE: 118 MMHG | OXYGEN SATURATION: 98 % | DIASTOLIC BLOOD PRESSURE: 76 MMHG | WEIGHT: 293 LBS | HEIGHT: 66 IN

## 2025-03-21 DIAGNOSIS — L02.01 FACIAL ABSCESS: Primary | ICD-10-CM

## 2025-03-21 PROCEDURE — 99213 OFFICE O/P EST LOW 20 MIN: CPT | Performed by: PHYSICIAN ASSISTANT

## 2025-03-21 RX ORDER — DOXYCYCLINE 100 MG/1
100 CAPSULE ORAL EVERY 12 HOURS SCHEDULED
Qty: 20 CAPSULE | Refills: 0 | Status: SHIPPED | OUTPATIENT
Start: 2025-03-21 | End: 2025-03-26

## 2025-03-21 NOTE — ASSESSMENT & PLAN NOTE
Orders:  •  doxycycline hyclate (VIBRAMYCIN) 100 mg capsule; Take 1 capsule (100 mg total) by mouth every 12 (twelve) hours for 10 days

## 2025-03-21 NOTE — TELEPHONE ENCOUNTER
Rec'd call from patient checking for cancellations in the [facility] office. Unfortunately there are no cancellations at this time. Offered appt for today with AP in [place]. Patient declined.

## 2025-03-21 NOTE — PROGRESS NOTES
"Name: Tiffany Jean-Baptiste      : 1995      MRN: [id number]  Encounter Provider: Martha Rinaldi PA-C  Encounter Date: 3/21/2025   Encounter department: [facility] [address]  :  Assessment & Plan  Facial abscess    Orders:  •  doxycycline hyclate (VIBRAMYCIN) 100 mg capsule; Take 1 capsule (100 mg total) by mouth every 12 (twelve) hours for 10 days           History of Present Illness   Pt has cyst on her face that is painful and red. She has had this in the past and was treated with aspiration, Kenalog injection and antibiotics.      Review of Systems   Skin:         Facial cyst       Objective   /76   Pulse 85   Temp 97.6 °F (36.4 °C) (Tympanic)   Ht 5' 6\" (1.676 m)   Wt (!) 137 kg (302 lb 12.8 oz)   SpO2 98%   BMI 48.87 kg/m²      Physical Exam  Vitals and nursing note reviewed.   Constitutional:       Appearance: Normal appearance.   Skin:     Findings: Lesion and rash present. Rash is pustular.             Comments: Area cleaned with Betadine x 3. Area injected with 0.5 cc 10% Lidocaine, aspiration attempted with no fluid out. I injected 0.5 cc Kenalog 40 mg  Pt tolerated procedure well   Neurological:      Mental Status: She is alert.         "

## 2025-03-26 ENCOUNTER — OFFICE VISIT (OUTPATIENT)
Age: 30
End: 2025-03-26
Payer: COMMERCIAL

## 2025-03-26 ENCOUNTER — APPOINTMENT (OUTPATIENT)
Dept: LAB | Facility: HOSPITAL | Age: 30
End: 2025-03-26
Payer: COMMERCIAL

## 2025-03-26 ENCOUNTER — OFFICE VISIT (OUTPATIENT)
Dept: FAMILY MEDICINE CLINIC | Facility: CLINIC | Age: 30
End: 2025-03-26
Payer: COMMERCIAL

## 2025-03-26 VITALS — HEIGHT: 66 IN | BODY MASS INDEX: 48.71 KG/M2

## 2025-03-26 VITALS
HEIGHT: 66 IN | SYSTOLIC BLOOD PRESSURE: 110 MMHG | WEIGHT: 293 LBS | DIASTOLIC BLOOD PRESSURE: 60 MMHG | RESPIRATION RATE: 16 BRPM | OXYGEN SATURATION: 98 % | BODY MASS INDEX: 47.09 KG/M2 | TEMPERATURE: 97 F | HEART RATE: 82 BPM

## 2025-03-26 DIAGNOSIS — E66.01 OBESITY, CLASS III, BMI 40-49.9 (MORBID OBESITY) (HCC): ICD-10-CM

## 2025-03-26 DIAGNOSIS — R53.83 OTHER FATIGUE: ICD-10-CM

## 2025-03-26 DIAGNOSIS — K13.0 CYST OF LIP: Primary | ICD-10-CM

## 2025-03-26 DIAGNOSIS — L02.01 FACIAL ABSCESS: Primary | ICD-10-CM

## 2025-03-26 DIAGNOSIS — R21 RASH AND NONSPECIFIC SKIN ERUPTION: ICD-10-CM

## 2025-03-26 DIAGNOSIS — R73.01 ELEVATED FASTING BLOOD SUGAR: ICD-10-CM

## 2025-03-26 DIAGNOSIS — E78.5 HYPERLIPIDEMIA, UNSPECIFIED HYPERLIPIDEMIA TYPE: ICD-10-CM

## 2025-03-26 DIAGNOSIS — T50.905A FIXED DRUG REACTION: ICD-10-CM

## 2025-03-26 LAB
ALBUMIN SERPL BCG-MCNC: 4.3 G/DL (ref 3.5–5)
ALP SERPL-CCNC: 58 U/L (ref 34–104)
ALT SERPL W P-5'-P-CCNC: 10 U/L (ref 7–52)
ANION GAP SERPL CALCULATED.3IONS-SCNC: 5 MMOL/L (ref 4–13)
AST SERPL W P-5'-P-CCNC: 14 U/L (ref 13–39)
BASOPHILS # BLD AUTO: 0.05 THOUSANDS/ÂΜL (ref 0–0.1)
BASOPHILS NFR BLD AUTO: 1 % (ref 0–1)
BILIRUB SERPL-MCNC: 0.26 MG/DL (ref 0.2–1)
BUN SERPL-MCNC: 13 MG/DL (ref 5–25)
CALCIUM SERPL-MCNC: 9.2 MG/DL (ref 8.4–10.2)
CHLORIDE SERPL-SCNC: 106 MMOL/L (ref 96–108)
CHOLEST SERPL-MCNC: 195 MG/DL (ref ?–200)
CO2 SERPL-SCNC: 26 MMOL/L (ref 21–32)
CREAT SERPL-MCNC: 0.68 MG/DL (ref 0.6–1.3)
EOSINOPHIL # BLD AUTO: 0.24 THOUSAND/ÂΜL (ref 0–0.61)
EOSINOPHIL NFR BLD AUTO: 4 % (ref 0–6)
ERYTHROCYTE [DISTWIDTH] IN BLOOD BY AUTOMATED COUNT: 12.9 % (ref 11.6–15.1)
EST. AVERAGE GLUCOSE BLD GHB EST-MCNC: 108 MG/DL
FERRITIN SERPL-MCNC: 13 NG/ML (ref 11–307)
FOLATE SERPL-MCNC: 10.1 NG/ML
GFR SERPL CREATININE-BSD FRML MDRD: 120 ML/MIN/1.73SQ M
GLUCOSE P FAST SERPL-MCNC: 88 MG/DL (ref 65–99)
HBA1C MFR BLD: 5.4 %
HCT VFR BLD AUTO: 40 % (ref 34.8–46.1)
HDLC SERPL-MCNC: 54 MG/DL
HGB BLD-MCNC: 13.4 G/DL (ref 11.5–15.4)
IMM GRANULOCYTES # BLD AUTO: 0.02 THOUSAND/UL (ref 0–0.2)
IMM GRANULOCYTES NFR BLD AUTO: 0 % (ref 0–2)
IRON SATN MFR SERPL: 16 % (ref 15–50)
IRON SERPL-MCNC: 59 UG/DL (ref 50–212)
LDLC SERPL CALC-MCNC: 122 MG/DL (ref 0–100)
LYMPHOCYTES # BLD AUTO: 2.14 THOUSANDS/ÂΜL (ref 0.6–4.47)
LYMPHOCYTES NFR BLD AUTO: 37 % (ref 14–44)
MCH RBC QN AUTO: 29.8 PG (ref 26.8–34.3)
MCHC RBC AUTO-ENTMCNC: 33.5 G/DL (ref 31.4–37.4)
MCV RBC AUTO: 89 FL (ref 82–98)
MONOCYTES # BLD AUTO: 0.43 THOUSAND/ÂΜL (ref 0.17–1.22)
MONOCYTES NFR BLD AUTO: 7 % (ref 4–12)
NEUTROPHILS # BLD AUTO: 2.95 THOUSANDS/ÂΜL (ref 1.85–7.62)
NEUTS SEG NFR BLD AUTO: 51 % (ref 43–75)
NONHDLC SERPL-MCNC: 141 MG/DL
NRBC BLD AUTO-RTO: 0 /100 WBCS
PLATELET # BLD AUTO: 131 THOUSANDS/UL (ref 149–390)
PMV BLD AUTO: 12.5 FL (ref 8.9–12.7)
POTASSIUM SERPL-SCNC: 3.9 MMOL/L (ref 3.5–5.3)
PROT SERPL-MCNC: 7 G/DL (ref 6.4–8.4)
RBC # BLD AUTO: 4.5 MILLION/UL (ref 3.81–5.12)
SODIUM SERPL-SCNC: 137 MMOL/L (ref 135–147)
TIBC SERPL-MCNC: 371 UG/DL (ref 250–450)
TRANSFERRIN SERPL-MCNC: 265 MG/DL (ref 203–362)
TRIGL SERPL-MCNC: 95 MG/DL (ref ?–150)
TSH SERPL DL<=0.05 MIU/L-ACNC: 3.67 UIU/ML (ref 0.45–4.5)
UIBC SERPL-MCNC: 312 UG/DL (ref 155–355)
VIT B12 SERPL-MCNC: 320 PG/ML (ref 180–914)
WBC # BLD AUTO: 5.83 THOUSAND/UL (ref 4.31–10.16)

## 2025-03-26 PROCEDURE — 84443 ASSAY THYROID STIM HORMONE: CPT

## 2025-03-26 PROCEDURE — 82746 ASSAY OF FOLIC ACID SERUM: CPT

## 2025-03-26 PROCEDURE — 83036 HEMOGLOBIN GLYCOSYLATED A1C: CPT

## 2025-03-26 PROCEDURE — 85025 COMPLETE CBC W/AUTO DIFF WBC: CPT

## 2025-03-26 PROCEDURE — 82728 ASSAY OF FERRITIN: CPT

## 2025-03-26 PROCEDURE — 36415 COLL VENOUS BLD VENIPUNCTURE: CPT

## 2025-03-26 PROCEDURE — 82785 ASSAY OF IGE: CPT

## 2025-03-26 PROCEDURE — 83550 IRON BINDING TEST: CPT

## 2025-03-26 PROCEDURE — 86003 ALLG SPEC IGE CRUDE XTRC EA: CPT

## 2025-03-26 PROCEDURE — 80061 LIPID PANEL: CPT

## 2025-03-26 PROCEDURE — 83540 ASSAY OF IRON: CPT

## 2025-03-26 PROCEDURE — 99214 OFFICE O/P EST MOD 30 MIN: CPT

## 2025-03-26 PROCEDURE — 80053 COMPREHEN METABOLIC PANEL: CPT

## 2025-03-26 PROCEDURE — 82607 VITAMIN B-12: CPT

## 2025-03-26 RX ORDER — TIRZEPATIDE 10 MG/.5ML
10 INJECTION, SOLUTION SUBCUTANEOUS WEEKLY
Qty: 2 ML | Refills: 0 | Status: SHIPPED | OUTPATIENT
Start: 2025-03-26

## 2025-03-26 NOTE — ASSESSMENT & PLAN NOTE
Prior Authorization Clinical Questions for Weight Management Pharmacotherapy    1. Does the patient have a contrainidcation to medication prescribed for weight management?: No  2. Does the patient have a diagnosis of obesity, confirmed by a BMI greater than or equal to 30 kg/m^2?: Yes  3. Does the patient have a BMI of greater than or equal to 27 kg/m^2 with at least one weight-related comorbidity/risk factor/complication (e.g. diabetes, dyslipidemia, coronary artery disease)?: Yes  4. Weight-related co-morbidities/risk factors: prediabetes, dyslipidemia, hypertension, polycystic ovary syndrome (PCOS), GERD, depression  5. WEGOVY CVA Indication: Does patient have established documented cardiovascular disease (history of a prior heart attack (myocardial infarction), stroke, or symptomatic peripheral arterial disease (PAD)?: N/A  6. ZEPBOUND JESSICA Indication: Does patient have documented JESSICA diagnosed via sleep study (insurance will require copy of sleep study results for approval)?: N/A  7. Has the patient been on a weight loss regimen of low-calorie diet, increased physical activity, and lifestyle modifications for a minimum of 6 months?: Yes  8. Has the patient completed a comprehensive weight loss program (ie, Weight Watchers, Noom, Bariatrics, other roberto on phone)? If so, what?: No  9. Does the patient have a history of type 2 diabetes?: No  10. Has the member tried and failed other weight loss medication within the past 12 months?: No  11. Will the member use requested medication in combination with another GLP agonist or weight loss drug?: No  12. Is the medication a controlled substance?: No     Baseline weight (in pounds): 309 lbs  Current weight (in pounds): 301 lbs  Weight loss percentage: -2.59%     Will continue to increase dose of Zepbound, great job with improving activity again recommend the my plate diet follow-up in 1 month or sooner call if rash does not resolve with stopping doxycycline    Orders:  •   tirzepatide (Zepbound) 10 mg/0.5 mL auto-injector; Inject 0.5 mL (10 mg total) under the skin once a week  •  Hemoglobin A1C; Future  •  TSH, 3rd generation with Free T4 reflex; Future

## 2025-03-26 NOTE — PROGRESS NOTES
"[facility] Dermatology Clinic Note     Patient Name: Tiffany Jean-Baptiste  Encounter Date: 3/26/2025     Have you been cared for by a [facility] Dermatologist in the last 3 years and, if so, which description applies to you?    Yes.  I have been here within the last 3 years, and my medical history has NOT changed since that time.  I am FEMALE/of child-bearing potential.    REVIEW OF SYSTEMS:  Have you recently had or currently have any of the following? No changes in my recent health.   PAST MEDICAL HISTORY:  Have you personally ever had or currently have any of the following?  If \"YES,\" then please provide more detail. No changes in my medical history.   HISTORY OF IMMUNOSUPPRESSION: Do you have a history of any of the following:  Systemic Immunosuppression such as Diabetes, Biologic or Immunotherapy, Chemotherapy, Organ Transplantation, Bone Marrow Transplantation or Prednisone?  No     Answering \"YES\" requires the addition of the dotphrase \"IMMUNOSUPPRESSED\" as the first diagnosis of the patient's visit.   FAMILY HISTORY:  Any \"first degree relatives\" (parent, brother, sister, or child) with the following?    No changes in my family's known health.   PATIENT EXPERIENCE:    Do you want the Dermatologist to perform a COMPLETE skin exam today including a clinical examination under the \"bra and underwear\" areas?  NO  If necessary, do we have your permission to call and leave a detailed message on your Preferred Phone number that includes your specific medical information?  Yes      No Known Allergies   Current Outpatient Medications:     Adapalene-Benzoyl Peroxide 0.3-2.5 % GEL, Apply 1 Application topically daily at bedtime, Disp: 45 g, Rfl: 1    ammonium lactate (LAC-HYDRIN) 12 % cream, Apply topically as needed for dry skin, Disp: 385 g, Rfl: 2    clindamycin (CLEOCIN T) 1 % lotion, Apply topically 2 (two) times a day Apply a thin film 1-2 times daily., Disp: 60 mL, Rfl: 2    clotrimazole-betamethasone (LOTRISONE) 1-0.05 % " cream, Apply topically 2 (two) times a day, Disp: 45 g, Rfl: 1    doxycycline hyclate (VIBRAMYCIN) 100 mg capsule, Take 1 capsule (100 mg total) by mouth every 12 (twelve) hours for 10 days, Disp: 20 capsule, Rfl: 0    ergocalciferol (VITAMIN D2) 50,000 units, Take 1 capsule (50,000 Units total) by mouth once a week, Disp: 16 capsule, Rfl: 1    fluticasone (FLONASE) 50 mcg/act nasal spray, 1 spray into each nostril daily, Disp: 15.8 mL, Rfl: 1    guaiFENesin (MUCINEX) 600 mg 12 hr tablet, Take 2 tablets (1,200 mg total) by mouth every 12 (twelve) hours (Patient not taking: Reported on 1/24/2025), Disp: 120 tablet, Rfl: 1    ibuprofen (MOTRIN) 800 mg tablet, Take 1 tablet (800 mg total) by mouth 3 (three) times a day (Patient not taking: Reported on 1/24/2025), Disp: 21 tablet, Rfl: 0    linaCLOtide (Linzess) 145 MCG CAPS, Take 1 capsule (145 mcg total) by mouth in the morning, Disp: 30 capsule, Rfl: 3    omeprazole (PriLOSEC) 40 MG capsule, TAKE 1 CAPSULE (40 MG TOTAL) BY MOUTH DAILY., Disp: 90 capsule, Rfl: 1    tirzepatide (Zepbound) 10 mg/0.5 mL auto-injector, Inject 0.5 mL (10 mg total) under the skin once a week, Disp: 2 mL, Rfl: 0    Current Facility-Administered Medications:     cyanocobalamin injection 1,000 mcg, 1,000 mcg, Intramuscular, Q30 Days, , 1,000 mcg at 02/26/25 1356          Whom besides the patient is providing clinical information about today's encounter?   NO ADDITIONAL HISTORIAN (patient alone provided history)    Physical Exam and Assessment/Plan by Diagnosis:    FIXED DRUG ERUPTION SECONDARY TO ORAL DOXYCYCLINE     Physical Exam:  (Anatomic Location); (Size and Morphological Description); (Differential Diagnosis):  Chest, round erythematous to dusky plaques    Additional History of Present Condition:  Patient reports she was given oral doxycycline for an inflamed cyst on the left upper lip. Patient started antibiotic on Thursday and rash began Friday. Patient stopped the oral antibiotic  yesterday. She notes the rash is itchy. She is not using anything topically on the area. She has had oral docycycline in the past with no reaction.     Assessment and Plan:  Based on a thorough discussion of this condition and the management approach to it (including a comprehensive discussion of the known risks, side effects and potential benefits of treatment), the patient (family) agrees to implement the following specific plan:  Advised patient is likely experiencing a fixed drug reaction. This will resolve on its own as patient is no longer on the oral antibiotic.   Discussed prescribing a topical steroid to help with itch. Patient declined.   Doxycycline is now listed on her allergy list.     CYST     Physical Exam:  Anatomic Location Affected:  Left upper cutaneous lip  Morphological Description:  1cm firm mobile subcutaneous nodule     Additional History of Present Condition:  Present for a long time.  She previously saw Dr. Wertheim and he suggested removal. She would like to schedule this as cyst keeps flaring.     Assessment and Plan:  Based on a thorough discussion of this condition and the management approach to it (including a comprehensive discussion of the known risks, side effects and potential benefits of treatment), the patient (family) agrees to implement the following specific plan:  Scheduled excision for 6/18/2025.    Scribe Attestation      I,:  Caroline Sanchez MA am acting as a scribe while in the presence of the attending physician.:       I,:  Regina Rubio PA-C personally performed the services described in this documentation    as scribed in my presence.:

## 2025-03-26 NOTE — PROGRESS NOTES
Name: Tiffany Jean-Baptiste      : 1995      MRN: [id number]  Encounter Provider: MIREYA Ordoñez  Encounter Date: 3/26/2025   Encounter department: [facility] [address]  :  Assessment & Plan  Facial abscess  Seeing dermatologist today at 1. She would like to take the abscess today at 1.        Obesity, Class III, BMI 40-49.9 (morbid obesity) (HCC)  Prior Authorization Clinical Questions for Weight Management Pharmacotherapy    1. Does the patient have a contrainidcation to medication prescribed for weight management?: No  2. Does the patient have a diagnosis of obesity, confirmed by a BMI greater than or equal to 30 kg/m^2?: Yes  3. Does the patient have a BMI of greater than or equal to 27 kg/m^2 with at least one weight-related comorbidity/risk factor/complication (e.g. diabetes, dyslipidemia, coronary artery disease)?: Yes  4. Weight-related co-morbidities/risk factors: prediabetes, dyslipidemia, hypertension, polycystic ovary syndrome (PCOS), GERD, depression  5. WEGOVY CVA Indication: Does patient have established documented cardiovascular disease (history of a prior heart attack (myocardial infarction), stroke, or symptomatic peripheral arterial disease (PAD)?: N/A  6. ZEPBOUND JESSICA Indication: Does patient have documented JESSICA diagnosed via sleep study (insurance will require copy of sleep study results for approval)?: N/A  7. Has the patient been on a weight loss regimen of low-calorie diet, increased physical activity, and lifestyle modifications for a minimum of 6 months?: Yes  8. Has the patient completed a comprehensive weight loss program (ie, Weight Watchers, Noom, Bariatrics, other roberto on phone)? If so, what?: No  9. Does the patient have a history of type 2 diabetes?: No  10. Has the member tried and failed other weight loss medication within the past 12 months?: No  11. Will the member use requested medication in combination with another GLP agonist or  weight loss drug?: No  12. Is the medication a controlled substance?: No     Baseline weight (in pounds): 309 lbs  Current weight (in pounds): 301 lbs  Weight loss percentage: -2.59%     Will continue to increase dose of Zepbound, great job with improving activity again recommend the [email or web address] plate diet follow-up in 1 month or sooner call if rash does not resolve with stopping doxycycline    Orders:  •  tirzepatide (Zepbound) 10 mg/0.5 mL auto-injector; Inject 0.5 mL (10 mg total) under the skin once a week  •  Hemoglobin A1C; Future  •  TSH, 3rd generation with Free T4 reflex; Future    Elevated fasting blood sugar  Will continue to increase dose of Zepbound, great job with improving activity again recommend the my plate diet follow-up in 1 month or sooner call if rash does not resolve with stopping doxycycline  Orders:  •  tirzepatide (Zepbound) 10 mg/0.5 mL auto-injector; Inject 0.5 mL (10 mg total) under the skin once a week    Hyperlipidemia, unspecified hyperlipidemia type  Will continue to increase dose of Zepbound, great job with improving activity again recommend the my plate diet follow-up in 1 month or sooner call if rash does not resolve with stopping doxycycline  Orders:  •  tirzepatide (Zepbound) 10 mg/0.5 mL auto-injector; Inject 0.5 mL (10 mg total) under the skin once a week    Rash and nonspecific skin eruption  Stop doxycycline, have lab work follow-up with allergy  Orders:  •  Food Allergy Profile; Future  •  [facility] Allergy Panel, Adult; Future  •  Ambulatory Referral to Allergy; Future           History of Present Illness   HPI  Review of Systems   Constitutional:  Negative for chills and fever.   HENT:  Positive for sinus pressure and sinus pain. Negative for congestion, ear pain and sore throat.    Respiratory:  Negative for cough and shortness of breath.    Cardiovascular:  Negative for chest pain and palpitations.   Gastrointestinal:  Negative for abdominal pain, constipation, diarrhea, nausea  "and vomiting.   Genitourinary:  Negative for dysuria, frequency and hematuria.   Musculoskeletal:  Negative for arthralgias, back pain and myalgias.   Skin:  Positive for wound. Negative for color change and rash.   Neurological:  Negative for dizziness, seizures, syncope, light-headedness and headaches.   Psychiatric/Behavioral:  Negative for dysphoric mood and sleep disturbance. The patient is not nervous/anxious.    All other systems reviewed and are negative.      Objective   /60 (BP Location: Left arm, Cuff Size: Large)   Pulse 82   Temp (!) 97 °F (36.1 °C)   Resp 16   Ht 5' 6\" (1.676 m)   Wt (!) 137 kg (301 lb 12.8 oz)   SpO2 98%   BMI 48.71 kg/m²      Physical Exam  Vitals and nursing note reviewed.   Constitutional:       General: She is not in acute distress.     Appearance: Normal appearance. She is well-developed. She is not ill-appearing.   HENT:      Head: Normocephalic and atraumatic.      Right Ear: Tympanic membrane, ear canal and external ear normal. There is no impacted cerumen.      Left Ear: Tympanic membrane, ear canal and external ear normal. There is no impacted cerumen.      Nose: Nose normal. No congestion.      Mouth/Throat:      Mouth: Mucous membranes are moist.      Pharynx: No posterior oropharyngeal erythema.   Eyes:      Extraocular Movements: Extraocular movements intact.      Conjunctiva/sclera: Conjunctivae normal.      Pupils: Pupils are equal, round, and reactive to light.   Cardiovascular:      Rate and Rhythm: Normal rate and regular rhythm.      Heart sounds: No murmur heard.  Pulmonary:      Effort: Pulmonary effort is normal. No respiratory distress.      Breath sounds: Normal breath sounds.   Abdominal:      Palpations: Abdomen is soft.      Tenderness: There is no abdominal tenderness.   Musculoskeletal:         General: No swelling.      Cervical back: Normal range of motion and neck supple.      Right lower leg: No edema.      Left lower leg: No edema. "   Lymphadenopathy:      Cervical: No cervical adenopathy.   Skin:     General: Skin is warm and dry.      Capillary Refill: Capillary refill takes less than 2 seconds.      Findings: Lesion present.   Neurological:      General: No focal deficit present.      Mental Status: She is alert.   Psychiatric:         Mood and Affect: Mood normal.

## 2025-03-26 NOTE — ASSESSMENT & PLAN NOTE
Will continue to increase dose of Zepbound, great job with improving activity again recommend the my plate diet follow-up in 1 month or sooner call if rash does not resolve with stopping doxycycline  Orders:  •  tirzepatide (Zepbound) 10 mg/0.5 mL auto-injector; Inject 0.5 mL (10 mg total) under the skin once a week

## 2025-03-27 ENCOUNTER — RESULTS FOLLOW-UP (OUTPATIENT)
Dept: FAMILY MEDICINE CLINIC | Facility: CLINIC | Age: 30
End: 2025-03-27

## 2025-03-27 LAB
A ALTERNATA IGE QN: <0.1 KUA/I (ref 0–0.1)
A FUMIGATUS IGE QN: <0.1 KUA/I (ref 0–0.1)
ALMOND IGE QN: <0.1 KUA/I (ref 0–0.1)
BERMUDA GRASS IGE QN: <0.1 KUA/I (ref 0–0.1)
BOXELDER IGE QN: <0.1 KUA/I (ref 0–0.1)
C HERBARUM IGE QN: <0.1 KUA/I (ref 0–0.1)
CASHEW NUT IGE QN: <0.1 KUA/I (ref 0–0.1)
CAT DANDER IGE QN: <0.1 KUA/I (ref 0–0.1)
CMN PIGWEED IGE QN: <0.1 KUA/I (ref 0–0.1)
CODFISH IGE QN: <0.1 KUA/I (ref 0–0.1)
COMMON RAGWEED IGE QN: <0.1 KUA/I (ref 0–0.1)
COTTONWOOD IGE QN: <0.1 KUA/I (ref 0–0.1)
D FARINAE IGE QN: <0.1 KUA/I (ref 0–0.1)
D PTERONYSS IGE QN: <0.1 KUA/I (ref 0–0.1)
DOG DANDER IGE QN: <0.1 KUA/I (ref 0–0.1)
EGG WHITE IGE QN: <0.1 KUA/I (ref 0–0.1)
GLUTEN IGE QN: <0.1 KUA/I (ref 0–0.1)
HAZELNUT IGE QN: <0.1 KUA/L (ref 0–0.1)
LONDON PLANE IGE QN: <0.1 KUA/I (ref 0–0.1)
MILK IGE QN: <0.1 KUA/I (ref 0–0.1)
MOUSE URINE PROT IGE QN: <0.1 KUA/I (ref 0–0.1)
MT JUNIPER IGE QN: <0.1 KUA/I (ref 0–0.1)
MUGWORT IGE QN: <0.1 KUA/I (ref 0–0.1)
P NOTATUM IGE QN: <0.1 KUA/I (ref 0–0.1)
PEANUT IGE QN: <0.1 KUA/I (ref 0–0.1)
ROACH IGE QN: <0.1 KUA/I (ref 0–0.1)
SALMON IGE QN: <0.1 KUA/I (ref 0–0.1)
SCALLOP IGE QN: <0.1 KUA/L (ref 0–0.1)
SESAME SEED IGE QN: <0.1 KUA/I (ref 0–0.1)
SHEEP SORREL IGE QN: <0.1 KUA/I (ref 0–0.1)
SHRIMP IGE QN: <0.1 KUA/L (ref 0–0.1)
SILVER BIRCH IGE QN: <0.1 KUA/I (ref 0–0.1)
SOYBEAN IGE QN: <0.1 KUA/I (ref 0–0.1)
TIMOTHY IGE QN: <0.1 KUA/I (ref 0–0.1)
TOTAL IGE SMQN RAST: 20.2 KU/L (ref 0–113)
TOTAL IGE SMQN RAST: 21.1 KU/L (ref 0–113)
TUNA IGE QN: <0.1 KUA/I (ref 0–0.1)
WALNUT IGE QN: <0.1 KUA/I (ref 0–0.1)
WALNUT IGE QN: <0.1 KUA/I (ref 0–0.1)
WHEAT IGE QN: <0.1 KUA/I (ref 0–0.1)
WHITE ASH IGE QN: <0.1 KUA/I (ref 0–0.1)
WHITE ELM IGE QN: <0.1 KUA/I (ref 0–0.1)
WHITE MULBERRY IGE QN: <0.1 KUA/I (ref 0–0.1)
WHITE OAK IGE QN: <0.1 KUA/I (ref 0–0.1)

## 2025-04-03 ENCOUNTER — OFFICE VISIT (OUTPATIENT)
Dept: BARIATRICS | Facility: CLINIC | Age: 30
End: 2025-04-03
Payer: COMMERCIAL

## 2025-04-03 VITALS
SYSTOLIC BLOOD PRESSURE: 120 MMHG | BODY MASS INDEX: 47.09 KG/M2 | HEART RATE: 78 BPM | DIASTOLIC BLOOD PRESSURE: 78 MMHG | OXYGEN SATURATION: 9 % | WEIGHT: 293 LBS | HEIGHT: 66 IN | RESPIRATION RATE: 12 BRPM

## 2025-04-03 DIAGNOSIS — R73.01 ELEVATED FASTING BLOOD SUGAR: ICD-10-CM

## 2025-04-03 DIAGNOSIS — K59.00 CONSTIPATION, UNSPECIFIED CONSTIPATION TYPE: ICD-10-CM

## 2025-04-03 DIAGNOSIS — E78.5 HYPERLIPIDEMIA, UNSPECIFIED HYPERLIPIDEMIA TYPE: ICD-10-CM

## 2025-04-03 DIAGNOSIS — E66.01 OBESITY, CLASS III, BMI 40-49.9 (MORBID OBESITY) (HCC): Primary | ICD-10-CM

## 2025-04-03 DIAGNOSIS — R63.5 ABNORMAL WEIGHT GAIN: ICD-10-CM

## 2025-04-03 PROCEDURE — 99204 OFFICE O/P NEW MOD 45 MIN: CPT | Performed by: PHYSICAL THERAPIST

## 2025-04-03 NOTE — ASSESSMENT & PLAN NOTE
- Recommend evaluation with RD to cover meal planning.   - Discussed options of HealthyCORE-Intensive Lifestyle Intervention Program, Very Low Calorie Diet-VLCD, and Conservative Program and the role of weight loss medications.  - Patient is interested in pursuing Conservative Program and follow up visits with medical weight management provider.  - Explained the importance of making lifestyle changes in addition to starting any anti-obesity medications.   - Initial weight loss goal of 5-10% weight loss for improved health. Weight loss can improve patient's co-morbid conditions and/or prevent weight-related complications.  - Weight is not at goal and patient has been unable to achieve a meaningful weight loss above 5% using various programs and tools for more than 6 months  - Labs reviewed.     General Recommendations:  Nutrition:  Eat breakfast daily.  Do not skip meals.      Food log (ie.) www.[employer].com, sparkpeople.com, loseit.com, calorieking.com, etc.     Practice mindful eating.  Be sure to set aside time to eat, eat slowly, and savor your food.     Hydration:    At least 64oz of water daily.  No sugar sweetened beverages.  No juice (eat the fruit instead).     Exercise:  Studies have shown that the ideal exercise goal is somewhere between 150 to 300 minutes of moderate intensity exercise a week.  Start with exercising 10 minutes every other day and gradually increase physical activity with a goal of at least 150 minutes of moderate intensity exercise a week, divided over at least 3 days a week.  An example of this would be exercising 30 minutes a day, 5 days a week.  Resistance training can increase muscle mass and increase our resting metabolic rate.   FULL BODY resistance training is recommended 2-3 times a week.  Do not do this on consecutive days to allow for muscle recovery.     Aim for a bare minimum 5000 steps, even on days you do not exercise.     Monitoring:   Weigh yourself daily.  If this  causes undue stress, then just weigh yourself once a week.  Weigh yourself the same time of the day with the same amount of clothing on.  Preferably this should be done after waking up, before you eat, and with no clothing or minimal clothing on.     Specific Goals:  Calorie goal:  1500 farhad/day (Provided with meal plan to follow)    Zepbound Instructions:    - Zepbound 10 mg subcutaneously once a week. Dose changes may occur after 4 doses if medication is tolerated. You will be assessed prior to each dose change to make sure you are tolerating the medication well.  - Please message me when you have 2 pens left from the prescription so there are no lapses in treatment.  - If you have been off the medication for more than 14 days please contact the office as you will need to restart the titration at the starting dose again to avoid significant side effects or adverse events.  - Visit [email or web address] for further information/injection instructions.   -Please eat small frequent meals to help reduce nausea. Lemon water and saltine crackers may help with this.   - Side effects of Zepbound discussed: nausea, vomiting, diarrhea, and constipation. If you experience fever, nausea/vomiting, and pain radiating to your back this may be a sign of pancreatitis. Please go to the emergency room if this occurs.  - If on oral birth control a 2nd method of birth control is recommended during the 1st 8 weeks of therapy and for 4 weeks after any dosage change.   - Patient understands the side effects of the medication and proper administration. Patient agrees with the treatment plan and all questions were answered.    - Side effects of Zepbound: nausea, vomiting, diarrhea, and constipation. If severe abdominal pain develops, stop Zepbound and go to the ER, as this could be pancreatitis. Monitor heart rate while on Zepbound and if resting heart rate greater than 100 beats per minute, patient will notify me.   - If you need to have surgery or  another procedure, such as an upper endoscopy or colonoscopy, please contact my office as often medications like Zepbound need to be held for a certain amount of time prior to a procedure.       GLP-1 Managing Side Effects Tips:  - focus on small frequent meals  - moderate sugar and fat intake  - change the injection site (abdomen --> thigh--> back of arm)  - eat protein, crackers, mints and qi-based drinks  - nighttime injections  - drink plenty of water  - consider fiber supplements like miralax     Tips for Nausea:  - Don't drink and eat simultaneously: separate fluids 30-60 minutes before and after meals when experiencing nausea or if you notice you become full quickly  - Choose mild smelling foods- strong smells can exacerbate nausea  - Qi and peppermint- consider drinking qi or peppermint tea, which can help alleviate symptoms  - Eat- don't skip meals, if you have nausea, it is understandable that you may not feel like eating. However, skipping meals is generally not recommended as this can lead to lower blood sugar and fatigue. Furthermore, it is essential to consume adequate protein during weight loss. You can opt for a protein shake, a meal replacement supplement, bone broth or soup.      Tips for Constipation:   - Drink plenty of water  - Eat food with a high fiber content: increase your fiber intake by consuming these types of foods:              Eat more whole grain products like barley, oats, farro, brown or wild rice and quinoa.               Look for choices with 100% whole wheat, rye, oats or bran as the first ingredient listed               Check nutrition fact labels and choose products with 4gm of dietary fiber or more per serving              Add more beans to your diet              Eat more fresh fruits and vegetables with skins on them               Exercise- increase physical activity as it helps stimulate gastric mobility, which moves stool through the digestive tract     Patient  denies personal history of pancreatitis. Patient also denies personal and family history of medullary thyroid cancer and multiple endocrine neoplasia type 2 (MEN 2 tumor). Increased risk for the aforementioned disease processes as well as those included in the medication monograph, associated with taking GLP-1 medication discussed. Patient acknowledges and would like to proceed with treatment. Patient also denies pregnancy. Expresses understanding that medication is not safe in pregnancy and will discontinue in the event of pregnancy. Patient demonstrates full understanding verbally. All questions answered.       Patient understands the side effects of the medication and proper administration. Patient agrees with the treatment plan and all questions were answered.

## 2025-04-03 NOTE — ASSESSMENT & PLAN NOTE
- Recommend follow-up with GI for persistent abdominal pain that began prior to Zepbound. Linzess as prescribed for constipation.

## 2025-04-03 NOTE — PATIENT INSTRUCTIONS
- Recommend evaluation with RD to cover meal planning.   - Discussed options of HealthyCORE-Intensive Lifestyle Intervention Program, Very Low Calorie Diet-VLCD, and Conservative Program and the role of weight loss medications.  - Patient is interested in pursuing Conservative Program and follow up visits with medical weight management provider.  - Explained the importance of making lifestyle changes in addition to starting any anti-obesity medications.   - Initial weight loss goal of 5-10% weight loss for improved health. Weight loss can improve patient's co-morbid conditions and/or prevent weight-related complications.  - Weight is not at goal and patient has been unable to achieve a meaningful weight loss above 5% using various programs and tools for more than 6 months  - Labs reviewed.     General Recommendations:  Nutrition:  Eat breakfast daily.  Do not skip meals.      Food log (ie.) www.[employer].com, sparkpeople.com, loseit.com, calorieking.com, etc.     Practice mindful eating.  Be sure to set aside time to eat, eat slowly, and savor your food.     Hydration:    At least 64oz of water daily.  No sugar sweetened beverages.  No juice (eat the fruit instead).     Exercise:  Studies have shown that the ideal exercise goal is somewhere between 150 to 300 minutes of moderate intensity exercise a week.  Start with exercising 10 minutes every other day and gradually increase physical activity with a goal of at least 150 minutes of moderate intensity exercise a week, divided over at least 3 days a week.  An example of this would be exercising 30 minutes a day, 5 days a week.  Resistance training can increase muscle mass and increase our resting metabolic rate.   FULL BODY resistance training is recommended 2-3 times a week.  Do not do this on consecutive days to allow for muscle recovery.     Aim for a bare minimum 5000 steps, even on days you do not exercise.     Monitoring:   Weigh yourself daily.  If this  causes undue stress, then just weigh yourself once a week.  Weigh yourself the same time of the day with the same amount of clothing on.  Preferably this should be done after waking up, before you eat, and with no clothing or minimal clothing on.     Specific Goals:  Calorie goal:  1500 farhad/day (Provided with meal plan to follow)    Zepbound Instructions:    - Zepbound 10 mg subcutaneously once a week. Dose changes may occur after 4 doses if medication is tolerated. You will be assessed prior to each dose change to make sure you are tolerating the medication well.  - Please message me when you have 2 pens left from the prescription so there are no lapses in treatment.  - If you have been off the medication for more than 14 days please contact the office as you will need to restart the titration at the starting dose again to avoid significant side effects or adverse events.  - Visit [email or web address].com for further information/injection instructions.   -Please eat small frequent meals to help reduce nausea. Lemon water and saltine crackers may help with this.   - Side effects of Zepbound discussed: nausea, vomiting, diarrhea, and constipation. If you experience fever, nausea/vomiting, and pain radiating to your back this may be a sign of pancreatitis. Please go to the emergency room if this occurs.  - If on oral birth control a 2nd method of birth control is recommended during the 1st 8 weeks of therapy and for 4 weeks after any dosage change.   - Patient understands the side effects of the medication and proper administration. Patient agrees with the treatment plan and all questions were answered.    - Side effects of Zepbound: nausea, vomiting, diarrhea, and constipation. If severe abdominal pain develops, stop Zepbound and go to the ER, as this could be pancreatitis. Monitor heart rate while on Zepbound and if resting heart rate greater than 100 beats per minute, patient will notify me.   - If you need to have surgery or  another procedure, such as an upper endoscopy or colonoscopy, please contact my office as often medications like Zepbound need to be held for a certain amount of time prior to a procedure.       GLP-1 Managing Side Effects Tips:  - focus on small frequent meals  - moderate sugar and fat intake  - change the injection site (abdomen --> thigh--> back of arm)  - eat protein, crackers, mints and qi-based drinks  - nighttime injections  - drink plenty of water  - consider fiber supplements like miralax     Tips for Nausea:  - Don't drink and eat simultaneously: separate fluids 30-60 minutes before and after meals when experiencing nausea or if you notice you become full quickly  - Choose mild smelling foods- strong smells can exacerbate nausea  - Qi and peppermint- consider drinking qi or peppermint tea, which can help alleviate symptoms  - Eat- don't skip meals, if you have nausea, it is understandable that you may not feel like eating. However, skipping meals is generally not recommended as this can lead to lower blood sugar and fatigue. Furthermore, it is essential to consume adequate protein during weight loss. You can opt for a protein shake, a meal replacement supplement, bone broth or soup.      Tips for Constipation:   - Drink plenty of water  - Eat food with a high fiber content: increase your fiber intake by consuming these types of foods:              Eat more whole grain products like barley, oats, farro, brown or wild rice and quinoa.               Look for choices with 100% whole wheat, rye, oats or bran as the first ingredient listed               Check nutrition fact labels and choose products with 4gm of dietary fiber or more per serving              Add more beans to your diet              Eat more fresh fruits and vegetables with skins on them               Exercise- increase physical activity as it helps stimulate gastric mobility, which moves stool through the digestive tract     Patient  denies personal history of pancreatitis. Patient also denies personal and family history of medullary thyroid cancer and multiple endocrine neoplasia type 2 (MEN 2 tumor). Increased risk for the aforementioned disease processes as well as those included in the medication monograph, associated with taking GLP-1 medication discussed. Patient acknowledges and would like to proceed with treatment. Patient also denies pregnancy. Expresses understanding that medication is not safe in pregnancy and will discontinue in the event of pregnancy. Patient demonstrates full understanding verbally. All questions answered.       Patient understands the side effects of the medication and proper administration. Patient agrees with the treatment plan and all questions were answered.

## 2025-04-03 NOTE — PROGRESS NOTES
Assessment/Plan:    Constipation  - Recommend follow-up with GI for persistent abdominal pain that began prior to Zepbound. Linzess as prescribed for constipation.     Obesity, Class III, BMI 40-49.9 (morbid obesity) (HCC)  - Recommend evaluation with RD to cover meal planning.   - Discussed options of HealthyCORE-Intensive Lifestyle Intervention Program, Very Low Calorie Diet-VLCD, and Conservative Program and the role of weight loss medications.  - Patient is interested in pursuing Conservative Program and follow up visits with medical weight management provider.  - Explained the importance of making lifestyle changes in addition to starting any anti-obesity medications.   - Initial weight loss goal of 5-10% weight loss for improved health. Weight loss can improve patient's co-morbid conditions and/or prevent weight-related complications.  - Weight is not at goal and patient has been unable to achieve a meaningful weight loss above 5% using various programs and tools for more than 6 months  - Labs reviewed.     General Recommendations:  Nutrition:  Eat breakfast daily.  Do not skip meals.      Food log (ie.) www.[email or web address], sparkpeople.com, loseit.com, calorieking.com, etc.     Practice mindful eating.  Be sure to set aside time to eat, eat slowly, and savor your food.     Hydration:    At least 64oz of water daily.  No sugar sweetened beverages.  No juice (eat the fruit instead).     Exercise:  Studies have shown that the ideal exercise goal is somewhere between 150 to 300 minutes of moderate intensity exercise a week.  Start with exercising 10 minutes every other day and gradually increase physical activity with a goal of at least 150 minutes of moderate intensity exercise a week, divided over at least 3 days a week.  An example of this would be exercising 30 minutes a day, 5 days a week.  Resistance training can increase muscle mass and increase our resting metabolic rate.   FULL BODY resistance training  is recommended 2-3 times a week.  Do not do this on consecutive days to allow for muscle recovery.     Aim for a bare minimum 5000 steps, even on days you do not exercise.     Monitoring:   Weigh yourself daily.  If this causes undue stress, then just weigh yourself once a week.  Weigh yourself the same time of the day with the same amount of clothing on.  Preferably this should be done after waking up, before you eat, and with no clothing or minimal clothing on.     Specific Goals:  Calorie goal:  1500 farhad/day (Provided with meal plan to follow)    Zepbound Instructions:    - Zepbound 10 mg subcutaneously once a week. Dose changes may occur after 4 doses if medication is tolerated. You will be assessed prior to each dose change to make sure you are tolerating the medication well.  - Please message me when you have 2 pens left from the prescription so there are no lapses in treatment.  - If you have been off the medication for more than 14 days please contact the office as you will need to restart the titration at the starting dose again to avoid significant side effects or adverse events.  - Visit Zepbound.com for further information/injection instructions.   -Please eat small frequent meals to help reduce nausea. Lemon water and saltine crackers may help with this.   - Side effects of Zepbound discussed: nausea, vomiting, diarrhea, and constipation. If you experience fever, nausea/vomiting, and pain radiating to your back this may be a sign of pancreatitis. Please go to the emergency room if this occurs.  - If on oral birth control a 2nd method of birth control is recommended during the 1st 8 weeks of therapy and for 4 weeks after any dosage change.   - Patient understands the side effects of the medication and proper administration. Patient agrees with the treatment plan and all questions were answered.    - Side effects of Zepbound: nausea, vomiting, diarrhea, and constipation. If severe abdominal pain develops,  stop Zepbound and go to the ER, as this could be pancreatitis. Monitor heart rate while on Zepbound and if resting heart rate greater than 100 beats per minute, patient will notify me.   - If you need to have surgery or another procedure, such as an upper endoscopy or colonoscopy, please contact my office as often medications like Zepbound need to be held for a certain amount of time prior to a procedure.       GLP-1 Managing Side Effects Tips:  - focus on small frequent meals  - moderate sugar and fat intake  - change the injection site (abdomen --> thigh--> back of arm)  - eat protein, crackers, mints and qi-based drinks  - nighttime injections  - drink plenty of water  - consider fiber supplements like miralax     Tips for Nausea:  - Don't drink and eat simultaneously: separate fluids 30-60 minutes before and after meals when experiencing nausea or if you notice you become full quickly  - Choose mild smelling foods- strong smells can exacerbate nausea  - Qi and peppermint- consider drinking qi or peppermint tea, which can help alleviate symptoms  - Eat- don't skip meals, if you have nausea, it is understandable that you may not feel like eating. However, skipping meals is generally not recommended as this can lead to lower blood sugar and fatigue. Furthermore, it is essential to consume adequate protein during weight loss. You can opt for a protein shake, a meal replacement supplement, bone broth or soup.      Tips for Constipation:   - Drink plenty of water  - Eat food with a high fiber content: increase your fiber intake by consuming these types of foods:              Eat more whole grain products like barley, oats, farro, brown or wild rice and quinoa.               Look for choices with 100% whole wheat, rye, oats or bran as the first ingredient listed               Check nutrition fact labels and choose products with 4gm of dietary fiber or more per serving              Add more beans to your  diet              Eat more fresh fruits and vegetables with skins on them               Exercise- increase physical activity as it helps stimulate gastric mobility, which moves stool through the digestive tract     Patient denies personal history of pancreatitis. Patient also denies personal and family history of medullary thyroid cancer and multiple endocrine neoplasia type 2 (MEN 2 tumor). Increased risk for the aforementioned disease processes as well as those included in the medication monograph, associated with taking GLP-1 medication discussed. Patient acknowledges and would like to proceed with treatment. Patient also denies pregnancy. Expresses understanding that medication is not safe in pregnancy and will discontinue in the event of pregnancy. Patient demonstrates full understanding verbally. All questions answered.       Patient understands the side effects of the medication and proper administration. Patient agrees with the treatment plan and all questions were answered.           Tiffany was seen today for consult.    Diagnoses and all orders for this visit:    Obesity, Class III, BMI 40-49.9 (morbid obesity) (HCC)    Hyperlipidemia, unspecified hyperlipidemia type    Abnormal weight gain    Elevated fasting blood sugar    Constipation, unspecified constipation type           Total time spent reviewing chart, interviewing patient, examining patient, discussing plan, answering all questions, and documentin min, with >50% face-to-face time spent counseling patient on nonsurgical interventions for the treatment of excess weight. Discussed in detail nonsurgical options including intensive lifestyle intervention program, very low-calorie diet program and conservative program.  Discussed the role of weight loss medications.  Counseled patient on diet behavior and exercise modification for weight loss.    Follow up in approximately 3 months with Non-Surgical Physician/Advanced Practitioner.    Subjective:    Chief Complaint   Patient presents with    Consult     Arm        Patient ID: Tiffany Jean-Baptiste  is a 29 y.o. female with excess weight/obesity here to pursue weight management. Patient started on Zepbound in January 2025 by PCP. Currently on 10 mg dose. Lower doses were not helping, but patient has been doing well since the 7.5 mg dose. Currently doing much better. Has some intermittent abdominal pain, possibly related to constipation. Currently taking Miralax daily. Denies fevers, sweats, vomitting, or nausea.     Previous notes and records have been reviewed.    Past Medical History:   Diagnosis Date    Anxiety     COVID 01/2021    High cholesterol     Morbid obesity with BMI of 45.0-49.9, adult ([facility])     Pre-operative laboratory examination     Seizures ([facility]) 03/31/2015     Past Surgical History:   Procedure Laterality Date    UPPER GASTROINTESTINAL ENDOSCOPY         HPI:  Wt Readings from Last 20 Encounters:   04/03/25 136 kg (300 lb)   03/26/25 (!) 137 kg (301 lb 12.8 oz)   03/21/25 (!) 137 kg (302 lb 12.8 oz)   02/26/25 (!) 138 kg (304 lb)   01/24/25 (!) 140 kg (309 lb)   12/17/24 (!) 139 kg (306 lb)   12/02/24 (!) 140 kg (309 lb)   11/01/24 135 kg (297 lb 3.2 oz)   09/26/24 135 kg (297 lb)   08/21/24 135 kg (298 lb)   08/16/24 135 kg (297 lb 12.8 oz)   07/23/24 136 kg (300 lb)   06/18/24 129 kg (284 lb)   05/03/24 129 kg (284 lb 3.2 oz)   03/14/24 123 kg (271 lb 12.8 oz)   02/22/24 125 kg (276 lb)   02/09/24 122 kg (270 lb)   01/29/24 122 kg (270 lb)   01/04/24 124 kg (273 lb)   12/20/23 123 kg (272 lb)       Patient presents today to medical weight management office for consult.      Starting MWM weight: 300 lbs (4/3/2025)  Starting BMI: 48.42 (4/3/2025)  Goal weight: 250 lbs   Medication: Zepbound 10 mg (started by PCP January 2025)       Obesity/Excess Weight:  Severity: Very Severe  Onset:  since high school     Modifiers: Diet and Exercise, Physician Supervised Weight Loss Program, and Prescription Weight  Loss Medications  Contributing factors: Poor Food Choices, Insufficient Physical Activity, and Insufficient time to make appropriate lifestyle changes  Associated symptoms: comorbid conditions, fatigue, body image issues, decreased self esteem, depression, inability to do certain activities, and clothes do not fit        Contraceptive Method: None  LMP: 1st week of March       Diet recall:  B: usually skips, sometimes eggs; coffee  L: usually skips  D: rice, beans, and meat  S: none  Take out frequency: 2-3x per week    Hydration: water  Alcohol: socially   Smoking: none  Exercise: gym for cardio 3x per week  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    Family History   Problem Relation Age of Onset    Coronary artery disease Mother     Diabetes Sister     Coronary artery disease Maternal Grandmother     Hyperlipidemia Maternal Grandmother     Hyperlipidemia Maternal Grandfather     Diabetes Maternal Grandfather     Alcohol abuse Neg Hx     Substance Abuse Neg Hx     Mental illness Neg Hx         Review of Systems   Constitutional:  Negative for chills and fever.   HENT:  Negative for ear pain and sore throat.    Eyes:  Negative for pain and visual disturbance.   Respiratory:  Negative for cough and shortness of breath.    Cardiovascular:  Negative for chest pain and palpitations.   Gastrointestinal:  Negative for abdominal pain and vomiting.   Genitourinary:  Negative for dysuria and hematuria.   Musculoskeletal:  Negative for arthralgias and back pain.   Skin:  Negative for color change and rash.   Neurological:  Negative for seizures and syncope.   All other systems reviewed and are negative.      Objective:  BP  "120/78 (BP Location: Left arm, Patient Position: Sitting, Cuff Size: Large)   Pulse 78   Resp 12   Ht 5' 6\" (1.676 m)   Wt 136 kg (300 lb)   SpO2 (!) 9%   BMI 48.42 kg/m²     Physical Exam  Constitutional:       General: She is not in acute distress.     Appearance: Normal appearance. She is obese. She is not ill-appearing, toxic-appearing or diaphoretic.   HENT:      Head: Normocephalic and atraumatic.   Pulmonary:      Effort: Pulmonary effort is normal.   Musculoskeletal:         General: Normal range of motion.      Cervical back: Normal range of motion.   Skin:     General: Skin is warm.   Neurological:      Mental Status: She is alert and oriented to person, place, and time.   Psychiatric:         Mood and Affect: Mood normal.         Behavior: Behavior normal.              Labs and Imaging  Recent labs and imaging have been personally reviewed.  Lab Results   Component Value Date    WBC 5.83 03/26/2025    HGB 13.4 03/26/2025    HCT 40.0 03/26/2025    MCV 89 03/26/2025     (L) 03/26/2025     Lab Results   Component Value Date    SODIUM 137 03/26/2025    K 3.9 03/26/2025     03/26/2025    CO2 26 03/26/2025    AGAP 5 03/26/2025    BUN 13 03/26/2025    CREATININE 0.68 03/26/2025    GLUC 82 11/22/2023    GLUF 88 03/26/2025    CALCIUM 9.2 03/26/2025    AST 14 03/26/2025    ALT 10 03/26/2025    ALKPHOS 58 03/26/2025    TP 7.0 03/26/2025    TBILI 0.26 03/26/2025    EGFR 120 03/26/2025     Lab Results   Component Value Date    HGBA1C 5.4 03/26/2025     Lab Results   Component Value Date    IKF4BRFAFMQH 3.667 03/26/2025    TSH 2.66 03/22/2019     Lab Results   Component Value Date    CHOLESTEROL 195 03/26/2025     Lab Results   Component Value Date    HDL 54 03/26/2025     Lab Results   Component Value Date    TRIG 95 03/26/2025     Lab Results   Component Value Date    LDLCALC 122 (H) 03/26/2025           Weight Management  Brayan Cummings PA-C    "

## 2025-04-24 ENCOUNTER — E-CONSULT (OUTPATIENT)
Age: 30
End: 2025-04-24
Payer: COMMERCIAL

## 2025-04-24 ENCOUNTER — OFFICE VISIT (OUTPATIENT)
Dept: FAMILY MEDICINE CLINIC | Facility: CLINIC | Age: 30
End: 2025-04-24
Payer: COMMERCIAL

## 2025-04-24 VITALS
BODY MASS INDEX: 47.09 KG/M2 | WEIGHT: 293 LBS | OXYGEN SATURATION: 100 % | HEIGHT: 66 IN | HEART RATE: 98 BPM | SYSTOLIC BLOOD PRESSURE: 126 MMHG | DIASTOLIC BLOOD PRESSURE: 74 MMHG

## 2025-04-24 DIAGNOSIS — G43.909 ACUTE MIGRAINE: ICD-10-CM

## 2025-04-24 DIAGNOSIS — E78.5 HYPERLIPIDEMIA, UNSPECIFIED HYPERLIPIDEMIA TYPE: ICD-10-CM

## 2025-04-24 DIAGNOSIS — D69.6 THROMBOCYTOPENIA (HCC): Primary | ICD-10-CM

## 2025-04-24 DIAGNOSIS — G44.221 CHRONIC TENSION-TYPE HEADACHE, INTRACTABLE: Primary | ICD-10-CM

## 2025-04-24 DIAGNOSIS — D69.6 THROMBOCYTOPENIA (HCC): ICD-10-CM

## 2025-04-24 DIAGNOSIS — R73.01 ELEVATED FASTING BLOOD SUGAR: ICD-10-CM

## 2025-04-24 DIAGNOSIS — E66.01 MORBID OBESITY WITH BODY MASS INDEX (BMI) OF 40.0 TO 44.9 IN ADULT (HCC): ICD-10-CM

## 2025-04-24 PROCEDURE — 99214 OFFICE O/P EST MOD 30 MIN: CPT

## 2025-04-24 PROCEDURE — 99446 NTRPROF PH1/NTRNET/EHR 5-10: CPT | Performed by: NURSE PRACTITIONER

## 2025-04-24 RX ORDER — METRONIDAZOLE 7.5 MG/G
GEL VAGINAL
COMMUNITY
Start: 2025-04-02

## 2025-04-24 RX ORDER — FLUCONAZOLE 150 MG/1
TABLET ORAL
COMMUNITY
Start: 2025-04-02

## 2025-04-24 RX ORDER — TIRZEPATIDE 12.5 MG/.5ML
12.5 INJECTION, SOLUTION SUBCUTANEOUS WEEKLY
Qty: 2 ML | Refills: 0 | Status: SHIPPED | OUTPATIENT
Start: 2025-04-24

## 2025-04-24 NOTE — ASSESSMENT & PLAN NOTE
Platelets have been low since 2022 we will put an E consult to hematology to see if they recommend any additional testing  Orders:  •  AMB E-CONSULT TO HEMATOLOGY

## 2025-04-24 NOTE — PROGRESS NOTES
E-Consult  Tiffany Jean-Baptiste 29 y.o. female MRN: [id number]  Encounter Date: 04/24/25      Reason for Consult / Principal Problem: Thrombocytopenia     Consulting Provider: MIREYA Smith    Requesting Provider: Padmini Vyas CRNP       ASSESSMENT:        Upon further review, your patient was seen by hematology in consultation on 5/9/2023 for evaluation of thrombocytopenia.  Per that consultation note, her mild thrombocytopenia dates back to at least 2018 and her most recent platelet count does not appear to be any worse than it has in the past.    RECOMMENDATIONS:  Please refer to previous hematology notes/recommendations before referring patients back especially if their lab abnormalities have not changed over the years.  As previously recommended, mild ITP does not require treatment especially if platelet count remains stable.  We do not treat ITP unless platelets drop below 50.  Continue to monitor and refer if your patient's platelets drop and are persistently below 100,000 on more than 3 occasions.    Thank you      Total time spent 5-10 minutes, >50% of the total time devoted to medical consultative verbal/EMR discussion between providers. Written report will be generated in the EMR. .

## 2025-04-24 NOTE — ASSESSMENT & PLAN NOTE
Prior Authorization Clinical Questions for Weight Management Pharmacotherapy    1. Does the patient have a contrainidcation to medication prescribed for weight management?: No  2. Does the patient have a diagnosis of obesity, confirmed by a BMI greater than or equal to 30 kg/m^2?: Yes  3. Does the patient have a BMI of greater than or equal to 27 kg/m^2 with at least one weight-related comorbidity/risk factor/complication (e.g. diabetes, dyslipidemia, coronary artery disease)?: Yes  4. Weight-related co-morbidities/risk factors: prediabetes, dyslipidemia, depression  5. WEGOVY CVA Indication: Does patient have established documented cardiovascular disease (history of a prior heart attack (myocardial infarction), stroke, or symptomatic peripheral arterial disease (PAD)?: N/A  6. ZEPBOUND JESSICA Indication: Does patient have documented JESSICA diagnosed via sleep study (insurance will require copy of sleep study results for approval)?: N/A  7. Has the patient been on a weight loss regimen of low-calorie diet, increased physical activity, and lifestyle modifications for a minimum of 6 months?: Yes  8. Has the patient completed a comprehensive weight loss program (ie, Weight Watchers, Noom, Bariatrics, other roberto on phone)? If so, what?: No  9. Does the patient have a history of type 2 diabetes?: No  10. Has the member tried and failed other weight loss medication within the past 12 months?: No  11. Will the member use requested medication in combination with another GLP agonist or weight loss drug?: No  12. Is the medication a controlled substance?: No     Baseline weight (in pounds): 309 lbs  Current weight (in pounds): 296 lbs  Weight loss percentage: -4.21%     Great job with weight loss will continue to titrate up the Zepbound do recommend the my plate or Mediterranean diet and 2-1/2 hours of exercise weekly will continue to monitor    Orders:  •  tirzepatide (Zepbound) 12.5 mg/0.5 mL auto-injector; Inject 0.5 mL (12.5 mg  total) under the skin once a week

## 2025-04-24 NOTE — ASSESSMENT & PLAN NOTE
Reviewed labs blood sugar is better  Orders:  •  tirzepatide (Zepbound) 12.5 mg/0.5 mL auto-injector; Inject 0.5 mL (12.5 mg total) under the skin once a week

## 2025-04-24 NOTE — ASSESSMENT & PLAN NOTE
Cholesterol is still high, continue to try to cut down on high cholesterol foods such as full fat daily, butter, red meat, eggs, blancas/pork, mayonnaise and increase high fiber foods suck as oatmeal and vegetables. I also suggest increasing healthy fats such as olive oil, nuts/nut butters and avocados.     Orders:  •  tirzepatide (Zepbound) 12.5 mg/0.5 mL auto-injector; Inject 0.5 mL (12.5 mg total) under the skin once a week

## 2025-04-24 NOTE — PROGRESS NOTES
Name: Tiffany Jean-Baptiste      : 1995      MRN: [id number]  Encounter Provider: MIREYA Ordoñez  Encounter Date: 2025   Encounter department: [facility] [address] [facility]  :  Assessment & Plan  Chronic tension-type headache, intractable  Acute migraines lasting 1 month sometimes like a band sometimes coming from the back and sometimes hemicranial, will do MRI for acute onset and worsening of chronic migraines.  Will call with results do recommend 1 to 2 L of water a day 7 hours of sleep no alcohol 400 mg of magnesium 400 mg to b 2  Orders:  •  MRI brain w wo contrast; Future    Thrombocytopenia (HCC)  Platelets have been low since  we will put an E consult to hematology to see if they recommend any additional testing  Orders:  •  AMB E-CONSULT TO HEMATOLOGY    Hyperlipidemia, unspecified hyperlipidemia type  Cholesterol is still high, continue to try to cut down on high cholesterol foods such as full fat daily, butter, red meat, eggs, blancas/pork, mayonnaise and increase high fiber foods suck as oatmeal and vegetables. I also suggest increasing healthy fats such as olive oil, nuts/nut butters and avocados.     Orders:  •  tirzepatide (Zepbound) 12.5 mg/0.5 mL auto-injector; Inject 0.5 mL (12.5 mg total) under the skin once a week    Morbid obesity with body mass index (BMI) of 40.0 to 44.9 in adult (HCC)  Prior Authorization Clinical Questions for Weight Management Pharmacotherapy    1. Does the patient have a contrainidcation to medication prescribed for weight management?: No  2. Does the patient have a diagnosis of obesity, confirmed by a BMI greater than or equal to 30 kg/m^2?: Yes  3. Does the patient have a BMI of greater than or equal to 27 kg/m^2 with at least one weight-related comorbidity/risk factor/complication (e.g. diabetes, dyslipidemia, coronary artery disease)?: Yes  4. Weight-related co-morbidities/risk factors: prediabetes, dyslipidemia,  depression  5. WEGOVY CVA Indication: Does patient have established documented cardiovascular disease (history of a prior heart attack (myocardial infarction), stroke, or symptomatic peripheral arterial disease (PAD)?: N/A  6. ZEPBOUND JESSICA Indication: Does patient have documented JESSICA diagnosed via sleep study (insurance will require copy of sleep study results for approval)?: N/A  7. Has the patient been on a weight loss regimen of low-calorie diet, increased physical activity, and lifestyle modifications for a minimum of 6 months?: Yes  8. Has the patient completed a comprehensive weight loss program (ie, Weight Watchers, Noom, Bariatrics, other roberto on phone)? If so, what?: No  9. Does the patient have a history of type 2 diabetes?: No  10. Has the member tried and failed other weight loss medication within the past 12 months?: No  11. Will the member use requested medication in combination with another GLP agonist or weight loss drug?: No  12. Is the medication a controlled substance?: No     Baseline weight (in pounds): 309 lbs  Current weight (in pounds): 296 lbs  Weight loss percentage: -4.21%     Great job with weight loss will continue to titrate up the Zepbound do recommend the my plate or Mediterranean diet and 2-1/2 hours of exercise weekly will continue to monitor    Orders:  •  tirzepatide (Zepbound) 12.5 mg/0.5 mL auto-injector; Inject 0.5 mL (12.5 mg total) under the skin once a week    Elevated fasting blood sugar  Reviewed labs blood sugar is better  Orders:  •  tirzepatide (Zepbound) 12.5 mg/0.5 mL auto-injector; Inject 0.5 mL (12.5 mg total) under the skin once a week    Acute migraine  Acute migraines lasting 1 month sometimes like a band sometimes coming from the back and sometimes hemicranial, will do MRI for acute onset and worsening of chronic migraines.  Will call with results do recommend 1 to 2 L of water a day 7 hours of sleep no alcohol 400 mg of magnesium 400 mg to b 2  Orders:  •  MRI  "brain w wo contrast; Future           History of Present Illness   Since last visit has lost 5 lbs. Is having daily HA for the past month.     Migraine  Associated symptoms include nausea. Pertinent negatives include no abdominal pain, back pain, coughing, diarrhea, dizziness, ear pain, fever, sinus pressure, sore throat or vomiting.     Review of Systems   Constitutional:  Positive for fatigue. Negative for chills, diaphoresis and fever.   HENT:  Negative for congestion, ear pain, sinus pressure, sinus pain and sore throat.    Eyes:  Negative for visual disturbance.   Respiratory:  Negative for cough, chest tightness and shortness of breath.    Cardiovascular:  Negative for chest pain and palpitations.   Gastrointestinal:  Positive for nausea. Negative for abdominal pain, constipation, diarrhea and vomiting.   Genitourinary:  Negative for dysuria and hematuria.   Musculoskeletal:  Negative for arthralgias, back pain and myalgias.   Neurological:  Positive for headaches. Negative for dizziness, syncope and light-headedness.   Psychiatric/Behavioral:  Negative for dysphoric mood and sleep disturbance. The patient is not nervous/anxious.    All other systems reviewed and are negative.      Objective   /74   Pulse 98   Ht 5' 6\" (1.676 m)   Wt 134 kg (296 lb)   SpO2 100%   BMI 47.78 kg/m²      Physical Exam      "

## 2025-05-11 DIAGNOSIS — L70.0 ACNE VULGARIS: ICD-10-CM

## 2025-05-12 RX ORDER — ADAPALENE AND BENZOYL PEROXIDE 3; 25 MG/G; MG/G
1 GEL TOPICAL
Qty: 45 G | Refills: 1 | Status: SHIPPED | OUTPATIENT
Start: 2025-05-12

## 2025-05-23 DIAGNOSIS — R73.01 ELEVATED FASTING BLOOD SUGAR: ICD-10-CM

## 2025-05-23 DIAGNOSIS — E66.01 MORBID OBESITY WITH BODY MASS INDEX (BMI) OF 40.0 TO 44.9 IN ADULT (HCC): ICD-10-CM

## 2025-05-23 DIAGNOSIS — E78.5 HYPERLIPIDEMIA, UNSPECIFIED HYPERLIPIDEMIA TYPE: ICD-10-CM

## 2025-05-23 RX ORDER — TIRZEPATIDE 12.5 MG/.5ML
12.5 INJECTION, SOLUTION SUBCUTANEOUS WEEKLY
Qty: 2 ML | Refills: 0 | Status: SHIPPED | OUTPATIENT
Start: 2025-05-23

## 2025-05-23 NOTE — TELEPHONE ENCOUNTER
Pt called in requesting med refill of the following medication:  Requested Prescriptions     Pending Prescriptions Disp Refills    tirzepatide (Zepbound) 12.5 mg/0.5 mL auto-injector 2 mL 0     Sig: Inject 0.5 mL (12.5 mg total) under the skin once a week     Script going to the following pharmacy:  [id number] [place] - [address] [phone]

## 2025-05-27 ENCOUNTER — TELEPHONE (OUTPATIENT)
Dept: FAMILY MEDICINE CLINIC | Facility: CLINIC | Age: 30
End: 2025-05-27

## 2025-05-27 NOTE — TELEPHONE ENCOUNTER
PA for (Zepbound) 12.5 mg/0.5 mL auto-injector SUBMITTED to     via    []CMM-KEY:   []Surescripts-Case ID #   []Availity-Auth ID # NDC #   []Faxed to plan   [x]Other website PromptPA [facility] - [place] [id number]  []Phone call Case ID #     [x]PA sent as URGENT    All office notes, labs and other pertaining documents and studies sent. Clinical questions answered. Awaiting determination from insurance company.     Turnaround time for your insurance to make a decision on your Prior Authorization can take 7-21 business days.

## 2025-05-27 NOTE — TELEPHONE ENCOUNTER
Received fax from [employer] requesting prior auth on Zepbound.     Key: [id number]    Request scanned into patients chart.

## 2025-05-27 NOTE — TELEPHONE ENCOUNTER
PA for Zepbound) 12.5 mg/0.5 mL auto-injector  DENIED    Reason:(Screenshot if applicable)        Message sent to office clinical pool Yes    Denial letter scanned into Media Yes    We can gladly do an appeal but the process can take about 30-60 days to provide determination. Please have the office staff schedule a Peer to Peer at phone [phone] . If an appeal is truly warranted please have Provider send clinical documentation to the PA department to support the appeal.     **Please follow up with your patient regarding denial and next steps**

## 2025-05-30 ENCOUNTER — OFFICE VISIT (OUTPATIENT)
Dept: FAMILY MEDICINE CLINIC | Facility: CLINIC | Age: 30
End: 2025-05-30
Payer: COMMERCIAL

## 2025-05-30 ENCOUNTER — TELEPHONE (OUTPATIENT)
Age: 30
End: 2025-05-30

## 2025-05-30 VITALS
HEART RATE: 94 BPM | OXYGEN SATURATION: 99 % | DIASTOLIC BLOOD PRESSURE: 78 MMHG | TEMPERATURE: 99.4 F | SYSTOLIC BLOOD PRESSURE: 122 MMHG | WEIGHT: 289.6 LBS | HEIGHT: 66 IN | BODY MASS INDEX: 46.54 KG/M2

## 2025-05-30 DIAGNOSIS — E66.813 OBESITY, CLASS III, BMI 40-49.9 (MORBID OBESITY): ICD-10-CM

## 2025-05-30 DIAGNOSIS — R63.5 ABNORMAL WEIGHT GAIN: ICD-10-CM

## 2025-05-30 DIAGNOSIS — J02.9 SORE THROAT: Primary | ICD-10-CM

## 2025-05-30 LAB — S PYO AG THROAT QL: NEGATIVE

## 2025-05-30 PROCEDURE — 99213 OFFICE O/P EST LOW 20 MIN: CPT | Performed by: NURSE PRACTITIONER

## 2025-05-30 PROCEDURE — 87880 STREP A ASSAY W/OPTIC: CPT | Performed by: NURSE PRACTITIONER

## 2025-05-30 RX ORDER — VALACYCLOVIR HYDROCHLORIDE 500 MG/1
TABLET, FILM COATED ORAL
COMMUNITY
Start: 2025-05-27

## 2025-05-30 RX ORDER — TIRZEPATIDE 15 MG/.5ML
15 INJECTION, SOLUTION SUBCUTANEOUS WEEKLY
Qty: 2 ML | Refills: 0 | Status: SHIPPED | OUTPATIENT
Start: 2025-05-30

## 2025-05-30 NOTE — ASSESSMENT & PLAN NOTE
Orders:    tirzepatide (Zepbound) 15 mg/0.5 mL auto-injector; Inject 0.5 mL (15 mg total) under the skin once a week

## 2025-05-30 NOTE — TELEPHONE ENCOUNTER
PA for ZEPBOUND 15MG SUBMITTED           via    [x]CMM-KEY:   [x]PA sent as URGENT    All office notes, labs and other pertaining documents and studies sent. Clinical questions answered. Awaiting determination from insurance company.     Turnaround time for your insurance to make a decision on your Prior Authorization can take 7-21 business days.

## 2025-05-30 NOTE — PROGRESS NOTES
Name: Tiffany Jean-Baptiste      : 1995      MRN: [id number]  Encounter Provider: MIREYA Hernandez  Encounter Date: 2025   Encounter department: [facility] [address]  :  Assessment & Plan  Sore throat  Rapid strep negative in office.  Advised patient to start antihistamine, like Claritin or Zyrtec.  To continue with Flonase as prescribed.  Advised increased hydration, soft foods, voice rest.    Orders:    POCT rapid ANTIGEN strepA    Obesity, Class III, BMI 40-49.9 (morbid obesity)  Prior Authorization Clinical Questions for Weight Management Pharmacotherapy    1. Does the patient have a contrainidcation to medication prescribed for weight management?: No  2. Does the patient have a diagnosis of obesity, confirmed by a BMI greater than or equal to 30 kg/m^2?: Yes  3. Does the patient have a BMI of greater than or equal to 27 kg/m^2 with at least one weight-related comorbidity/risk factor/complication (e.g. diabetes, dyslipidemia, coronary artery disease)?: Yes  4. Weight-related co-morbidities/risk factors: dyslipidemia  5. WEGOVY CVA Indication: Does patient have established documented cardiovascular disease (history of a prior heart attack (myocardial infarction), stroke, or symptomatic peripheral arterial disease (PAD)?: N/A  6. ZEPBOUND JESSICA Indication: Does patient have documented JESSICA diagnosed via sleep study (insurance will require copy of sleep study results for approval)?: No  7. Has the patient been on a weight loss regimen of low-calorie diet, increased physical activity, and lifestyle modifications for a minimum of 6 months?: Yes  8. Has the patient completed a comprehensive weight loss program (ie, Weight Watchers, Noom, Bariatrics, other roberto on phone)? If so, what?: No  9. Does the patient have a history of type 2 diabetes?: No  10. Has the member tried and failed other weight loss medication within the past 12 months?: No  11. Will the member use  requested medication in combination with another GLP agonist or weight loss drug?: No  12. Is the medication a controlled substance?: No  For renewals: Has the patient had a positive outcome with current weight management medication (i.e., change in body weight of at least 4-5% after 12-16 weeks on maximally tolerated dose)?: Yes     Baseline weight (in pounds): 309 lbs  Current weight (in pounds): 289.6 lbs  Weight loss percentage: -6.28%       Patient continues to have weight loss with Zepbound.  Will increase Zepbound from 12.5 mg to 15 mg.  Encouraged to continue with diet modifications, exercise.  To follow-up with PCP as scheduled.  Orders:    tirzepatide (Zepbound) 15 mg/0.5 mL auto-injector; Inject 0.5 mL (15 mg total) under the skin once a week    Abnormal weight gain    Orders:    tirzepatide (Zepbound) 15 mg/0.5 mL auto-injector; Inject 0.5 mL (15 mg total) under the skin once a week           History of Present Illness   Patient presents to the office for medication management.  Is currently on Zepbound 12.5 mg.  Tolerated medication well.  Denies adverse side effects.  Patient is engaging in portion control and smart food choices.  Patient also notes sore throat that began earlier this week.  Seen at urgent care, strep swab was negative at that time.  Patient denies fever, chills, sinus pain/pressure      Review of Systems   Constitutional:  Negative for chills and fever.   HENT:  Positive for ear pain, postnasal drip and sore throat. Negative for congestion, sinus pressure and sinus pain.    Eyes:  Negative for photophobia and visual disturbance.   Respiratory:  Negative for cough.    Cardiovascular:  Negative for chest pain.   Gastrointestinal:  Negative for abdominal pain, nausea and vomiting.   Genitourinary:  Negative for decreased urine volume.   Musculoskeletal:  Negative for arthralgias and myalgias.   Skin:  Negative for color change and rash.   Neurological:  Negative for dizziness, weakness,  "light-headedness and headaches.   Hematological:  Negative for adenopathy.   Psychiatric/Behavioral:  The patient is not nervous/anxious.        Objective   /78 (BP Location: Left arm, Patient Position: Sitting)   Pulse 94   Temp 99.4 °F (37.4 °C)   Ht 5' 6\" (1.676 m)   Wt 131 kg (289 lb 9.6 oz)   LMP 05/01/2025 (Exact Date)   SpO2 99%   BMI 46.74 kg/m²      Physical Exam  Vitals reviewed.   Constitutional:       General: She is not in acute distress.     Appearance: She is not ill-appearing.   HENT:      Head: Normocephalic and atraumatic.      Right Ear: Tympanic membrane, ear canal and external ear normal.      Left Ear: Tympanic membrane, ear canal and external ear normal.      Nose: Congestion present.      Mouth/Throat:      Mouth: Mucous membranes are moist.      Pharynx: Oropharynx is clear. No oropharyngeal exudate or posterior oropharyngeal erythema.     Eyes:      Pupils: Pupils are equal, round, and reactive to light.       Cardiovascular:      Rate and Rhythm: Normal rate and regular rhythm.      Pulses: Normal pulses.      Heart sounds: Normal heart sounds.   Pulmonary:      Effort: Pulmonary effort is normal.      Breath sounds: Normal breath sounds.     Musculoskeletal:         General: Normal range of motion.      Cervical back: Normal range of motion and neck supple.   Lymphadenopathy:      Cervical: No cervical adenopathy.     Skin:     General: Skin is warm and dry.     Neurological:      General: No focal deficit present.      Mental Status: She is alert and oriented to person, place, and time.     Psychiatric:         Mood and Affect: Mood normal.         Behavior: Behavior normal.         "

## 2025-05-30 NOTE — ASSESSMENT & PLAN NOTE
Prior Authorization Clinical Questions for Weight Management Pharmacotherapy    1. Does the patient have a contrainidcation to medication prescribed for weight management?: No  2. Does the patient have a diagnosis of obesity, confirmed by a BMI greater than or equal to 30 kg/m^2?: Yes  3. Does the patient have a BMI of greater than or equal to 27 kg/m^2 with at least one weight-related comorbidity/risk factor/complication (e.g. diabetes, dyslipidemia, coronary artery disease)?: Yes  4. Weight-related co-morbidities/risk factors: dyslipidemia  5. WEGOVY CVA Indication: Does patient have established documented cardiovascular disease (history of a prior heart attack (myocardial infarction), stroke, or symptomatic peripheral arterial disease (PAD)?: N/A  6. ZEPBOUND JESSICA Indication: Does patient have documented JESSICA diagnosed via sleep study (insurance will require copy of sleep study results for approval)?: No  7. Has the patient been on a weight loss regimen of low-calorie diet, increased physical activity, and lifestyle modifications for a minimum of 6 months?: Yes  8. Has the patient completed a comprehensive weight loss program (ie, Weight Watchers, Noom, Bariatrics, other roberto on phone)? If so, what?: No  9. Does the patient have a history of type 2 diabetes?: No  10. Has the member tried and failed other weight loss medication within the past 12 months?: No  11. Will the member use requested medication in combination with another GLP agonist or weight loss drug?: No  12. Is the medication a controlled substance?: No  For renewals: Has the patient had a positive outcome with current weight management medication (i.e., change in body weight of at least 4-5% after 12-16 weeks on maximally tolerated dose)?: Yes     Baseline weight (in pounds): 309 lbs  Current weight (in pounds): 289.6 lbs  Weight loss percentage: -6.28%       Patient continues to have weight loss with Zepbound.  Will increase Zepbound from 12.5 mg to  15 mg.  Encouraged to continue with diet modifications, exercise.  To follow-up with PCP as scheduled.  Orders:    tirzepatide (Zepbound) 15 mg/0.5 mL auto-injector; Inject 0.5 mL (15 mg total) under the skin once a week

## 2025-06-02 NOTE — TELEPHONE ENCOUNTER
Corby from [facility] calling in stating that the prior authorization has been denied for the zepbound and they're faxing that over

## 2025-06-02 NOTE — TELEPHONE ENCOUNTER
PA for ZEPBOUND 15MG  APPEALED via     [x]PPA CRISTINAER ([facility]) ID [id number]    All necessary records sent. Will await response from insurance company    Turnaround time for a decision to be made on an appeal could take up to 30 business days

## 2025-06-02 NOTE — TELEPHONE ENCOUNTER
PA for ZEPBOUND 15MG  DENIED    Reason:(Screenshot if applicable)        Message sent to office clinical pool Yes    Denial letter scanned into Media Yes    We can gladly do an appeal but the process can take about 30-60 days to provide determination. Please have the office staff schedule a Peer to Peer at phone [phone] . If an appeal is truly warranted please have Provider send clinical documentation to the PA department to support the appeal.     **Please follow up with your patient regarding denial and next steps**

## 2025-06-02 NOTE — TELEPHONE ENCOUNTER
PA Appeal for ZEPBOUND 15MG  DENIED    Reason:(Screenshot if applicable)        Message sent to office clinical pool Yes    Denial letter scanned into Media Yes

## 2025-06-02 NOTE — TELEPHONE ENCOUNTER
Patient calling if the issues with her weight has been resolved?    Please call patient and let her know at  [phone]

## 2025-06-02 NOTE — TELEPHONE ENCOUNTER
Arcelia from [facility] called, she stated the prior auth is getting denied, due to a discrepancy with the original weight and the most recent weight of the patient. They are asking if that could be updated and resubmitted.     Please advise, Thank you

## 2025-06-03 NOTE — TELEPHONE ENCOUNTER
Documents from 12/17 were originally submitted with the appeal yet [employer] denied appeal stating patient started medication on 11/18 as per pharmacy claim. Patients initial weight was 297. I explained the patient was re-issued starting dose on12/17.      Representative from [employer] stated office should request a peer to peer at [phone] explaining why patient was re-issued starting dose on 12/17. No further appeal can be submitted at this time.

## 2025-06-03 NOTE — TELEPHONE ENCOUNTER
Called Kristy to see how this can get approved as patient has lost more then 5% from her baseline weight, they said to initiate another auth as it has been more then 24 hours since the 1st one and send the note from 12/17/2024 along with patient updated weight from 5/30/2025 note to show how much was lost.

## 2025-06-04 NOTE — TELEPHONE ENCOUNTER
PA for ZEPBOUND 15MG  APPROVED     Date(s) approved           Patient advised by          [x]MyChart Message  []Phone call   []LMOM  []L/M to call office as no active Communication consent on file  []Unable to leave detailed message as VM not approved on Communication consent       Pharmacy advised by    [x]Fax  []Phone call  []Secure Chat       Approval letter scanned into Media Yes

## 2025-06-05 NOTE — TELEPHONE ENCOUNTER
I reviewed the chart for this patient.  The PA team did do everything they could to get this approved.  Unfortunately there was no documentation stating why the patient stayed on the same dose for 2 months.    PA team did reach out to the insurance and they requested to speak with the provider directly regarding this.   PA team did not refuse to speak with the insurance.  It is documented the insurance was requesting to speak directly with the provider for clarification.

## 2025-06-09 ENCOUNTER — TELEPHONE (OUTPATIENT)
Age: 30
End: 2025-06-09

## 2025-06-09 NOTE — TELEPHONE ENCOUNTER
Left voice mail message for patient advising that Dr. Victoria will be leaving the network and will not be in at the time current appt is scheduled. Advised on the message for patient to call back and schedule with an advanced practitioner. There are appointments still available for the same week but these templates will be released on Friday. Requested patient to call back before 9/13/25 to be able to accommodate same week appt.

## 2025-06-18 ENCOUNTER — OFFICE VISIT (OUTPATIENT)
Dept: FAMILY MEDICINE CLINIC | Facility: CLINIC | Age: 30
End: 2025-06-18
Payer: COMMERCIAL

## 2025-06-18 ENCOUNTER — PROCEDURE VISIT (OUTPATIENT)
Age: 30
End: 2025-06-18
Payer: COMMERCIAL

## 2025-06-18 VITALS
TEMPERATURE: 98.2 F | DIASTOLIC BLOOD PRESSURE: 78 MMHG | OXYGEN SATURATION: 98 % | BODY MASS INDEX: 45.32 KG/M2 | HEIGHT: 66 IN | WEIGHT: 282 LBS | HEART RATE: 94 BPM | SYSTOLIC BLOOD PRESSURE: 122 MMHG

## 2025-06-18 VITALS
RESPIRATION RATE: 14 BRPM | DIASTOLIC BLOOD PRESSURE: 80 MMHG | HEIGHT: 66 IN | WEIGHT: 283.2 LBS | BODY MASS INDEX: 45.51 KG/M2 | OXYGEN SATURATION: 98 % | TEMPERATURE: 97 F | SYSTOLIC BLOOD PRESSURE: 110 MMHG | HEART RATE: 80 BPM

## 2025-06-18 DIAGNOSIS — L91.0 KELOID OF SKIN: ICD-10-CM

## 2025-06-18 DIAGNOSIS — K13.0 CYST OF LIP: Primary | ICD-10-CM

## 2025-06-18 DIAGNOSIS — E66.813 OBESITY, CLASS III, BMI 40-49.9 (MORBID OBESITY): ICD-10-CM

## 2025-06-18 DIAGNOSIS — R63.5 ABNORMAL WEIGHT GAIN: ICD-10-CM

## 2025-06-18 DIAGNOSIS — J02.9 SORE THROAT: Primary | ICD-10-CM

## 2025-06-18 DIAGNOSIS — K64.9 HEMORRHOIDS, UNSPECIFIED HEMORRHOID TYPE: ICD-10-CM

## 2025-06-18 LAB — S PYO AG THROAT QL: NEGATIVE

## 2025-06-18 PROCEDURE — 87070 CULTURE OTHR SPECIMN AEROBIC: CPT

## 2025-06-18 PROCEDURE — 99214 OFFICE O/P EST MOD 30 MIN: CPT

## 2025-06-18 PROCEDURE — 87880 STREP A ASSAY W/OPTIC: CPT

## 2025-06-18 PROCEDURE — 99213 OFFICE O/P EST LOW 20 MIN: CPT | Performed by: STUDENT IN AN ORGANIZED HEALTH CARE EDUCATION/TRAINING PROGRAM

## 2025-06-18 PROCEDURE — 87147 CULTURE TYPE IMMUNOLOGIC: CPT

## 2025-06-18 RX ORDER — HYDROCORTISONE 25 MG/G
CREAM TOPICAL 2 TIMES DAILY
Qty: 28 G | Refills: 0 | Status: SHIPPED | OUTPATIENT
Start: 2025-06-18

## 2025-06-18 RX ORDER — CLINDAMYCIN PHOSPHATE 10 UG/ML
LOTION TOPICAL 2 TIMES DAILY
Qty: 60 ML | Refills: 4 | Status: SHIPPED | OUTPATIENT
Start: 2025-06-18

## 2025-06-18 RX ORDER — TIRZEPATIDE 15 MG/.5ML
15 INJECTION, SOLUTION SUBCUTANEOUS WEEKLY
Qty: 6 ML | Refills: 1 | Status: SHIPPED | OUTPATIENT
Start: 2025-06-18

## 2025-06-18 NOTE — PROGRESS NOTES
Name: Tiffany Jean-Baptiste      : 1995      MRN: [id number]  Encounter Provider: MIREYA Ordoñez  Encounter Date: 2025   Encounter department: [facility] [address]  :  Assessment & Plan  Sore throat  Rapid strep negative will do send out as there is exudate, recommend salt water gargles continue Flonase will call with results  Orders:  •  POCT rapid ANTIGEN strepA  •  Throat culture; Future    Obesity, Class III, BMI 40-49.9 (morbid obesity)  Prior Authorization Clinical Questions for Weight Management Pharmacotherapy    1. Does the patient have a contrainidcation to medication prescribed for weight management?: No  2. Does the patient have a diagnosis of obesity, confirmed by a BMI greater than or equal to 30 kg/m^2?: Yes  3. Does the patient have a BMI of greater than or equal to 27 kg/m^2 with at least one weight-related comorbidity/risk factor/complication (e.g. diabetes, dyslipidemia, coronary artery disease)?: Yes  4. Weight-related co-morbidities/risk factors: prediabetes, dyslipidemia  5. WEGOVY CVA Indication: Does patient have established documented cardiovascular disease (history of a prior heart attack (myocardial infarction), stroke, or symptomatic peripheral arterial disease (PAD)?: N/A  6. ZEPBOUND JESSICA Indication: Does patient have documented JESSICA diagnosed via sleep study (insurance will require copy of sleep study results for approval)?: N/A  7. Has the patient been on a weight loss regimen of low-calorie diet, increased physical activity, and lifestyle modifications for a minimum of 6 months?: Yes  8. Has the patient completed a comprehensive weight loss program (ie, Weight Watchers, Noom, Bariatrics, other roberto on phone)? If so, what?: No  9. Does the patient have a history of type 2 diabetes?: No  10. Has the member tried and failed other weight loss medication within the past 12 months?: No  11. Will the member use requested medication in  combination with another GLP agonist or weight loss drug?: No  12. Is the medication a controlled substance?: No  For renewals: Has the patient had a positive outcome with current weight management medication (i.e., change in body weight of at least 4-5% after 12-16 weeks on maximally tolerated dose)?: Yes     Baseline weight (in pounds): 309 lbs  Current weight (in pounds): 283.2 lbs  Weight loss percentage: -8.35%       Great job with weight loss, goal of losing 25 more pounds is trying to lose weight so she can qualify for breast reduction surgery.  Great job with going to the gym and healthy diet will continue to monitor  Orders:  •  tirzepatide (Zepbound) 15 mg/0.5 mL auto-injector; Inject 0.5 mL (15 mg total) under the skin once a week    Abnormal weight gain  Great job with weight loss, goal of losing 25 more pounds is trying to lose weight so she can qualify for breast reduction surgery.  Great job with going to the gym and healthy diet will continue to monitor  Orders:  •  tirzepatide (Zepbound) 15 mg/0.5 mL auto-injector; Inject 0.5 mL (15 mg total) under the skin once a week    Hemorrhoids, unspecified hemorrhoid type  Reports hemorrhoids since having child, Anusol as needed can follow-up with colorectal if bothersome  Orders:  •  hydrocortisone (ANUSOL-HC) 2.5 % rectal cream; Apply topically 2 (two) times a day  •  Ambulatory Referral to Colorectal Surgery; Future           History of Present Illness   Tiffany is here for sore throat    Sore Throat   Associated symptoms include diarrhea and ear pain. Pertinent negatives include no abdominal pain, congestion, coughing, headaches, shortness of breath or vomiting.   Earache   Associated symptoms include diarrhea and a sore throat. Pertinent negatives include no abdominal pain, coughing, headaches, rash or vomiting.     Review of Systems   Constitutional:  Negative for chills, diaphoresis and fever.   HENT:  Positive for ear pain, sinus pressure and sore  "throat. Negative for congestion and sinus pain.    Respiratory:  Negative for cough, chest tightness and shortness of breath.    Cardiovascular:  Negative for chest pain and palpitations.   Gastrointestinal:  Positive for diarrhea. Negative for abdominal pain, constipation, nausea and vomiting.   Genitourinary:  Negative for frequency and hematuria.   Musculoskeletal:  Negative for arthralgias, back pain and myalgias.   Skin:  Negative for rash.   Neurological:  Negative for dizziness, seizures, syncope, light-headedness and headaches.   All other systems reviewed and are negative.      Objective   /80 (BP Location: Left arm, Cuff Size: Large)   Pulse 80   Temp (!) 97 °F (36.1 °C)   Resp 14   Ht 5' 6\" (1.676 m)   Wt 128 kg (283 lb 3.2 oz)   LMP 05/01/2025 (Exact Date)   SpO2 98%   BMI 45.71 kg/m²      Physical Exam  Vitals and nursing note reviewed.   Constitutional:       General: She is not in acute distress.     Appearance: Normal appearance. She is well-developed. She is not ill-appearing.   HENT:      Head: Normocephalic and atraumatic.      Right Ear: Tympanic membrane, ear canal and external ear normal. No drainage, swelling or tenderness. No middle ear effusion. There is no impacted cerumen. Tympanic membrane is not erythematous.      Left Ear: Tympanic membrane, ear canal and external ear normal. No drainage, swelling or tenderness.  No middle ear effusion. There is no impacted cerumen. Tympanic membrane is not erythematous.      Nose: Nose normal. No congestion.      Mouth/Throat:      Mouth: Mucous membranes are moist.      Pharynx: Pharyngeal swelling, oropharyngeal exudate and posterior oropharyngeal erythema present.     Eyes:      Extraocular Movements: Extraocular movements intact.      Conjunctiva/sclera: Conjunctivae normal.      Pupils: Pupils are equal, round, and reactive to light.       Cardiovascular:      Rate and Rhythm: Normal rate and regular rhythm.      Heart sounds: No " murmur heard.  Pulmonary:      Effort: Pulmonary effort is normal. No respiratory distress.      Breath sounds: Normal breath sounds.   Abdominal:      Palpations: Abdomen is soft.      Tenderness: There is no abdominal tenderness.     Musculoskeletal:         General: No swelling.      Cervical back: Normal range of motion and neck supple.      Right lower leg: No edema.      Left lower leg: No edema.   Lymphadenopathy:      Cervical: No cervical adenopathy.     Skin:     General: Skin is warm and dry.      Capillary Refill: Capillary refill takes less than 2 seconds.     Neurological:      General: No focal deficit present.      Mental Status: She is alert.     Psychiatric:         Mood and Affect: Mood normal.

## 2025-06-18 NOTE — PROGRESS NOTES
"[facility] Dermatology Clinic Note     Patient Name: Tiffany Jean-Baptiste  Encounter Date: 6/8/25       Have you been cared for by a [facility] Dermatologist in the last 3 years and, if so, which description applies to you? Yes. I have been here within the last 3 years, and my medical history has NOT changed since that time. I am not of child-bearing potential.     REVIEW OF SYSTEMS:  Have you recently had or currently have any of the following? No changes in my recent health.   PAST MEDICAL HISTORY:  Have you personally ever had or currently have any of the following?  If \"YES,\" then please provide more detail. No changes in my medical history.   HISTORY OF IMMUNOSUPPRESSION: Do you have a history of any of the following:  Systemic Immunosuppression such as Diabetes, Biologic or Immunotherapy, Chemotherapy, Organ Transplantation, Bone Marrow Transplantation or Prednisone?  No     Answering \"YES\" requires the addition of the dotphrase \"IMMUNOSUPPRESSED\" as the first diagnosis of the patient's visit.   FAMILY HISTORY:  Any \"first degree relatives\" (parent, brother, sister, or child) with the following?    No changes in my family's known health.   PATIENT EXPERIENCE:    Do you want the Dermatologist to perform a COMPLETE skin exam today including a clinical examination under the \"bra and underwear\" areas?  NO  If necessary, do we have your permission to call and leave a detailed message on your Preferred Phone number that includes your specific medical information?  Yes      Allergies[1] Current Medications[2]              Whom besides the patient is providing clinical information about today's encounter?   NO ADDITIONAL HISTORIAN (patient alone provided history)    Physical Exam and Assessment/Plan by Diagnosis:    EPIDERMAL INCLUSION CYST    Physical Exam:  Anatomic Location Affected:  left upper cutaneous lip  Morphological Description:  firm mobile subcutaneous nodule  Pertinent Positives:  Pertinent " Negatives:    Additional History of Present Condition:  patient present for excision, ut cyst is not present on exam.     Assessment and Plan:  Based on a thorough discussion of this condition and the management approach to it (including a comprehensive discussion of the known risks, side effects and potential benefits of treatment), the patient (family) agrees to implement the following specific plan:  Patient will reschedule when cyst is active.    What are epidermal inclusion cysts?  Epidermal inclusion cysts are the most common, benign cutaneous cysts. There are many different names for epidermal inclusion cysts, including epidermoid cyst, epidermal cyst, infundibular cyst, inclusion cyst, and keratin cyst. These cysts can occur anywhere on the body and typically present as nodules directly underneath the skin. There is often a visible pore or opening in the center. The cysts are freely moveable and can range from a few millimeters to several centimeters in diameter. The center of epidermoid cysts almost always contains keratin, which has a cheesy appearance, and not sebum. They also do not originate from sebaceous glands. Therefore, epidermal inclusion cysts are not the same as sebaceous cysts.    Cysts may remain stable or progressively enlarge over time. There are no reliable predictive factors to tell if an epidermal inclusion cyst will enlarge, become inflamed, or remain quiescent. Infected cysts tend to become larger, turn red, and are more noticeable to the patient. There may be accompanying pain and discomfort.     What causes epidermal inclusion cysts?  Epidermal inclusion cysts often appear out of the blue and are not contagious. They are due to a proliferation of epidermal cells within the dermis and are more common in men than women. They occur more frequently in patients in their 20s to 40s. Epidermal inclusion cysts by themselves are usually not inherited, but they can be hereditary in rare  syndromes such as Meyers syndrome, nodular elastosis with cysts and comedones (Favre-Racouchot syndrome), and basal cell nevus syndrome (Gorlin syndrome). Elderly patients with chronic sun-damaged skin areas have a higher likelihood of developing epidermoid cysts. They often occur in areas where hair follicles have been inflamed or repeatedly irritated are more frequent in patients with acne vulgaris. In the  period, they are called milia.     Patients on BRAF inhibitors such as imiquimod and cyclosporine have a higher incidence of epidermoid cysts of the face.    How do we diagnose an epidermal inclusion cyst?  Epidermoid inclusion cysts are often diagnosed by history and physical exam. There is usually no need for biopsy prior to removal.  Radiographic and laboratory exams, such as ultrasound studies, are unnecessary and not typically ordered unless the practitioner suspects a genetic condition.    What is the treatment for an epidermal inclusion cyst?  Inflamed, uninfected epidermal inclusion cysts rarely resolve spontaneously without therapy or surgical intervention. Treatment is not emergent unless desired by the patient.     Definitive treatment is via surgical excision with walls intact. This method will prevent recurrence. This is best done when the cyst is not inflamed, to decrease the probability of rupture during surgery.   A local anesthetic will be injected around the cyst  A small incision is made in the skin overlying the cyst, and contents are expressed  The incision is repaired with sutures    Another option is to use a 4mm punch biopsy with cyst extraction through the defect.    Incision and drainage is often needed if the cyst is infected or inflamed. If there is surrounding cellulitis, oral antibiotic therapy may be necessary. The common agents used target methicillin sensitive Staphylococcal aureus and methicillin resistant S aureus in areas of high prevalence.     KELOID  SCAR    Physical Exam:  Anatomic Location Affected:  upper legs/ thighs, and back  Morphological Description:  scar tissue  Pertinent Positives:  Pertinent Negatives:    Additional History of Present Condition:  present on exam    Assessment and Plan:  Based on a thorough discussion of this condition and the management approach to it (including a comprehensive discussion of the known risks, side effects and potential benefits of treatment), the patient (family) agrees to implement the following specific plan:  Discussed intralesion kenalog which she has had in the past with improvement but will defer at this time.     A keloid scar is a firm, smooth, hard growth due to spontaneous scar formation. It can arise soon after an injury, or develop months later. Keloids may be uncomfortable or itchy and extend well beyond the original wound. They may form on any part of the body, although the upper chest and shoulders are especially prone to them.    The precise reason that wound healing sometimes leads to keloid formation is under investigation but is not yet clear.  While most people never form keloids, others develop them after minor injuries, burns, insect bites and acne spots. Dark skinned people form keloids more easily than Caucasians. A keloid is harmless to general health and does not change into skin cancer.    The following measures are helpful in at least some patients.  Emollients (creams and oils)  Polyurethane or silicone scar reduction patches  Silicone gel  Oral or topical tranilast (an inhibitor of collagen synthesis)  Pressure dressings  Surgical excision (but in keloids, excision may result in a new keloid even larger than the original one)  Intralesional corticosteroid injection, repeated every few weeks  Intralesional 5-fluorouracil  Cryotherapy  Superficial X-ray treatment soon after surgery.  Pulsed dye laser   Skin needling  Subcision    Scar dressings should be worn for 12-24 hours per day, for at  least 8 to 12 weeks, and perhaps for much longer.      Scribe Attestation    I,:  Charlette Olga am acting as a scribe while in the presence of the attending physician.:       I,:  Ilia Victoria, DO personally performed the services described in this documentation    as scribed in my presence.:                    [1]  Allergies  Allergen Reactions   • Doxycycline Rash   [2]    Current Outpatient Medications:   •  Adapalene-Benzoyl Peroxide 0.3-2.5 % GEL, Apply 1 Application topically daily at bedtime, Disp: 45 g, Rfl: 1  •  ammonium lactate (LAC-HYDRIN) 12 % cream, Apply topically as needed for dry skin, Disp: 385 g, Rfl: 2  •  clindamycin (CLEOCIN T) 1 % lotion, Apply topically 2 (two) times a day Apply a thin film 1-2 times daily., Disp: 60 mL, Rfl: 2  •  clindamycin (CLEOCIN T) 1 % lotion, Apply topically 2 (two) times a day, Disp: 60 mL, Rfl: 4  •  clotrimazole-betamethasone (LOTRISONE) 1-0.05 % cream, Apply topically 2 (two) times a day, Disp: 45 g, Rfl: 1  •  fluticasone (FLONASE) 50 mcg/act nasal spray, 1 spray into each nostril daily, Disp: 15.8 mL, Rfl: 1  •  valACYclovir (VALTREX) 500 mg tablet, TAKE 1 TABLET BY MOUTH TWICE A DAY FOR 3 TO 5 DAYS TOTAL FOR HSV OUTBREAK, Disp: , Rfl:   •  ergocalciferol (VITAMIN D2) 50,000 units, Take 1 capsule (50,000 Units total) by mouth once a week (Patient not taking: Reported on 6/18/2025), Disp: 16 capsule, Rfl: 1  •  hydrocortisone (ANUSOL-HC) 2.5 % rectal cream, Apply topically 2 (two) times a day, Disp: 28 g, Rfl: 0  •  tirzepatide (Zepbound) 15 mg/0.5 mL auto-injector, Inject 0.5 mL (15 mg total) under the skin once a week, Disp: 6 mL, Rfl: 1    Current Facility-Administered Medications:   •  cyanocobalamin injection 1,000 mcg, 1,000 mcg, Intramuscular, Q30 Days, , 1,000 mcg at 02/26/25 7323

## 2025-06-18 NOTE — ASSESSMENT & PLAN NOTE
Prior Authorization Clinical Questions for Weight Management Pharmacotherapy    1. Does the patient have a contrainidcation to medication prescribed for weight management?: No  2. Does the patient have a diagnosis of obesity, confirmed by a BMI greater than or equal to 30 kg/m^2?: Yes  3. Does the patient have a BMI of greater than or equal to 27 kg/m^2 with at least one weight-related comorbidity/risk factor/complication (e.g. diabetes, dyslipidemia, coronary artery disease)?: Yes  4. Weight-related co-morbidities/risk factors: prediabetes, dyslipidemia  5. WEGOVY CVA Indication: Does patient have established documented cardiovascular disease (history of a prior heart attack (myocardial infarction), stroke, or symptomatic peripheral arterial disease (PAD)?: N/A  6. ZEPBOUND JESSICA Indication: Does patient have documented JESSICA diagnosed via sleep study (insurance will require copy of sleep study results for approval)?: N/A  7. Has the patient been on a weight loss regimen of low-calorie diet, increased physical activity, and lifestyle modifications for a minimum of 6 months?: Yes  8. Has the patient completed a comprehensive weight loss program (ie, Weight Watchers, Noom, Bariatrics, other roberto on phone)? If so, what?: No  9. Does the patient have a history of type 2 diabetes?: No  10. Has the member tried and failed other weight loss medication within the past 12 months?: No  11. Will the member use requested medication in combination with another GLP agonist or weight loss drug?: No  12. Is the medication a controlled substance?: No  For renewals: Has the patient had a positive outcome with current weight management medication (i.e., change in body weight of at least 4-5% after 12-16 weeks on maximally tolerated dose)?: Yes     Baseline weight (in pounds): 309 lbs  Current weight (in pounds): 283.2 lbs  Weight loss percentage: -8.35%       Great job with weight loss, goal of losing 25 more pounds is trying to lose  weight so she can qualify for breast reduction surgery.  Great job with going to the gym and healthy diet will continue to monitor  Orders:  •  tirzepatide (Zepbound) 15 mg/0.5 mL auto-injector; Inject 0.5 mL (15 mg total) under the skin once a week

## 2025-06-18 NOTE — PATIENT INSTRUCTIONS
KELOID SCAR  Assessment and Plan:  Based on a thorough discussion of this condition and the management approach to it (including a comprehensive discussion of the known risks, side effects and potential benefits of treatment), the patient (family) agrees to implement the following specific plan:  Clindamycin  lotion 1% to affected areas.     A keloid scar is a firm, smooth, hard growth due to spontaneous scar formation. It can arise soon after an injury, or develop months later. Keloids may be uncomfortable or itchy and extend well beyond the original wound. They may form on any part of the body, although the upper chest and shoulders are especially prone to them.    The precise reason that wound healing sometimes leads to keloid formation is under investigation but is not yet clear.  While most people never form keloids, others develop them after minor injuries, burns, insect bites and acne spots. Dark skinned people form keloids more easily than Caucasians. A keloid is harmless to general health and does not change into skin cancer.    The following measures are helpful in at least some patients.  Emollients (creams and oils)  Polyurethane or silicone scar reduction patches  Silicone gel  Oral or topical tranilast (an inhibitor of collagen synthesis)  Pressure dressings  Surgical excision (but in keloids, excision may result in a new keloid even larger than the original one)  Intralesional corticosteroid injection, repeated every few weeks  Intralesional 5-fluorouracil  Cryotherapy  Superficial X-ray treatment soon after surgery.  Pulsed dye laser   Skin needling  Subcision    Scar dressings should be worn for 12-24 hours per day, for at least 8 to 12 weeks, and perhaps for much longer.

## 2025-06-18 NOTE — ASSESSMENT & PLAN NOTE
Great job with weight loss, goal of losing 25 more pounds is trying to lose weight so she can qualify for breast reduction surgery.  Great job with going to the gym and healthy diet will continue to monitor  Orders:  •  tirzepatide (Zepbound) 15 mg/0.5 mL auto-injector; Inject 0.5 mL (15 mg total) under the skin once a week

## 2025-06-21 LAB — BACTERIA THROAT CULT: ABNORMAL

## 2025-08-21 ENCOUNTER — OFFICE VISIT (OUTPATIENT)
Dept: FAMILY MEDICINE CLINIC | Facility: CLINIC | Age: 30
End: 2025-08-21
Payer: COMMERCIAL

## 2025-08-21 VITALS
RESPIRATION RATE: 18 BRPM | WEIGHT: 271 LBS | BODY MASS INDEX: 43.55 KG/M2 | HEIGHT: 66 IN | DIASTOLIC BLOOD PRESSURE: 78 MMHG | HEART RATE: 78 BPM | OXYGEN SATURATION: 99 % | SYSTOLIC BLOOD PRESSURE: 120 MMHG

## 2025-08-21 DIAGNOSIS — R21 RASH: Primary | ICD-10-CM

## 2025-08-21 PROCEDURE — 99213 OFFICE O/P EST LOW 20 MIN: CPT | Performed by: NURSE PRACTITIONER

## 2025-08-21 RX ORDER — CLOTRIMAZOLE AND BETAMETHASONE DIPROPIONATE 10; .64 MG/G; MG/G
CREAM TOPICAL 2 TIMES DAILY
Qty: 45 G | Refills: 1 | Status: SHIPPED | OUTPATIENT
Start: 2025-08-21